# Patient Record
Sex: FEMALE | Race: WHITE | NOT HISPANIC OR LATINO | URBAN - METROPOLITAN AREA
[De-identification: names, ages, dates, MRNs, and addresses within clinical notes are randomized per-mention and may not be internally consistent; named-entity substitution may affect disease eponyms.]

---

## 2019-07-10 ENCOUNTER — EMERGENCY (EMERGENCY)
Facility: HOSPITAL | Age: 59
LOS: 1 days | Discharge: HOME | End: 2019-07-10
Attending: EMERGENCY MEDICINE
Payer: COMMERCIAL

## 2019-07-10 VITALS
TEMPERATURE: 98 F | RESPIRATION RATE: 16 BRPM | HEART RATE: 87 BPM | SYSTOLIC BLOOD PRESSURE: 91 MMHG | WEIGHT: 192.9 LBS | HEIGHT: 72 IN | DIASTOLIC BLOOD PRESSURE: 60 MMHG | OXYGEN SATURATION: 97 %

## 2019-07-10 PROBLEM — Z00.00 ENCOUNTER FOR PREVENTIVE HEALTH EXAMINATION: Status: ACTIVE | Noted: 2019-07-10

## 2019-07-10 PROCEDURE — 99284 EMERGENCY DEPT VISIT MOD MDM: CPT

## 2019-07-10 PROCEDURE — 73110 X-RAY EXAM OF WRIST: CPT | Mod: 26,RT

## 2019-07-10 PROCEDURE — 73090 X-RAY EXAM OF FOREARM: CPT | Mod: 26,RT

## 2019-07-10 RX ORDER — OXYCODONE HYDROCHLORIDE 5 MG/1
5 TABLET ORAL ONCE
Refills: 0 | Status: DISCONTINUED | OUTPATIENT
Start: 2019-07-10 | End: 2019-07-10

## 2019-07-10 RX ORDER — ACETAMINOPHEN 500 MG
975 TABLET ORAL ONCE
Refills: 0 | Status: COMPLETED | OUTPATIENT
Start: 2019-07-10 | End: 2019-07-10

## 2019-07-10 RX ADMIN — OXYCODONE HYDROCHLORIDE 5 MILLIGRAM(S): 5 TABLET ORAL at 21:39

## 2019-07-10 RX ADMIN — Medication 975 MILLIGRAM(S): at 23:21

## 2019-07-10 NOTE — ED ADULT NURSE NOTE - NSIMPLEMENTINTERV_GEN_ALL_ED
Implemented All Universal Safety Interventions:  [place] to call system. Call bell, personal items and telephone within reach. Instruct patient to call for assistance. Room bathroom lighting operational. Non-slip footwear when patient is off stretcher. Physically safe environment: no spills, clutter or unnecessary equipment. Stretcher in lowest position, wheels locked, appropriate side rails in place.

## 2019-07-10 NOTE — ED ADULT NURSE NOTE - CAS DISCH TRANSFER METHOD
Patient Instructions by Dung Castnao MD at 05/01/17 09:52 AM     Author:  Dung Castano MD Service:  (none) Author Type:  Physician     Filed:  05/01/17 09:52 AM Encounter Date:  5/1/2017 Status:  Signed     :  Dung Castano MD (Physician)            PATIENT INFORMATION    Sinus Infection (Sinusitis)    Definition   A sinus infection is a bacterial infection of one of the seven sinuses that normally drain into the nose.  Sinus congestion can occur without an infection if one of the sinus openings becomes blocked from a cold or hay fever.  As bacteria multiply within the sinuses, pain and pressure occur above the eyebrow, behind the eye, or over the cheekbone.  Other symptoms can include a profuse yellow nasal drainage, postnasal drip, a blocked nose, fever and bad breath.  Until recent years, we didn't recognize that a chronic cough can be caused by a sinus infection.  Swallowing sinus secretions is normal and harmless but may lead to some nausea.  Most sinus infections can be diagnosed without sinus x-ray studies.  The following treatment should reduce pain and fever within 48 hours or less.    Home Treatment    Antibiotics This medicine will kill bacteria that are causing the sinus infection.  Try not to forget any of the doses.  If your child goes to school or to a , arrange for someone to give the afternoon dose.  If the medicine is a liquid, use a measuring spoon so you can give the right amount.  Also, an antibiotic should not be saved from one illness to the next because it loses its strength.  Even though your child will feel better in a few days, give all the medicine to prevent the infection from flaring up.    Nasal Washes Use warm water or saline nose drops followed by suction or nose blowing to wash dried mucus or pus out of the nose.  Do nasal washes at least four times a day or whenever your child can't breath through the nose.  If the air in your home is dry, run a  humidifier.    Pain Relief Medication Acetaminophen or ibuprofen can be given for a few days for sinus pain or any fever over 102 F.    Oral Antihistamine If your child also has hay fever, give her allergy medicine.  Otherwise, avoid antihistamines because they can slow down the movement of secretions out of the sinuses.    Contagiousness Sinus infections are not contagious.  Your child can return to school or day care when she is feeling better and the fever is gone.      Call our office Immediately if  · Redness or swelling occurs on the cheek, eyelid, or forehead.  · Your child starts acting very sick.    Call our office within 24 hours if  · The fever or pain is not gone after your child has taken the antibiotic for 48 hours.  · You have other questions or concerns.    Taken from: MICHAEL Bermeo (1999).  Instructions for Pediatric Patients 2nd Edition.      Follow-Up  - If your symptoms worsen, please call.    Additional Educational Resources:  For additional resources regarding your symptoms, diagnosis, or further health information, please visit the Health Resources section on [email or web address] or the Online Health Resources section in [employer].        Revision History        User Key Date/Time User Provider Type Action    > [N/A] 05/01/17 09:52 AM Dung Castano MD Physician Sign             Private car

## 2019-07-10 NOTE — ED ADULT NURSE NOTE - OBJECTIVE STATEMENT
59 year old female A&OX4 presents s/p  trip and fall landing on an outstretched right hand. Patient went to urgent care and had xray performed which showed a right wrist fracture. Patient has sensation present. Limited strength.

## 2019-07-10 NOTE — ED ADULT NURSE REASSESSMENT NOTE - NS ED NURSE REASSESS COMMENT FT1
Report received from BRIDGET BHANDARI Pt resting with family at bedside, c/o pain to R arm. Awaiting radiology results. Safety maintained at all times, bed in lowest position, call bell in reach. Will continue to monitor closely.

## 2019-07-11 VITALS
SYSTOLIC BLOOD PRESSURE: 110 MMHG | TEMPERATURE: 98 F | OXYGEN SATURATION: 100 % | DIASTOLIC BLOOD PRESSURE: 62 MMHG | HEART RATE: 72 BPM | RESPIRATION RATE: 16 BRPM

## 2019-07-11 PROCEDURE — 73110 X-RAY EXAM OF WRIST: CPT

## 2019-07-11 PROCEDURE — 73090 X-RAY EXAM OF FOREARM: CPT

## 2019-07-11 PROCEDURE — 73110 X-RAY EXAM OF WRIST: CPT | Mod: 26,RT

## 2019-07-11 PROCEDURE — 99285 EMERGENCY DEPT VISIT HI MDM: CPT | Mod: 25

## 2019-07-11 PROCEDURE — 25605 CLTX DST RDL FX/EPHYS SEP W/: CPT | Mod: RT

## 2019-07-11 PROCEDURE — 99282 EMERGENCY DEPT VISIT SF MDM: CPT

## 2019-07-11 NOTE — ED PROVIDER NOTE - OBJECTIVE STATEMENT
59 y F h/o aortic stenosis and HTN c R wrist pain after mechanical trip and fall.  Was power washing her house, tripped, RUE FOOSH, no head/neck/chest/back trauma/pain.  Not on AC.  Able to stand afterwards.  Seen at urgent care center, found to have R distal radius fx, splinted there and told to f/u in ED.  Noted to have been hypotensive at urgent care center, states she took an additional tab of losartan earlier in the afternoon because she "felt her blood pressure was high." (stated symptom was facial warmth) but denied SOB/n/v/dizziness, CP, palps.  No paresthesias in RUE.  No pain Rx given at urgent care center, took aleve eaerlier in the day.  Denies ETOH, drug use, +smoking

## 2019-07-11 NOTE — ED PROVIDER NOTE - PHYSICAL EXAMINATION
GENERAL: AAOx4, GCS 15, NAD, no ETOH on breath, WDWN; HEENT: MMM, no jugular venous distension, supple neck, PERRLA, EOMI, nonicteric sclera; PULM: CTA B, no crackles/rubs/rales; CV: RRR, S1S2, no MRG; ABD: Flat abdomen, NTND, no R/G/R, no CVAT.  MSK: RUE -- obvious deformity noted at distal radius after splint taken down, normal skin, tenderness to palpation at site of deformity, distal extremity NVI.  VELIZ, +2 pulses x4;  NEURO: No obvious focal deficits; PSYCH: AAOx3, clear thought and normal sensorium.

## 2019-07-11 NOTE — ED PROVIDER NOTE - CLINICAL SUMMARY MEDICAL DECISION MAKING FREE TEXT BOX
R wrist pain/deformity after mechanical fall from standing height.  No n/v, no other trauma, sent by urgent care center for distal radius fx.  NVI, no breaks in the skin, no skin tenting or crepitation.  Remainder for primary/secondary surveys unremarkable.  Noted to have been hypotensive in urgent care center (80s/60s) but states had taken additional blood pressure medications this afternoon because she felt her bp was high.  110s/70s in ED, mentating and perfusing well.  No clinical intoxication, no AMS as per daughter.  NCAT, supple neck, nontender c/t/l throughout, lungs CTA, abd ntnd, b/l LE, LUE, WNL.   Will give pain Rx, XR, ortho for reduction/splint, reassess.  --BMM

## 2019-07-11 NOTE — CONSULT NOTE ADULT - SUBJECTIVE AND OBJECTIVE BOX
59yFemale presents to [facility] ED c/o severe R wrist pain s/p mechanical fall. Patient denies head hit or LOC. Localizing pain to distal radius. Denies radiation of pain. Pt denies numbness, tingling or burning. L Hand Dominant. Patient denies any other injuries.    PMH: aortic stenosis, HTN    PSH:  denies  AH: denies    Meds: See med rec    T(C): 36.7 (07-11-19 @ 01:32)  HR: 72 (07-11-19 @ 01:32)  BP: 110/62 (07-11-19 @ 01:32)  RR: 16 (07-11-19 @ 01:32)  SpO2: 100% (07-11-19 @ 01:32)  Wt(kg): --    PE R UE:  Skin intact, visible deformity of wrist, + soft tissue swelling, no ecchymosis; Decreased ROM of Wrist 2/2 pain. Normal Elbow/Shoulder ROM w/o pain. + TTP over DR/Ulna. + Rad Pulse 2+/4. SILT C5-T1, +AIN/PIN/Ulnar/Radial/Musc/Median, soft compartments;    L UE / B/L LE:  No bony TTP; Good ROM w/o pain. Able to SLR B/L. Exam Unremarkable.     Imaging:  XRay R Wrist  3 views of R Wrist demonstrates R distal radius fracture, intra-articular w/ posterior displacement of carpus/hand in relation to forearm. No other fx/dislocations noted.       Procedure Note:  After verbal consent obtained, ~ 10 cc of 1% Lidocaine injected into area around DR/Ulna as hematoma block. UE hung by fingers and reduction maneuver performed. Sugartong splint applied to Forearm/Wrist and mold held. LA XR obtained which show improved alignment of R DR Fracture. Pt NVI post procedure. Pt tolerated procedure well.    A/P: 59yFemale s/p Mech Fall w/ R Distal Radius Fracture  - Pain control  - Strict Ice/Elevation  - NWB R UE with splint and sling  - Keep splint clean/dry/intact;  - Encourage active finger motion to help with swelling  - Pt aware of possible need for surgical intervention of distal radius fracture. Will FU as outpatient  - Pt made aware of signs and symptoms of compartment syndrome. Aware of need to contact Doctor / Return to ED if symtoms arise.0  - All Pt's / Family Members questions answered, Pt/family understand plan.  - PATITO w/ Dr. George in 2-3 days.

## 2019-07-24 ENCOUNTER — APPOINTMENT (OUTPATIENT)
Dept: ORTHOPEDIC SURGERY | Facility: CLINIC | Age: 59
End: 2019-07-24
Payer: MEDICARE

## 2019-07-24 VITALS
HEART RATE: 61 BPM | SYSTOLIC BLOOD PRESSURE: 103 MMHG | HEIGHT: 71 IN | BODY MASS INDEX: 27.02 KG/M2 | WEIGHT: 193 LBS | DIASTOLIC BLOOD PRESSURE: 68 MMHG

## 2019-07-24 PROCEDURE — 73110 X-RAY EXAM OF WRIST: CPT | Mod: RT

## 2019-07-24 PROCEDURE — 99203 OFFICE O/P NEW LOW 30 MIN: CPT

## 2019-07-26 NOTE — DISCUSSION/SUMMARY
[de-identified] : AN extensive discussion is had w pt regarding conservative vs operative tx of this fracture.  Th loss of reduction is discussed.  She refuses surgery and wants to continue w the splint.  R/B/A are discussed of both tx modalities. We will see her back in 3 weeks

## 2019-07-26 NOTE — PHYSICAL EXAM
[de-identified] : RUE- IN sugar tong splint.  NVI [de-identified] : 3 views of the R wrist taken today and reviewed by me show a 3 part distal radius fx.  There is some loss of volar tilt from immediate post reduction films due to dorsal comminution.  There is currently neutral volar tilt and restoration of radial height

## 2019-08-21 ENCOUNTER — APPOINTMENT (OUTPATIENT)
Dept: ORTHOPEDIC SURGERY | Facility: CLINIC | Age: 59
End: 2019-08-21
Payer: MEDICARE

## 2019-08-21 DIAGNOSIS — S52.531D COLLES' FRACTURE OF RIGHT RADIUS, SUBSEQUENT ENCOUNTER FOR CLOSED FRACTURE WITH ROUTINE HEALING: ICD-10-CM

## 2019-08-21 PROCEDURE — 73110 X-RAY EXAM OF WRIST: CPT | Mod: RT

## 2019-08-21 PROCEDURE — 99213 OFFICE O/P EST LOW 20 MIN: CPT | Mod: 25

## 2019-08-21 PROCEDURE — 29075 APPL CST ELBW FNGR SHORT ARM: CPT | Mod: RT

## 2019-08-23 PROBLEM — S52.531D CLOSED COLLES' FRACTURE OF RIGHT RADIUS WITH ROUTINE HEALING, SUBSEQUENT ENCOUNTER: Status: ACTIVE | Noted: 2019-07-23

## 2019-09-11 ENCOUNTER — APPOINTMENT (OUTPATIENT)
Dept: ORTHOPEDIC SURGERY | Facility: CLINIC | Age: 59
End: 2019-09-11

## 2021-11-22 NOTE — ED ADULT NURSE NOTE - BREATH SOUNDS, MLM
Received surgical clearance from [facility] for patient to have a Colonoscopy scheduled on 12/20/21.     Upon chart review, clearance in JM last office visit note.   Letter drafted and faxed to [facility], received fax confirmation, scanned into [employer].    Clear

## 2022-05-26 ENCOUNTER — TRANSCRIPTION ENCOUNTER (OUTPATIENT)
Age: 62
End: 2022-05-26

## 2022-05-27 ENCOUNTER — INPATIENT (INPATIENT)
Facility: HOSPITAL | Age: 62
LOS: 3 days | Discharge: HOME | DRG: 158 | End: 2022-05-31
Attending: DENTIST | Admitting: DENTIST
Payer: MEDICARE

## 2022-05-27 ENCOUNTER — TRANSCRIPTION ENCOUNTER (OUTPATIENT)
Age: 62
End: 2022-05-27

## 2022-05-27 VITALS
HEART RATE: 65 BPM | HEIGHT: 72 IN | SYSTOLIC BLOOD PRESSURE: 130 MMHG | TEMPERATURE: 98 F | WEIGHT: 192.02 LBS | OXYGEN SATURATION: 95 % | RESPIRATION RATE: 20 BRPM | DIASTOLIC BLOOD PRESSURE: 62 MMHG

## 2022-05-27 DIAGNOSIS — K12.2 CELLULITIS AND ABSCESS OF MOUTH: ICD-10-CM

## 2022-05-27 LAB
ALBUMIN SERPL ELPH-MCNC: 3.9 G/DL — SIGNIFICANT CHANGE UP (ref 3.3–5)
ALP SERPL-CCNC: 77 U/L — SIGNIFICANT CHANGE UP (ref 40–120)
ALT FLD-CCNC: 12 U/L — SIGNIFICANT CHANGE UP (ref 10–45)
ANION GAP SERPL CALC-SCNC: 11 MMOL/L — SIGNIFICANT CHANGE UP (ref 5–17)
AST SERPL-CCNC: 10 U/L — SIGNIFICANT CHANGE UP (ref 10–40)
BASE EXCESS BLDV CALC-SCNC: 5.4 MMOL/L — HIGH (ref -2–2)
BASOPHILS # BLD AUTO: 0 K/UL — SIGNIFICANT CHANGE UP (ref 0–0.2)
BASOPHILS NFR BLD AUTO: 0 % — SIGNIFICANT CHANGE UP (ref 0–2)
BILIRUB SERPL-MCNC: 0.2 MG/DL — SIGNIFICANT CHANGE UP (ref 0.2–1.2)
BUN SERPL-MCNC: 20 MG/DL — SIGNIFICANT CHANGE UP (ref 7–23)
CA-I SERPL-SCNC: 1.34 MMOL/L — HIGH (ref 1.15–1.33)
CALCIUM SERPL-MCNC: 9.8 MG/DL — SIGNIFICANT CHANGE UP (ref 8.4–10.5)
CHLORIDE BLDV-SCNC: 99 MMOL/L — SIGNIFICANT CHANGE UP (ref 96–108)
CHLORIDE SERPL-SCNC: 97 MMOL/L — SIGNIFICANT CHANGE UP (ref 96–108)
CO2 BLDV-SCNC: 35 MMOL/L — HIGH (ref 22–26)
CO2 SERPL-SCNC: 29 MMOL/L — SIGNIFICANT CHANGE UP (ref 22–31)
CREAT SERPL-MCNC: 0.79 MG/DL — SIGNIFICANT CHANGE UP (ref 0.5–1.3)
EGFR: 85 ML/MIN/1.73M2 — SIGNIFICANT CHANGE UP
EOSINOPHIL # BLD AUTO: 0 K/UL — SIGNIFICANT CHANGE UP (ref 0–0.5)
EOSINOPHIL NFR BLD AUTO: 0 % — SIGNIFICANT CHANGE UP (ref 0–6)
FLUAV AG NPH QL: SIGNIFICANT CHANGE UP
FLUBV AG NPH QL: SIGNIFICANT CHANGE UP
GAS PNL BLDV: 133 MMOL/L — LOW (ref 136–145)
GAS PNL BLDV: SIGNIFICANT CHANGE UP
GAS PNL BLDV: SIGNIFICANT CHANGE UP
GLUCOSE BLDV-MCNC: 100 MG/DL — HIGH (ref 70–99)
GLUCOSE SERPL-MCNC: 101 MG/DL — HIGH (ref 70–99)
HCO3 BLDV-SCNC: 33 MMOL/L — HIGH (ref 22–29)
HCT VFR BLD CALC: 37.5 % — SIGNIFICANT CHANGE UP (ref 34.5–45)
HCT VFR BLDA CALC: 38 % — SIGNIFICANT CHANGE UP (ref 34.5–46.5)
HGB BLD CALC-MCNC: 12.8 G/DL — SIGNIFICANT CHANGE UP (ref 11.7–16.1)
HGB BLD-MCNC: 12.2 G/DL — SIGNIFICANT CHANGE UP (ref 11.5–15.5)
LACTATE BLDV-MCNC: 1.5 MMOL/L — SIGNIFICANT CHANGE UP (ref 0.7–2)
LYMPHOCYTES # BLD AUTO: 18.1 % — SIGNIFICANT CHANGE UP (ref 13–44)
LYMPHOCYTES # BLD AUTO: 2.41 K/UL — SIGNIFICANT CHANGE UP (ref 1–3.3)
MANUAL SMEAR VERIFICATION: SIGNIFICANT CHANGE UP
MCHC RBC-ENTMCNC: 30.9 PG — SIGNIFICANT CHANGE UP (ref 27–34)
MCHC RBC-ENTMCNC: 32.5 GM/DL — SIGNIFICANT CHANGE UP (ref 32–36)
MCV RBC AUTO: 94.9 FL — SIGNIFICANT CHANGE UP (ref 80–100)
MONOCYTES # BLD AUTO: 1.14 K/UL — HIGH (ref 0–0.9)
MONOCYTES NFR BLD AUTO: 8.6 % — SIGNIFICANT CHANGE UP (ref 2–14)
NEUTROPHILS # BLD AUTO: 9.62 K/UL — HIGH (ref 1.8–7.4)
NEUTROPHILS NFR BLD AUTO: 72.4 % — SIGNIFICANT CHANGE UP (ref 43–77)
PCO2 BLDV: 61 MMHG — HIGH (ref 39–42)
PH BLDV: 7.34 — SIGNIFICANT CHANGE UP (ref 7.32–7.43)
PLAT MORPH BLD: NORMAL — SIGNIFICANT CHANGE UP
PLATELET # BLD AUTO: 364 K/UL — SIGNIFICANT CHANGE UP (ref 150–400)
PO2 BLDV: 17 MMHG — LOW (ref 25–45)
POTASSIUM BLDV-SCNC: 4.4 MMOL/L — SIGNIFICANT CHANGE UP (ref 3.5–5.1)
POTASSIUM SERPL-MCNC: 4.1 MMOL/L — SIGNIFICANT CHANGE UP (ref 3.5–5.3)
POTASSIUM SERPL-SCNC: 4.1 MMOL/L — SIGNIFICANT CHANGE UP (ref 3.5–5.3)
PROT SERPL-MCNC: 7.8 G/DL — SIGNIFICANT CHANGE UP (ref 6–8.3)
RBC # BLD: 3.95 M/UL — SIGNIFICANT CHANGE UP (ref 3.8–5.2)
RBC # FLD: 14.2 % — SIGNIFICANT CHANGE UP (ref 10.3–14.5)
RBC BLD AUTO: SIGNIFICANT CHANGE UP
RSV RNA NPH QL NAA+NON-PROBE: SIGNIFICANT CHANGE UP
SAO2 % BLDV: 24.7 % — LOW (ref 67–88)
SARS-COV-2 RNA SPEC QL NAA+PROBE: SIGNIFICANT CHANGE UP
SODIUM SERPL-SCNC: 137 MMOL/L — SIGNIFICANT CHANGE UP (ref 135–145)
VARIANT LYMPHS # BLD: 0.9 % — SIGNIFICANT CHANGE UP (ref 0–6)
WBC # BLD: 13.29 K/UL — HIGH (ref 3.8–10.5)
WBC # FLD AUTO: 13.29 K/UL — HIGH (ref 3.8–10.5)

## 2022-05-27 PROCEDURE — 70491 CT SOFT TISSUE NECK W/DYE: CPT | Mod: 26,MA

## 2022-05-27 PROCEDURE — 99285 EMERGENCY DEPT VISIT HI MDM: CPT

## 2022-05-27 RX ORDER — LOSARTAN POTASSIUM 100 MG/1
100 TABLET, FILM COATED ORAL DAILY
Refills: 0 | Status: DISCONTINUED | OUTPATIENT
Start: 2022-05-28 | End: 2022-05-31

## 2022-05-27 RX ORDER — HYDROMORPHONE HYDROCHLORIDE 2 MG/ML
0.5 INJECTION INTRAMUSCULAR; INTRAVENOUS; SUBCUTANEOUS ONCE
Refills: 0 | Status: DISCONTINUED | OUTPATIENT
Start: 2022-05-27 | End: 2022-05-27

## 2022-05-27 RX ORDER — SODIUM CHLORIDE 9 MG/ML
1000 INJECTION INTRAMUSCULAR; INTRAVENOUS; SUBCUTANEOUS ONCE
Refills: 0 | Status: COMPLETED | OUTPATIENT
Start: 2022-05-27 | End: 2022-05-27

## 2022-05-27 RX ORDER — ONDANSETRON 8 MG/1
4 TABLET, FILM COATED ORAL ONCE
Refills: 0 | Status: DISCONTINUED | OUTPATIENT
Start: 2022-05-27 | End: 2022-05-27

## 2022-05-27 RX ORDER — ACETAMINOPHEN 500 MG
650 TABLET ORAL EVERY 6 HOURS
Refills: 0 | Status: DISCONTINUED | OUTPATIENT
Start: 2022-05-27 | End: 2022-05-27

## 2022-05-27 RX ORDER — SODIUM CHLORIDE 9 MG/ML
1000 INJECTION, SOLUTION INTRAVENOUS
Refills: 0 | Status: DISCONTINUED | OUTPATIENT
Start: 2022-05-27 | End: 2022-05-27

## 2022-05-27 RX ORDER — ENOXAPARIN SODIUM 100 MG/ML
40 INJECTION SUBCUTANEOUS EVERY 24 HOURS
Refills: 0 | Status: DISCONTINUED | OUTPATIENT
Start: 2022-05-28 | End: 2022-05-31

## 2022-05-27 RX ORDER — FENTANYL CITRATE 50 UG/ML
50 INJECTION INTRAVENOUS
Refills: 0 | Status: DISCONTINUED | OUTPATIENT
Start: 2022-05-27 | End: 2022-05-27

## 2022-05-27 RX ORDER — IBUPROFEN 200 MG
600 TABLET ORAL EVERY 6 HOURS
Refills: 0 | Status: DISCONTINUED | OUTPATIENT
Start: 2022-05-27 | End: 2022-05-27

## 2022-05-27 RX ORDER — SODIUM CHLORIDE 9 MG/ML
1000 INJECTION, SOLUTION INTRAVENOUS
Refills: 0 | Status: DISCONTINUED | OUTPATIENT
Start: 2022-05-27 | End: 2022-05-29

## 2022-05-27 RX ORDER — CLONAZEPAM 1 MG
2 TABLET ORAL
Refills: 0 | Status: DISCONTINUED | OUTPATIENT
Start: 2022-05-27 | End: 2022-05-28

## 2022-05-27 RX ORDER — ACETAMINOPHEN 500 MG
1000 TABLET ORAL ONCE
Refills: 0 | Status: COMPLETED | OUTPATIENT
Start: 2022-05-27 | End: 2022-05-27

## 2022-05-27 RX ORDER — AMPICILLIN SODIUM AND SULBACTAM SODIUM 250; 125 MG/ML; MG/ML
3 INJECTION, POWDER, FOR SUSPENSION INTRAMUSCULAR; INTRAVENOUS ONCE
Refills: 0 | Status: COMPLETED | OUTPATIENT
Start: 2022-05-27 | End: 2022-05-27

## 2022-05-27 RX ORDER — VANCOMYCIN HCL 1 G
1000 VIAL (EA) INTRAVENOUS ONCE
Refills: 0 | Status: COMPLETED | OUTPATIENT
Start: 2022-05-27 | End: 2022-05-27

## 2022-05-27 RX ORDER — OXYCODONE HYDROCHLORIDE 5 MG/1
20 TABLET ORAL
Refills: 0 | Status: DISCONTINUED | OUTPATIENT
Start: 2022-05-27 | End: 2022-05-31

## 2022-05-27 RX ORDER — ACETAMINOPHEN 500 MG
650 TABLET ORAL EVERY 6 HOURS
Refills: 0 | Status: DISCONTINUED | OUTPATIENT
Start: 2022-05-27 | End: 2022-05-29

## 2022-05-27 RX ORDER — MORPHINE SULFATE 50 MG/1
2 CAPSULE, EXTENDED RELEASE ORAL ONCE
Refills: 0 | Status: DISCONTINUED | OUTPATIENT
Start: 2022-05-27 | End: 2022-05-27

## 2022-05-27 RX ADMIN — SODIUM CHLORIDE 1000 MILLILITER(S): 9 INJECTION INTRAMUSCULAR; INTRAVENOUS; SUBCUTANEOUS at 12:33

## 2022-05-27 RX ADMIN — SODIUM CHLORIDE 75 MILLILITER(S): 9 INJECTION, SOLUTION INTRAVENOUS at 20:57

## 2022-05-27 RX ADMIN — Medication 1000 MILLIGRAM(S): at 14:30

## 2022-05-27 RX ADMIN — Medication 1000 MILLIGRAM(S): at 13:30

## 2022-05-27 RX ADMIN — AMPICILLIN SODIUM AND SULBACTAM SODIUM 3 GRAM(S): 250; 125 INJECTION, POWDER, FOR SUSPENSION INTRAMUSCULAR; INTRAVENOUS at 12:03

## 2022-05-27 RX ADMIN — SODIUM CHLORIDE 1000 MILLILITER(S): 9 INJECTION INTRAMUSCULAR; INTRAVENOUS; SUBCUTANEOUS at 14:17

## 2022-05-27 RX ADMIN — Medication 400 MILLIGRAM(S): at 14:15

## 2022-05-27 RX ADMIN — SODIUM CHLORIDE 1000 MILLILITER(S): 9 INJECTION INTRAMUSCULAR; INTRAVENOUS; SUBCUTANEOUS at 11:33

## 2022-05-27 RX ADMIN — AMPICILLIN SODIUM AND SULBACTAM SODIUM 200 GRAM(S): 250; 125 INJECTION, POWDER, FOR SUSPENSION INTRAMUSCULAR; INTRAVENOUS at 11:33

## 2022-05-27 RX ADMIN — HYDROMORPHONE HYDROCHLORIDE 0.5 MILLIGRAM(S): 2 INJECTION INTRAMUSCULAR; INTRAVENOUS; SUBCUTANEOUS at 21:08

## 2022-05-27 RX ADMIN — Medication 100 MILLIGRAM(S): at 16:06

## 2022-05-27 RX ADMIN — Medication 1000 MILLIGRAM(S): at 14:34

## 2022-05-27 RX ADMIN — Medication 250 MILLIGRAM(S): at 12:21

## 2022-05-27 RX ADMIN — HYDROMORPHONE HYDROCHLORIDE 0.5 MILLIGRAM(S): 2 INJECTION INTRAMUSCULAR; INTRAVENOUS; SUBCUTANEOUS at 21:30

## 2022-05-27 NOTE — ED ADULT NURSE NOTE - OBJECTIVE STATEMENT
1055 62 yr old WF c/o throat and neck swelling x 10 days since dental procedure, 2 dental implants 10 days ago. Has been on augmentin x 10 days. States allergic to PCN as a child caused  itching and swelling at that time. A&Ox4. No stridor. +Drooling. Lungs clear. denies SOB. States unable to eat all week and difficulty drinking fluids. Denies fever or chills.  evaluated pt. 1055 62 yr old WF c/o pain in teeth, throat and neck swelling x 10 days since dental procedure, 2 dental implants 10 days ago. Has been on augmentin x 10 days. States allergic to PCN as a child caused  itching and swelling at that time. A&Ox4. No stridor. +Drooling. Lungs clear. denies SOB. States unable to eat all week and difficulty drinking fluids. Denies fever or chills. Dr evaluated pt.

## 2022-05-27 NOTE — ED PROVIDER NOTE - NS ED ROS FT
CONST: no fevers, no chills  EYES: no pain, no vision changes  ENT: +submandibular edema, pain  CV: no chest pain, no leg swelling  RESP: +sob  ABD: no abdominal pain, no nausea, no vomiting, no diarrhea  : no dysuria, no flank pain, no hematuria  MSK: no back pain, no extremity pain  NEURO: no headache or additional neurologic complaints  SKIN:  no rash

## 2022-05-27 NOTE — ED ADULT NURSE REASSESSMENT NOTE - NS ED NURSE REASSESS COMMENT FT1
Pt received report @ 1430, pt lying in bed comfortably and denies any pain/discomfort. Pt made aware of plan of care for admission. Pt denies any difficulty breathing. VSS.

## 2022-05-27 NOTE — ED PROVIDER NOTE - ATTENDING CONTRIBUTION TO CARE
Pt s/p dental implants #29 and #31 2wks ago with almost immediate swelling of submandibular region, on antibiotics, currently Augmentin for 1 week, finished steroid pack, persistent pain and swelling.  NO stridor, +swelling submandibular, tender, swollen, clear lungs, RRR.  Spoke to pt's oral surgeon to obtain some of the history (oral surgeon does not come to hospital)

## 2022-05-27 NOTE — H&P ADULT - NSHPPHYSICALEXAM_GEN_ALL_CORE
Vital Signs Last 24 Hrs  T(C): 36.4 (27 May 2022 20:24), Max: 37.9 (27 May 2022 11:21)  T(F): 97.5 (27 May 2022 20:24), Max: 100.3 (27 May 2022 11:21)  HR: 69 (27 May 2022 20:29) (50 - 73)  BP: 140/109 (27 May 2022 20:29) (109/74 - 144/99)  BP(mean): 120 (27 May 2022 20:29) (93 - 120)  RR: 20 (27 May 2022 20:29) (18 - 20)  SpO2: 97% (27 May 2022 20:29) (94% - 99%)      AAOx3, NAD  Card: RRR  RESP: b/l chest rise, LCTAB,   HEENT: right submandibular/ submental edema, soft, warm, tender, (-) trismus  IOE: edentulous right melissa ridge with healing midcrestal incision, running vicryl suture present, mild right FOM edema, MP2, airway patent  Neuro: CN2-12 grossly intact

## 2022-05-27 NOTE — H&P ADULT - ASSESSMENT
Assessment/Plan: 62yF with right sublingual/ submental abscess,10 days s/p placement of implant 29,31 currently stable  -plan for I&D in OR with OMFS  -npo  -clinda  -covid test  -replete labs  -mIVF

## 2022-05-27 NOTE — H&P ADULT - HISTORY OF PRESENT ILLNESS
62F pt PMHx MVP, HTN, HLD presents to [facility] ED with CC of right lower jaw/chin swelling ~8 days. Pt had 2 dental implants placed at right mandible 10 days ago, developed significant swelling 2 days post-op, was placed on course of abx,and PO steroids, no improvement. Currently Reports right sided jaw pain and swelling, denies dyspnea, dysphagia, tolerating secretions, limited PO intake. In ED, WBC 13, HD stable, afebrile.

## 2022-05-27 NOTE — BRIEF OPERATIVE NOTE - OPERATION/FINDINGS
Incision and drainage of submental abscess via skin incision. Placement of 1 penrose drain. Extraction of tooth # 28

## 2022-05-27 NOTE — ED ADULT TRIAGE NOTE - CHIEF COMPLAINT QUOTE
pt states 2 dental implants placed in LR quad placed 5/17 swelling started right away pt was put on Augmentin and steroid pack with no relief of symptoms pt states difficulty swallowing

## 2022-05-27 NOTE — ED PROVIDER NOTE - CLINICAL SUMMARY MEDICAL DECISION MAKING FREE TEXT BOX
61yo female pt who presents to ED for submandibular edema, pain since dental procedure. Has been taking augmentin, medrol packs w no improvement. On exam found with submandibular induration and ttp. Will assess with imaging, labs. Will begin IV abx. Most likely tiara angina

## 2022-05-27 NOTE — ED ADULT NURSE NOTE - IN THE PAST 12 MONTHS HAVE YOU USED DRUGS OTHER THAN THOSE REQUIRED FOR MEDICAL REASON?
Problem: Patient Care Overview  Goal: Plan of Care Review  Outcome: Ongoing (interventions implemented as appropriate)   08/04/18 0256   Coping/Psychosocial   Plan of Care Reviewed With patient   OTHER   Outcome Summary Patient VSS, working on pain management, communicates by writing or mouthing words, up to bedside commode with assist x1   Plan of Care Review   Progress no change     Goal: Individualization and Mutuality  Outcome: Ongoing (interventions implemented as appropriate)    Goal: Discharge Needs Assessment  Outcome: Ongoing (interventions implemented as appropriate)      Problem: Fall Risk (Adult)  Goal: Identify Related Risk Factors and Signs and Symptoms  Outcome: Ongoing (interventions implemented as appropriate)    Goal: Absence of Fall  Outcome: Ongoing (interventions implemented as appropriate)      Problem: Skin Injury Risk (Adult)  Goal: Identify Related Risk Factors and Signs and Symptoms  Outcome: Ongoing (interventions implemented as appropriate)    Goal: Skin Health and Integrity  Outcome: Ongoing (interventions implemented as appropriate)      Problem: Pain, Acute (Adult)  Goal: Identify Related Risk Factors and Signs and Symptoms  Outcome: Ongoing (interventions implemented as appropriate)    Goal: Acceptable Pain Control/Comfort Level  Outcome: Ongoing (interventions implemented as appropriate)      Problem: Nutrition, Enteral (Adult)  Goal: Signs and Symptoms of Listed Potential Problems Will be Absent, Minimized or Managed (Nutrition, Enteral)  Outcome: Ongoing (interventions implemented as appropriate)         No

## 2022-05-27 NOTE — ED PROVIDER NOTE - PHYSICAL EXAMINATION
GENERAL: Awake, alert, NAD  HEENT: NC/AT, moist mucous membranes. no lymphadenopathy palpated. submandibular induration and ttp.  LUNGS: CTAB, no wheezes or crackles   CARDIAC: RRR, no m/r/g  ABDOMEN: Soft, non tender, non distended   BACK: No midline spinal tenderness, no CVA tenderness  EXT: No edema, no calf tenderness  NEURO: A&Ox3. Moving all extremities.

## 2022-05-27 NOTE — H&P ADULT - NSHPREVIEWOFSYSTEMS_GEN_ALL_CORE
*SOCIAL HISTORY: Denies ETOH use, Tobacco, drugs    * Review of Systems: (-) fever, chills,  (-) LOC,  (-) Nausea/vomiting/headache.   (-), SOB, cough.  (-) palpitations. (-) blurred vision/double vision.  (-) dysphagia, dyspnea.  (-) paresthesia.

## 2022-05-27 NOTE — ED PROVIDER NOTE - OBJECTIVE STATEMENT
63yo female pt PMHx MVP, HTN, HLD who presents to ED for worsening submandibular edema, pain since 2 weeks ago. Patient had dental procedure/dental implants removed 2 weeks ago. Has been taking augmenting, motrin and medrol for edema and pain with minimal improvement. Dentist referred patient to ED for Sonido angina suspicion. Denies f/c, n/v/d, abd pain. Does refer worsening pain with swallowing, eating. Experienced some SOB yesterday night.

## 2022-05-28 LAB
ANION GAP SERPL CALC-SCNC: 15 MMOL/L — SIGNIFICANT CHANGE UP (ref 5–17)
BUN SERPL-MCNC: 19 MG/DL — SIGNIFICANT CHANGE UP (ref 7–23)
CALCIUM SERPL-MCNC: 9 MG/DL — SIGNIFICANT CHANGE UP (ref 8.4–10.5)
CHLORIDE SERPL-SCNC: 98 MMOL/L — SIGNIFICANT CHANGE UP (ref 96–108)
CO2 SERPL-SCNC: 23 MMOL/L — SIGNIFICANT CHANGE UP (ref 22–31)
CREAT SERPL-MCNC: 0.83 MG/DL — SIGNIFICANT CHANGE UP (ref 0.5–1.3)
EGFR: 80 ML/MIN/1.73M2 — SIGNIFICANT CHANGE UP
GLUCOSE SERPL-MCNC: 129 MG/DL — HIGH (ref 70–99)
HCT VFR BLD CALC: 33.3 % — LOW (ref 34.5–45)
HCV AB S/CO SERPL IA: 0.16 S/CO — SIGNIFICANT CHANGE UP (ref 0–0.99)
HCV AB SERPL-IMP: SIGNIFICANT CHANGE UP
HGB BLD-MCNC: 10.7 G/DL — LOW (ref 11.5–15.5)
MCHC RBC-ENTMCNC: 30.4 PG — SIGNIFICANT CHANGE UP (ref 27–34)
MCHC RBC-ENTMCNC: 32.1 GM/DL — SIGNIFICANT CHANGE UP (ref 32–36)
MCV RBC AUTO: 94.6 FL — SIGNIFICANT CHANGE UP (ref 80–100)
NRBC # BLD: 0 /100 WBCS — SIGNIFICANT CHANGE UP (ref 0–0)
PLATELET # BLD AUTO: 316 K/UL — SIGNIFICANT CHANGE UP (ref 150–400)
POTASSIUM SERPL-MCNC: 4.3 MMOL/L — SIGNIFICANT CHANGE UP (ref 3.5–5.3)
POTASSIUM SERPL-SCNC: 4.3 MMOL/L — SIGNIFICANT CHANGE UP (ref 3.5–5.3)
RBC # BLD: 3.52 M/UL — LOW (ref 3.8–5.2)
RBC # FLD: 14.3 % — SIGNIFICANT CHANGE UP (ref 10.3–14.5)
SODIUM SERPL-SCNC: 136 MMOL/L — SIGNIFICANT CHANGE UP (ref 135–145)
WBC # BLD: 11.79 K/UL — HIGH (ref 3.8–10.5)
WBC # FLD AUTO: 11.79 K/UL — HIGH (ref 3.8–10.5)

## 2022-05-28 PROCEDURE — 99222 1ST HOSP IP/OBS MODERATE 55: CPT

## 2022-05-28 RX ORDER — AMPICILLIN SODIUM AND SULBACTAM SODIUM 250; 125 MG/ML; MG/ML
3 INJECTION, POWDER, FOR SUSPENSION INTRAMUSCULAR; INTRAVENOUS EVERY 6 HOURS
Refills: 0 | Status: DISCONTINUED | OUTPATIENT
Start: 2022-05-28 | End: 2022-05-31

## 2022-05-28 RX ORDER — AMPICILLIN SODIUM AND SULBACTAM SODIUM 250; 125 MG/ML; MG/ML
INJECTION, POWDER, FOR SUSPENSION INTRAMUSCULAR; INTRAVENOUS
Refills: 0 | Status: DISCONTINUED | OUTPATIENT
Start: 2022-05-28 | End: 2022-05-31

## 2022-05-28 RX ORDER — CLONAZEPAM 1 MG
2 TABLET ORAL THREE TIMES A DAY
Refills: 0 | Status: DISCONTINUED | OUTPATIENT
Start: 2022-05-28 | End: 2022-05-31

## 2022-05-28 RX ORDER — AMPICILLIN SODIUM AND SULBACTAM SODIUM 250; 125 MG/ML; MG/ML
3 INJECTION, POWDER, FOR SUSPENSION INTRAMUSCULAR; INTRAVENOUS ONCE
Refills: 0 | Status: COMPLETED | OUTPATIENT
Start: 2022-05-28 | End: 2022-05-28

## 2022-05-28 RX ADMIN — Medication 2 MILLIGRAM(S): at 15:02

## 2022-05-28 RX ADMIN — OXYCODONE HYDROCHLORIDE 20 MILLIGRAM(S): 5 TABLET ORAL at 23:16

## 2022-05-28 RX ADMIN — OXYCODONE HYDROCHLORIDE 20 MILLIGRAM(S): 5 TABLET ORAL at 12:13

## 2022-05-28 RX ADMIN — AMPICILLIN SODIUM AND SULBACTAM SODIUM 200 GRAM(S): 250; 125 INJECTION, POWDER, FOR SUSPENSION INTRAMUSCULAR; INTRAVENOUS at 12:35

## 2022-05-28 RX ADMIN — OXYCODONE HYDROCHLORIDE 20 MILLIGRAM(S): 5 TABLET ORAL at 18:03

## 2022-05-28 RX ADMIN — OXYCODONE HYDROCHLORIDE 20 MILLIGRAM(S): 5 TABLET ORAL at 02:04

## 2022-05-28 RX ADMIN — OXYCODONE HYDROCHLORIDE 20 MILLIGRAM(S): 5 TABLET ORAL at 17:33

## 2022-05-28 RX ADMIN — AMPICILLIN SODIUM AND SULBACTAM SODIUM 200 GRAM(S): 250; 125 INJECTION, POWDER, FOR SUSPENSION INTRAMUSCULAR; INTRAVENOUS at 17:32

## 2022-05-28 RX ADMIN — AMPICILLIN SODIUM AND SULBACTAM SODIUM 200 GRAM(S): 250; 125 INJECTION, POWDER, FOR SUSPENSION INTRAMUSCULAR; INTRAVENOUS at 23:41

## 2022-05-28 RX ADMIN — ENOXAPARIN SODIUM 40 MILLIGRAM(S): 100 INJECTION SUBCUTANEOUS at 05:48

## 2022-05-28 RX ADMIN — OXYCODONE HYDROCHLORIDE 20 MILLIGRAM(S): 5 TABLET ORAL at 01:34

## 2022-05-28 RX ADMIN — OXYCODONE HYDROCHLORIDE 20 MILLIGRAM(S): 5 TABLET ORAL at 07:56

## 2022-05-28 RX ADMIN — OXYCODONE HYDROCHLORIDE 20 MILLIGRAM(S): 5 TABLET ORAL at 11:43

## 2022-05-28 RX ADMIN — OXYCODONE HYDROCHLORIDE 20 MILLIGRAM(S): 5 TABLET ORAL at 07:26

## 2022-05-28 RX ADMIN — Medication 2 MILLIGRAM(S): at 23:15

## 2022-05-28 RX ADMIN — Medication 2 MILLIGRAM(S): at 08:10

## 2022-05-28 RX ADMIN — Medication 100 MILLIGRAM(S): at 03:44

## 2022-05-28 RX ADMIN — LOSARTAN POTASSIUM 100 MILLIGRAM(S): 100 TABLET, FILM COATED ORAL at 05:48

## 2022-05-28 NOTE — PROVIDER CONTACT NOTE (MEDICATION) - RECOMMENDATIONS
Contact provider, assess patient. Contact provider, assess patient; Patient educated to avoid self-medicating w/ home meds while in hospital; Please notify RN if in pain

## 2022-05-28 NOTE — PROVIDER CONTACT NOTE (OTHER) - SITUATION
Patient Bradycardic in the Hypoxemic, Tele, Pulse Ox, and medication order Patient Bradycardic and hypoxic O2 saturation 80's Pt Bradycardic and hypoxic O2 saturation in 80's

## 2022-05-28 NOTE — CONSULT NOTE ADULT - ATTENDING COMMENTS
62 f with HTN, HLD, MVP, s/p 2 dental implant placement on 5/17 and developed edema and pain 2 days later and was given augmentin and prednisone with no improvement   here T: 100.3, WBC: 13  CT showed large abscess in submandibular area  S/p I&D with penrose drain placement, tooth extraction #28  Abscess Cx grew GPC in pairs and GVRs    fever, leukocytosis, submandibular abscess due to dental infection s/o implant 5/17 but also had another tooth infection s/p I&D and tooth extraction    * f/u the abscess cx  * discontinue clinda and start unasyn  * MRI to r/o osteo    The above assessment and plan was discussed with the primary team    Elizabeth Diaz MD  contact on teams  After 5pm and on weekends call [phone]

## 2022-05-28 NOTE — PROVIDER CONTACT NOTE (OTHER) - ACTION/TREATMENT ORDERED:
Bradycardic in the mid 50's, Pulse ox drops to low 80's, requested order to be placed for Tele and continuous Pulse Ox. Pt denies pain or respiratory distress. Verified medication order with MD. MD Levin made aware. Remote tele/cpox ordered. No other interventions ordered at this time. Will continue to monitor.

## 2022-05-28 NOTE — PATIENT PROFILE ADULT - NSVAPING NUMBER OF YRS_GEN_A_CORE_NU
Anesthesia Consult and Med Hx


Date of service: 03/18/18





- Airway


Anesthetic Teeth Evaluation: Good


ROM Head & Neck: Adequate


Mental/Hyoid Distance: Adequate


Mallampati Class: Class III


Intubation Access Assessment: Possibly Difficult





- Pulmonary Exam


CTA: Yes





- Cardiac Exam


Cardiac Exam: RRR





- Pre-Operative Health Status


ASA Pre-Surgery Classification: ASA2


Proposed Anesthetic Plan: Epidural, Spinal





- Pulmonary


Hx Smoking: No


Hx Asthma: Yes (over 4 months ago)


COPD: No


Hx Pneumonia: No





- Cardiovascular System


Hx Hypertension: No





- Central Nervous System


Hx Seizures: No


Hx Psychiatric Problems: No





- Endocrine


Hx Renal Disease: No


Hx End Stage Renal Disease: No


Hx Hypothyroidism: No


Hx Hyperthyroidism: No





- Hematic


Hx Anemia: Yes


Hx Sickle Cell Disease: No





- Other Systems


Hx Alcohol Use: No
2

## 2022-05-28 NOTE — PROVIDER CONTACT NOTE (OTHER) - RECOMMENDATIONS
Maintain HOB 35-45 degrees. Continuous Pulse ox and Tele, EKG, Make provider aware. have pt on tele/cpox. EKG Make provider aware. Have pt on tele/cpox. EKG

## 2022-05-28 NOTE — CONSULT NOTE ADULT - ASSESSMENT
62F pt PMHx MVP, HTN, HLD presents to [facility] ED with CC of right lower jaw/chin swelling and pain    ID is consulted for submandibular abscess  Had 2 dental implant placed #29 and #31 on 5/17, immediately started having pain and worsening swelling  CT showed large abscess in submandibular area  S/p I&D with penrose drain placement, tooth extraction #28  Low grade fever on admission with leukocytosis  Abscess Cx grew GPC in pairs and GVRs    Source control is achieved with I&D and drain placement  However it is unsure if the implants are infected due to the infected tooth was in close proximity  Will need MRI to rule out underlying OM as it will change the duration of therapy and management      IMPRESSION:  Submandibular abscess  Tooth infection  Leukocytosis    RECOMMENDATIONS:  - D/C clindamycin, start Unasyn 3gm q6hrs for now  - MR maxillofacial w/wo immanuel to rule out OM  - Follow up abscess culture  - ENT follow up  - Trend WBC, fever curve, transaminases, creatinine daily  - Will continue to follow      Patient is seen and examined with attending and case is discussed with primary team      Mandy Lorenzo, DO  PGY4 ID fellow  Please contact me via page or text through [employer] Teams  If after 5PM or on weekends, please call [phone]

## 2022-05-28 NOTE — PROVIDER CONTACT NOTE (MEDICATION) - REASON
Patient took home medication: Advil 200 mg Patient took home medication: Advil 200 mg at 2020 5/28/21

## 2022-05-28 NOTE — PROVIDER CONTACT NOTE (OTHER) - ASSESSMENT
Pt AOx4, pt afebrile, /85. HOB elevated and O2 became 92%. Pt asymptomatic. No signs of respiratory distress. Pt denies chest pain. Pt AOx4, pt afebrile, /85. Elevated HOB O2 became 92%. Pt asymptomatic, no s/s of respiratory distress or chest pain.

## 2022-05-28 NOTE — PROVIDER CONTACT NOTE (MEDICATION) - BACKGROUND
Pt admitted for jaw swelling with pain and decrease oral intake. Pt admitted for jaw swelling with pain and decrease oral intake. S/p I&D submental abscess.

## 2022-05-28 NOTE — PATIENT PROFILE ADULT - FALL HARM RISK - HARM RISK INTERVENTIONS
Assistance with ambulation/Communicate Risk of Fall with Harm to all staff/Monitor gait and stability/Provide patient with walking aids - walker, cane, crutches/Reinforce activity limits and safety measures with patient and family/Review medications for side effects contributing to fall risk/Sit up slowly, dangle for a short time, stand at bedside before walking/Tailored Fall Risk Interventions/Toileting schedule using arm’s reach rule for commode and bathroom/Use of alarms - bed, chair and/or voice tab/Visual Cue: Yellow wristband and red socks/Bed in lowest position, wheels locked, appropriate side rails in place/Call bell, personal items and telephone in reach/Instruct patient to call for assistance before getting out of bed or chair/Non-slip footwear when patient is out of bed/[place] to call system/Physically safe environment - no spills, clutter or unnecessary equipment/Purposeful Proactive Rounding/Room/bathroom lighting operational, light cord in reach

## 2022-05-28 NOTE — CONSULT NOTE ADULT - SUBJECTIVE AND OBJECTIVE BOX
INFECTIOUS DISEASE CONSULT NOTE    Patient is a 62y old  Female who presents with a chief complaint of Submental infection (28 May 2022 08:28)    HPI:  62F pt PMHx MVP, HTN, HLD presents to [facility] ED with CC of right lower jaw/chin swelling ~8 days. Pt had 2 dental implants placed at right mandible 10 days ago, developed significant swelling 2 days post-op, was placed on course of abx,and PO steroids, no improvement. Currently Reports right sided jaw pain and swelling, denies dyspnea, dysphagia, tolerating secretions, limited PO intake. In ED, WBC 13, HD stable, afebrile. (27 May 2022 20:29)       REVIEW OF SYSTEMS:  CONSTITUTIONAL: + fever  HEENT: + jaw pain and swelling  RESPIRATORY: No cough, no shortness of breath  CARDIOVASCULAR: No chest pain or palpitations  GASTROINTESTINAL: No abdominal or epigastric pain  GENITOURINARY: No dysuria  NEUROLOGICAL: No headache/dizziness  MSK: No joint pain, erythema, or swelling; no back pain  SKIN: No itching, rashes  All other ROS negative except noted above    Prior hospital charts reviewed [Yes]  Primary team notes reviewed [Yes]  Other consultant notes reviewed [Yes]    PAST MEDICAL & SURGICAL HISTORY:  HTN (hypertension)      Mitral valve prolapse      HLD (hyperlipidemia)          SOCIAL HISTORY:  - Lives with   - No recent travel  - Denies tobacco use  - Denies alcohol use  - Denies illicit drug use    FAMILY HISTORY:  Father had DM  Mother had MVP    Allergies:  No Known Allergies      ANTIMICROBIALS:  ampicillin/sulbactam  IVPB    ampicillin/sulbactam  IVPB 3 every 6 hours      ANTIMICROBIALS (past 90 days):  MEDICATIONS  (STANDING):  ampicillin/sulbactam  IVPB   200 mL/Hr IV Intermittent (05-27-22 @ 11:33)    ampicillin/sulbactam  IVPB   200 mL/Hr IV Intermittent (05-28-22 @ 12:35)    ampicillin/sulbactam  IVPB   200 mL/Hr IV Intermittent (05-28-22 @ 17:32)    clindamycin IVPB   100 mL/Hr IV Intermittent (05-28-22 @ 03:44)    clindamycin IVPB   100 mL/Hr IV Intermittent (05-27-22 @ 16:06)    vancomycin  IVPB.   250 mL/Hr IV Intermittent (05-27-22 @ 12:21)        OTHER MEDS:   MEDICATIONS  (STANDING):  acetaminophen     Tablet .. 650 every 6 hours PRN  clonazePAM Oral Disintegrating Tablet 2 three times a day PRN  enoxaparin Injectable 40 every 24 hours  losartan 100 daily  oxyCODONE    IR 20 four times a day PRN      VITALS:  Vital Signs Last 24 Hrs  T(F): 97.7 (05-28-22 @ 16:50), Max: 100.3 (05-27-22 @ 11:21)    Vital Signs Last 24 Hrs  HR: 71 (05-28-22 @ 16:50) (50 - 73)  BP: 101/59 (05-28-22 @ 16:50) (90/56 - 151/84)  RR: 18 (05-28-22 @ 16:50)  SpO2: 94% (05-28-22 @ 16:50) (92% - 99%)  Wt(kg): --    EXAM:  GENERAL: NAD, lying in bed comfortably  HEAD: No head lesions  EYES: Conjunctiva pink and cornea white  ENT: Normal external ears and nose, no discharges; moist mucous membranes; swollen jaw and submandibular area. Dressing is clean and intact; tender to palpation  NECK: Supple, nontender to palpation; no JVD  CHEST/LUNG: Clear to auscultation bilaterally  HEART: S1 S2  ABDOMEN: Soft, nontender, nondistended; normoactive bowel sounds  EXTREMITIES: No clubbing, cyanosis, or petal edema  NERVOUS SYSTEM: Alert and oriented to person, time, place and situation, speech clear. No focal deficits   MSK: No joint erythema, swelling or pain  SKIN: No rashes or lesions, no superficial thrombophlebitis    Labs:                        10.7   11.79 )-----------( 316      ( 28 May 2022 07:11 )             33.3     05-28    136  |  98  |  19  ----------------------------<  129<H>  4.3   |  23  |  0.83    Ca    9.0      28 May 2022 07:10    TPro  7.8  /  Alb  3.9  /  TBili  0.2  /  DBili  x   /  AST  10  /  ALT  12  /  AlkPhos  77  05-27      WBC Trend:  WBC Count: 11.79 (05-28-22 @ 07:11)  WBC Count: 13.29 (05-27-22 @ 11:28)      Auto Neutrophil #: 9.62 K/uL (05-27-22 @ 11:28)      Creatine Trend:  Creatinine, Serum: 0.83 (05-28)  Creatinine, Serum: 0.79 (05-27)      Liver Biochemical Testing Trend:  Alanine Aminotransferase (ALT/SGPT): 12 (05-27)  Aspartate Aminotransferase (AST/SGOT): 10 (05-27-22 @ 11:28)  Bilirubin Total, Serum: 0.2 (05-27)      Trend LDH      Auto Eosinophil %: 0.0 % (05-27-22 @ 11:28)          MICROBIOLOGY:        Culture - Tissue with Gram Stain (collected 27 May 2022 22:24)  Source: .Tissue submental abscess    Blood Gas Venous - Lactate: 1.5 (05-27 @ 11:05)        RADIOLOGY:  imaging below personally reviewed    < from: CT Neck Soft Tissue w/ IV Cont (05.27.22 @ 12:09) >  IMPRESSION:  Floor of mouth abscess measuring up to 5.6 cm with surrounding   cellulitis. Correlate with history of recent dental work. Dental implants   noted within the bilateral mandible and periapical lucency involving the   right maxillary premolar tooth.    Reactive cervical lymphadenopathy.    Possible left maxillary sinusitis. Cannot rule out contribution from   maxillary periodontal disease.    < end of copied text >   INFECTIOUS DISEASE CONSULT NOTE    Patient is a 62y old  Female who presents with a chief complaint of Submental infection (28 May 2022 08:28)    HPI:  62F pt PMHx MVP, HTN, HLD presents to [facility] ED with CC of right lower jaw/chin swelling ~8 days. Pt had 2 dental implants placed at right mandible 10 days ago, developed significant swelling 2 days post-op, was placed on course of abx,and PO steroids, no improvement. Currently Reports right sided jaw pain and swelling, denies dyspnea, dysphagia, tolerating secretions, limited PO intake. In ED, WBC 13, HD stable, afebrile. (27 May 2022 20:29)       REVIEW OF SYSTEMS:  CONSTITUTIONAL: + fever  HEENT: + jaw pain and swelling  RESPIRATORY: No cough, no shortness of breath  CARDIOVASCULAR: No chest pain or palpitations  GASTROINTESTINAL: No abdominal or epigastric pain  GENITOURINARY: No dysuria  NEUROLOGICAL: No headache/dizziness  MSK: No joint pain, erythema, or swelling; no back pain  SKIN: No itching, rashes  All other ROS negative except noted above    Prior hospital charts reviewed [Yes]  Primary team notes reviewed [Yes]  Other consultant notes reviewed [Yes]    PAST MEDICAL & SURGICAL HISTORY:  HTN (hypertension)      Mitral valve prolapse      HLD (hyperlipidemia)          SOCIAL HISTORY:  - Lives with   - No recent travel  - Denies tobacco use  - Denies alcohol use  - Denies illicit drug use    FAMILY HISTORY:  Father had DM  Mother had MVP    Allergies:  No Known Allergies      ANTIMICROBIALS:  ampicillin/sulbactam  IVPB    ampicillin/sulbactam  IVPB 3 every 6 hours      ANTIMICROBIALS (past 90 days):  MEDICATIONS  (STANDING):  ampicillin/sulbactam  IVPB   200 mL/Hr IV Intermittent (05-27-22 @ 11:33)    ampicillin/sulbactam  IVPB   200 mL/Hr IV Intermittent (05-28-22 @ 12:35)    ampicillin/sulbactam  IVPB   200 mL/Hr IV Intermittent (05-28-22 @ 17:32)    clindamycin IVPB   100 mL/Hr IV Intermittent (05-28-22 @ 03:44)    clindamycin IVPB   100 mL/Hr IV Intermittent (05-27-22 @ 16:06)    vancomycin  IVPB.   250 mL/Hr IV Intermittent (05-27-22 @ 12:21)        OTHER MEDS:   MEDICATIONS  (STANDING):  acetaminophen     Tablet .. 650 every 6 hours PRN  clonazePAM Oral Disintegrating Tablet 2 three times a day PRN  enoxaparin Injectable 40 every 24 hours  losartan 100 daily  oxyCODONE    IR 20 four times a day PRN      VITALS:  Vital Signs Last 24 Hrs  T(F): 97.7 (05-28-22 @ 16:50), Max: 100.3 (05-27-22 @ 11:21)    Vital Signs Last 24 Hrs  HR: 71 (05-28-22 @ 16:50) (50 - 73)  BP: 101/59 (05-28-22 @ 16:50) (90/56 - 151/84)  RR: 18 (05-28-22 @ 16:50)  SpO2: 94% (05-28-22 @ 16:50) (92% - 99%)  Wt(kg): --    EXAM:  GENERAL: NAD, lying in bed comfortably  HEAD: No head lesions  EYES: Conjunctiva pink and cornea white  ENT: Normal external ears and nose, no discharges; moist mucous membranes; swollen jaw and submandibular area. Dressing is clean and intact; tender to palpation  NECK: Supple, nontender to palpation; no JVD  CHEST/LUNG: Clear to auscultation bilaterally  HEART: S1 S2  ABDOMEN: Soft, nontender, nondistended; normoactive bowel sounds  : no suprapubic or CVA tenderness  EXTREMITIES: No clubbing, cyanosis, or petal edema  NERVOUS SYSTEM: Alert and oriented to person, time, place and situation, speech clear. No focal deficits   MSK: No joint erythema, swelling or pain  SKIN: No rashes or lesions, no superficial thrombophlebitis  Psych: normal affect  Labs:                        10.7   11.79 )-----------( 316      ( 28 May 2022 07:11 )             33.3     05-28    136  |  98  |  19  ----------------------------<  129<H>  4.3   |  23  |  0.83    Ca    9.0      28 May 2022 07:10    TPro  7.8  /  Alb  3.9  /  TBili  0.2  /  DBili  x   /  AST  10  /  ALT  12  /  AlkPhos  77  05-27      WBC Trend:  WBC Count: 11.79 (05-28-22 @ 07:11)  WBC Count: 13.29 (05-27-22 @ 11:28)      Auto Neutrophil #: 9.62 K/uL (05-27-22 @ 11:28)      Creatine Trend:  Creatinine, Serum: 0.83 (05-28)  Creatinine, Serum: 0.79 (05-27)      Liver Biochemical Testing Trend:  Alanine Aminotransferase (ALT/SGPT): 12 (05-27)  Aspartate Aminotransferase (AST/SGOT): 10 (05-27-22 @ 11:28)  Bilirubin Total, Serum: 0.2 (05-27)      Trend LDH      Auto Eosinophil %: 0.0 % (05-27-22 @ 11:28)          MICROBIOLOGY:        Culture - Tissue with Gram Stain (collected 27 May 2022 22:24)  Source: .Tissue submental abscess    Blood Gas Venous - Lactate: 1.5 (05-27 @ 11:05)        RADIOLOGY:  imaging below personally reviewed    < from: CT Neck Soft Tissue w/ IV Cont (05.27.22 @ 12:09) >  IMPRESSION:  Floor of mouth abscess measuring up to 5.6 cm with surrounding   cellulitis. Correlate with history of recent dental work. Dental implants   noted within the bilateral mandible and periapical lucency involving the   right maxillary premolar tooth.    Reactive cervical lymphadenopathy.    Possible left maxillary sinusitis. Cannot rule out contribution from   maxillary periodontal disease.    < end of copied text >

## 2022-05-28 NOTE — PROVIDER CONTACT NOTE (OTHER) - REASON
Patient Bradycardic, Hypoxemic, Tele, Pulse Ox, and medication order Patient Bradycardic and hypoxic O2 saturation 80's Pt bradycardic and hypoxic O2 sat.'s in 80's No

## 2022-05-28 NOTE — PROVIDER CONTACT NOTE (OTHER) - BACKGROUND
Patient admitted for cellulitis and abscess in mouth. PMH HTN, HLD, and mitral valve prolapse. Pt admitted for cellulitis and abscess of mouth. PMH- HTN, HLD, and mitral valve prolapse.

## 2022-05-29 LAB
ANION GAP SERPL CALC-SCNC: 12 MMOL/L — SIGNIFICANT CHANGE UP (ref 5–17)
BUN SERPL-MCNC: 20 MG/DL — SIGNIFICANT CHANGE UP (ref 7–23)
CALCIUM SERPL-MCNC: 9.1 MG/DL — SIGNIFICANT CHANGE UP (ref 8.4–10.5)
CHLORIDE SERPL-SCNC: 100 MMOL/L — SIGNIFICANT CHANGE UP (ref 96–108)
CO2 SERPL-SCNC: 25 MMOL/L — SIGNIFICANT CHANGE UP (ref 22–31)
CREAT SERPL-MCNC: 0.89 MG/DL — SIGNIFICANT CHANGE UP (ref 0.5–1.3)
EGFR: 73 ML/MIN/1.73M2 — SIGNIFICANT CHANGE UP
GLUCOSE SERPL-MCNC: 83 MG/DL — SIGNIFICANT CHANGE UP (ref 70–99)
HCT VFR BLD CALC: 34 % — LOW (ref 34.5–45)
HGB BLD-MCNC: 11 G/DL — LOW (ref 11.5–15.5)
MCHC RBC-ENTMCNC: 31.1 PG — SIGNIFICANT CHANGE UP (ref 27–34)
MCHC RBC-ENTMCNC: 32.4 GM/DL — SIGNIFICANT CHANGE UP (ref 32–36)
MCV RBC AUTO: 96 FL — SIGNIFICANT CHANGE UP (ref 80–100)
NRBC # BLD: 0 /100 WBCS — SIGNIFICANT CHANGE UP (ref 0–0)
PLATELET # BLD AUTO: 321 K/UL — SIGNIFICANT CHANGE UP (ref 150–400)
POTASSIUM SERPL-MCNC: 3.8 MMOL/L — SIGNIFICANT CHANGE UP (ref 3.5–5.3)
POTASSIUM SERPL-SCNC: 3.8 MMOL/L — SIGNIFICANT CHANGE UP (ref 3.5–5.3)
RBC # BLD: 3.54 M/UL — LOW (ref 3.8–5.2)
RBC # FLD: 14.4 % — SIGNIFICANT CHANGE UP (ref 10.3–14.5)
SODIUM SERPL-SCNC: 137 MMOL/L — SIGNIFICANT CHANGE UP (ref 135–145)
WBC # BLD: 7.98 K/UL — SIGNIFICANT CHANGE UP (ref 3.8–10.5)
WBC # FLD AUTO: 7.98 K/UL — SIGNIFICANT CHANGE UP (ref 3.8–10.5)

## 2022-05-29 PROCEDURE — 99232 SBSQ HOSP IP/OBS MODERATE 35: CPT | Mod: GC

## 2022-05-29 PROCEDURE — 70542 MRI ORBIT/FACE/NECK W/DYE: CPT | Mod: 26

## 2022-05-29 RX ORDER — ACETAMINOPHEN 500 MG
650 TABLET ORAL EVERY 6 HOURS
Refills: 0 | Status: DISCONTINUED | OUTPATIENT
Start: 2022-05-29 | End: 2022-05-31

## 2022-05-29 RX ORDER — IBUPROFEN 200 MG
600 TABLET ORAL EVERY 6 HOURS
Refills: 0 | Status: DISCONTINUED | OUTPATIENT
Start: 2022-05-29 | End: 2022-05-31

## 2022-05-29 RX ADMIN — OXYCODONE HYDROCHLORIDE 20 MILLIGRAM(S): 5 TABLET ORAL at 23:45

## 2022-05-29 RX ADMIN — AMPICILLIN SODIUM AND SULBACTAM SODIUM 200 GRAM(S): 250; 125 INJECTION, POWDER, FOR SUSPENSION INTRAMUSCULAR; INTRAVENOUS at 11:25

## 2022-05-29 RX ADMIN — OXYCODONE HYDROCHLORIDE 20 MILLIGRAM(S): 5 TABLET ORAL at 05:04

## 2022-05-29 RX ADMIN — OXYCODONE HYDROCHLORIDE 20 MILLIGRAM(S): 5 TABLET ORAL at 11:25

## 2022-05-29 RX ADMIN — ENOXAPARIN SODIUM 40 MILLIGRAM(S): 100 INJECTION SUBCUTANEOUS at 05:04

## 2022-05-29 RX ADMIN — AMPICILLIN SODIUM AND SULBACTAM SODIUM 200 GRAM(S): 250; 125 INJECTION, POWDER, FOR SUSPENSION INTRAMUSCULAR; INTRAVENOUS at 23:45

## 2022-05-29 RX ADMIN — AMPICILLIN SODIUM AND SULBACTAM SODIUM 200 GRAM(S): 250; 125 INJECTION, POWDER, FOR SUSPENSION INTRAMUSCULAR; INTRAVENOUS at 05:04

## 2022-05-29 RX ADMIN — Medication 650 MILLIGRAM(S): at 23:46

## 2022-05-29 RX ADMIN — OXYCODONE HYDROCHLORIDE 20 MILLIGRAM(S): 5 TABLET ORAL at 00:16

## 2022-05-29 RX ADMIN — Medication 2 MILLIGRAM(S): at 07:39

## 2022-05-29 RX ADMIN — AMPICILLIN SODIUM AND SULBACTAM SODIUM 200 GRAM(S): 250; 125 INJECTION, POWDER, FOR SUSPENSION INTRAMUSCULAR; INTRAVENOUS at 17:22

## 2022-05-29 RX ADMIN — Medication 600 MILLIGRAM(S): at 21:44

## 2022-05-29 RX ADMIN — OXYCODONE HYDROCHLORIDE 20 MILLIGRAM(S): 5 TABLET ORAL at 11:55

## 2022-05-29 RX ADMIN — SODIUM CHLORIDE 75 MILLILITER(S): 9 INJECTION, SOLUTION INTRAVENOUS at 00:00

## 2022-05-29 RX ADMIN — OXYCODONE HYDROCHLORIDE 20 MILLIGRAM(S): 5 TABLET ORAL at 06:04

## 2022-05-29 RX ADMIN — OXYCODONE HYDROCHLORIDE 20 MILLIGRAM(S): 5 TABLET ORAL at 16:45

## 2022-05-29 RX ADMIN — Medication 2 MILLIGRAM(S): at 14:02

## 2022-05-29 RX ADMIN — OXYCODONE HYDROCHLORIDE 20 MILLIGRAM(S): 5 TABLET ORAL at 17:15

## 2022-05-29 NOTE — PROVIDER CONTACT NOTE (CRITICAL VALUE NOTIFICATION) - TEST AND RESULT REPORTED:
Tissue cx from submental abscess 5/27 with "Few Streptococcus anginosus "Susceptibilities not performed""

## 2022-05-30 LAB
ANION GAP SERPL CALC-SCNC: 12 MMOL/L — SIGNIFICANT CHANGE UP (ref 5–17)
BUN SERPL-MCNC: 18 MG/DL — SIGNIFICANT CHANGE UP (ref 7–23)
CALCIUM SERPL-MCNC: 9 MG/DL — SIGNIFICANT CHANGE UP (ref 8.4–10.5)
CHLORIDE SERPL-SCNC: 104 MMOL/L — SIGNIFICANT CHANGE UP (ref 96–108)
CO2 SERPL-SCNC: 24 MMOL/L — SIGNIFICANT CHANGE UP (ref 22–31)
CREAT SERPL-MCNC: 0.81 MG/DL — SIGNIFICANT CHANGE UP (ref 0.5–1.3)
CULTURE RESULTS: SIGNIFICANT CHANGE UP
EGFR: 82 ML/MIN/1.73M2 — SIGNIFICANT CHANGE UP
GLUCOSE SERPL-MCNC: 87 MG/DL — SIGNIFICANT CHANGE UP (ref 70–99)
HCT VFR BLD CALC: 33.1 % — LOW (ref 34.5–45)
HGB BLD-MCNC: 10.4 G/DL — LOW (ref 11.5–15.5)
MCHC RBC-ENTMCNC: 31.1 PG — SIGNIFICANT CHANGE UP (ref 27–34)
MCHC RBC-ENTMCNC: 31.4 GM/DL — LOW (ref 32–36)
MCV RBC AUTO: 99.1 FL — SIGNIFICANT CHANGE UP (ref 80–100)
NRBC # BLD: 0 /100 WBCS — SIGNIFICANT CHANGE UP (ref 0–0)
PLATELET # BLD AUTO: 345 K/UL — SIGNIFICANT CHANGE UP (ref 150–400)
POTASSIUM SERPL-MCNC: 4.1 MMOL/L — SIGNIFICANT CHANGE UP (ref 3.5–5.3)
POTASSIUM SERPL-SCNC: 4.1 MMOL/L — SIGNIFICANT CHANGE UP (ref 3.5–5.3)
RBC # BLD: 3.34 M/UL — LOW (ref 3.8–5.2)
RBC # FLD: 14.7 % — HIGH (ref 10.3–14.5)
SODIUM SERPL-SCNC: 140 MMOL/L — SIGNIFICANT CHANGE UP (ref 135–145)
SPECIMEN SOURCE: SIGNIFICANT CHANGE UP
WBC # BLD: 8.03 K/UL — SIGNIFICANT CHANGE UP (ref 3.8–10.5)
WBC # FLD AUTO: 8.03 K/UL — SIGNIFICANT CHANGE UP (ref 3.8–10.5)

## 2022-05-30 RX ADMIN — AMPICILLIN SODIUM AND SULBACTAM SODIUM 200 GRAM(S): 250; 125 INJECTION, POWDER, FOR SUSPENSION INTRAMUSCULAR; INTRAVENOUS at 16:49

## 2022-05-30 RX ADMIN — OXYCODONE HYDROCHLORIDE 20 MILLIGRAM(S): 5 TABLET ORAL at 11:44

## 2022-05-30 RX ADMIN — OXYCODONE HYDROCHLORIDE 20 MILLIGRAM(S): 5 TABLET ORAL at 05:57

## 2022-05-30 RX ADMIN — Medication 2 MILLIGRAM(S): at 08:28

## 2022-05-30 RX ADMIN — OXYCODONE HYDROCHLORIDE 20 MILLIGRAM(S): 5 TABLET ORAL at 06:45

## 2022-05-30 RX ADMIN — OXYCODONE HYDROCHLORIDE 20 MILLIGRAM(S): 5 TABLET ORAL at 23:19

## 2022-05-30 RX ADMIN — Medication 650 MILLIGRAM(S): at 23:18

## 2022-05-30 RX ADMIN — Medication 650 MILLIGRAM(S): at 12:14

## 2022-05-30 RX ADMIN — Medication 650 MILLIGRAM(S): at 11:45

## 2022-05-30 RX ADMIN — Medication 650 MILLIGRAM(S): at 05:58

## 2022-05-30 RX ADMIN — Medication 650 MILLIGRAM(S): at 00:30

## 2022-05-30 RX ADMIN — AMPICILLIN SODIUM AND SULBACTAM SODIUM 200 GRAM(S): 250; 125 INJECTION, POWDER, FOR SUSPENSION INTRAMUSCULAR; INTRAVENOUS at 05:59

## 2022-05-30 RX ADMIN — OXYCODONE HYDROCHLORIDE 20 MILLIGRAM(S): 5 TABLET ORAL at 17:18

## 2022-05-30 RX ADMIN — ENOXAPARIN SODIUM 40 MILLIGRAM(S): 100 INJECTION SUBCUTANEOUS at 05:58

## 2022-05-30 RX ADMIN — OXYCODONE HYDROCHLORIDE 20 MILLIGRAM(S): 5 TABLET ORAL at 12:14

## 2022-05-30 RX ADMIN — Medication 2 MILLIGRAM(S): at 23:22

## 2022-05-30 RX ADMIN — AMPICILLIN SODIUM AND SULBACTAM SODIUM 200 GRAM(S): 250; 125 INJECTION, POWDER, FOR SUSPENSION INTRAMUSCULAR; INTRAVENOUS at 11:44

## 2022-05-30 RX ADMIN — Medication 650 MILLIGRAM(S): at 06:45

## 2022-05-30 RX ADMIN — OXYCODONE HYDROCHLORIDE 20 MILLIGRAM(S): 5 TABLET ORAL at 16:48

## 2022-05-30 RX ADMIN — Medication 2 MILLIGRAM(S): at 12:59

## 2022-05-30 RX ADMIN — AMPICILLIN SODIUM AND SULBACTAM SODIUM 200 GRAM(S): 250; 125 INJECTION, POWDER, FOR SUSPENSION INTRAMUSCULAR; INTRAVENOUS at 23:20

## 2022-05-30 RX ADMIN — OXYCODONE HYDROCHLORIDE 20 MILLIGRAM(S): 5 TABLET ORAL at 00:30

## 2022-05-30 RX ADMIN — Medication 600 MILLIGRAM(S): at 20:09

## 2022-05-30 RX ADMIN — Medication 2 MILLIGRAM(S): at 00:05

## 2022-05-30 RX ADMIN — Medication 600 MILLIGRAM(S): at 08:36

## 2022-05-31 ENCOUNTER — TRANSCRIPTION ENCOUNTER (OUTPATIENT)
Age: 62
End: 2022-05-31

## 2022-05-31 VITALS
RESPIRATION RATE: 20 BRPM | SYSTOLIC BLOOD PRESSURE: 108 MMHG | TEMPERATURE: 98 F | HEART RATE: 58 BPM | DIASTOLIC BLOOD PRESSURE: 67 MMHG | OXYGEN SATURATION: 96 %

## 2022-05-31 PROCEDURE — 83605 ASSAY OF LACTIC ACID: CPT

## 2022-05-31 PROCEDURE — 86803 HEPATITIS C AB TEST: CPT

## 2022-05-31 PROCEDURE — 80048 BASIC METABOLIC PNL TOTAL CA: CPT

## 2022-05-31 PROCEDURE — 87070 CULTURE OTHR SPECIMN AEROBIC: CPT

## 2022-05-31 PROCEDURE — 70542 MRI ORBIT/FACE/NECK W/DYE: CPT

## 2022-05-31 PROCEDURE — 36415 COLL VENOUS BLD VENIPUNCTURE: CPT

## 2022-05-31 PROCEDURE — C9399: CPT

## 2022-05-31 PROCEDURE — 96365 THER/PROPH/DIAG IV INF INIT: CPT | Mod: XU

## 2022-05-31 PROCEDURE — 87102 FUNGUS ISOLATION CULTURE: CPT

## 2022-05-31 PROCEDURE — 82330 ASSAY OF CALCIUM: CPT

## 2022-05-31 PROCEDURE — 84132 ASSAY OF SERUM POTASSIUM: CPT

## 2022-05-31 PROCEDURE — 87637 SARSCOV2&INF A&B&RSV AMP PRB: CPT

## 2022-05-31 PROCEDURE — 87075 CULTR BACTERIA EXCEPT BLOOD: CPT

## 2022-05-31 PROCEDURE — 85027 COMPLETE CBC AUTOMATED: CPT

## 2022-05-31 PROCEDURE — 82435 ASSAY OF BLOOD CHLORIDE: CPT

## 2022-05-31 PROCEDURE — 82803 BLOOD GASES ANY COMBINATION: CPT

## 2022-05-31 PROCEDURE — 99285 EMERGENCY DEPT VISIT HI MDM: CPT | Mod: 25

## 2022-05-31 PROCEDURE — 96375 TX/PRO/DX INJ NEW DRUG ADDON: CPT

## 2022-05-31 PROCEDURE — 99232 SBSQ HOSP IP/OBS MODERATE 35: CPT

## 2022-05-31 PROCEDURE — 70491 CT SOFT TISSUE NECK W/DYE: CPT | Mod: MA

## 2022-05-31 PROCEDURE — 80053 COMPREHEN METABOLIC PANEL: CPT

## 2022-05-31 PROCEDURE — 82947 ASSAY GLUCOSE BLOOD QUANT: CPT

## 2022-05-31 PROCEDURE — 85018 HEMOGLOBIN: CPT

## 2022-05-31 PROCEDURE — 85014 HEMATOCRIT: CPT

## 2022-05-31 PROCEDURE — 85025 COMPLETE CBC W/AUTO DIFF WBC: CPT

## 2022-05-31 PROCEDURE — 87077 CULTURE AEROBIC IDENTIFY: CPT

## 2022-05-31 PROCEDURE — 96367 TX/PROPH/DG ADDL SEQ IV INF: CPT

## 2022-05-31 PROCEDURE — 84295 ASSAY OF SERUM SODIUM: CPT

## 2022-05-31 PROCEDURE — 96361 HYDRATE IV INFUSION ADD-ON: CPT

## 2022-05-31 RX ORDER — IBUPROFEN 200 MG
1 TABLET ORAL
Qty: 0 | Refills: 0 | DISCHARGE
Start: 2022-05-31

## 2022-05-31 RX ORDER — CLONAZEPAM 1 MG
1 TABLET ORAL
Qty: 0 | Refills: 0 | DISCHARGE
Start: 2022-05-31

## 2022-05-31 RX ORDER — LOSARTAN POTASSIUM 100 MG/1
1 TABLET, FILM COATED ORAL
Qty: 0 | Refills: 0 | DISCHARGE
Start: 2022-05-31

## 2022-05-31 RX ORDER — ACETAMINOPHEN 500 MG
2 TABLET ORAL
Qty: 0 | Refills: 0 | DISCHARGE
Start: 2022-05-31

## 2022-05-31 RX ORDER — OXYCODONE HYDROCHLORIDE 5 MG/1
1 TABLET ORAL
Qty: 0 | Refills: 0 | DISCHARGE
Start: 2022-05-31

## 2022-05-31 RX ORDER — CHLORHEXIDINE GLUCONATE 213 G/1000ML
15 SOLUTION TOPICAL
Qty: 473 | Refills: 0
Start: 2022-05-31 | End: 2022-06-04

## 2022-05-31 RX ADMIN — OXYCODONE HYDROCHLORIDE 20 MILLIGRAM(S): 5 TABLET ORAL at 12:18

## 2022-05-31 RX ADMIN — OXYCODONE HYDROCHLORIDE 20 MILLIGRAM(S): 5 TABLET ORAL at 13:00

## 2022-05-31 RX ADMIN — AMPICILLIN SODIUM AND SULBACTAM SODIUM 200 GRAM(S): 250; 125 INJECTION, POWDER, FOR SUSPENSION INTRAMUSCULAR; INTRAVENOUS at 06:02

## 2022-05-31 RX ADMIN — OXYCODONE HYDROCHLORIDE 20 MILLIGRAM(S): 5 TABLET ORAL at 06:01

## 2022-05-31 RX ADMIN — Medication 2 MILLIGRAM(S): at 08:04

## 2022-05-31 RX ADMIN — OXYCODONE HYDROCHLORIDE 20 MILLIGRAM(S): 5 TABLET ORAL at 06:45

## 2022-05-31 RX ADMIN — Medication 650 MILLIGRAM(S): at 06:45

## 2022-05-31 RX ADMIN — Medication 650 MILLIGRAM(S): at 11:22

## 2022-05-31 RX ADMIN — AMPICILLIN SODIUM AND SULBACTAM SODIUM 200 GRAM(S): 250; 125 INJECTION, POWDER, FOR SUSPENSION INTRAMUSCULAR; INTRAVENOUS at 11:22

## 2022-05-31 RX ADMIN — Medication 600 MILLIGRAM(S): at 09:30

## 2022-05-31 RX ADMIN — Medication 600 MILLIGRAM(S): at 14:33

## 2022-05-31 RX ADMIN — ENOXAPARIN SODIUM 40 MILLIGRAM(S): 100 INJECTION SUBCUTANEOUS at 06:03

## 2022-05-31 RX ADMIN — Medication 650 MILLIGRAM(S): at 00:10

## 2022-05-31 RX ADMIN — OXYCODONE HYDROCHLORIDE 20 MILLIGRAM(S): 5 TABLET ORAL at 00:10

## 2022-05-31 RX ADMIN — Medication 650 MILLIGRAM(S): at 06:02

## 2022-05-31 NOTE — PROGRESS NOTE ADULT - SUBJECTIVE AND OBJECTIVE BOX
Follow Up:  abscess, dental infection    Interval History: no more fever and WBC normalized, abscess cx showing alpha hem strep    ROS:      All other systems negative    Constitutional: no fever, no chills  ENT:  improved submandibular swelling  Cardiovascular:  no chest pain, no palpitation  Respiratory:  no SOB, no cough  GI:  no abd pain, no vomiting, no diarrhea  urinary: no dysuria, no hematuria, no flank pain  musculoskeletal:  no joint pain, no joint swelling  skin:  no rash  neurology:  no headache, no seizure      Allergies  No Known Allergies        ANTIMICROBIALS:  ampicillin/sulbactam  IVPB 3 every 6 hours  ampicillin/sulbactam  IVPB        OTHER MEDS:  acetaminophen     Tablet .. 650 milliGRAM(s) Oral every 6 hours PRN  clonazePAM Oral Disintegrating Tablet 2 milliGRAM(s) Oral three times a day PRN  enoxaparin Injectable 40 milliGRAM(s) SubCutaneous every 24 hours  lactated ringers. 1000 milliLiter(s) IV Continuous <Continuous>  losartan 100 milliGRAM(s) Oral daily  oxyCODONE    IR 20 milliGRAM(s) Oral four times a day PRN      Vital Signs Last 24 Hrs  T(C): 36.4 (29 May 2022 09:03), Max: 36.6 (28 May 2022 13:53)  T(F): 97.6 (29 May 2022 09:03), Max: 97.9 (28 May 2022 13:53)  HR: 54 (29 May 2022 09:03) (52 - 71)  BP: 98/55 (29 May 2022 09:03) (90/56 - 113/75)  BP(mean): --  RR: 16 (29 May 2022 09:03) (16 - 19)  SpO2: 97% (29 May 2022 09:03) (94% - 99%)    Physical Exam:  General:    NAD,  non toxic  ENT, abscess site with improved edema  Cardio:     regular S1, S2  Respiratory:    clear b/l,    no wheezing  abd:     soft,   BS +,   no tenderness  :   no CVAT,  no suprapubic tenderness,   no  joseph  Musculoskeletal:   no joint swelling  vascular: no phlebitis  Skin:    no rash             11.0   7.98  )-----------( 321      ( 29 May 2022 07:19 )             34.0       05-29    137  |  100  |  20  ----------------------------<  83  3.8   |  25  |  0.89    Ca    9.1      29 May 2022 07:19    TPro  7.8  /  Alb  3.9  /  TBili  0.2  /  DBili  x   /  AST  10  /  ALT  12  /  AlkPhos  77  05-27          MICROBIOLOGY:  v  .Surgical Swab submental abscess #2  05-27-22   Few Alpha hemolytic strep "Susceptibilities not performed"  --    Rare polymorphonuclear leukocytes seen per low power field  Few Gram positive cocci in pairs seen per oil power field  Few Gram Variable Rods seen per oil power field                RADIOLOGY:  Images independently visualized and reviewed personally, findings as below  < from: CT Neck Soft Tissue w/ IV Cont (05.27.22 @ 12:09) >  IMPRESSION:  Floor of mouth abscess measuring up to 5.6 cm with surrounding   cellulitis. Correlate with history of recent dental work. Dental implants   noted within the bilateral mandible and periapical lucency involving the   right maxillary premolar tooth.    < end of copied text >  
62F pt PMHx MVP, HTN, HLD presents to [facility] ED with CC of right lower jaw/chin swelling ~8 days. Pt had 2 dental implants placed at right mandible 10 days ago, developed significant swelling 2 days post-op, was placed on course of abx,and PO steroids, no improvement. Currently Reports right sided jaw pain and swelling, denies dyspnea, dysphagia, tolerating secretions, limited PO intake. In ED, WBC 13, HD stable, afebrile.      Interval hx:   5/27: Pt present to [facility] ED comaplining of LR facial swelling. Pt taken to OR for ext #28 and submental space I&D via extra-oral approach, 1x penrose drain placed  5/28: NAEON. Pt feeling subjectively and doing objectively better. WBC downtrending  5/29: NAEON. WBC downtrending. MRI taken.    5/30: BAMBI  5/31: Discharge to home    Subjective: Pt seen resting comfortably. Pain controlled. Tolerating diet, ambulating, voiding voiding w/o issue. Pt states she feels well today    Vital Signs Last 24 Hrs  T(C): 36.6 (31 May 2022 10:39), Max: 36.6 (30 May 2022 21:49)  T(F): 97.8 (31 May 2022 10:39), Max: 97.8 (30 May 2022 21:49)  HR: 58 (31 May 2022 10:39) (51 - 60)  BP: 112/62 (31 May 2022 10:39) (99/66 - 119/75)  BP(mean): --  RR: 18 (31 May 2022 10:39) (16 - 18)  SpO2: 96% (31 May 2022 10:39) (95% - 98%)      05-30-22 @ 07:01  -  05-31-22 @ 07:00  --------------------------------------------------------  IN: 1720 mL / OUT: 0 mL / NET: 1720 mL    05-31-22 @ 07:01  -  05-31-22 @ 11:52  --------------------------------------------------------  IN: 200 mL / OUT: 0 mL / NET: 200 mL          HEENT: Neck incision Hemostatic,  SS output, softer to touch  IOE: BHARGAV ~40mm, occlusion stable, extraction socket hemostatic w/ coagulum in place. FOM non elevated and non tender   LABS:                        10.4   8.03  )-----------( 345      ( 30 May 2022 07:23 )             33.1       05-30    140  |  104  |  18  ----------------------------<  87  4.1   |  24  |  0.81    Ca    9.0      30 May 2022 07:24        
PROGRESS NOTE:   Authored By: Lawrence Correa DMD (Pager [phone])  [place] [facility] [place]ager: [phone] 43I pt PMHx MVP, HTN, HLD presents to [facility] ED with CC of right lower jaw/chin swelling ~8 days. Pt had 2 dental implants placed at right mandible 10 days ago, developed significant swelling 2 days post-op, was placed on course of abx,and PO steroids, no improvement. Currently Reports right sided jaw pain and swelling, denies dyspnea, dysphagia, tolerating secretions, limited PO intake. In ED, WBC 13, HD stable, afebrile.    Interval hx:  - PT taken to OR for I&D submental infection via extra-oral approach, placement of penrose drain, and extraction of tooth #28      OVERNIGHT EVENTS:   - No acute events overnight  - Pt ambulating, voiding, tolerating PO  - Pt resting comfortably in bed, NAD  - Pt denies f/c/ns     Hospital course:   5/27: Pt prewsents to ED w/ facial swelling and is taken to OR for I&D submental infection via extra-oral approach, placement of penrose drain, and extraction of tooth #28. [place] [facility] [place]  5/28: NAEON. Neck swelling and mouth opening showing improvement.     SUBJECTIVE: Pt seen and examined at bedside this morning.     ADDITIONAL REVIEW OF SYSTEMS:  Vital Signs Last 24 Hrs  T(C): 36.7 (28 May 2022 05:09), Max: 37.9 (27 May 2022 11:21)  T(F): 98.1 (28 May 2022 05:09), Max: 100.3 (27 May 2022 11:21)  HR: 53 (28 May 2022 05:09) (50 - 73)  BP: 101/60 (28 May 2022 05:09) (101/60 - 151/84)  BP(mean): 97 (27 May 2022 22:15) (93 - 120)  RR: 18 (28 May 2022 05:09) (15 - 20)  SpO2: 94% (28 May 2022 05:09) (92% - 99%)    MEDICATIONS  (STANDING):  clindamycin IVPB 600 milliGRAM(s) IV Intermittent every 8 hours  enoxaparin Injectable 40 milliGRAM(s) SubCutaneous every 24 hours  lactated ringers. 1000 milliLiter(s) (75 mL/Hr) IV Continuous <Continuous>  losartan 100 milliGRAM(s) Oral daily    MEDICATIONS  (PRN):  acetaminophen     Tablet .. 650 milliGRAM(s) Oral every 6 hours PRN Mild Pain (1 - 3)  clonazePAM Oral Disintegrating Tablet 2 milliGRAM(s) Oral two times a day PRN Anxiety  oxyCODONE    IR 20 milliGRAM(s) Oral four times a day PRN Moderate Pain (4 - 6)        I&O's Summary    27 May 2022 07:01  -  28 May 2022 07:00  --------------------------------------------------------  IN: 955 mL / OUT: 850 mL / NET: 105 mL        PHYSICAL EXAM:  HEENT: Neck incision Hemostatic, penrose drain in place, SS output, softer to touch in comparison to pre-op.   IOE: BHARGAV ~35mm, occlusion stable, extraction socket hemostatic w/ coagulum in place. FOM elevated in tender (improvement from pre-op noted).   LABS:                        10.7   11.79 )-----------( 316      ( 28 May 2022 07:11 )             33.3     05-28    136  |  98  |  19  ----------------------------<  129<H>  4.3   |  23  |  0.83    Ca    9.0      28 May 2022 07:10    TPro  7.8  /  Alb  3.9  /  TBili  0.2  /  DBili  x   /  AST  10  /  ALT  12  /  AlkPhos  77  05-27              Culture - Tissue with Gram Stain (collected 27 May 2022 22:24)  Source: .Tissue submental abscess  Gram Stain (28 May 2022 04:53):    Rare polymorphonuclear leukocytes seen per low power field    Few Gram positive cocci in pairs seen per oil power field    Few Gram Variable Rods seen per oil power field        RADIOLOGY & ADDITIONAL TESTS:    Results Reviewed:   Imaging Personally Reviewed:  Electrocardiogram Personally Reviewed:      
  62F pt PMHx MVP, HTN, HLD presents to [facility] ED with CC of right lower jaw/chin swelling ~8 days. Pt had 2 dental implants placed at right mandible 10 days ago, developed significant swelling 2 days post-op, was placed on course of abx,and PO steroids, no improvement. Currently Reports right sided jaw pain and swelling, denies dyspnea, dysphagia, tolerating secretions, limited PO intake. In ED, WBC 13, HD stable, afebrile.    [id number]: s/p incision and drainage and extraction of tooth # 28.  Pt feels well today. States she feels better than yesterday. Pain controlled   - No acute events overnight  - Pt ambulating, voiding, tolerating PO  - Pt resting comfortably in bed, NAD  - Pt denies f/c/ns       OBJECTIVE:  Vital Signs Last 24 Hrs  T(C): 36.4 (29 May 2022 09:03), Max: 36.6 (28 May 2022 13:53)  T(F): 97.6 (29 May 2022 09:03), Max: 97.9 (28 May 2022 13:53)  HR: 54 (29 May 2022 09:03) (52 - 71)  BP: 98/55 (29 May 2022 09:03) (90/56 - 113/75)  BP(mean): --  RR: 16 (29 May 2022 09:03) (16 - 19)  SpO2: 97% (29 May 2022 09:03) (94% - 99%)      05-28-22 @ 07:01  -  05-29-22 @ 07:00  --------------------------------------------------------  IN: 3960 mL / OUT: 0 mL / NET: 3960 mL      PHYSICAL EXAM:  HEENT: Neck incision Hemostatic, penrose drain in place, SS output, softer to touch  IOE: BHARGAV ~40mm, occlusion stable, extraction socket hemostatic w/ coagulum in place. FOM non elevated and non tender       LABS:                        11.0   7.98  )-----------( 321      ( 29 May 2022 07:19 )             34.0       05-29    137  |  100  |  20  ----------------------------<  83  3.8   |  25  |  0.89    Ca    9.1      29 May 2022 07:19    TPro  7.8  /  Alb  3.9  /  TBili  0.2  /  DBili  x   /  AST  10  /  ALT  12  /  AlkPhos  77  05-27      
  62F pt PMHx MVP, HTN, HLD presents to [facility] ED with CC of right lower jaw/chin swelling ~8 days. Pt had 2 dental implants placed at right mandible 10 days ago, developed significant swelling 2 days post-op, was placed on course of abx,and PO steroids, no improvement. Currently Reports right sided jaw pain and swelling, denies dyspnea, dysphagia, tolerating secretions, limited PO intake. In ED, WBC 13, HD stable, afebrile.    -  s/p incision and drainage and extraction of tooth # 28 (5/27).  Pt feels well today. States she feels better than yesterday. Pain controlled     - MRI completed, waiting on read  - No acute events overnight  - Pt ambulating, voiding, tolerating PO  - Pt resting comfortably in bed, NAD  - Pt denies f/c/ns     Interval hx:   5/27: Pt present to [facility] ED comaplining of LR facial swelling. Pt taken to OR for ext #28 and submental space I&D via extra-oral approach, 1x penrose drain placed  5/28: NAEON. Pt feeling subjectively and doing objectively better. WBC downtrending  5/29: NAEON. WBC downtrending. MRI taken.      OBJECTIVE:  ICU Vital Signs Last 24 Hrs  T(C): 36.3 (30 May 2022 06:31), Max: 36.8 (29 May 2022 21:45)  T(F): 97.4 (30 May 2022 06:31), Max: 98.3 (29 May 2022 21:45)  HR: 50 (30 May 2022 06:31) (50 - 62)  BP: 115/68 (30 May 2022 06:31) (96/58 - 115/68)  BP(mean): --  ABP: --  ABP(mean): --  RR: 18 (30 May 2022 06:31) (17 - 18)  SpO2: 97% (30 May 2022 06:31) (97% - 99%)    I&O's Detail    29 May 2022 07:01  -  30 May 2022 07:00  --------------------------------------------------------  IN:    IV PiggyBack: 200 mL    Lactated Ringers: 1125 mL    Oral Fluid: 1500 mL  Total IN: 2825 mL    OUT:  Total OUT: 0 mL    Total NET: 2825 mL      PHYSICAL EXAM:  HEENT: Neck incision Hemostatic, penrose drain in place, SS output, softer to touch  IOE: BHARGAV ~40mm, occlusion stable, extraction socket hemostatic w/ coagulum in place. FOM non elevated and non tender       LABS:                        11.0   7.98  )-----------( 321      ( 29 May 2022 07:19 )             34.0                         10.4   8.03  )-----------( 345      ( 30 May 2022 07:23 )             33.1         05-29    137  |  100  |  20  ----------------------------<  83  3.8   |  25  |  0.89    Ca    9.1      29 May 2022 07:19    TPro  7.8  /  Alb  3.9  /  TBili  0.2  /  DBili  x   /  AST  10  /  ALT  12  /  AlkPhos  77  05-27 05-30    140  |  104  |  18  ----------------------------<  87  4.1   |  24  |  0.81    Ca    9.0      30 May 2022 07:24        Culture - Tissue with Gram Stain (collected 27 May 2022 22:24)  Source: .Tissue submental abscess  Gram Stain (28 May 2022 04:53):    Rare polymorphonuclear leukocytes seen per low power field    Few Gram positive cocci in pairs seen per oil power field    Few Gram Variable Rods seen per oil power field  Preliminary Report (29 May 2022 12:09):    Few Streptococcus anginosus "Susceptibilities not performed"    Culture - Surgical Swab (collected 27 May 2022 22:24)  Source: .Surgical Swab submental abscess #2  Preliminary Report (29 May 2022 22:57):    Few Alpha hemolytic strep "Susceptibilities not performed"    Numerous Prevotella denticola "Susceptibilities not performed"    Normal mouth amalia isolated        
  Follow Up:  abscess, dental infection    Interval History: no more fever and WBC normalized, MRI showed improved abscess but could not complete exclude osteo due to metallic hardware    ROS:      All other systems negative    Constitutional: no fever, no chills  ENT:  improved submandibular swelling and swallowing  Cardiovascular:  no chest pain, no palpitation  Respiratory:  no SOB, no cough  GI:  no abd pain, no vomiting, no diarrhea  urinary: no dysuria, no hematuria, no flank pain  musculoskeletal:  no joint pain, no joint swelling  skin:  no rash  neurology:  no headache, no seizure          Allergies  No Known Allergies        ANTIMICROBIALS:  ampicillin/sulbactam  IVPB    ampicillin/sulbactam  IVPB 3 every 6 hours      OTHER MEDS:  acetaminophen     Tablet .. 650 milliGRAM(s) Oral every 6 hours  clonazePAM Oral Disintegrating Tablet 2 milliGRAM(s) Oral three times a day PRN  enoxaparin Injectable 40 milliGRAM(s) SubCutaneous every 24 hours  ibuprofen  Tablet. 600 milliGRAM(s) Oral every 6 hours  losartan 100 milliGRAM(s) Oral daily  oxyCODONE    IR 20 milliGRAM(s) Oral four times a day PRN      Vital Signs Last 24 Hrs  T(C): 36.6 (31 May 2022 10:39), Max: 36.6 (30 May 2022 21:49)  T(F): 97.8 (31 May 2022 10:39), Max: 97.8 (30 May 2022 21:49)  HR: 58 (31 May 2022 10:39) (51 - 60)  BP: 112/62 (31 May 2022 10:39) (99/66 - 119/75)  BP(mean): --  RR: 18 (31 May 2022 10:39) (16 - 18)  SpO2: 96% (31 May 2022 10:39) (95% - 98%)    Physical Exam:  General:    NAD,  non toxic  ENT, abscess site with improved edema, no tenderness  Cardio:     regular S1, S2  Respiratory:    clear b/l,    no wheezing  abd:     soft,   BS +,   no tenderness  :   no CVAT,  no suprapubic tenderness,   no  joseph  Musculoskeletal:   no joint swelling  vascular: no phlebitis  Skin:    no rash                        10.4   8.03  )-----------( 345      ( 30 May 2022 07:23 )             33.1       05-30    140  |  104  |  18  ----------------------------<  87  4.1   |  24  |  0.81    Ca    9.0      30 May 2022 07:24            MICROBIOLOGY:  v  .Surgical Swab submental abscess #2  05-27-22   Few Alpha hemolytic strep "Susceptibilities not performed"  Numerous Prevotella denticola "Susceptibilities not performed"  Few Haemophilus parainfluenzae "Susceptibilities not performed"  Normal mouth amalia isolated  --    Rare polymorphonuclear leukocytes seen per low power field  Few Gram positive cocci in pairs seen per oil power field  Few Gram Variable Rods seen per oil power field                RADIOLOGY:  Images independently visualized and reviewed personally, findings as below  < from: MR Orbit, Face, and/or Neck w/Cont (05.29.22 @ 11:42) >  IMPRESSION:    1. Interval decrease in size of a previously seen floor of mouth abscess   when compared to the prior CT exam.    2. Metallic dental hardware confounds the evaluation for osteomyelitis   within the underlying mandible and maxilla secondary to dental   susceptibility artifact. Underlying osteomyelitis cannot be fully   excluded. Consider further evaluation with a nuclear medicine study bone   scan or reevaluation with CT modality.    3. Unchanged reactive bilateral cervical lymphadenopathy.    4. Unchanged imaging findings for which acute left-sided maxillary   sinusitis is raised.      < end of copied text >

## 2022-05-31 NOTE — DISCHARGE NOTE PROVIDER - CARE PROVIDER_API CALL
Fantasma Huang (DDS; MD)  OralMaxillofacial Surgery  [address] [place] [address]  Phone: [phone]  Fax: [phone]  Follow Up Time: 1 week

## 2022-05-31 NOTE — DISCHARGE NOTE NURSING/CASE MANAGEMENT/SOCIAL WORK - PATIENT PORTAL LINK FT
You can access the FollowMyHealth Patient Portal offered by [facility] by registering at the following website: [email or web address]. By joining [facility]’s FollowMyHealth portal, you will also be able to view your health information using other applications (apps) compatible with our system.

## 2022-05-31 NOTE — DISCHARGE NOTE PROVIDER - HOSPITAL COURSE
63 F presented to [facility] w/ facial swelling related to tooth # 28. She went to the OR for incision and drainage of abscess and extraction of tooth # 28.  She then had an MRI done to evaluate her mandibular bone as per ID. She had an uneventful hospital stay and was discharged on 2 weeks of Augmentin on 5/31/22.  She should follow up with FS clinic in 1 week or as needed    At time of discharge, pt was tolerating a regular diet, voiding/stooling spontaneously, ambulating, and pain was well-controlled. Patient and family felt ready for discharge.

## 2022-05-31 NOTE — DISCHARGE NOTE PROVIDER - NSDCMRMEDTOKEN_GEN_ALL_CORE_FT
acetaminophen 325 mg oral tablet: 2 tab(s) orally every 6 hours  amoxicillin-clavulanate 875 mg-125 mg oral tablet: 1 tab(s) orally 2 times a day   clonazePAM 2 mg oral tablet, disintegratin tab(s) orally 3 times a day, As needed, Anxiety  ibuprofen 600 mg oral tablet: 1 tab(s) orally every 6 hours  losartan 100 mg oral tablet: 1 tab(s) orally once a day  oxyCODONE 20 mg oral tablet: 1 tab(s) orally 4 times a day, As needed, Moderate Pain (4 - 6)  Peridex 0.12% mucous membrane liquid: 15 milliliter(s) orally 2 times a day

## 2022-05-31 NOTE — DISCHARGE NOTE PROVIDER - NSDCCPCAREPLAN_GEN_ALL_CORE_FT
PRINCIPAL DISCHARGE DIAGNOSIS  Diagnosis: Submandibular abscess  Assessment and Plan of Treatment: You had an infection related to a tooth. The infection was drained and the tooth was removed.

## 2022-05-31 NOTE — PROGRESS NOTE ADULT - ASSESSMENT
62 f with HTN, HLD, MVP, s/p 2 dental implant placement on 5/17 and developed edema and pain 2 days later and was given augmentin and prednisone with no improvement   here T: 100.3, WBC: 13  CT showed large abscess in submandibular area  S/p I&D with penrose drain placement, tooth extraction #28  Abscess Cx grew  strep anginosis, prevotella, bacteroides, hemophilus parainfluenza  fever, leukocytosis, submandibular abscess due to dental infection s/o implant 5/17 but also had another tooth infection s/p I&D and tooth extraction  MRI could not exclude osteo completely in view of metallic hardware but no apparent osteo  * switch to augmentin 875 bid for a 2 week course  * f/u with OMFS    The above assessment and plan was discussed with the primary team    Elizabeth Diaz MD  contact on teams  After 5pm and on weekends call [phone]    
A/P: 62F [id number]  I&D submental infection via extra-oral approach, placement of penrose drain, and extraction of tooth #28. Pt progressing well    -C/w clindamycin  -F/u ID recs. F/U cultures  - analgesics  - HOB elevated  - Peridex BID  - encourage good oral hygiene  - activity as tolerated   -Pending MRI as per ID
A/P: 62F [id number]  I&D submental infection via extra-oral approach, placement of penrose drain, and extraction of tooth #28. Pt progressing well    - MRI taken; waiting on MRI read  -C/w clindamycin  -F/u ID recs. F/U cultures  - analgesics  - HOB elevated  - Peridex BID  - encourage good oral hygiene  - activity as tolerated   
62F [id number]  I&D submental infection via extra-oral approach, placement of penrose drain, and extraction of tooth #28. Pt progressing well    - MRI taken: ID rec DC on PO abx  -Unasyn --> PO Augmentin on DC  -Penrose drain removed today  -F/u ID recs. F/U cultures  - analgesics  - HOB elevated  - Peridex BID  - encourage good oral hygiene  - activity as tolerated   -Pt cleared for discharge today  
62 f with HTN, HLD, MVP, s/p 2 dental implant placement on 5/17 and developed edema and pain 2 days later and was given augmentin and prednisone with no improvement   here T: 100.3, WBC: 13  CT showed large abscess in submandibular area  S/p I&D with penrose drain placement, tooth extraction #28  Abscess Cx grew GPC in pairs and GVRs    fever, leukocytosis, submandibular abscess due to dental infection s/o implant 5/17 but also had another tooth infection s/p I&D and tooth extraction    * f/u the final abscess cx, for now growing alpha hem strep  * c/w unasyn  * MRI to r/o osteo    The above assessment and plan was discussed with the primary team    Elizabeth Diaz MD  contact on teams  After 5pm and on weekends call [phone]
A/P: 62F s/p  I&D submental infection via extra-oral approach, placement of penrose drain, and extraction of tooth #28. Pt progressing well    - abx  - analgesics  - HOB elevated  - Peridex BID  - encourage good oral hygiene  - activity as tolerated

## 2022-06-25 LAB
CULTURE RESULTS: SIGNIFICANT CHANGE UP
SPECIMEN SOURCE: SIGNIFICANT CHANGE UP

## 2023-09-12 NOTE — ED PROVIDER NOTE - NSFOLLOWUPINSTRUCTIONS_ED_ALL_ED_FT
Follow up with Dr. George in 7-10 days  Motrin 600 mg every 8 hours as needed for pain  Tylenol 500 mg every 6 hours for pain  Take your medications as prescribed  Return to the ER for ANY worsening pain, swelling, numbness, or ANY other concerns
[facility]

## 2023-11-27 NOTE — PATIENT PROFILE ADULT - FUNCTIONAL ASSESSMENT - DAILY ACTIVITY 2.
Call Center TCM Note      Flowsheet Row Responses   [facility] patient discharged from? Non-   Does the patient have one of the following disease processes/diagnoses(primary or secondary)? Other   TCM attempt successful? Yes   Call start time 1001   Call end time 1005   Meds reviewed with patient/caregiver? Yes   Does the patient have all medications ordered at discharge? Yes   Does the patient have an appointment with their PCP within 7-14 days of discharge? No   Nursing Interventions Assisted patient with making appointment per protocol   Has home health visited the patient within 72 hours of discharge? N/A   Psychosocial issues? No   What is the patient's perception of their health status since discharge? Improving   Is the patient/caregiver able to teach back signs and symptoms related to disease process for when to call PCP? Yes   Is the patient/caregiver able to teach back signs and symptoms related to disease process for when to call 911? Yes   Is the patient/caregiver able to teach back the hierarchy of who to call/visit for symptoms/problems? PCP, Specialist, Home health nurse, Urgent Care, ED, 911 Yes   If the patient is a current smoker, are they able to teach back resources for cessation? Not a smoker   TCM call completed? Yes   Call end time 1005   Would this patient benefit from a Referral to Amb Social Work? No   Is the patient interested in additional calls from an ambulatory ? No            Estrellita Davis LPN    11/27/2023, 10:14 EST        
4 = No assist / stand by assistance

## 2024-04-13 ENCOUNTER — INPATIENT (INPATIENT)
Facility: HOSPITAL | Age: 64
LOS: 13 days | Discharge: HOME HEALTH | DRG: 293 | End: 2024-04-27
Attending: THORACIC SURGERY (CARDIOTHORACIC VASCULAR SURGERY) | Admitting: INTERNAL MEDICINE
Payer: MEDICARE

## 2024-04-13 ENCOUNTER — RESULT REVIEW (OUTPATIENT)
Age: 64
End: 2024-04-13

## 2024-04-13 VITALS — OXYGEN SATURATION: 77 % | RESPIRATION RATE: 26 BRPM | HEART RATE: 75 BPM

## 2024-04-13 DIAGNOSIS — R57.0 CARDIOGENIC SHOCK: ICD-10-CM

## 2024-04-13 PROBLEM — E78.5 HYPERLIPIDEMIA, UNSPECIFIED: Chronic | Status: ACTIVE | Noted: 2022-05-27

## 2024-04-13 PROBLEM — I34.1 NONRHEUMATIC MITRAL (VALVE) PROLAPSE: Chronic | Status: ACTIVE | Noted: 2022-05-27

## 2024-04-13 PROBLEM — I10 ESSENTIAL (PRIMARY) HYPERTENSION: Chronic | Status: ACTIVE | Noted: 2022-05-27

## 2024-04-13 LAB
ALBUMIN SERPL ELPH-MCNC: 3.5 G/DL — SIGNIFICANT CHANGE UP (ref 3.3–5)
ALBUMIN SERPL ELPH-MCNC: 3.9 G/DL — SIGNIFICANT CHANGE UP (ref 3.3–5)
ALP SERPL-CCNC: 142 U/L — HIGH (ref 40–120)
ALP SERPL-CCNC: 161 U/L — HIGH (ref 40–120)
ALT FLD-CCNC: 127 U/L — HIGH (ref 10–45)
ALT FLD-CCNC: 131 U/L — HIGH (ref 10–45)
ANION GAP SERPL CALC-SCNC: 14 MMOL/L — SIGNIFICANT CHANGE UP (ref 5–17)
ANION GAP SERPL CALC-SCNC: 20 MMOL/L — HIGH (ref 5–17)
APTT BLD: 29.7 SEC — SIGNIFICANT CHANGE UP (ref 24.5–35.6)
APTT BLD: 29.9 SEC — SIGNIFICANT CHANGE UP (ref 24.5–35.6)
AST SERPL-CCNC: 89 U/L — HIGH (ref 10–40)
AST SERPL-CCNC: 90 U/L — HIGH (ref 10–40)
BASE EXCESS BLDMV CALC-SCNC: -0.7 MMOL/L — SIGNIFICANT CHANGE UP (ref -3–3)
BASE EXCESS BLDMV CALC-SCNC: -2.4 MMOL/L — SIGNIFICANT CHANGE UP (ref -3–3)
BASE EXCESS BLDV CALC-SCNC: -1.4 MMOL/L — SIGNIFICANT CHANGE UP (ref -2–3)
BASE EXCESS BLDV CALC-SCNC: -17.8 MMOL/L — LOW (ref -2–3)
BASE EXCESS BLDV CALC-SCNC: -3.2 MMOL/L — LOW (ref -2–3)
BASE EXCESS BLDV CALC-SCNC: 0 MMOL/L — SIGNIFICANT CHANGE UP (ref -2–3)
BASOPHILS # BLD AUTO: 0.03 K/UL — SIGNIFICANT CHANGE UP (ref 0–0.2)
BASOPHILS # BLD AUTO: 0.03 K/UL — SIGNIFICANT CHANGE UP (ref 0–0.2)
BASOPHILS NFR BLD AUTO: 0.3 % — SIGNIFICANT CHANGE UP (ref 0–2)
BASOPHILS NFR BLD AUTO: 0.4 % — SIGNIFICANT CHANGE UP (ref 0–2)
BILIRUB SERPL-MCNC: 0.7 MG/DL — SIGNIFICANT CHANGE UP (ref 0.2–1.2)
BILIRUB SERPL-MCNC: 0.8 MG/DL — SIGNIFICANT CHANGE UP (ref 0.2–1.2)
BLD GP AB SCN SERPL QL: NEGATIVE — SIGNIFICANT CHANGE UP
BUN SERPL-MCNC: 41 MG/DL — HIGH (ref 7–23)
BUN SERPL-MCNC: 44 MG/DL — HIGH (ref 7–23)
CA-I SERPL-SCNC: 0.61 MMOL/L — CRITICAL LOW (ref 1.15–1.33)
CA-I SERPL-SCNC: 1.2 MMOL/L — SIGNIFICANT CHANGE UP (ref 1.15–1.33)
CA-I SERPL-SCNC: 1.22 MMOL/L — SIGNIFICANT CHANGE UP (ref 1.15–1.33)
CA-I SERPL-SCNC: 1.24 MMOL/L — SIGNIFICANT CHANGE UP (ref 1.15–1.33)
CALCIUM SERPL-MCNC: 8.9 MG/DL — SIGNIFICANT CHANGE UP (ref 8.4–10.5)
CALCIUM SERPL-MCNC: 9.4 MG/DL — SIGNIFICANT CHANGE UP (ref 8.4–10.5)
CHLORIDE BLDV-SCNC: 100 MMOL/L — SIGNIFICANT CHANGE UP (ref 96–108)
CHLORIDE BLDV-SCNC: 102 MMOL/L — SIGNIFICANT CHANGE UP (ref 96–108)
CHLORIDE BLDV-SCNC: 103 MMOL/L — SIGNIFICANT CHANGE UP (ref 96–108)
CHLORIDE BLDV-SCNC: 123 MMOL/L — HIGH (ref 96–108)
CHLORIDE SERPL-SCNC: 100 MMOL/L — SIGNIFICANT CHANGE UP (ref 96–108)
CHLORIDE SERPL-SCNC: 100 MMOL/L — SIGNIFICANT CHANGE UP (ref 96–108)
CK MB CFR SERPL CALC: 3.7 NG/ML — SIGNIFICANT CHANGE UP (ref 0–3.8)
CK SERPL-CCNC: 99 U/L — SIGNIFICANT CHANGE UP (ref 25–170)
CO2 BLDMV-SCNC: 25 MMOL/L — SIGNIFICANT CHANGE UP (ref 21–29)
CO2 BLDMV-SCNC: 27 MMOL/L — SIGNIFICANT CHANGE UP (ref 21–29)
CO2 BLDV-SCNC: 25 MMOL/L — SIGNIFICANT CHANGE UP (ref 22–26)
CO2 BLDV-SCNC: 26 MMOL/L — SIGNIFICANT CHANGE UP (ref 22–26)
CO2 BLDV-SCNC: 28 MMOL/L — HIGH (ref 22–26)
CO2 BLDV-SCNC: 8 MMOL/L — LOW (ref 22–26)
CO2 SERPL-SCNC: 17 MMOL/L — LOW (ref 22–31)
CO2 SERPL-SCNC: 19 MMOL/L — LOW (ref 22–31)
CREAT SERPL-MCNC: 1.39 MG/DL — HIGH (ref 0.5–1.3)
CREAT SERPL-MCNC: 1.66 MG/DL — HIGH (ref 0.5–1.3)
EGFR: 34 ML/MIN/1.73M2 — LOW
EGFR: 42 ML/MIN/1.73M2 — LOW
EOSINOPHIL # BLD AUTO: 0.07 K/UL — SIGNIFICANT CHANGE UP (ref 0–0.5)
EOSINOPHIL # BLD AUTO: 0.09 K/UL — SIGNIFICANT CHANGE UP (ref 0–0.5)
EOSINOPHIL NFR BLD AUTO: 0.8 % — SIGNIFICANT CHANGE UP (ref 0–6)
EOSINOPHIL NFR BLD AUTO: 1 % — SIGNIFICANT CHANGE UP (ref 0–6)
GAS PNL BLDA: SIGNIFICANT CHANGE UP
GAS PNL BLDMV: SIGNIFICANT CHANGE UP
GAS PNL BLDMV: SIGNIFICANT CHANGE UP
GAS PNL BLDV: 133 MMOL/L — LOW (ref 136–145)
GAS PNL BLDV: 133 MMOL/L — LOW (ref 136–145)
GAS PNL BLDV: 134 MMOL/L — LOW (ref 136–145)
GAS PNL BLDV: 135 MMOL/L — LOW (ref 136–145)
GAS PNL BLDV: SIGNIFICANT CHANGE UP
GLUCOSE BLDV-MCNC: 105 MG/DL — HIGH (ref 70–99)
GLUCOSE BLDV-MCNC: 106 MG/DL — HIGH (ref 70–99)
GLUCOSE BLDV-MCNC: 145 MG/DL — HIGH (ref 70–99)
GLUCOSE BLDV-MCNC: 181 MG/DL — HIGH (ref 70–99)
GLUCOSE SERPL-MCNC: 102 MG/DL — HIGH (ref 70–99)
GLUCOSE SERPL-MCNC: 111 MG/DL — HIGH (ref 70–99)
HCO3 BLDMV-SCNC: 24 MMOL/L — SIGNIFICANT CHANGE UP (ref 20–28)
HCO3 BLDMV-SCNC: 26 MMOL/L — SIGNIFICANT CHANGE UP (ref 20–28)
HCO3 BLDV-SCNC: 23 MMOL/L — SIGNIFICANT CHANGE UP (ref 22–29)
HCO3 BLDV-SCNC: 24 MMOL/L — SIGNIFICANT CHANGE UP (ref 22–29)
HCO3 BLDV-SCNC: 26 MMOL/L — SIGNIFICANT CHANGE UP (ref 22–29)
HCO3 BLDV-SCNC: 8 MMOL/L — CRITICAL LOW (ref 22–29)
HCT VFR BLD CALC: 31.7 % — LOW (ref 34.5–45)
HCT VFR BLD CALC: 33.3 % — LOW (ref 34.5–45)
HCT VFR BLDA CALC: 13 % — CRITICAL LOW (ref 34.5–46.5)
HCT VFR BLDA CALC: 31 % — LOW (ref 34.5–46.5)
HCT VFR BLDA CALC: 31 % — LOW (ref 34.5–46.5)
HCT VFR BLDA CALC: 35 % — SIGNIFICANT CHANGE UP (ref 34.5–46.5)
HGB BLD CALC-MCNC: 10.3 G/DL — LOW (ref 11.7–16.1)
HGB BLD CALC-MCNC: 10.4 G/DL — LOW (ref 11.7–16.1)
HGB BLD CALC-MCNC: 11.8 G/DL — SIGNIFICANT CHANGE UP (ref 11.7–16.1)
HGB BLD CALC-MCNC: 4.4 G/DL — CRITICAL LOW (ref 11.7–16.1)
HGB BLD-MCNC: 10.2 G/DL — LOW (ref 11.5–15.5)
HGB BLD-MCNC: 10.5 G/DL — LOW (ref 11.5–15.5)
HOROWITZ INDEX BLDMV+IHG-RTO: 36 — SIGNIFICANT CHANGE UP
HOROWITZ INDEX BLDMV+IHG-RTO: 36 — SIGNIFICANT CHANGE UP
HOROWITZ INDEX BLDV+IHG-RTO: 36 — SIGNIFICANT CHANGE UP
HOROWITZ INDEX BLDV+IHG-RTO: 36 — SIGNIFICANT CHANGE UP
IMM GRANULOCYTES NFR BLD AUTO: 0.4 % — SIGNIFICANT CHANGE UP (ref 0–0.9)
IMM GRANULOCYTES NFR BLD AUTO: 0.5 % — SIGNIFICANT CHANGE UP (ref 0–0.9)
INR BLD: 1.23 RATIO — HIGH (ref 0.85–1.18)
INR BLD: 1.29 RATIO — HIGH (ref 0.85–1.18)
LACTATE BLDV-MCNC: 0.7 MMOL/L — SIGNIFICANT CHANGE UP (ref 0.5–2)
LACTATE BLDV-MCNC: 0.7 MMOL/L — SIGNIFICANT CHANGE UP (ref 0.5–2)
LACTATE BLDV-MCNC: 1.4 MMOL/L — SIGNIFICANT CHANGE UP (ref 0.5–2)
LACTATE BLDV-MCNC: 2.6 MMOL/L — HIGH (ref 0.5–2)
LYMPHOCYTES # BLD AUTO: 1.81 K/UL — SIGNIFICANT CHANGE UP (ref 1–3.3)
LYMPHOCYTES # BLD AUTO: 2.16 K/UL — SIGNIFICANT CHANGE UP (ref 1–3.3)
LYMPHOCYTES # BLD AUTO: 21.2 % — SIGNIFICANT CHANGE UP (ref 13–44)
LYMPHOCYTES # BLD AUTO: 24.2 % — SIGNIFICANT CHANGE UP (ref 13–44)
MAGNESIUM SERPL-MCNC: 2.4 MG/DL — SIGNIFICANT CHANGE UP (ref 1.6–2.6)
MCHC RBC-ENTMCNC: 30.5 PG — SIGNIFICANT CHANGE UP (ref 27–34)
MCHC RBC-ENTMCNC: 30.7 PG — SIGNIFICANT CHANGE UP (ref 27–34)
MCHC RBC-ENTMCNC: 31.5 GM/DL — LOW (ref 32–36)
MCHC RBC-ENTMCNC: 32.2 GM/DL — SIGNIFICANT CHANGE UP (ref 32–36)
MCV RBC AUTO: 95.5 FL — SIGNIFICANT CHANGE UP (ref 80–100)
MCV RBC AUTO: 96.8 FL — SIGNIFICANT CHANGE UP (ref 80–100)
MONOCYTES # BLD AUTO: 0.64 K/UL — SIGNIFICANT CHANGE UP (ref 0–0.9)
MONOCYTES # BLD AUTO: 0.67 K/UL — SIGNIFICANT CHANGE UP (ref 0–0.9)
MONOCYTES NFR BLD AUTO: 7.5 % — SIGNIFICANT CHANGE UP (ref 2–14)
MONOCYTES NFR BLD AUTO: 7.5 % — SIGNIFICANT CHANGE UP (ref 2–14)
NEUTROPHILS # BLD AUTO: 5.93 K/UL — SIGNIFICANT CHANGE UP (ref 1.8–7.4)
NEUTROPHILS # BLD AUTO: 5.95 K/UL — SIGNIFICANT CHANGE UP (ref 1.8–7.4)
NEUTROPHILS NFR BLD AUTO: 66.6 % — SIGNIFICANT CHANGE UP (ref 43–77)
NEUTROPHILS NFR BLD AUTO: 69.6 % — SIGNIFICANT CHANGE UP (ref 43–77)
NRBC # BLD: 0 /100 WBCS — SIGNIFICANT CHANGE UP (ref 0–0)
NRBC # BLD: 0 /100 WBCS — SIGNIFICANT CHANGE UP (ref 0–0)
O2 CT VFR BLD CALC: 32 MMHG — SIGNIFICANT CHANGE UP (ref 30–65)
O2 CT VFR BLD CALC: 35 MMHG — SIGNIFICANT CHANGE UP (ref 30–65)
PCO2 BLDMV: 45 MMHG — SIGNIFICANT CHANGE UP (ref 30–65)
PCO2 BLDMV: 49 MMHG — SIGNIFICANT CHANGE UP (ref 30–65)
PCO2 BLDV: 44 MMHG — HIGH (ref 39–42)
PCO2 BLDV: 46 MMHG — HIGH (ref 39–42)
PCO2 BLDV: 50 MMHG — HIGH (ref 39–42)
PCO2 BLDV: <19 MMHG — LOW (ref 39–42)
PH BLDMV: 7.33 — SIGNIFICANT CHANGE UP (ref 7.32–7.45)
PH BLDMV: 7.33 — SIGNIFICANT CHANGE UP (ref 7.32–7.45)
PH BLDV: 7.28 — LOW (ref 7.32–7.43)
PH BLDV: 7.31 — LOW (ref 7.32–7.43)
PH BLDV: 7.33 — SIGNIFICANT CHANGE UP (ref 7.32–7.43)
PH BLDV: 7.35 — SIGNIFICANT CHANGE UP (ref 7.32–7.43)
PHOSPHATE SERPL-MCNC: 4.7 MG/DL — HIGH (ref 2.5–4.5)
PLATELET # BLD AUTO: 232 K/UL — SIGNIFICANT CHANGE UP (ref 150–400)
PLATELET # BLD AUTO: 274 K/UL — SIGNIFICANT CHANGE UP (ref 150–400)
PO2 BLDV: 15 MMHG — LOW (ref 25–45)
PO2 BLDV: 30 MMHG — SIGNIFICANT CHANGE UP (ref 25–45)
PO2 BLDV: 34 MMHG — SIGNIFICANT CHANGE UP (ref 25–45)
PO2 BLDV: <10 MMHG — LOW (ref 25–45)
POTASSIUM BLDV-SCNC: 1.3 MMOL/L — CRITICAL LOW (ref 3.5–5.1)
POTASSIUM BLDV-SCNC: 3.7 MMOL/L — SIGNIFICANT CHANGE UP (ref 3.5–5.1)
POTASSIUM BLDV-SCNC: 4 MMOL/L — SIGNIFICANT CHANGE UP (ref 3.5–5.1)
POTASSIUM BLDV-SCNC: 4.1 MMOL/L — SIGNIFICANT CHANGE UP (ref 3.5–5.1)
POTASSIUM SERPL-MCNC: 3.8 MMOL/L — SIGNIFICANT CHANGE UP (ref 3.5–5.3)
POTASSIUM SERPL-MCNC: 4.5 MMOL/L — SIGNIFICANT CHANGE UP (ref 3.5–5.3)
POTASSIUM SERPL-SCNC: 3.8 MMOL/L — SIGNIFICANT CHANGE UP (ref 3.5–5.3)
POTASSIUM SERPL-SCNC: 4.5 MMOL/L — SIGNIFICANT CHANGE UP (ref 3.5–5.3)
PROT SERPL-MCNC: 6.8 G/DL — SIGNIFICANT CHANGE UP (ref 6–8.3)
PROT SERPL-MCNC: 7.6 G/DL — SIGNIFICANT CHANGE UP (ref 6–8.3)
PROTHROM AB SERPL-ACNC: 13.4 SEC — HIGH (ref 9.5–13)
PROTHROM AB SERPL-ACNC: 14.1 SEC — HIGH (ref 9.5–13)
RBC # BLD: 3.32 M/UL — LOW (ref 3.8–5.2)
RBC # BLD: 3.44 M/UL — LOW (ref 3.8–5.2)
RBC # FLD: 15.2 % — HIGH (ref 10.3–14.5)
RBC # FLD: 15.4 % — HIGH (ref 10.3–14.5)
RH IG SCN BLD-IMP: NEGATIVE — SIGNIFICANT CHANGE UP
SAO2 % BLDMV: 46.7 — LOW (ref 60–90)
SAO2 % BLDMV: 50.2 — LOW (ref 60–90)
SAO2 % BLDV: 23.6 % — LOW (ref 67–88)
SAO2 % BLDV: 44.9 % — LOW (ref 67–88)
SAO2 % BLDV: 52 % — LOW (ref 67–88)
SAO2 % BLDV: 67.4 % — SIGNIFICANT CHANGE UP (ref 67–88)
SODIUM SERPL-SCNC: 133 MMOL/L — LOW (ref 135–145)
SODIUM SERPL-SCNC: 137 MMOL/L — SIGNIFICANT CHANGE UP (ref 135–145)
TROPONIN T, HIGH SENSITIVITY RESULT: 17 NG/L — SIGNIFICANT CHANGE UP (ref 0–51)
WBC # BLD: 8.52 K/UL — SIGNIFICANT CHANGE UP (ref 3.8–10.5)
WBC # BLD: 8.94 K/UL — SIGNIFICANT CHANGE UP (ref 3.8–10.5)
WBC # FLD AUTO: 8.52 K/UL — SIGNIFICANT CHANGE UP (ref 3.8–10.5)
WBC # FLD AUTO: 8.94 K/UL — SIGNIFICANT CHANGE UP (ref 3.8–10.5)

## 2024-04-13 PROCEDURE — 99291 CRITICAL CARE FIRST HOUR: CPT | Mod: 25

## 2024-04-13 PROCEDURE — 76937 US GUIDE VASCULAR ACCESS: CPT | Mod: 26

## 2024-04-13 PROCEDURE — 93460 R&L HRT ART/VENTRICLE ANGIO: CPT | Mod: 26

## 2024-04-13 PROCEDURE — 93306 TTE W/DOPPLER COMPLETE: CPT | Mod: 26

## 2024-04-13 PROCEDURE — 99291 CRITICAL CARE FIRST HOUR: CPT

## 2024-04-13 PROCEDURE — 33967 INSERT I-AORT PERCUT DEVICE: CPT

## 2024-04-13 PROCEDURE — 99222 1ST HOSP IP/OBS MODERATE 55: CPT

## 2024-04-13 PROCEDURE — 93010 ELECTROCARDIOGRAM REPORT: CPT

## 2024-04-13 PROCEDURE — 71045 X-RAY EXAM CHEST 1 VIEW: CPT | Mod: 26,76

## 2024-04-13 PROCEDURE — 36620 INSERTION CATHETER ARTERY: CPT

## 2024-04-13 PROCEDURE — 93308 TTE F-UP OR LMTD: CPT | Mod: 26

## 2024-04-13 RX ORDER — MILRINONE LACTATE 1 MG/ML
0.12 INJECTION, SOLUTION INTRAVENOUS
Qty: 20 | Refills: 0 | Status: DISCONTINUED | OUTPATIENT
Start: 2024-04-13 | End: 2024-04-15

## 2024-04-13 RX ORDER — BUMETANIDE 0.25 MG/ML
2 INJECTION INTRAMUSCULAR; INTRAVENOUS
Qty: 20 | Refills: 0 | Status: DISCONTINUED | OUTPATIENT
Start: 2024-04-13 | End: 2024-04-15

## 2024-04-13 RX ORDER — NOREPINEPHRINE BITARTRATE/D5W 8 MG/250ML
0.05 PLASTIC BAG, INJECTION (ML) INTRAVENOUS
Qty: 8 | Refills: 0 | Status: DISCONTINUED | OUTPATIENT
Start: 2024-04-13 | End: 2024-04-13

## 2024-04-13 RX ORDER — FUROSEMIDE 40 MG
20 TABLET ORAL ONCE
Refills: 0 | Status: COMPLETED | OUTPATIENT
Start: 2024-04-13 | End: 2024-04-13

## 2024-04-13 RX ORDER — POTASSIUM CHLORIDE 20 MEQ
20 PACKET (EA) ORAL ONCE
Refills: 0 | Status: COMPLETED | OUTPATIENT
Start: 2024-04-13 | End: 2024-04-13

## 2024-04-13 RX ORDER — NICOTINE POLACRILEX 2 MG
1 GUM BUCCAL DAILY
Refills: 0 | Status: DISCONTINUED | OUTPATIENT
Start: 2024-04-13 | End: 2024-04-14

## 2024-04-13 RX ORDER — BUMETANIDE 0.25 MG/ML
2 INJECTION INTRAMUSCULAR; INTRAVENOUS ONCE
Refills: 0 | Status: COMPLETED | OUTPATIENT
Start: 2024-04-13 | End: 2024-04-13

## 2024-04-13 RX ORDER — HEPARIN SODIUM 5000 [USP'U]/ML
1400 INJECTION INTRAVENOUS; SUBCUTANEOUS
Qty: 25000 | Refills: 0 | Status: DISCONTINUED | OUTPATIENT
Start: 2024-04-13 | End: 2024-04-16

## 2024-04-13 RX ORDER — OXYCODONE HYDROCHLORIDE 5 MG/1
20 TABLET ORAL ONCE
Refills: 0 | Status: DISCONTINUED | OUTPATIENT
Start: 2024-04-13 | End: 2024-04-13

## 2024-04-13 RX ORDER — SODIUM CHLORIDE 9 MG/ML
250 INJECTION INTRAMUSCULAR; INTRAVENOUS; SUBCUTANEOUS ONCE
Refills: 0 | Status: COMPLETED | OUTPATIENT
Start: 2024-04-13 | End: 2024-04-13

## 2024-04-13 RX ORDER — CEFEPIME 1 G/1
1000 INJECTION, POWDER, FOR SOLUTION INTRAMUSCULAR; INTRAVENOUS ONCE
Refills: 0 | Status: COMPLETED | OUTPATIENT
Start: 2024-04-13 | End: 2024-04-13

## 2024-04-13 RX ADMIN — HEPARIN SODIUM 14 UNIT(S)/HR: 5000 INJECTION INTRAVENOUS; SUBCUTANEOUS at 22:34

## 2024-04-13 RX ADMIN — Medication 1 PATCH: at 21:37

## 2024-04-13 RX ADMIN — CEFEPIME 100 MILLIGRAM(S): 1 INJECTION, POWDER, FOR SOLUTION INTRAMUSCULAR; INTRAVENOUS at 16:20

## 2024-04-13 RX ADMIN — OXYCODONE HYDROCHLORIDE 20 MILLIGRAM(S): 5 TABLET ORAL at 23:00

## 2024-04-13 RX ADMIN — BUMETANIDE 10 MG/HR: 0.25 INJECTION INTRAMUSCULAR; INTRAVENOUS at 23:48

## 2024-04-13 RX ADMIN — Medication 20 MILLIEQUIVALENT(S): at 21:37

## 2024-04-13 RX ADMIN — Medication 2 MILLIGRAM(S): at 21:37

## 2024-04-13 RX ADMIN — Medication 7.69 MICROGRAM(S)/KG/MIN: at 20:10

## 2024-04-13 RX ADMIN — OXYCODONE HYDROCHLORIDE 20 MILLIGRAM(S): 5 TABLET ORAL at 22:45

## 2024-04-13 RX ADMIN — SODIUM CHLORIDE 250 MILLILITER(S): 9 INJECTION INTRAMUSCULAR; INTRAVENOUS; SUBCUTANEOUS at 15:59

## 2024-04-13 RX ADMIN — Medication 20 MILLIGRAM(S): at 21:36

## 2024-04-13 RX ADMIN — Medication 7.69 MICROGRAM(S)/KG/MIN: at 16:09

## 2024-04-13 RX ADMIN — BUMETANIDE 2 MILLIGRAM(S): 0.25 INJECTION INTRAMUSCULAR; INTRAVENOUS at 23:31

## 2024-04-13 NOTE — ED PROVIDER NOTE - PROGRESS NOTE DETAILS
Attending La Holloway: d/w cards will go to the cath lab for IABP Attending La Holloway: upon arrival pt with cold extremities, hypotensive. pocus with dilated LA, small pericardial effuision and cnocern for severe MR. cncern for cardiogenic shock. cards called and pt started on blood pressure support

## 2024-04-13 NOTE — H&P ADULT - NSHPLABSRESULTS_GEN_ALL_CORE
10.2   8.94  )-----------( 232      ( 13 Apr 2024 20:34 )             31.7     04-13    133<L>  |  100  |  41<H>  ----------------------------<  102<H>  3.8   |  19<L>  |  1.39<H>    Ca    8.9      13 Apr 2024 20:34  Phos  4.7     04-13  Mg     2.4     04-13    TPro  6.8  /  Alb  3.5  /  TBili  0.7  /  DBili  x   /  AST  89<H>  /  ALT  127<H>  /  AlkPhos  142<H>  04-13    < from: TTE W or WO Ultrasound Enhancing Agent (04.13.24 @ 16:30) >    CONCLUSIONS:      1. The left ventricular cavity is normal in size. Left ventricular wall thickness is normal. Left ventricular systolic function is normal with an ejection fraction visually estimated at 60 to 65%. There are no regional wall motion abnormalities seen. The left ventricular diastolic function is indeterminate. Analysis of left ventricular diastolic function and filling pressure is made challenging by the presence of severe mitral regurgitation.   2. Mildly enlarged right ventricular cavity size and reduced systolic function.   3. There is mitral valve thickening of the anterior and posterior leaflets. Chordal thickening noted. Doming and thickening of the tips of the mitral valve consistent with rheumatic mitral valve disease. The transmitral peak gradientis 60.2 mmHg and mean gradient is 22.80 mmHg. There is severe mitral regurgitation. Elevated transmitral gradients are related to the both the presence of significant mitral regurgitation in addition to mitral stenosis.   4. The left atrium is severely dilated with an indexed volume of 62 ml/m².   5. Structurally normal tricuspid valve with normal leaflet excursion. There is moderate tricuspid regurgitation. Estimated pulmonary artery systolic pressure is 76 mmHg, consistent with severe pulmonary hypertension.   6. The inferior vena cava is dilated (dilated >2.1cm) with abnormal inspiratory collapse (abnormal <50%) consistent with elevated right atrial pressure (~15, range 10-20mmHg).   7. No prior echocardiogram is available for comparison.    < end of copied text >

## 2024-04-13 NOTE — ED PROVIDER NOTE - CLINICAL SUMMARY MEDICAL DECISION MAKING FREE TEXT BOX
Attending La Holloway: 64-year-old female presenting with concern for shortness of breath.  Upon arrival to the emergency department patient ill-appearing with cold extremities.  Patient also found to be tachycardic and hypotensive.  Manual blood pressure with significant hypotension in the 70s.  Point-of-care ultrasound performed upon arrival showing dilated left atrium, plethoric IVC, bilateral anterior B-lines, bilateral pleural effusions and significant mitral regurgitation.  Concern based on appearance and point-of-care ultrasound findings for cardiogenic shock.  Concern for severe mitral regurgitation in the setting of a plethoric IVC versus possible papillary muscle rupture.  Patient started on Levophed as concerned with plethoric IVC and bilateral B-lines for fluid overload.  Patient denies any chest pain.  Less likely pulmonary embolism.  Cardiology consulted upon arrival as concern for cardiogenic shock and need for mechanical support.  2 large-bore IVs in place.  Patient is afebrile rectally.  Does not appear to be infected however with significantly abnormal vital signs upon arrival and ill appearance of the patient will cover with antibiotics.  No fevers or rash on exam to suggest endocarditis.  Patient started on Levophed with improvement in the blood pressure however difficulty obtaining good blood pressures reliably.  Discussed with CCU attending Dr. Terrell and decision made to place a right A-line for closer hemodynamic monitoring.  Stat comprehensive echo performed to further evaluate for mitral valve disease as well as cardiac function.  Decision made to take patient to the Cath Lab for balloon pump as concern for cardiogenic shock.  Patient will need to go to the CCU.  Patient is also found to be in new onset atrial fibrillation.  Discussed with Dr. Terrell will hold off on anticoagulation at this time pending further evaluation by 60

## 2024-04-13 NOTE — H&P ADULT - NSHPPHYSICALEXAM_GEN_ALL_CORE
Physical Exam:   Constitutional: NAD, well-groomed, well-developed  HEENT: PERRLA, EOMI, no drainage or redness  Neck: supple  Respiratory: Breath Sounds equal & clear bilaterally to auscultation, no rales/rhonchi/wheezing, no accessory muscle use noted  Cardiovascular: Regular rate, irregular rhythm, normal S1, S2  Gastrointestinal: Soft, non-tender, non distended, + bowel sounds  Extremities: VELIZ x 4, no peripheral edema, no cyanosis, no clubbing. +peripheral pulses.   Neurological: A+O x 3; speech clear and intact; no sensory, motor  deficits, normal reflexes. Nonfocal.   Skin: warm, dry, well perfused  Lines: R fem IABP and swan c/d/i Physical Exam:   Constitutional: in no acute distress  HEENT: PERRLA, EOMI, no drainage or redness  Neck: supple  Respiratory: Breath Sounds equal & clear bilaterally to auscultation, no rales/rhonchi/wheezing, no accessory muscle use noted  Cardiovascular: Regular rate, irregular rhythm, normal S1, S2  Gastrointestinal: Soft, non-tender, non distended, + bowel sounds  Extremities: VELIZ x 4, no peripheral edema, no cyanosis, no clubbing. + peripheral pulses.   Neurological: A+O x 3; speech clear and intact; no sensory, motor  deficits, normal reflexes. Nonfocal.   Skin: cool, dry  Lines: R fem IABP and swan c/d/i
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
## 2024-04-13 NOTE — ED PROVIDER NOTE - OBJECTIVE STATEMENT
Attending La Holloway: 64-year-old female current smoker with past medical history of mitral valve prolapse, hypertension, hyperlipidemia presenting with concern for shortness of breath.  Per daughter who is a nurse she has not seen her mom for the last couple of days.  When she came to see her today she noticed that she was having difficulty breathing and appeared pale and brought to the emergency department.  Upon arrival patient found to be hypotensive and ill-appearing.  Patient reports feeling short of breath.  No fevers or chills.  Does see a cardiologist.  Denies any back pain.  Denies any bl patient denies any chest pain.  denies an black or bloody stools

## 2024-04-13 NOTE — ED PROVIDER NOTE - PHYSICAL EXAMINATION
Attending La Holloway:  Gen: ill appearingheent: atrauamtic, eomi, perrla, mmm, op pink, uvula midline, neck; nttp, no nuchal rigidity, chest: nttp, no crepitus, cv: irregular +murmur, lungs: ctab, abd: soft, nontender, nondistended, no peritoneal signs, no guarding, ext: cool extremities skin: no rash, neuro: awake and alert, following commands, speech clear, sensation and strength intact, no focal deficits

## 2024-04-13 NOTE — H&P ADULT - HISTORY OF PRESENT ILLNESS
Ms Denise Bautista is a 63 y/o female who is a current smoker with a PMHx of HLD, HTN, AFib (diagnosed recently, not on AC), benzodiazepine/opioid and etoh abuse, and mitral valve prolapse presenting to the ER with shortness of breath. Pt reports developing shortness of breath, fatigue, and a sense of feeling unwell for the last week, with milder symptoms present longer. Her daughter at bedside reports pt is lethargic, not herself and was having difficulty breathing at home.      Upon arrival to the ED she was pale and ill appearing. Was found to be hypoxic to 77% on room air requiring 3L nasal cannula as well as hypotensive to the 70s requiring levophed. On pocus she was found to have a small pericardial effusion, severe MR w/ plethoric IVC and bilateral B lines. Cardiology was consulted for concern of cardiogenic shock. She is now s/p RHC with IABP and swan purnima catheter placement.    Ms Denise Bautista is a 63 y/o female who is a current smoker with a PMHx of HLD, HTN, AFib (diagnosed recently, not on AC), benzodiazepine/opioid and etoh abuse, and mitral valve prolapse presenting to the ER with shortness of breath. Pt reports developing shortness of breath, fatigue, and a sense of feeling unwell for the last week, with milder symptoms present longer. Her daughter at bedside reports pt is lethargic, not herself and was having difficulty breathing at home.      Upon arrival to the ED she was pale and ill appearing. Was found to be hypoxic to 77% on room air requiring 3L nasal cannula as well as hypotensive to the 70s requiring levophed. On pocus she was found to have a small pericardial effusion, severe MR w/ plethoric IVC and bilateral B lines. Cardiology was consulted for concern of cardiogenic shock. She is now s/p RHC with IABP and swan purnima catheter placement.     On admission to CICU she is A&O x4, saturating well on 4L nasal cannula, IABP 1:1 augmenting 100 on .1mcg/kg/min of norepinephrine.

## 2024-04-13 NOTE — ED ADULT NURSE NOTE - OBJECTIVE STATEMENT
65 y/o female with PMH of HLD, HTN, mitral valve prolapse arrives to the ER complaining of SOB. Pt reports developing shortness of breath for the last week. Daughter at bedside reports pt is lethargic, not herself.  On assessment pt is lethargic, pale, able to follow commands, A&Ox4, speaking coherently, airway is patent, breathing spontaneously, tachypneic, SPO2 77% RA, placed on NC 3 L improving to 96 Skin is dry, warm. Pt placed on continuous cardiac monitor, , non febrile.  Patient undressed and placed into gown, side rails up with bed locked  and in lowest position for safety. [place] provided. Comfort and safety provided. 63 y/o female with PMH of HLD, HTN, mitral valve prolapse arrives to the ER complaining of SOB. Pt reports developing shortness of breath for the last week. Daughter at bedside reports pt is lethargic, not herself having difficulty making full sentences due to the SOB.  On assessment pt is lethargic, pale, able to follow commands, A&Ox4, speaking coherently, airway is patent, breathing spontaneously, tachypneic, SPO2 77% RA, placed on NC 3 L improving to 96 Skin is dry, warm. Pt placed on continuous cardiac monitor, , hypotensive 88/58, non febrile.  Patient undressed and placed into gown, side rails up with bed locked  and in lowest position for safety. [place] provided. Comfort and safety provided.

## 2024-04-13 NOTE — ED PROVIDER NOTE - ATTENDING CONTRIBUTION TO CARE
Attending MD La Holloway:  I personally have seen and examined this patient.  Resident note reviewed and agree on plan of care and except where noted.  See HPI, PE, and MDM for details.

## 2024-04-13 NOTE — H&P ADULT - ASSESSMENT
====================ASSESSMENT ==============   63 y/o female with PMHx of HLD, HTN, AFib (diagnosed recently, not on AC), benzodiazepine/opioid & ETOH abuse, and mitral valve prolapse found to have severe mitral and tricuspid regurgitation w/ signs of cardiogenic shock, now s/p RHC with IABP placement.     ====================== NEUROLOGY=====================   #Substance Use Disorder   #chronic back pain  - A&O x4 on exam  - takes oxycodon 20mg TID for chronic back pain s/t work injury  - on home clonazepam 1mg TID  - istop confirmed, printed in chart  - ? alcohol use disorder as per daughter, continue ciwa assessment  - continue to monitor mental status per protocol    ==================== RESPIRATORY======================   #Acute Hypoxic Respiratory Failure   - requiring 4L nasal cannula s/t pulmonary edema i/s/o severe MR  - wean nasal cannula w/ diuresis  - continue to monitor sp02    ====================CARDIOVASCULAR==================   #Cardiogenic Shock   - i/s/o severe MR and pHTN requiring IABP   - TTE 4/13: severe MR and TR with normal LV size, thickness, and EF of 60-65%.  LA severely dilated.  Moderate tricuspid regurgitation.  Mildly enlarged RV with reduced systolic function, severe pHTN  - s/p RHC w/ PAC and IABP placement  - CVP 10 on admission, 40 mg lasix ordered  - continue IABP 1:1 w/ heparin gtt  - f/u mv02, lactate and perfusion indices  - wean levo as tolerated for assisted means > 65  - continue strict I&O, daily weights, frequent mv02 trending w/ lactate monitoring    #Atrial Fibrillation   - diagnosed as outpatient, not on AC at home  - rates > 100 on admission, continue to monitor w/ weaning levo  - Heparin gtt   - continue to monitor tele, electrolytes    ==================== GASTROINTESTINAL===================   #transaminitis  - likely i/s/o low flow state  - avoid hepatoxins, continue to trend daily    - DASH diet   - continue bowel regimen      ===================== RENAL =========================   #ISABELA  - sCR 1.66 on admission, unknown baseline  - continue to trend, avoid nephrotoxins  - replete lytes PRN, keep K>4 and Mg>2      ===================HEMATOLOGIC/ONC ===================   - H/H and platelets stable  - continue to trend daily  #DVT: heparin gtt for IABP & a.fib    =======================    ENDOCRINE  =====================   - f.u a1c, lipid panel, tsh  - glucose controlled on cmp, continue to trend    ========================INFECTIOUS DISEASE================   - afebrile, no leukocytosis    - f/u blood cultures x2,   - continue to monitor WBC and fever curve      #full code  Lines: R fem IABP (4/13 -  R fem PAC (4/13 -     ======================= DISPOSITION  =====================  [X] Critical   [ ] Guarded    [ ] Stable    [X] Maintain in CICU  [ ] Downgrade to Telemtry  [ ] Discharge Home    Patient requires continuous monitoring with bedside rhythm monitoring, pulse ox monitoring, and intermittent blood gas analysis. Care plan discussed with ICU care team. Patient remained critical and at risk for life threatening decompensation.  Patient seen, examined and plan discussed with CCU team during rounds.     I have personally provided 75 minutes of critical care time excluding time spent on separate procedures, in addition to initial critical care time provided by the CICU Attending, Dr. Anita Cadet, [facility]

## 2024-04-13 NOTE — PATIENT PROFILE ADULT - FALL HARM RISK - HARM RISK INTERVENTIONS
Assistance with ambulation/Assistance OOB with selected safe patient handling equipment/Communicate Risk of Fall with Harm to all staff/Discuss with provider need for PT consult/Provide patient with walking aids - walker, cane, crutches/Reinforce activity limits and safety measures with patient and family/Tailored Fall Risk Interventions/Visual Cue: Yellow wristband and red socks/Bed in lowest position, wheels locked, appropriate side rails in place/Call bell, personal items and telephone in reach/[place] to call system/Physically safe environment - no spills, clutter or unnecessary equipment/Purposeful Proactive Rounding/Room/bathroom lighting operational, light cord in reach

## 2024-04-14 DIAGNOSIS — R57.0 CARDIOGENIC SHOCK: ICD-10-CM

## 2024-04-14 DIAGNOSIS — I48.91 UNSPECIFIED ATRIAL FIBRILLATION: ICD-10-CM

## 2024-04-14 DIAGNOSIS — I34.0 NONRHEUMATIC MITRAL (VALVE) INSUFFICIENCY: ICD-10-CM

## 2024-04-14 DIAGNOSIS — I07.1 RHEUMATIC TRICUSPID INSUFFICIENCY: ICD-10-CM

## 2024-04-14 DIAGNOSIS — N17.9 ACUTE KIDNEY FAILURE, UNSPECIFIED: ICD-10-CM

## 2024-04-14 LAB
A1C WITH ESTIMATED AVERAGE GLUCOSE RESULT: 5.6 % — SIGNIFICANT CHANGE UP (ref 4–5.6)
ALBUMIN SERPL ELPH-MCNC: 3.5 G/DL — SIGNIFICANT CHANGE UP (ref 3.3–5)
ALBUMIN SERPL ELPH-MCNC: 3.8 G/DL — SIGNIFICANT CHANGE UP (ref 3.3–5)
ALP SERPL-CCNC: 141 U/L — HIGH (ref 40–120)
ALP SERPL-CCNC: 145 U/L — HIGH (ref 40–120)
ALT FLD-CCNC: 112 U/L — HIGH (ref 10–45)
ALT FLD-CCNC: 120 U/L — HIGH (ref 10–45)
ANION GAP SERPL CALC-SCNC: 12 MMOL/L — SIGNIFICANT CHANGE UP (ref 5–17)
ANION GAP SERPL CALC-SCNC: 13 MMOL/L — SIGNIFICANT CHANGE UP (ref 5–17)
APTT BLD: 45.6 SEC — HIGH (ref 24.5–35.6)
APTT BLD: 70 SEC — HIGH (ref 24.5–35.6)
APTT BLD: 74.6 SEC — HIGH (ref 24.5–35.6)
AST SERPL-CCNC: 59 U/L — HIGH (ref 10–40)
AST SERPL-CCNC: 84 U/L — HIGH (ref 10–40)
BASE EXCESS BLDMV CALC-SCNC: -0.4 MMOL/L — SIGNIFICANT CHANGE UP (ref -3–3)
BASE EXCESS BLDMV CALC-SCNC: 1 MMOL/L — SIGNIFICANT CHANGE UP (ref -3–3)
BASE EXCESS BLDMV CALC-SCNC: 5.3 MMOL/L — HIGH (ref -3–3)
BASE EXCESS BLDV CALC-SCNC: -0.1 MMOL/L — SIGNIFICANT CHANGE UP (ref -2–3)
BASE EXCESS BLDV CALC-SCNC: 1 MMOL/L — SIGNIFICANT CHANGE UP (ref -2–3)
BASE EXCESS BLDV CALC-SCNC: 5.4 MMOL/L — HIGH (ref -2–3)
BASOPHILS # BLD AUTO: 0.04 K/UL — SIGNIFICANT CHANGE UP (ref 0–0.2)
BASOPHILS NFR BLD AUTO: 0.5 % — SIGNIFICANT CHANGE UP (ref 0–2)
BILIRUB SERPL-MCNC: 0.4 MG/DL — SIGNIFICANT CHANGE UP (ref 0.2–1.2)
BILIRUB SERPL-MCNC: 0.5 MG/DL — SIGNIFICANT CHANGE UP (ref 0.2–1.2)
BUN SERPL-MCNC: 36 MG/DL — HIGH (ref 7–23)
BUN SERPL-MCNC: 39 MG/DL — HIGH (ref 7–23)
CA-I SERPL-SCNC: 1.21 MMOL/L — SIGNIFICANT CHANGE UP (ref 1.15–1.33)
CA-I SERPL-SCNC: 1.22 MMOL/L — SIGNIFICANT CHANGE UP (ref 1.15–1.33)
CA-I SERPL-SCNC: 1.23 MMOL/L — SIGNIFICANT CHANGE UP (ref 1.15–1.33)
CALCIUM SERPL-MCNC: 8.8 MG/DL — SIGNIFICANT CHANGE UP (ref 8.4–10.5)
CALCIUM SERPL-MCNC: 9.2 MG/DL — SIGNIFICANT CHANGE UP (ref 8.4–10.5)
CHLORIDE BLDV-SCNC: 101 MMOL/L — SIGNIFICANT CHANGE UP (ref 96–108)
CHLORIDE BLDV-SCNC: 103 MMOL/L — SIGNIFICANT CHANGE UP (ref 96–108)
CHLORIDE BLDV-SCNC: 99 MMOL/L — SIGNIFICANT CHANGE UP (ref 96–108)
CHLORIDE SERPL-SCNC: 101 MMOL/L — SIGNIFICANT CHANGE UP (ref 96–108)
CHLORIDE SERPL-SCNC: 98 MMOL/L — SIGNIFICANT CHANGE UP (ref 96–108)
CHOLEST SERPL-MCNC: 74 MG/DL — SIGNIFICANT CHANGE UP
CO2 BLDMV-SCNC: 27 MMOL/L — SIGNIFICANT CHANGE UP (ref 21–29)
CO2 BLDMV-SCNC: 28 MMOL/L — SIGNIFICANT CHANGE UP (ref 21–29)
CO2 BLDMV-SCNC: 32 MMOL/L — HIGH (ref 21–29)
CO2 BLDV-SCNC: 27 MMOL/L — HIGH (ref 22–26)
CO2 BLDV-SCNC: 29 MMOL/L — HIGH (ref 22–26)
CO2 BLDV-SCNC: 32 MMOL/L — HIGH (ref 22–26)
CO2 SERPL-SCNC: 22 MMOL/L — SIGNIFICANT CHANGE UP (ref 22–31)
CO2 SERPL-SCNC: 26 MMOL/L — SIGNIFICANT CHANGE UP (ref 22–31)
CREAT SERPL-MCNC: 1.1 MG/DL — SIGNIFICANT CHANGE UP (ref 0.5–1.3)
CREAT SERPL-MCNC: 1.36 MG/DL — HIGH (ref 0.5–1.3)
EGFR: 44 ML/MIN/1.73M2 — LOW
EGFR: 56 ML/MIN/1.73M2 — LOW
EOSINOPHIL # BLD AUTO: 0.17 K/UL — SIGNIFICANT CHANGE UP (ref 0–0.5)
EOSINOPHIL NFR BLD AUTO: 2.1 % — SIGNIFICANT CHANGE UP (ref 0–6)
ESTIMATED AVERAGE GLUCOSE: 114 MG/DL — SIGNIFICANT CHANGE UP (ref 68–114)
GAS PNL BLDMV: SIGNIFICANT CHANGE UP
GAS PNL BLDV: 134 MMOL/L — LOW (ref 136–145)
GAS PNL BLDV: 135 MMOL/L — LOW (ref 136–145)
GAS PNL BLDV: 135 MMOL/L — LOW (ref 136–145)
GAS PNL BLDV: SIGNIFICANT CHANGE UP
GLUCOSE BLDV-MCNC: 103 MG/DL — HIGH (ref 70–99)
GLUCOSE BLDV-MCNC: 143 MG/DL — HIGH (ref 70–99)
GLUCOSE BLDV-MCNC: 146 MG/DL — HIGH (ref 70–99)
GLUCOSE SERPL-MCNC: 113 MG/DL — HIGH (ref 70–99)
GLUCOSE SERPL-MCNC: 149 MG/DL — HIGH (ref 70–99)
HCO3 BLDMV-SCNC: 26 MMOL/L — SIGNIFICANT CHANGE UP (ref 20–28)
HCO3 BLDMV-SCNC: 27 MMOL/L — SIGNIFICANT CHANGE UP (ref 20–28)
HCO3 BLDMV-SCNC: 30 MMOL/L — HIGH (ref 20–28)
HCO3 BLDV-SCNC: 25 MMOL/L — SIGNIFICANT CHANGE UP (ref 22–29)
HCO3 BLDV-SCNC: 27 MMOL/L — SIGNIFICANT CHANGE UP (ref 22–29)
HCO3 BLDV-SCNC: 30 MMOL/L — HIGH (ref 22–29)
HCT VFR BLD CALC: 30.9 % — LOW (ref 34.5–45)
HCT VFR BLDA CALC: 31 % — LOW (ref 34.5–46.5)
HCT VFR BLDA CALC: 31 % — LOW (ref 34.5–46.5)
HCT VFR BLDA CALC: 32 % — LOW (ref 34.5–46.5)
HDLC SERPL-MCNC: 27 MG/DL — LOW
HGB BLD CALC-MCNC: 10.2 G/DL — LOW (ref 11.7–16.1)
HGB BLD CALC-MCNC: 10.3 G/DL — LOW (ref 11.7–16.1)
HGB BLD CALC-MCNC: 10.8 G/DL — LOW (ref 11.7–16.1)
HGB BLD-MCNC: 10 G/DL — LOW (ref 11.5–15.5)
HOROWITZ INDEX BLDMV+IHG-RTO: 28 — SIGNIFICANT CHANGE UP
HOROWITZ INDEX BLDMV+IHG-RTO: 36 — SIGNIFICANT CHANGE UP
HOROWITZ INDEX BLDMV+IHG-RTO: 36 — SIGNIFICANT CHANGE UP
HOROWITZ INDEX BLDV+IHG-RTO: 28 — SIGNIFICANT CHANGE UP
HOROWITZ INDEX BLDV+IHG-RTO: 36 — SIGNIFICANT CHANGE UP
HOROWITZ INDEX BLDV+IHG-RTO: 36 — SIGNIFICANT CHANGE UP
IMM GRANULOCYTES NFR BLD AUTO: 0.5 % — SIGNIFICANT CHANGE UP (ref 0–0.9)
INR BLD: 1.23 RATIO — HIGH (ref 0.85–1.18)
INR BLD: 1.27 RATIO — HIGH (ref 0.85–1.18)
INR BLD: 1.29 RATIO — HIGH (ref 0.85–1.18)
LACTATE BLDV-MCNC: 0.7 MMOL/L — SIGNIFICANT CHANGE UP (ref 0.5–2)
LACTATE BLDV-MCNC: 0.7 MMOL/L — SIGNIFICANT CHANGE UP (ref 0.5–2)
LACTATE BLDV-MCNC: 1 MMOL/L — SIGNIFICANT CHANGE UP (ref 0.5–2)
LIPID PNL WITH DIRECT LDL SERPL: 30 MG/DL — SIGNIFICANT CHANGE UP
LYMPHOCYTES # BLD AUTO: 1.91 K/UL — SIGNIFICANT CHANGE UP (ref 1–3.3)
LYMPHOCYTES # BLD AUTO: 23.3 % — SIGNIFICANT CHANGE UP (ref 13–44)
MAGNESIUM SERPL-MCNC: 1.7 MG/DL — SIGNIFICANT CHANGE UP (ref 1.6–2.6)
MAGNESIUM SERPL-MCNC: 2.2 MG/DL — SIGNIFICANT CHANGE UP (ref 1.6–2.6)
MCHC RBC-ENTMCNC: 30.9 PG — SIGNIFICANT CHANGE UP (ref 27–34)
MCHC RBC-ENTMCNC: 32.4 GM/DL — SIGNIFICANT CHANGE UP (ref 32–36)
MCV RBC AUTO: 95.4 FL — SIGNIFICANT CHANGE UP (ref 80–100)
MONOCYTES # BLD AUTO: 0.72 K/UL — SIGNIFICANT CHANGE UP (ref 0–0.9)
MONOCYTES NFR BLD AUTO: 8.8 % — SIGNIFICANT CHANGE UP (ref 2–14)
NEUTROPHILS # BLD AUTO: 5.32 K/UL — SIGNIFICANT CHANGE UP (ref 1.8–7.4)
NEUTROPHILS NFR BLD AUTO: 64.8 % — SIGNIFICANT CHANGE UP (ref 43–77)
NON HDL CHOLESTEROL: 47 MG/DL — SIGNIFICANT CHANGE UP
NRBC # BLD: 0 /100 WBCS — SIGNIFICANT CHANGE UP (ref 0–0)
O2 CT VFR BLD CALC: 36 MMHG — SIGNIFICANT CHANGE UP (ref 30–65)
O2 CT VFR BLD CALC: 40 MMHG — SIGNIFICANT CHANGE UP (ref 30–65)
O2 CT VFR BLD CALC: 40 MMHG — SIGNIFICANT CHANGE UP (ref 30–65)
PCO2 BLDMV: 45 MMHG — SIGNIFICANT CHANGE UP (ref 30–65)
PCO2 BLDMV: 46 MMHG — SIGNIFICANT CHANGE UP (ref 30–65)
PCO2 BLDMV: 48 MMHG — SIGNIFICANT CHANGE UP (ref 30–65)
PCO2 BLDV: 44 MMHG — HIGH (ref 39–42)
PCO2 BLDV: 46 MMHG — HIGH (ref 39–42)
PCO2 BLDV: 49 MMHG — HIGH (ref 39–42)
PH BLDMV: 7.34 — SIGNIFICANT CHANGE UP (ref 7.32–7.45)
PH BLDMV: 7.38 — SIGNIFICANT CHANGE UP (ref 7.32–7.45)
PH BLDMV: 7.43 — SIGNIFICANT CHANGE UP (ref 7.32–7.45)
PH BLDV: 7.35 — SIGNIFICANT CHANGE UP (ref 7.32–7.43)
PH BLDV: 7.37 — SIGNIFICANT CHANGE UP (ref 7.32–7.43)
PH BLDV: 7.43 — SIGNIFICANT CHANGE UP (ref 7.32–7.43)
PHOSPHATE SERPL-MCNC: 3.3 MG/DL — SIGNIFICANT CHANGE UP (ref 2.5–4.5)
PHOSPHATE SERPL-MCNC: 4 MG/DL — SIGNIFICANT CHANGE UP (ref 2.5–4.5)
PLATELET # BLD AUTO: 225 K/UL — SIGNIFICANT CHANGE UP (ref 150–400)
PO2 BLDV: 35 MMHG — SIGNIFICANT CHANGE UP (ref 25–45)
PO2 BLDV: 37 MMHG — SIGNIFICANT CHANGE UP (ref 25–45)
PO2 BLDV: 40 MMHG — SIGNIFICANT CHANGE UP (ref 25–45)
POTASSIUM BLDV-SCNC: 3.3 MMOL/L — LOW (ref 3.5–5.1)
POTASSIUM BLDV-SCNC: 3.7 MMOL/L — SIGNIFICANT CHANGE UP (ref 3.5–5.1)
POTASSIUM BLDV-SCNC: 4.4 MMOL/L — SIGNIFICANT CHANGE UP (ref 3.5–5.1)
POTASSIUM SERPL-MCNC: 3.4 MMOL/L — LOW (ref 3.5–5.3)
POTASSIUM SERPL-MCNC: 3.8 MMOL/L — SIGNIFICANT CHANGE UP (ref 3.5–5.3)
POTASSIUM SERPL-SCNC: 3.4 MMOL/L — LOW (ref 3.5–5.3)
POTASSIUM SERPL-SCNC: 3.8 MMOL/L — SIGNIFICANT CHANGE UP (ref 3.5–5.3)
PROT SERPL-MCNC: 6.8 G/DL — SIGNIFICANT CHANGE UP (ref 6–8.3)
PROT SERPL-MCNC: 6.9 G/DL — SIGNIFICANT CHANGE UP (ref 6–8.3)
PROTHROM AB SERPL-ACNC: 12.8 SEC — SIGNIFICANT CHANGE UP (ref 9.5–13)
PROTHROM AB SERPL-ACNC: 13.2 SEC — HIGH (ref 9.5–13)
PROTHROM AB SERPL-ACNC: 14.1 SEC — HIGH (ref 9.5–13)
RBC # BLD: 3.24 M/UL — LOW (ref 3.8–5.2)
RBC # FLD: 15.2 % — HIGH (ref 10.3–14.5)
SAO2 % BLDMV: 58.6 — LOW (ref 60–90)
SAO2 % BLDMV: 59.1 — LOW (ref 60–90)
SAO2 % BLDMV: 66.4 — SIGNIFICANT CHANGE UP (ref 60–90)
SAO2 % BLDV: 51.8 % — LOW (ref 67–88)
SAO2 % BLDV: 61.8 % — LOW (ref 67–88)
SAO2 % BLDV: 69.2 % — SIGNIFICANT CHANGE UP (ref 67–88)
SODIUM SERPL-SCNC: 135 MMOL/L — SIGNIFICANT CHANGE UP (ref 135–145)
SODIUM SERPL-SCNC: 137 MMOL/L — SIGNIFICANT CHANGE UP (ref 135–145)
TRIGL SERPL-MCNC: 80 MG/DL — SIGNIFICANT CHANGE UP
TSH SERPL-MCNC: 2.29 UIU/ML — SIGNIFICANT CHANGE UP (ref 0.27–4.2)
WBC # BLD: 8.2 K/UL — SIGNIFICANT CHANGE UP (ref 3.8–10.5)
WBC # FLD AUTO: 8.2 K/UL — SIGNIFICANT CHANGE UP (ref 3.8–10.5)

## 2024-04-14 PROCEDURE — 71045 X-RAY EXAM CHEST 1 VIEW: CPT | Mod: 26

## 2024-04-14 PROCEDURE — 99292 CRITICAL CARE ADDL 30 MIN: CPT

## 2024-04-14 PROCEDURE — 99291 CRITICAL CARE FIRST HOUR: CPT | Mod: GC,25

## 2024-04-14 PROCEDURE — 99291 CRITICAL CARE FIRST HOUR: CPT

## 2024-04-14 PROCEDURE — 93010 ELECTROCARDIOGRAM REPORT: CPT

## 2024-04-14 PROCEDURE — 99221 1ST HOSP IP/OBS SF/LOW 40: CPT

## 2024-04-14 RX ORDER — OXYCODONE HYDROCHLORIDE 5 MG/1
20 TABLET ORAL EVERY 6 HOURS
Refills: 0 | Status: DISCONTINUED | OUTPATIENT
Start: 2024-04-14 | End: 2024-04-16

## 2024-04-14 RX ORDER — NOREPINEPHRINE BITARTRATE/D5W 8 MG/250ML
0.05 PLASTIC BAG, INJECTION (ML) INTRAVENOUS
Qty: 8 | Refills: 0 | Status: DISCONTINUED | OUTPATIENT
Start: 2024-04-14 | End: 2024-04-14

## 2024-04-14 RX ORDER — NICOTINE POLACRILEX 2 MG
1 GUM BUCCAL DAILY
Refills: 0 | Status: DISCONTINUED | OUTPATIENT
Start: 2024-04-14 | End: 2024-04-16

## 2024-04-14 RX ORDER — DIGOXIN 250 MCG
250 TABLET ORAL EVERY 6 HOURS
Refills: 0 | Status: COMPLETED | OUTPATIENT
Start: 2024-04-14 | End: 2024-04-14

## 2024-04-14 RX ORDER — POTASSIUM CHLORIDE 20 MEQ
20 PACKET (EA) ORAL
Refills: 0 | Status: COMPLETED | OUTPATIENT
Start: 2024-04-14 | End: 2024-04-14

## 2024-04-14 RX ORDER — LANOLIN ALCOHOL/MO/W.PET/CERES
3 CREAM (GRAM) TOPICAL ONCE
Refills: 0 | Status: COMPLETED | OUTPATIENT
Start: 2024-04-14 | End: 2024-04-14

## 2024-04-14 RX ORDER — CHLORHEXIDINE GLUCONATE 213 G/1000ML
1 SOLUTION TOPICAL DAILY
Refills: 0 | Status: DISCONTINUED | OUTPATIENT
Start: 2024-04-14 | End: 2024-04-16

## 2024-04-14 RX ORDER — OXYCODONE HYDROCHLORIDE 5 MG/1
20 TABLET ORAL ONCE
Refills: 0 | Status: DISCONTINUED | OUTPATIENT
Start: 2024-04-14 | End: 2024-04-14

## 2024-04-14 RX ORDER — MAGNESIUM SULFATE 500 MG/ML
2 VIAL (ML) INJECTION ONCE
Refills: 0 | Status: COMPLETED | OUTPATIENT
Start: 2024-04-14 | End: 2024-04-14

## 2024-04-14 RX ORDER — DIGOXIN 250 MCG
500 TABLET ORAL ONCE
Refills: 0 | Status: COMPLETED | OUTPATIENT
Start: 2024-04-14 | End: 2024-04-14

## 2024-04-14 RX ORDER — POTASSIUM CHLORIDE 20 MEQ
40 PACKET (EA) ORAL ONCE
Refills: 0 | Status: COMPLETED | OUTPATIENT
Start: 2024-04-14 | End: 2024-04-14

## 2024-04-14 RX ORDER — POTASSIUM CHLORIDE 20 MEQ
20 PACKET (EA) ORAL ONCE
Refills: 0 | Status: COMPLETED | OUTPATIENT
Start: 2024-04-14 | End: 2024-04-14

## 2024-04-14 RX ADMIN — Medication 8.32 MICROGRAM(S)/KG/MIN: at 10:30

## 2024-04-14 RX ADMIN — OXYCODONE HYDROCHLORIDE 20 MILLIGRAM(S): 5 TABLET ORAL at 04:00

## 2024-04-14 RX ADMIN — HEPARIN SODIUM 16 UNIT(S)/HR: 5000 INJECTION INTRAVENOUS; SUBCUTANEOUS at 19:14

## 2024-04-14 RX ADMIN — Medication 100 MILLIEQUIVALENT(S): at 04:50

## 2024-04-14 RX ADMIN — Medication 3 MILLIGRAM(S): at 02:21

## 2024-04-14 RX ADMIN — Medication 25 GRAM(S): at 18:03

## 2024-04-14 RX ADMIN — Medication 100 MILLIEQUIVALENT(S): at 02:21

## 2024-04-14 RX ADMIN — BUMETANIDE 10 MG/HR: 0.25 INJECTION INTRAMUSCULAR; INTRAVENOUS at 19:13

## 2024-04-14 RX ADMIN — OXYCODONE HYDROCHLORIDE 20 MILLIGRAM(S): 5 TABLET ORAL at 09:27

## 2024-04-14 RX ADMIN — Medication 250 MICROGRAM(S): at 23:56

## 2024-04-14 RX ADMIN — Medication 1 PATCH: at 12:32

## 2024-04-14 RX ADMIN — Medication 100 MILLIEQUIVALENT(S): at 03:41

## 2024-04-14 RX ADMIN — HEPARIN SODIUM 16 UNIT(S)/HR: 5000 INJECTION INTRAVENOUS; SUBCUTANEOUS at 07:56

## 2024-04-14 RX ADMIN — BUMETANIDE 10 MG/HR: 0.25 INJECTION INTRAMUSCULAR; INTRAVENOUS at 14:00

## 2024-04-14 RX ADMIN — Medication 1 PATCH: at 19:15

## 2024-04-14 RX ADMIN — Medication 2 MILLIGRAM(S): at 22:17

## 2024-04-14 RX ADMIN — Medication 250 MICROGRAM(S): at 18:04

## 2024-04-14 RX ADMIN — Medication 1 PATCH: at 12:30

## 2024-04-14 RX ADMIN — Medication 2 MILLIGRAM(S): at 06:20

## 2024-04-14 RX ADMIN — HEPARIN SODIUM 16 UNIT(S)/HR: 5000 INJECTION INTRAVENOUS; SUBCUTANEOUS at 14:01

## 2024-04-14 RX ADMIN — BUMETANIDE 10 MG/HR: 0.25 INJECTION INTRAMUSCULAR; INTRAVENOUS at 07:54

## 2024-04-14 RX ADMIN — OXYCODONE HYDROCHLORIDE 20 MILLIGRAM(S): 5 TABLET ORAL at 16:27

## 2024-04-14 RX ADMIN — Medication 1 PATCH: at 08:00

## 2024-04-14 RX ADMIN — MILRINONE LACTATE 3.33 MICROGRAM(S)/KG/MIN: 1 INJECTION, SOLUTION INTRAVENOUS at 00:10

## 2024-04-14 RX ADMIN — OXYCODONE HYDROCHLORIDE 20 MILLIGRAM(S): 5 TABLET ORAL at 03:40

## 2024-04-14 RX ADMIN — MILRINONE LACTATE 3.33 MICROGRAM(S)/KG/MIN: 1 INJECTION, SOLUTION INTRAVENOUS at 19:13

## 2024-04-14 RX ADMIN — OXYCODONE HYDROCHLORIDE 20 MILLIGRAM(S): 5 TABLET ORAL at 10:00

## 2024-04-14 RX ADMIN — Medication 2 MILLIGRAM(S): at 14:00

## 2024-04-14 RX ADMIN — OXYCODONE HYDROCHLORIDE 20 MILLIGRAM(S): 5 TABLET ORAL at 23:00

## 2024-04-14 RX ADMIN — CHLORHEXIDINE GLUCONATE 1 APPLICATION(S): 213 SOLUTION TOPICAL at 23:57

## 2024-04-14 RX ADMIN — Medication 40 MILLIEQUIVALENT(S): at 18:03

## 2024-04-14 RX ADMIN — OXYCODONE HYDROCHLORIDE 20 MILLIGRAM(S): 5 TABLET ORAL at 22:17

## 2024-04-14 RX ADMIN — OXYCODONE HYDROCHLORIDE 20 MILLIGRAM(S): 5 TABLET ORAL at 15:57

## 2024-04-14 RX ADMIN — Medication 500 MICROGRAM(S): at 12:31

## 2024-04-14 RX ADMIN — MILRINONE LACTATE 3.33 MICROGRAM(S)/KG/MIN: 1 INJECTION, SOLUTION INTRAVENOUS at 07:54

## 2024-04-14 NOTE — CONSULT NOTE ADULT - PROBLEM SELECTOR RECOMMENDATION 2
Severe with concomitant mitral valve stenosis, likely rheumatic   Management of preload and afterload as per above  Appreciate CTS

## 2024-04-14 NOTE — CONSULT NOTE ADULT - PROBLEM SELECTOR RECOMMENDATION 5
Unclear baseline, possible has CKD  Initial Cr 1.6, now improving to 1.3  Will monitor as treat shock as per above

## 2024-04-14 NOTE — CONSULT NOTE ADULT - NS ATTEND AMEND GEN_ALL_CORE FT
Pt seen and examined.  Risks/benefits MVR, poss TVR MAZE d/w Pt.  Agree to proceed  STS risk 2-4%.  Agree to proceed

## 2024-04-14 NOTE — PROGRESS NOTE ADULT - ASSESSMENT
====================ASSESSMENT ==============   63 y/o female with PMHx of HLD, HTN, AFib (diagnosed recently, not on AC), benzodiazepine/opioid & ETOH abuse, and mitral valve prolapse found to have severe mitral and tricuspid regurgitation w/ signs of cardiogenic shock, now s/p RHC with IABP placement.     ====================== NEUROLOGY=====================   #Substance Use Disorder   #chronic back pain  - A&O x4 on exam  - takes oxycodon 20mg TID for chronic back pain s/t work injury  - on home clonazepam 1mg TID  - istop confirmed, printed in chart  - ? alcohol use disorder as per daughter, continue ciwa assessment  - continue to monitor mental status per protocol    ==================== RESPIRATORY======================   #Acute Hypoxic Respiratory Failure   - requiring 4L nasal cannula s/t pulmonary edema i/s/o severe MR  - wean nasal cannula w/ diuresis  - continue to monitor sp02    ====================CARDIOVASCULAR==================   #Cardiogenic Shock   - i/s/o severe MR and pHTN requiring IABP   - TTE 4/13: severe MR and TR with normal LV size, thickness, and EF of 60-65%.  LA severely dilated.  Moderate tricuspid regurgitation.  Mildly enlarged RV with reduced systolic function, severe pHTN  - s/p RHC w/ PAC and IABP placement  - CVP 10 on admission, 40 mg lasix ordered  - continue IABP 1:1 w/ heparin gtt  - f/u mv02, lactate and perfusion indices  - wean levo as tolerated for assisted means > 65  - continue strict I&O, daily weights, frequent mv02 trending w/ lactate monitoring    #Atrial Fibrillation   - diagnosed as outpatient, not on AC at home  - rates > 100 on admission, continue to monitor w/ weaning levo  - Heparin gtt   - continue to monitor tele, electrolytes    ==================== GASTROINTESTINAL===================   #transaminitis  - likely i/s/o low flow state  - avoid hepatoxins, continue to trend daily    - DASH diet   - continue bowel regimen      ===================== RENAL =========================   #ISABELA  - sCR 1.66 on admission, unknown baseline  - continue to trend, avoid nephrotoxins  - replete lytes PRN, keep K>4 and Mg>2      ===================HEMATOLOGIC/ONC ===================   - H/H and platelets stable  - continue to trend daily  #DVT: heparin gtt for IABP & a.fib    =======================    ENDOCRINE  =====================   - f.u a1c, lipid panel, tsh  - glucose controlled on cmp, continue to trend    ========================INFECTIOUS DISEASE================   - afebrile, no leukocytosis    - f/u blood cultures x2,   - continue to monitor WBC and fever curve      #full code  Lines: R fem IABP (4/13 -  R fem PAC (4/13 -     ======================= DISPOSITION  =====================  [X] Critical   [ ] Guarded    [ ] Stable    [X] Maintain in CICU  [ ] Downgrade to Telemtry  [ ] Discharge Home    Patient requires continuous monitoring with bedside rhythm monitoring, pulse ox monitoring, and intermittent blood gas analysis. Care plan discussed with ICU care team. Patient remained critical and at risk for life threatening decompensation.  Patient seen, examined and plan discussed with CCU team during rounds.     I have personally provided 75 minutes of critical care time excluding time spent on separate procedures, in addition to initial critical care time provided by the CICU Attending, Dr. Anita Cadet, [facility]   ====================ASSESSMENT ==============   65 y/o female with PMHx of HLD, HTN, AFib (diagnosed recently, not on AC), benzodiazepine/opioid & ETOH abuse, and mitral valve prolapse found to have severe mitral and tricuspid regurgitation w/ signs of cardiogenic shock, now s/p RHC with IABP placement.     ====================== NEUROLOGY=====================   #Substance Use Disorder   #chronic back pain  - A&O x4 on exam  - takes oxycodon 20mg TID for chronic back pain s/t work injury  - on home clonazepam 1mg TID  - istop confirmed, printed in chart  - ? alcohol use disorder as per daughter, continue ciwa assessment  - continue to monitor mental status per protocol    ==================== RESPIRATORY======================   #Acute Hypoxic Respiratory Failure   - requiring 4L nasal cannula s/t pulmonary edema i/s/o severe MR  - wean nasal cannula w/ diuresis  - continue to monitor sp02    ====================CARDIOVASCULAR==================   #Cardiogenic Shock   - i/s/o severe MR and pHTN requiring IABP   - TTE 4/13: severe MR and TR with normal LV size, thickness, and EF of 60-65%.  LA severely dilated.  Moderate tricuspid regurgitation.  Mildly enlarged RV with reduced systolic function, severe pHTN  - s/p RHC w/ PAC and IABP placement  - CVP 10 on admission, 40 mg lasix ordered  - continue IABP 1:1 w/ heparin gtt  - f/u mv02, lactate and perfusion indices  - wean levo as tolerated for assisted means > 65  - continue strict I&O, daily weights, frequent mv02 trending w/ lactate monitoring    #Atrial Fibrillation   - diagnosed as outpatient, not on AC at home  - rates > 100 on admission, continue to monitor w/ weaning levo  - Heparin gtt   - continue to monitor tele, electrolytes    ==================== GASTROINTESTINAL===================   #transaminitis  - likely i/s/o low flow state  - avoid hepatoxins, continue to trend daily    - DASH diet   - continue bowel regimen      ===================== RENAL =========================   #ISABELA  - sCR 1.66 on admission, unknown baseline  - continue to trend, avoid nephrotoxins  - replete lytes PRN, keep K>4 and Mg>2      ===================HEMATOLOGIC/ONC ===================   - H/H and platelets stable  - continue to trend daily  #DVT: heparin gtt for IABP & a.fib    =======================    ENDOCRINE  =====================   - f.u a1c, lipid panel, tsh  - glucose controlled on cmp, continue to trend    ========================INFECTIOUS DISEASE================   - afebrile, no leukocytosis    - f/u blood cultures x2,   - continue to monitor WBC and fever curve      #full code  Lines: R fem IABP (4/13 -  R fem PAC (4/13 -     ======================= DISPOSITION  =====================  [X] Critical   [ ] Guarded    [ ] Stable    [X] Maintain in CICU  [ ] Downgrade to Telemtry  [ ] Discharge Home     ====================ASSESSMENT ==============   65 y/o female with PMHx of HLD, HTN, AFib (diagnosed recently, not on AC), benzodiazepine/opioid & ETOH abuse, and mitral valve prolapse found to have severe mitral and tricuspid regurgitation w/ signs of cardiogenic shock, now s/p RHC with IABP placement.     ====================== NEUROLOGY=====================   #Substance Use Disorder   #chronic back pain  - A&O x4 on exam  - takes oxycodon 20mg TID for chronic back pain s/t work injury, order as needed  - on home clonazepam 1mg TID  - istop confirmed, printed in chart  - alcohol use disorder as per daughter, continue ciwa assessment  - continue to monitor mental status per protocol    ==================== RESPIRATORY======================   #Acute Hypoxic Respiratory Failure   - requiring 4L nasal cannula s/t pulmonary edema i/s/o severe MR  - wean nasal cannula w/ diuresis  - continue to monitor sp02    ====================CARDIOVASCULAR==================   #Cardiogenic Shock   - i/s/o severe MR and pHTN requiring IABP   - TTE 4/13: severe MR and TR with normal LV size, thickness, and EF of 60-65%.  LA severely dilated.  Moderate tricuspid regurgitation.  Mildly enlarged RV with reduced systolic function, severe pHTN  - s/p RHC w/ PAC and IABP placement  - CVP 10 on admission, 40 mg lasix ordered  - continue IABP 1:1 w/ heparin gtt  - f/u mv02, lactate and perfusion indices  - wean levo as tolerated for assisted means > 65  - continue strict I&O, daily weights, frequent mv02 trending w/ lactate monitoring  - goal -200cc /hr   - BRUNA   - CT surgery for possible mitral valve repair     #Atrial Fibrillation   - diagnosed as outpatient, not on AC at home  - rates > 100 on admission, continue to monitor w/ weaning levo  - Heparin gtt   - continue to monitor tele, electrolytes  - add digoxin for rhythm control     ==================== GASTROINTESTINAL===================   #transaminitis  - likely i/s/o low flow state  - avoid hepatoxins, continue to trend daily    - DASH diet   - continue bowel regimen      ===================== RENAL =========================   #ISABELA  - sCR 1.66 on admission, unknown baseline  - continue to trend, avoid nephrotoxins  - replete lytes PRN, keep K>4 and Mg>2      ===================HEMATOLOGIC/ONC ===================   - H/H and platelets stable  - continue to trend daily  #DVT: heparin gtt for IABP & a.fib    =======================    ENDOCRINE  =====================   - f.u a1c, lipid panel, tsh  - glucose controlled on cmp, continue to trend    ========================INFECTIOUS DISEASE================   - afebrile, no leukocytosis    - f/u blood cultures x2,   - continue to monitor WBC and fever curve      #full code  Lines: R fem IABP (4/13 -  R fem PAC (4/13 -     ======================= DISPOSITION  =====================  [X] Critical   [ ] Guarded    [ ] Stable    [X] Maintain in CICU  [ ] Downgrade to Telemtry  [ ] Discharge Home
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
## 2024-04-14 NOTE — PROGRESS NOTE ADULT - SUBJECTIVE AND OBJECTIVE BOX
Jamil Cage, PGY-1  Internal Medicine   [phone]    HERBER IRAHETA  MRN-[id number]  Patient is a 64y old  Female who presents with a chief complaint of cardiogenic shock (2024 20:11)      INTERVAL HISTORY/OVERNIGHT EVENTS:      REVIEW OF SYSTEMS:    CONSTITUTIONAL: No weakness, fevers or chills  EYES/ENT: No visual changes;  No vertigo or throat pain   NECK: No pain or stiffness  RESPIRATORY: No cough, wheezing, hemoptysis; No shortness of breath  CARDIOVASCULAR: No chest pain or palpitations  GASTROINTESTINAL: No abdominal or epigastric pain. No nausea, vomiting, or hematemesis; No diarrhea or constipation. No melena or hematochezia.  GENITOURINARY: No dysuria, frequency or hematuria  NEUROLOGICAL: No numbness or weakness  SKIN: No itching, rashes      MEDICATIONS:  MEDICATIONS  (STANDING):  buMETAnide Infusion 2 mG/Hr (10 mL/Hr) IV Continuous <Continuous>  heparin  Infusion 1400 Unit(s)/Hr (16 mL/Hr) IV Continuous <Continuous>  LORazepam     Tablet 2 milliGRAM(s) Oral three times a day  milrinone Infusion 0.125 MICROgram(s)/kG/Min (3.33 mL/Hr) IV Continuous <Continuous>  nicotine - 21 mG/24Hr(s) Patch 1 Patch Transdermal daily    MEDICATIONS  (PRN):      ALLERGIES:  Allergies    No Known Allergies    Intolerances        OBJECTIVE:  ICU Vital Signs Last 24 Hrs  T(C): 37.1 (2024 03:00), Max: 37.5 (2024 23:30)  T(F): 98.8 (2024 03:00), Max: 99.5 (2024 23:30)  HR: 122 (2024 06:00) (75 - 126)  BP: 89/50 (2024 01:00) (85/63 - 181/66)  BP(mean): 66 (2024 01:00) (63 - 106)  ABP: --  ABP(mean): --  RR: 22 (2024 06:00) (15 - 39)  SpO2: 90% (2024 06:00) (77% - 100%)    O2 Parameters below as of 2024 06:00  Patient On (Oxygen Delivery Method): nasal cannula  O2 Flow (L/min): 2          Adult Advanced Hemodynamics Last 24 Hrs  CVP(mm Hg): 19 (2024 02:00) (6 - 26)  CVP(cm H2O): --  CO: --  CI: --  PA: 51/36 (2024 06:00) (40/26 - 65/46)  PA(mean): 43 (2024 06:00) (32 - 55)  PCWP: --  SVR: --  SVRI: --  PVR: --  PVRI: --  CAPILLARY BLOOD GLUCOSE        Daily Height in cm: 180.34 (2024 19:45)    Daily Weight in k.2 (2024 06:00)    PHYSICAL EXAM:  GENERAL: No acute distress, well-developed  HEAD:  Atraumatic, Normocephalic  EYES: EOMI, PERRLA, conjunctiva and sclera clear  NECK: Supple, no lymphadenopathy, no JVD  CHEST/LUNG: CTAB; No wheezes, rales, or rhonchi  HEART: Regular rate and rhythm. Normal S1/S2. No murmurs, rubs, or gallops  ABDOMEN: Soft, non-tender, non-distended; normal bowel sounds, no organomegaly  EXTREMITIES:  2+ peripheral pulses b/l, No clubbing, cyanosis, or edema  NEUROLOGY: A&O x 3, no focal deficits  SKIN: No rashes or lesions      ============================I/O===========================   I&O's Detail    2024 07:01  -  2024 07:00  --------------------------------------------------------  IN:    Bumetanide: 70 mL    Heparin: 112 mL    IV PiggyBack: 300 mL    Milrinone: 19.8 mL    Norepinephrine: 45.6 mL    Oral Fluid: 840 mL  Total IN: 1387.4 mL    OUT:    Voided (mL): 2250 mL  Total OUT: 2250 mL    Total NET: -862.6 mL        ============================ LABS =========================                        10.0   8.20  )-----------( 225      ( 2024 02:24 )             30.9         135  |  101  |  39<H>  ----------------------------<  149<H>  3.4<L>   |  22  |  1.36<H>    Ca    9.2      2024 02:24  Phos  4.0       Mg     2.2         TPro  6.8  /  Alb  3.5  /  TBili  0.5  /  DBili  x   /  AST  84<H>  /  ALT  120<H>  /  AlkPhos  141<H>      LIVER FUNCTIONS - ( 2024 02:24 )  Alb: 3.5 g/dL / Pro: 6.8 g/dL / ALK PHOS: 141 U/L / ALT: 120 U/L / AST: 84 U/L / GGT: x           Troponin T, High Sensitivity Result: 17 ng/L (24 @ 16:14)    CKMB Units: 3.7 ng/mL (24 @ 16:14)    Creatine Kinase, Serum: 99 U/L (24 @ 16:14)          Blood Gas Venous - Lactate: 1.0 mmol/L (24 @ 06:12)  Blood Gas Venous - Lactate: 0.7 mmol/L (24 @ 01:40)  Blood Gas Venous - Lactate: 1.4 mmol/L (24 @ 22:55)  Blood Gas Venous - Lactate: 0.7 mmol/L (24 @ 20:33)  Blood Gas Arterial, Lactate: 1.0 mmol/L (24 @ 17:42)  Blood Gas Venous - Lactate: 0.7 mmol/L (24 @ 17:16)  Blood Gas Venous - Lactate: 2.6 mmol/L (24 @ 16:01)    Urinalysis Basic - ( 2024 02:24 )    Color: x / Appearance: x / SG: x / pH: x  Gluc: 149 mg/dL / Ketone: x  / Bili: x / Urobili: x   Blood: x / Protein: x / Nitrite: x   Leuk Esterase: x / RBC: x / WBC x   Sq Epi: x / Non Sq Epi: x / Bacteria: x      ABG - ( 2024 17:42 )  pH, Arterial: 7.36  pH, Blood: x     /  pCO2: 36    /  pO2: 87    / HCO3: 20    / Base Excess: -4.6  /  SaO2: 97.8              PT/INR - ( 2024 05:05 )   PT: 14.1 sec;   INR: 1.29 ratio         PTT - ( 2024 05:05 )  PTT:45.6 sec    ======================Micro/Rad/Cardio=================  Telemetry: Reviewed   EKG: Reviewed  CXR: Reviewed  Culture: Reviewed   Echo:   Cath:   ======================================================           Jamil Cage, PGY-2  Internal Medicine   TEAMS/[phone]    HERBER IRAHETA  MRN-[id number]  Patient is a 64y old  Female who presents with a chief complaint of cardiogenic shock (2024 20:11)      INTERVAL HISTORY/OVERNIGHT EVENTS:      REVIEW OF SYSTEMS:    CONSTITUTIONAL: No weakness, fevers or chills  EYES/ENT: No visual changes;  No vertigo or throat pain   NECK: No pain or stiffness  RESPIRATORY: No cough, wheezing, hemoptysis; No shortness of breath  CARDIOVASCULAR: No chest pain or palpitations  GASTROINTESTINAL: No abdominal or epigastric pain. No nausea, vomiting, or hematemesis; No diarrhea or constipation. No melena or hematochezia.  GENITOURINARY: No dysuria, frequency or hematuria  NEUROLOGICAL: No numbness or weakness  SKIN: No itching, rashes      MEDICATIONS:  MEDICATIONS  (STANDING):  buMETAnide Infusion 2 mG/Hr (10 mL/Hr) IV Continuous <Continuous>  heparin  Infusion 1400 Unit(s)/Hr (16 mL/Hr) IV Continuous <Continuous>  LORazepam     Tablet 2 milliGRAM(s) Oral three times a day  milrinone Infusion 0.125 MICROgram(s)/kG/Min (3.33 mL/Hr) IV Continuous <Continuous>  nicotine - 21 mG/24Hr(s) Patch 1 Patch Transdermal daily    MEDICATIONS  (PRN):      ALLERGIES:  Allergies    No Known Allergies    Intolerances        OBJECTIVE:  ICU Vital Signs Last 24 Hrs  T(C): 37.1 (2024 03:00), Max: 37.5 (2024 23:30)  T(F): 98.8 (2024 03:00), Max: 99.5 (2024 23:30)  HR: 122 (2024 06:00) (75 - 126)  BP: 89/50 (2024 01:00) (85/63 - 181/66)  BP(mean): 66 (2024 01:00) (63 - 106)  ABP: --  ABP(mean): --  RR: 22 (2024 06:00) (15 - 39)  SpO2: 90% (2024 06:00) (77% - 100%)    O2 Parameters below as of 2024 06:00  Patient On (Oxygen Delivery Method): nasal cannula  O2 Flow (L/min): 2          Adult Advanced Hemodynamics Last 24 Hrs  CVP(mm Hg): 19 (2024 02:00) (6 - 26)  CVP(cm H2O): --  CO: --  CI: --  PA: 51/36 (2024 06:00) (40/26 - 65/46)  PA(mean): 43 (2024 06:00) (32 - 55)  PCWP: --  SVR: --  SVRI: --  PVR: --  PVRI: --  CAPILLARY BLOOD GLUCOSE        Daily Height in cm: 180.34 (2024 19:45)    Daily Weight in k.2 (2024 06:00)    PHYSICAL EXAM:  GENERAL: No acute distress, well-developed  HEAD:  Atraumatic, Normocephalic  EYES: EOMI, PERRLA, conjunctiva and sclera clear  NECK: Supple, no lymphadenopathy, no JVD  CHEST/LUNG: CTAB; No wheezes, rales, or rhonchi  HEART: Regular rate and rhythm. Normal S1/S2. No murmurs, rubs, or gallops  ABDOMEN: Soft, non-tender, non-distended; normal bowel sounds, no organomegaly  EXTREMITIES:  2+ peripheral pulses b/l, No clubbing, cyanosis, or edema  NEUROLOGY: A&O x 3, no focal deficits  SKIN: No rashes or lesions      ============================I/O===========================   I&O's Detail    2024 07:01  -  2024 07:00  --------------------------------------------------------  IN:    Bumetanide: 70 mL    Heparin: 112 mL    IV PiggyBack: 300 mL    Milrinone: 19.8 mL    Norepinephrine: 45.6 mL    Oral Fluid: 840 mL  Total IN: 1387.4 mL    OUT:    Voided (mL): 2250 mL  Total OUT: 2250 mL    Total NET: -862.6 mL        ============================ LABS =========================                        10.0   8.20  )-----------( 225      ( 2024 02:24 )             30.9         135  |  101  |  39<H>  ----------------------------<  149<H>  3.4<L>   |  22  |  1.36<H>    Ca    9.2      2024 02:24  Phos  4.0       Mg     2.2         TPro  6.8  /  Alb  3.5  /  TBili  0.5  /  DBili  x   /  AST  84<H>  /  ALT  120<H>  /  AlkPhos  141<H>      LIVER FUNCTIONS - ( 2024 02:24 )  Alb: 3.5 g/dL / Pro: 6.8 g/dL / ALK PHOS: 141 U/L / ALT: 120 U/L / AST: 84 U/L / GGT: x           Troponin T, High Sensitivity Result: 17 ng/L (24 @ 16:14)    CKMB Units: 3.7 ng/mL (24 @ 16:14)    Creatine Kinase, Serum: 99 U/L (24 @ 16:14)          Blood Gas Venous - Lactate: 1.0 mmol/L (24 @ 06:12)  Blood Gas Venous - Lactate: 0.7 mmol/L (24 @ 01:40)  Blood Gas Venous - Lactate: 1.4 mmol/L (24 @ 22:55)  Blood Gas Venous - Lactate: 0.7 mmol/L (24 @ 20:33)  Blood Gas Arterial, Lactate: 1.0 mmol/L (24 @ 17:42)  Blood Gas Venous - Lactate: 0.7 mmol/L (24 @ 17:16)  Blood Gas Venous - Lactate: 2.6 mmol/L (24 @ 16:01)    Urinalysis Basic - ( 2024 02:24 )    Color: x / Appearance: x / SG: x / pH: x  Gluc: 149 mg/dL / Ketone: x  / Bili: x / Urobili: x   Blood: x / Protein: x / Nitrite: x   Leuk Esterase: x / RBC: x / WBC x   Sq Epi: x / Non Sq Epi: x / Bacteria: x      ABG - ( 2024 17:42 )  pH, Arterial: 7.36  pH, Blood: x     /  pCO2: 36    /  pO2: 87    / HCO3: 20    / Base Excess: -4.6  /  SaO2: 97.8              PT/INR - ( 2024 05:05 )   PT: 14.1 sec;   INR: 1.29 ratio         PTT - ( 2024 05:05 )  PTT:45.6 sec    ======================Micro/Rad/Cardio=================  Telemetry: Reviewed   EKG: Reviewed  CXR: Reviewed  Culture: Reviewed   Echo:   Cath:   ======================================================           Jamil Cage, PGY-2  Internal Medicine   TEAMS/[phone]    HERBER IRAHETA  MRN-[id number]  Patient is a 64y old  Female who presents with a chief complaint of cardiogenic shock (2024 20:11)      INTERVAL HISTORY/OVERNIGHT EVENTS:  ON, she was complaining about back pain, and numbness on the legs has been improving. denied any cp sob.     REVIEW OF SYSTEMS:    CONSTITUTIONAL: No weakness, fevers or chills  EYES/ENT: No visual changes;  No vertigo or throat pain   NECK: No pain or stiffness  RESPIRATORY: No cough, wheezing, hemoptysis; No shortness of breath  CARDIOVASCULAR: No chest pain or palpitations  GASTROINTESTINAL: No abdominal or epigastric pain. No nausea, vomiting, or hematemesis; No diarrhea or constipation. No melena or hematochezia.  GENITOURINARY: No dysuria, frequency or hematuria  NEUROLOGICAL: No numbness or weakness  SKIN: No itching, rashes      MEDICATIONS:  MEDICATIONS  (STANDING):  buMETAnide Infusion 2 mG/Hr (10 mL/Hr) IV Continuous <Continuous>  heparin  Infusion 1400 Unit(s)/Hr (16 mL/Hr) IV Continuous <Continuous>  LORazepam     Tablet 2 milliGRAM(s) Oral three times a day  milrinone Infusion 0.125 MICROgram(s)/kG/Min (3.33 mL/Hr) IV Continuous <Continuous>  nicotine - 21 mG/24Hr(s) Patch 1 Patch Transdermal daily    MEDICATIONS  (PRN):      ALLERGIES:  Allergies    No Known Allergies    Intolerances        OBJECTIVE:  ICU Vital Signs Last 24 Hrs  T(C): 37.1 (2024 03:00), Max: 37.5 (2024 23:30)  T(F): 98.8 (2024 03:00), Max: 99.5 (2024 23:30)  HR: 122 (2024 06:00) (75 - 126)  BP: 89/50 (2024 01:00) (85/63 - 181/66)  BP(mean): 66 (2024 01:00) (63 - 106)  ABP: --  ABP(mean): --  RR: 22 (2024 06:00) (15 - 39)  SpO2: 90% (2024 06:00) (77% - 100%)    O2 Parameters below as of 2024 06:00  Patient On (Oxygen Delivery Method): nasal cannula  O2 Flow (L/min): 2          Adult Advanced Hemodynamics Last 24 Hrs  CVP(mm Hg): 19 (2024 02:00) (6 - 26)  CVP(cm H2O): --  CO: --  CI: --  PA: 51/36 (2024 06:00) (40/26 - 65/46)  PA(mean): 43 (2024 06:00) (32 - 55)  PCWP: --  SVR: --  SVRI: --  PVR: --  PVRI: --  CAPILLARY BLOOD GLUCOSE        Daily Height in cm: 180.34 (2024 19:45)    Daily Weight in k.2 (2024 06:00)    PHYSICAL EXAM:  GENERAL: No acute distress, well-developed  HEAD:  Atraumatic, Normocephalic  EYES: EOMI, PERRLA, conjunctiva and sclera clear  NECK: Supple, no lymphadenopathy, no JVD  CHEST/LUNG: CTAB; No wheezes, rales, or rhonchi  HEART: Regular rate and rhythm. Normal S1/S2. systolic murmur noted  ABDOMEN: Soft, non-tender, non-distended; normal bowel sounds, no organomegaly  EXTREMITIES:  2+ peripheral pulses b/l, No clubbing, cyanosis, or edema  NEUROLOGY: A&O x 3, no focal deficits  SKIN: No rashes or lesions      ============================I/O===========================   I&O's Detail    2024 07:01  -  2024 07:00  --------------------------------------------------------  IN:    Bumetanide: 70 mL    Heparin: 112 mL    IV PiggyBack: 300 mL    Milrinone: 19.8 mL    Norepinephrine: 45.6 mL    Oral Fluid: 840 mL  Total IN: 1387.4 mL    OUT:    Voided (mL): 2250 mL  Total OUT: 2250 mL    Total NET: -862.6 mL        ============================ LABS =========================                        10.0   8.20  )-----------( 225      ( 2024 02:24 )             30.9         135  |  101  |  39<H>  ----------------------------<  149<H>  3.4<L>   |  22  |  1.36<H>    Ca    9.2      2024 02:24  Phos  4.0       Mg     2.2         TPro  6.8  /  Alb  3.5  /  TBili  0.5  /  DBili  x   /  AST  84<H>  /  ALT  120<H>  /  AlkPhos  141<H>      LIVER FUNCTIONS - ( 2024 02:24 )  Alb: 3.5 g/dL / Pro: 6.8 g/dL / ALK PHOS: 141 U/L / ALT: 120 U/L / AST: 84 U/L / GGT: x           Troponin T, High Sensitivity Result: 17 ng/L (24 @ 16:14)    CKMB Units: 3.7 ng/mL (24 @ 16:14)    Creatine Kinase, Serum: 99 U/L (24 @ 16:14)          Blood Gas Venous - Lactate: 1.0 mmol/L (24 @ 06:12)  Blood Gas Venous - Lactate: 0.7 mmol/L (24 @ 01:40)  Blood Gas Venous - Lactate: 1.4 mmol/L (24 @ 22:55)  Blood Gas Venous - Lactate: 0.7 mmol/L (24 @ 20:33)  Blood Gas Arterial, Lactate: 1.0 mmol/L (24 @ 17:42)  Blood Gas Venous - Lactate: 0.7 mmol/L (04-13-24 @ 17:16)  Blood Gas Venous - Lactate: 2.6 mmol/L (24 @ 16:01)    Urinalysis Basic - ( 2024 02:24 )    Color: x / Appearance: x / SG: x / pH: x  Gluc: 149 mg/dL / Ketone: x  / Bili: x / Urobili: x   Blood: x / Protein: x / Nitrite: x   Leuk Esterase: x / RBC: x / WBC x   Sq Epi: x / Non Sq Epi: x / Bacteria: x      ABG - ( 2024 17:42 )  pH, Arterial: 7.36  pH, Blood: x     /  pCO2: 36    /  pO2: 87    / HCO3: 20    / Base Excess: -4.6  /  SaO2: 97.8              PT/INR - ( 2024 05:05 )   PT: 14.1 sec;   INR: 1.29 ratio         PTT - ( 2024 05:05 )  PTT:45.6 sec    ======================Micro/Rad/Cardio=================  Telemetry: Reviewed   EKG: Reviewed  CXR: Reviewed  Culture: Reviewed   Echo:   Cath:   ======================================================

## 2024-04-14 NOTE — CONSULT NOTE ADULT - ATTENDING COMMENTS
65 y/o F current smoker with h/o HTN, mitral valve prolapse, recent diagnosis of afib not on A/C admitted with worsening SOB, fatigue, and lethargic per daughter. She was found to be hypoxia on arrival to ED and promptly became hypotensive to the 70s for which she was started on norepinephrine gtt. On POCUS exam, severe MR and plethoric IVC were noted. She was taken urgently to the cath lab where right and left heart cath showed:     Angiogram: Normal coronary arteries   RHC: BP 90/71/77  RA 16/21, PA 60/35/48, PAWP 41/58 mmHg, LVEF 20 mmHg, CO/CI 3.24/1.61 SVR 1506  PVR 2.1  An IABP was placed and patient transferred to CCU. She was later started on milrinone for drop in Mvo2 to 40s.     Today, she reports feeling better. Her pulmonary pressures have decreased to 40s. ECG shows afib with RVR to 120s. POCUS shows severe MR. Mvo2 this morning was 66.4%.      Continue aggressive diuresis with Bumex gtt   Continue milrinone gtt  If Blood pressure allows, consider adding low dose nitroprusside  Strict I/Os   Monitor renal and liver function, lactate/Mvo2   Get iron profile   CT surgery consult for evaluation for MVR  Discussed with CCU team and patient at bedside

## 2024-04-14 NOTE — CONSULT NOTE ADULT - PROBLEM SELECTOR RECOMMENDATION 9
SCAI Stage C-D, 2/2 severe mitral valve regurgitation   Continue IABP 1:1 and Milrinone for inotropic and afterload support   If needed, can add Nipride for further afterload support if SVR rises   Bumex gtt to keep goal CVP 6-8  Please weigh patient each day and replete K 4-5, Mg >2.2  CT surgery recommendation for mitral valve intervention

## 2024-04-14 NOTE — PROGRESS NOTE ADULT - SUBJECTIVE AND OBJECTIVE BOX
DENISE IRAHETA  MRN-[id number]  Patient is a 64y old  Female who presents with a chief complaint of cardiogenic shock (14 Apr 2024 16:38)    HPI:  Ms Denise Iraheta is a 63 y/o female who is a current smoker with a PMHx of HLD, HTN, AFib (diagnosed recently, not on AC), benzodiazepine/opioid and etoh abuse, and mitral valve prolapse presenting to the ER with shortness of breath. Pt reports developing shortness of breath, fatigue, and a sense of feeling unwell for the last week, with milder symptoms present longer. Her daughter at bedside reports pt is lethargic, not herself and was having difficulty breathing at home.      Upon arrival to the ED she was pale and ill appearing. Was found to be hypoxic to 77% on room air requiring 3L nasal cannula as well as hypotensive to the 70s requiring levophed. On pocus she was found to have a small pericardial effusion, severe MR w/ plethoric IVC and bilateral B lines. Cardiology was consulted for concern of cardiogenic shock. She is now s/p RHC with IABP and swan purnima catheter placement.     On admission to CICU she is A&O x4, saturating well on 4L nasal cannula, IABP 1:1 augmenting 100 on .1mcg/kg/min of norepinephrine.    (13 Apr 2024 20:11)      24 HOUR EVENTS:    REVIEW OF SYSTEMS:    CONSTITUTIONAL: No weakness, fevers or chills  EYES/ENT: No visual changes;  No vertigo or throat pain   NECK: No pain or stiffness  RESPIRATORY: No cough, wheezing, hemoptysis; No shortness of breath  CARDIOVASCULAR: No chest pain or palpitations  GASTROINTESTINAL: No abdominal or epigastric pain. No nausea, vomiting, or hematemesis; No diarrhea or constipation. No melena or hematochezia.  GENITOURINARY: No dysuria, frequency or hematuria  NEUROLOGICAL: No numbness or weakness  SKIN: No itching, rashes      ICU Vital Signs Last 24 Hrs  T(C): 37.3 (14 Apr 2024 19:00), Max: 37.5 (13 Apr 2024 23:30)  T(F): 99.1 (14 Apr 2024 19:00), Max: 99.5 (13 Apr 2024 23:30)  HR: 102 (14 Apr 2024 19:00) (102 - 127)  BP: 102/60 (14 Apr 2024 19:00) (86/51 - 181/66)  BP(mean): 79 (14 Apr 2024 19:00) (63 - 106)  ABP: --  ABP(mean): --  RR: 17 (14 Apr 2024 19:00) (14 - 39)  SpO2: 94% (14 Apr 2024 19:00) (83% - 100%)    O2 Parameters below as of 14 Apr 2024 17:00  Patient On (Oxygen Delivery Method): nasal cannula  O2 Flow (L/min): 2          CVP(mm Hg): 12 (04-14-24 @ 17:00) (6 - 26)  CO: --  CI: --  PA: 35/20 (04-14-24 @ 19:00) (28/15 - 65/46)  PA(mean): 27 (04-14-24 @ 19:00) (21 - 55)  PA(direct): --  PCWP: --  LA: --  RA: --  SVR: --  SVRI: --  PVR: --  PVRI: --  I&O's Summary    13 Apr 2024 07:01  -  14 Apr 2024 07:00  --------------------------------------------------------  IN: 1416.7 mL / OUT: 2500 mL / NET: -1083.3 mL    14 Apr 2024 07:01  -  14 Apr 2024 19:15  --------------------------------------------------------  IN: 898.1 mL / OUT: 1400 mL / NET: -501.9 mL        CAPILLARY BLOOD GLUCOSE    CAPILLARY BLOOD GLUCOSE          PHYSICAL EXAM:  GENERAL: No acute distress, well-developed  HEAD:  Atraumatic, Normocephalic  EYES: EOMI, PERRLA, conjunctiva and sclera clear  NECK: Supple, no lymphadenopathy, no JVD  CHEST/LUNG: CTAB; No wheezes, rales, or rhonchi  HEART: Regular rate and rhythm. Normal S1/S2. No murmurs, rubs, or gallops  ABDOMEN: Soft, non-tender, non-distended; normal bowel sounds, no organomegaly  EXTREMITIES:  2+ peripheral pulses b/l, No clubbing, cyanosis, or edema  NEUROLOGY: A&O x 3, no focal deficits  SKIN: No rashes or lesions    ============================I/O===========================   I&O's Detail    13 Apr 2024 07:01  -  14 Apr 2024 07:00  --------------------------------------------------------  IN:    Bumetanide: 80 mL    Heparin: 128 mL    IV PiggyBack: 300 mL    Milrinone: 23.1 mL    Norepinephrine: 45.6 mL    Oral Fluid: 840 mL  Total IN: 1416.7 mL    OUT:    Voided (mL): 2500 mL  Total OUT: 2500 mL    Total NET: -1083.3 mL      14 Apr 2024 07:01  -  14 Apr 2024 19:15  --------------------------------------------------------  IN:    Bumetanide: 100 mL    Heparin: 160 mL    Milrinone: 33 mL    Norepinephrine: 5.1 mL    Oral Fluid: 600 mL  Total IN: 898.1 mL    OUT:    Voided (mL): 1400 mL  Total OUT: 1400 mL    Total NET: -501.9 mL        ============================ LABS =========================                        10.0   8.20  )-----------( 225      ( 14 Apr 2024 02:24 )             30.9     04-14    137  |  98  |  36<H>  ----------------------------<  113<H>  3.8   |  26  |  1.10    Ca    8.8      14 Apr 2024 16:57  Phos  3.3     04-14  Mg     1.7     04-14    TPro  6.9  /  Alb  3.8  /  TBili  0.4  /  DBili  x   /  AST  59<H>  /  ALT  112<H>  /  AlkPhos  145<H>  04-14    Troponin T, High Sensitivity Result: 17 ng/L (04-13-24 @ 16:14)    CKMB Units: 3.7 ng/mL (04-13-24 @ 16:14)    Creatine Kinase, Serum: 99 U/L (04-13-24 @ 16:14)          LIVER FUNCTIONS - ( 14 Apr 2024 16:57 )  Alb: 3.8 g/dL / Pro: 6.9 g/dL / ALK PHOS: 145 U/L / ALT: 112 U/L / AST: 59 U/L / GGT: x           PT/INR - ( 14 Apr 2024 13:11 )   PT: 13.2 sec;   INR: 1.27 ratio         PTT - ( 14 Apr 2024 13:11 )  PTT:70.0 sec  ABG - ( 13 Apr 2024 17:42 )  pH, Arterial: 7.36  pH, Blood: x     /  pCO2: 36    /  pO2: 87    / HCO3: 20    / Base Excess: -4.6  /  SaO2: 97.8              Blood Gas Venous - Lactate: 0.7 mmol/L (04-14-24 @ 16:53)  Blood Gas Venous - Lactate: 1.0 mmol/L (04-14-24 @ 06:12)  Blood Gas Venous - Lactate: 0.7 mmol/L (04-14-24 @ 01:40)  Blood Gas Venous - Lactate: 1.4 mmol/L (04-13-24 @ 22:55)  Blood Gas Venous - Lactate: 0.7 mmol/L (04-13-24 @ 20:33)  Blood Gas Arterial, Lactate: 1.0 mmol/L (04-13-24 @ 17:42)  Blood Gas Venous - Lactate: 0.7 mmol/L (04-13-24 @ 17:16)  Blood Gas Venous - Lactate: 2.6 mmol/L (04-13-24 @ 16:01)    Urinalysis Basic - ( 14 Apr 2024 16:57 )    Color: x / Appearance: x / SG: x / pH: x  Gluc: 113 mg/dL / Ketone: x  / Bili: x / Urobili: x   Blood: x / Protein: x / Nitrite: x   Leuk Esterase: x / RBC: x / WBC x   Sq Epi: x / Non Sq Epi: x / Bacteria: x      ======================Micro/Rad/Cardio=================  Telemetry: Reviewed   EKG: Reviewed  CXR: Reviewed  Culture: Reviewed   Echo:   Cath:   ======================================================  PAST MEDICAL & SURGICAL HISTORY:  HTN (hypertension)      Mitral valve prolapse      HLD (hyperlipidemia)              ======================= LINES/TUBES  =====================  [place]: (Date placed)  Central Line: (Date placed)  HD Catheter: (Date placed)  Arterial Line: (Date placed)  Endotracheal Tube: (Date placed)  Jimenez: (Date placed)  ======================= DISPOSITION  =====================  [X] Critical   [ ] Guarded    [ ] Stable    [X] Maintain in CICU  [ ] Downgrade to Telemtry  [ ] Discharge Home    Patient requires continuous monitoring with bedside rhythm monitoring, pulse ox monitoring, and intermittent blood gas analysis. Care plan discussed with ICU care team. Patient remained critical and at risk for life threatening decompensation.  Patient seen, examined and plan discussed with CCU team during rounds.     I have personally provided 30 minutes of critical care time excluding time spent on separate procedures, in addition to initial critical care time provided by the CICU Attending, Dr. Terrell/ Alphonse/ Leon/ Chelsea/ Jose/ Norah .       Mona Valdez [facility] [phone]      DENISE IRAHETA  MRN-[id number]  Patient is a 64y old  Female who presents with a chief complaint of cardiogenic shock (14 Apr 2024 16:38)    HPI: Ms Denise Iraheta is a 63 y/o female who is a current smoker with a PMHx of HLD, HTN, AFib (diagnosed recently, not on AC), benzodiazepine/opioid and etoh abuse, and mitral valve prolapse presenting to the ER with shortness of breath. Pt reports developing shortness of breath, fatigue, and a sense of feeling unwell for the last week, with milder symptoms present longer. Her daughter at bedside reports pt is lethargic, not herself and was having difficulty breathing at home.      Upon arrival to the ED she was pale and ill appearing. Was found to be hypoxic to 77% on room air requiring 3L nasal cannula as well as hypotensive to the 70s requiring levophed. On pocus she was found to have a small pericardial effusion, severe MR w/ plethoric IVC and bilateral B lines. Cardiology was consulted for concern of cardiogenic shock. She is now s/p RHC with IABP and swan purnima catheter placement.     On admission to CICU she is A&O x4, saturating well on 4L nasal cannula, IABP 1:1 augmenting 100 on .1mcg/kg/min of norepinephrine (13 Apr 2024 20:11)      REVIEW OF SYSTEMS:  CONSTITUTIONAL: No weakness, fevers or chills  EYES/ENT: No visual changes;  No vertigo or throat pain   NECK: No pain or stiffness  RESPIRATORY: No cough, wheezing, hemoptysis; No shortness of breath  CARDIOVASCULAR: No chest pain or palpitations  GASTROINTESTINAL: No abdominal or epigastric pain  No nausea, vomiting, or hematemesis; No diarrhea or constipation. No melena or hematochezia.  GENITOURINARY: No dysuria, frequency or hematuria  NEUROLOGICAL: No numbness or weakness  SKIN: No itching, rashes      ICU Vital Signs Last 24 Hrs  T(C): 37.3 (14 Apr 2024 19:00), Max: 37.5 (13 Apr 2024 23:30)  T(F): 99.1 (14 Apr 2024 19:00), Max: 99.5 (13 Apr 2024 23:30)  HR: 102 (14 Apr 2024 19:00) (102 - 127)  BP: 102/60 (14 Apr 2024 19:00) (86/51 - 181/66)  BP(mean): 79 (14 Apr 2024 19:00) (63 - 106)  RR: 17 (14 Apr 2024 19:00) (14 - 39)  SpO2: 94% (14 Apr 2024 19:00) (83% - 100%)    O2 Parameters below as of 14 Apr 2024 17:00  Patient On (Oxygen Delivery Method): nasal cannula  O2 Flow (L/min): 2      CVP(mm Hg): 12 (04-14-24 @ 17:00) (6 - 26)  PA: 35/20 (04-14-24 @ 19:00) (28/15 - 65/46)  PA(mean): 27 (04-14-24 @ 19:00) (21 - 55)      I&O's Summary    13 Apr 2024 07:01  -  14 Apr 2024 07:00  --------------------------------------------------------  IN: 1416.7 mL / OUT: 2500 mL / NET: -1083.3 mL    14 Apr 2024 07:01  -  14 Apr 2024 19:15  --------------------------------------------------------  IN: 898.1 mL / OUT: 1400 mL / NET: -501.9 mL  ============================I/O===========================   I&O's Detail    13 Apr 2024 07:01  -  14 Apr 2024 07:00  --------------------------------------------------------  IN:    Bumetanide: 80 mL    Heparin: 128 mL    IV PiggyBack: 300 mL    Milrinone: 23.1 mL    Norepinephrine: 45.6 mL    Oral Fluid: 840 mL  Total IN: 1416.7 mL    OUT:    Voided (mL): 2500 mL  Total OUT: 2500 mL    Total NET: -1083.3 mL      14 Apr 2024 07:01  -  14 Apr 2024 19:15  --------------------------------------------------------  IN:    Bumetanide: 100 mL    Heparin: 160 mL    Milrinone: 33 mL    Norepinephrine: 5.1 mL    Oral Fluid: 600 mL  Total IN: 898.1 mL    OUT:    Voided (mL): 1400 mL  Total OUT: 1400 mL    Total NET: -501.9 mL  ============================ LABS ========================                    10.0   8.20  )-----------( 225      ( 14 Apr 2024 02:24 )             30.9     04-14    137  |  98  |  36<H>  ----------------------------<  113<H>  3.8   |  26  |  1.10    Ca    8.8      14 Apr 2024 16:57  Phos  3.3     04-14  Mg     1.7     04-14    TPro  6.9  /  Alb  3.8  /  TBili  0.4  /  DBili  x   /  AST  59<H>  /  ALT  112<H>  /  AlkPhos  145<H>  04-14    Troponin T, High Sensitivity Result: 17 ng/L (04-13-24 @ 16:14)    CKMB Units: 3.7 ng/mL (04-13-24 @ 16:14)    Creatine Kinase, Serum: 99 U/L (04-13-24 @ 16:14)    LIVER FUNCTIONS - ( 14 Apr 2024 16:57 )  Alb: 3.8 g/dL / Pro: 6.9 g/dL / ALK PHOS: 145 U/L / ALT: 112 U/L / AST: 59 U/L / GGT: x           PT/INR - ( 14 Apr 2024 13:11 )   PT: 13.2 sec;   INR: 1.27 ratio         PTT - ( 14 Apr 2024 13:11 )  PTT:70.0 sec  ABG - ( 13 Apr 2024 17:42 )  pH, Arterial: 7.36  pH, Blood: x     /  pCO2: 36    /  pO2: 87    / HCO3: 20    / Base Excess: -4.6  /  SaO2: 97.8      Blood Gas Venous - Lactate: 0.7 mmol/L (04-14-24 @ 16:53)  Blood Gas Venous - Lactate: 1.0 mmol/L (04-14-24 @ 06:12)  Blood Gas Venous - Lactate: 0.7 mmol/L (04-14-24 @ 01:40)  Blood Gas Venous - Lactate: 1.4 mmol/L (04-13-24 @ 22:55)  Blood Gas Venous - Lactate: 0.7 mmol/L (04-13-24 @ 20:33)  Blood Gas Arterial, Lactate: 1.0 mmol/L (04-13-24 @ 17:42)  Blood Gas Venous - Lactate: 0.7 mmol/L (04-13-24 @ 17:16)  Blood Gas Venous - Lactate: 2.6 mmol/L (04-13-24 @ 16:01)    Urinalysis Basic - ( 14 Apr 2024 16:57 )    Color: x / Appearance: x / SG: x / pH: x  Gluc: 113 mg/dL / Ketone: x  / Bili: x / Urobili: x   Blood: x / Protein: x / Nitrite: x   Leuk Esterase: x / RBC: x / WBC x   Sq Epi: x / Non Sq Epi: x / Bacteria: x  ======================Micro/Rad/Cardio=================  Telemetry: Reviewed   EKG: Reviewed  CXR: Reviewed  Culture: Reviewed   Echo: Reviewed   Cath: Reviewed   ======================================================  PAST MEDICAL & SURGICAL HISTORY:  HTN (hypertension)    Mitral valve prolapse    HLD (hyperlipidemia)    A/P: 63 y/o female with PMHx of HLD, HTN, AFib (diagnosed recently, not on AC), benzodiazepine/opioid & ETOH abuse, and mitral valve prolapse found to have severe mitral and tricuspid regurgitation w/ signs of cardiogenic shock, now s/p RHC with IABP placement.     ====================== NEUROLOGY=====================   #Substance Use Disorder   #chronic back pain  - A&O x4 on exam  - takes oxycodon 20mg TID for chronic back pain s/t work injury, order as needed  - on home clonazepam 1mg TID  - istop confirmed, printed in chart  - alcohol use disorder as per daughter, continue ciwa assessment  - continue to monitor mental status per protocol    ==================== RESPIRATORY======================   #Acute Hypoxic Respiratory Failure   - requiring 4L nasal cannula s/t pulmonary edema i/s/o severe MR  - wean nasal cannula w/ diuresis  - continue to monitor sp02    ====================CARDIOVASCULAR==================   #Cardiogenic Shock   - i/s/o severe MR and pHTN requiring IABP   - TTE 4/13: severe MR and TR with normal LV size, thickness, and EF of 60-65%.  LA severely dilated.  Moderate tricuspid regurgitation.  Mildly enlarged RV with reduced systolic function, severe pHTN  - s/p RHC w/ PAC and IABP placement  - CVP 10 on admission, 40 mg lasix ordered  - continue IABP 1:1 w/ heparin gtt  - f/u mv02, lactate and perfusion indices  - wean levo as tolerated for assisted means > 65  - continue strict I&O, daily weights, frequent mv02 trending w/ lactate monitoring  - goal -200cc /hr   - BRUNA   - CT surgery for possible mitral valve repair     #Atrial Fibrillation   - diagnosed as outpatient, not on AC at home  - rates > 100 on admission, continue to monitor w/ weaning levo  - Heparin gtt   - continue to monitor tele, electrolytes  - add digoxin for rhythm control     ==================== GASTROINTESTINAL===================   #transaminitis  - likely i/s/o low flow state  - avoid hepatoxins, continue to trend daily    - DASH diet   - continue bowel regimen      ===================== RENAL =========================   #ISABELA  - sCR 1.66 on admission, unknown baseline  - continue to trend, avoid nephrotoxins  - replete lytes PRN, keep K>4 and Mg>2      ===================HEMATOLOGIC/ONC ===================   - H/H and platelets stable  - continue to trend daily  #DVT: heparin gtt for IABP & a.fib    =======================    ENDOCRINE  =====================   - f.u a1c, lipid panel, tsh  - glucose controlled on cmp, continue to trend    ========================INFECTIOUS DISEASE================   - afebrile, no leukocytosis    - f/u blood cultures x2,   - continue to monitor WBC and fever curve      #full code  Lines: R fem IABP (4/13 -  R fem PAC (4/13 -   ======================= DISPOSITION  =====================  [X] Critical   [ ] Guarded    [ ] Stable    [X] Maintain in CICU  [ ] Downgrade to Telemetry  [ ] Discharge Home    Patient requires continuous monitoring with bedside rhythm monitoring, pulse ox monitoring, and intermittent blood gas analysis. Care plan discussed with ICU care team. Patient remained critical and at risk for life threatening decompensation.  Patient seen, examined and plan discussed with CCU team during rounds.     I have personally provided 30 minutes of critical care time excluding time spent on separate procedures, in addition to initial critical care time provided by the CICU Attending, Dr. Xander Valdez [facility]  CICU [phone]

## 2024-04-14 NOTE — PROGRESS NOTE ADULT - ATTENDING COMMENTS
Admitted with mixed shock requiring IABP and pressors in the setting of severe TR and torrential MR  A+Ox3  Consumes alcohol and takes benzos and opioids (latter two prescribed) daily  Continue standing Ativan, CIWA protocol, and Oxycodone   Mixed shock requiring IABP and Milrinone for the cardiogenic component and Levophed for the vasodilatory component  Wean Levophed as able  TTE with preserved LVEF, severe TR and torrential MR - consult CT Surgery  O2 sats mid 90s on nasal cannula - wean to room air  Acute hypoxic respiratory failure requiring mechanical ventilation / Bipap  DASH/diabetic diet  Normal renal function/Non-oliguric ISABELA  H/H normal/low but acceptable  HSQ for DVT prophylaxis / on Heparin drip for afib / PE / IABP  Afebrile, no antibiotics  Sugars controlled  No central access  RIJIMENA Dang/Ned Admitted with mixed shock requiring IABP and pressors in the setting of severe TR and torrential MR  A+Ox3  Consumes alcohol and takes benzos and opioids (latter two prescribed) daily  Continue standing Ativan, CIWA protocol, and Oxycodone   Mixed shock requiring IABP and Milrinone for the cardiogenic component and Levophed for the vasodilatory component  Wean Levophed as able  TTE with preserved LVEF, severe TR and torrential MR - consult CT Surgery, check BRUNA  Afib with RVR; atria are enlarged on TTE - load Digoxin  O2 sats mid 90s on nasal cannula - wean to room air  DASH diet  Non-oliguric ISABELA, elevated filling pressures on exam - diurese with IV Bumex for at least 2L overall negative  H/H low but acceptable on Heparin drip for afib and IABP  Afebrile, no antibiotics  Sugars controlled  Femoral Cordis/Renfrew and IABP 4/13

## 2024-04-14 NOTE — CONSULT NOTE ADULT - SUBJECTIVE AND OBJECTIVE BOX
Patient seen and evaluated at bedside      HPI:  63 y/o female who is a current smoker with a PMHx of HLD, HTN, AFib (diagnosed recently, not on AC), benzodiazepine/opioid and etoh abuse, and mitral valve prolapse presenting to the ER with shortness of breath. Pt reports developing shortness of breath, fatigue, and a sense of feeling unwell for the last week, with milder symptoms present longer. Her daughter at bedside reports pt is lethargic, not herself and was having difficulty breathing at home.    Upon arrival to the ED she was pale and ill appearing. Was found to be hypoxic to 77% on room air requiring 3L nasal cannula as well as hypotensive to the 70s requiring levophed. On pocus she was found to have a small pericardial effusion, severe MR w/ plethoric IVC and bilateral B lines. Cardiology was consulted for concern of cardiogenic shock. She is now s/p RHC with IABP and swan purnima catheter placement.   On admission to CICU she is A&O x4, saturating well on 4L nasal cannula, IABP 1:1 augmenting 100 on .1mcg/kg/min of norepinephrine.          PMHx:   HTN (hypertension)    Mitral valve prolapse    HLD (hyperlipidemia)        PSHx:   No known surgical history       Allergies:  No Known Allergies      Home Meds: As per admission medication reconcilliation.     Current Medications:   buMETAnide Infusion 2 mG/Hr IV Continuous <Continuous>  chlorhexidine 2% Cloths 1 Application(s) Topical daily  digoxin  Injectable 250 MICROGram(s) IV Push every 6 hours  heparin  Infusion 1400 Unit(s)/Hr IV Continuous <Continuous>  LORazepam     Tablet 2 milliGRAM(s) Oral three times a day  milrinone Infusion 0.125 MICROgram(s)/kG/Min IV Continuous <Continuous>  nicotine - 21 mG/24Hr(s) Patch 1 Patch Transdermal daily  oxyCODONE    IR 20 milliGRAM(s) Oral every 6 hours PRN      FAMILY HISTORY: NC      Social History: Current tobacco use. Lives with . Daughter is RN.     REVIEW OF SYSTEMS:  Constitutional:     [x ] negative [ ] fevers [ ] chills [ ] weight loss [ ] weight gain  HEENT:                  [x ] negative [ ] dry eyes [ ] eye irritation [ ] postnasal drip [ ] nasal congestion  CV:                         [ x] negative  [ ] chest pain [ ] orthopnea [ ] palpitations [ ] murmur  Resp:                     [ ] negative [ ] cough [x ] shortness of breath [ ] dyspnea [ ] wheezing [ ] sputum [ ]hemoptysis  GI:                          [ x] negative [ ] nausea [ ] vomiting [ ] diarrhea [ ] constipation [ ] abd pain [ ] dysphagia   :                        [ x] negative [ ] dysuria [ ] nocturia [ ] hematuria [ ] increased urinary frequency  Musculoskeletal: [x ] negative [ ] back pain [ ] myalgias [ ] arthralgias [ ] fracture  Skin:                       [ x] negative [ ] rash [ ] itch  Neurological:        [ x] negative [ ] headache [ ] dizziness [ ] syncope [ ] weakness [ ] numbness  Psychiatric:           [ x] negative [ ] anxiety [ ] depression  Endocrine:            [ x] negative [ ] diabetes [ ] thyroid problem  Heme/Lymph:      [ x] negative [ ] anemia [ ] bleeding problem  Allergic/Immune: [ x] negative [ ] itchy eyes [ ] nasal discharge [ ] hives [ ] angioedema    [ x] All other systems negative  [ ] Unable to assess ROS due to      Physical Exam:  T(F): 98.8 (), Max: 99.5 ()  HR: 114 () (109 - 127)  BP: 97/61 () (86/51 - 181/66)  RR: 18 ()  SpO2: 93% ()  General: Alert, no acute distress  HEENT: No scleral icterus, EOMI, no facial dysmorphia, no external ear lesions   Cardiac: JAGDEEP, IABP in place  Pulmonary: Clear breath sounds throughout, no wheezing, no stridor, no crackles   Abdomen: Nondistended, nontender, appears soft   Skin: no obvious rash or lesions   Extremities: no LE edema  Neurological: Moving all 4 extremities, no overt focal deficits noted   Psych: normal mood and affect       Labs: Personally reviewed                        10.0   8.20  )-----------( 225      ( 2024 02:24 )             30.9     -14    135  |  101  |  39<H>  ----------------------------<  149<H>  3.4<L>   |  22  |  1.36<H>    Ca    9.2      2024 02:24  Phos  4.0     04-14  Mg     2.2         TPro  6.8  /  Alb  3.5  /  TBili  0.5  /  DBili  x   /  AST  84<H>  /  ALT  120<H>  /  AlkPhos  141<H>      PT/INR - ( 2024 13:11 )   PT: 13.2 sec;   INR: 1.27 ratio         PTT - ( 2024 13:11 )  PTT:70.0 sec    Total Cholesterol: 74  LDL: --  HDL: 27  T      Thyroid Stimulating Hormone, Serum: 2.29 uIU/mL ( @ 20:34)

## 2024-04-15 ENCOUNTER — TRANSCRIPTION ENCOUNTER (OUTPATIENT)
Age: 64
End: 2024-04-15

## 2024-04-15 LAB
ALBUMIN SERPL ELPH-MCNC: 3.9 G/DL — SIGNIFICANT CHANGE UP (ref 3.3–5)
ALP SERPL-CCNC: 157 U/L — HIGH (ref 40–120)
ALT FLD-CCNC: 117 U/L — HIGH (ref 10–45)
ANION GAP SERPL CALC-SCNC: 14 MMOL/L — SIGNIFICANT CHANGE UP (ref 5–17)
APTT BLD: 67.1 SEC — HIGH (ref 24.5–35.6)
AST SERPL-CCNC: 69 U/L — HIGH (ref 10–40)
BASE EXCESS BLDMV CALC-SCNC: 10.3 MMOL/L — HIGH (ref -3–3)
BASE EXCESS BLDMV CALC-SCNC: 7.7 MMOL/L — HIGH (ref -3–3)
BASE EXCESS BLDV CALC-SCNC: 7.8 MMOL/L — HIGH (ref -2–3)
BASOPHILS # BLD AUTO: 0.03 K/UL — SIGNIFICANT CHANGE UP (ref 0–0.2)
BASOPHILS NFR BLD AUTO: 0.3 % — SIGNIFICANT CHANGE UP (ref 0–2)
BILIRUB SERPL-MCNC: 0.5 MG/DL — SIGNIFICANT CHANGE UP (ref 0.2–1.2)
BUN SERPL-MCNC: 32 MG/DL — HIGH (ref 7–23)
CA-I SERPL-SCNC: 1.2 MMOL/L — SIGNIFICANT CHANGE UP (ref 1.15–1.33)
CALCIUM SERPL-MCNC: 9.8 MG/DL — SIGNIFICANT CHANGE UP (ref 8.4–10.5)
CHLORIDE BLDV-SCNC: 97 MMOL/L — SIGNIFICANT CHANGE UP (ref 96–108)
CHLORIDE SERPL-SCNC: 96 MMOL/L — SIGNIFICANT CHANGE UP (ref 96–108)
CO2 BLDMV-SCNC: 34 MMOL/L — HIGH (ref 21–29)
CO2 BLDMV-SCNC: 36 MMOL/L — HIGH (ref 21–29)
CO2 BLDV-SCNC: 34 MMOL/L — HIGH (ref 22–26)
CO2 SERPL-SCNC: 26 MMOL/L — SIGNIFICANT CHANGE UP (ref 22–31)
CREAT SERPL-MCNC: 1.01 MG/DL — SIGNIFICANT CHANGE UP (ref 0.5–1.3)
EGFR: 62 ML/MIN/1.73M2 — SIGNIFICANT CHANGE UP
EOSINOPHIL # BLD AUTO: 0.29 K/UL — SIGNIFICANT CHANGE UP (ref 0–0.5)
EOSINOPHIL NFR BLD AUTO: 2.8 % — SIGNIFICANT CHANGE UP (ref 0–6)
GAS PNL BLDMV: SIGNIFICANT CHANGE UP
GAS PNL BLDMV: SIGNIFICANT CHANGE UP
GAS PNL BLDV: 134 MMOL/L — LOW (ref 136–145)
GAS PNL BLDV: SIGNIFICANT CHANGE UP
GAS PNL BLDV: SIGNIFICANT CHANGE UP
GLUCOSE BLDV-MCNC: 115 MG/DL — HIGH (ref 70–99)
GLUCOSE SERPL-MCNC: 126 MG/DL — HIGH (ref 70–99)
HCO3 BLDMV-SCNC: 33 MMOL/L — HIGH (ref 20–28)
HCO3 BLDMV-SCNC: 35 MMOL/L — HIGH (ref 20–28)
HCO3 BLDV-SCNC: 33 MMOL/L — HIGH (ref 22–29)
HCT VFR BLD CALC: 34.7 % — SIGNIFICANT CHANGE UP (ref 34.5–45)
HCT VFR BLDA CALC: 35 % — SIGNIFICANT CHANGE UP (ref 34.5–46.5)
HGB BLD CALC-MCNC: 11.7 G/DL — SIGNIFICANT CHANGE UP (ref 11.7–16.1)
HGB BLD-MCNC: 11.4 G/DL — LOW (ref 11.5–15.5)
HOROWITZ INDEX BLDMV+IHG-RTO: 21 — SIGNIFICANT CHANGE UP
HOROWITZ INDEX BLDMV+IHG-RTO: 28 — SIGNIFICANT CHANGE UP
HOROWITZ INDEX BLDV+IHG-RTO: 28 — SIGNIFICANT CHANGE UP
IMM GRANULOCYTES NFR BLD AUTO: 0.6 % — SIGNIFICANT CHANGE UP (ref 0–0.9)
INR BLD: 1.18 RATIO — SIGNIFICANT CHANGE UP (ref 0.85–1.18)
LACTATE BLDV-MCNC: 1 MMOL/L — SIGNIFICANT CHANGE UP (ref 0.5–2)
LACTATE BLDV-MCNC: 1 MMOL/L — SIGNIFICANT CHANGE UP (ref 0.5–2)
LYMPHOCYTES # BLD AUTO: 1.88 K/UL — SIGNIFICANT CHANGE UP (ref 1–3.3)
LYMPHOCYTES # BLD AUTO: 18.4 % — SIGNIFICANT CHANGE UP (ref 13–44)
MAGNESIUM SERPL-MCNC: 2.1 MG/DL — SIGNIFICANT CHANGE UP (ref 1.6–2.6)
MCHC RBC-ENTMCNC: 30.5 PG — SIGNIFICANT CHANGE UP (ref 27–34)
MCHC RBC-ENTMCNC: 32.9 GM/DL — SIGNIFICANT CHANGE UP (ref 32–36)
MCV RBC AUTO: 92.8 FL — SIGNIFICANT CHANGE UP (ref 80–100)
MONOCYTES # BLD AUTO: 1.01 K/UL — HIGH (ref 0–0.9)
MONOCYTES NFR BLD AUTO: 9.9 % — SIGNIFICANT CHANGE UP (ref 2–14)
NEUTROPHILS # BLD AUTO: 6.94 K/UL — SIGNIFICANT CHANGE UP (ref 1.8–7.4)
NEUTROPHILS NFR BLD AUTO: 68 % — SIGNIFICANT CHANGE UP (ref 43–77)
NRBC # BLD: 0 /100 WBCS — SIGNIFICANT CHANGE UP (ref 0–0)
O2 CT VFR BLD CALC: 35 MMHG — SIGNIFICANT CHANGE UP (ref 30–65)
O2 CT VFR BLD CALC: 41 MMHG — SIGNIFICANT CHANGE UP (ref 30–65)
PCO2 BLDMV: 46 MMHG — SIGNIFICANT CHANGE UP (ref 30–65)
PCO2 BLDMV: 46 MMHG — SIGNIFICANT CHANGE UP (ref 30–65)
PCO2 BLDV: 46 MMHG — HIGH (ref 39–42)
PH BLDMV: 7.46 — HIGH (ref 7.32–7.45)
PH BLDMV: 7.49 — HIGH (ref 7.32–7.45)
PH BLDV: 7.46 — HIGH (ref 7.32–7.43)
PHOSPHATE SERPL-MCNC: 2.8 MG/DL — SIGNIFICANT CHANGE UP (ref 2.5–4.5)
PLATELET # BLD AUTO: 242 K/UL — SIGNIFICANT CHANGE UP (ref 150–400)
PO2 BLDV: 43 MMHG — SIGNIFICANT CHANGE UP (ref 25–45)
POTASSIUM BLDV-SCNC: 3.5 MMOL/L — SIGNIFICANT CHANGE UP (ref 3.5–5.1)
POTASSIUM SERPL-MCNC: 3.8 MMOL/L — SIGNIFICANT CHANGE UP (ref 3.5–5.3)
POTASSIUM SERPL-SCNC: 3.8 MMOL/L — SIGNIFICANT CHANGE UP (ref 3.5–5.3)
PROT SERPL-MCNC: 7.6 G/DL — SIGNIFICANT CHANGE UP (ref 6–8.3)
PROTHROM AB SERPL-ACNC: 12.3 SEC — SIGNIFICANT CHANGE UP (ref 9.5–13)
RBC # BLD: 3.74 M/UL — LOW (ref 3.8–5.2)
RBC # FLD: 15.2 % — HIGH (ref 10.3–14.5)
SAO2 % BLDMV: 55 — LOW (ref 60–90)
SAO2 % BLDMV: 71.1 — SIGNIFICANT CHANGE UP (ref 60–90)
SAO2 % BLDV: 74.7 % — SIGNIFICANT CHANGE UP (ref 67–88)
SODIUM SERPL-SCNC: 136 MMOL/L — SIGNIFICANT CHANGE UP (ref 135–145)
WBC # BLD: 10.21 K/UL — SIGNIFICANT CHANGE UP (ref 3.8–10.5)
WBC # FLD AUTO: 10.21 K/UL — SIGNIFICANT CHANGE UP (ref 3.8–10.5)

## 2024-04-15 PROCEDURE — 99292 CRITICAL CARE ADDL 30 MIN: CPT

## 2024-04-15 PROCEDURE — 71045 X-RAY EXAM CHEST 1 VIEW: CPT | Mod: 26,76

## 2024-04-15 PROCEDURE — 93010 ELECTROCARDIOGRAM REPORT: CPT

## 2024-04-15 PROCEDURE — 76700 US EXAM ABDOM COMPLETE: CPT | Mod: 26

## 2024-04-15 PROCEDURE — 99291 CRITICAL CARE FIRST HOUR: CPT

## 2024-04-15 RX ORDER — SENNA PLUS 8.6 MG/1
2 TABLET ORAL AT BEDTIME
Refills: 0 | Status: DISCONTINUED | OUTPATIENT
Start: 2024-04-15 | End: 2024-04-16

## 2024-04-15 RX ORDER — DIGOXIN 250 MCG
125 TABLET ORAL DAILY
Refills: 0 | Status: DISCONTINUED | OUTPATIENT
Start: 2024-04-16 | End: 2024-04-16

## 2024-04-15 RX ORDER — POTASSIUM CHLORIDE 20 MEQ
20 PACKET (EA) ORAL ONCE
Refills: 0 | Status: COMPLETED | OUTPATIENT
Start: 2024-04-15 | End: 2024-04-15

## 2024-04-15 RX ORDER — ACETAMINOPHEN 500 MG
1000 TABLET ORAL ONCE
Refills: 0 | Status: COMPLETED | OUTPATIENT
Start: 2024-04-15 | End: 2024-04-16

## 2024-04-15 RX ORDER — CHLORHEXIDINE GLUCONATE 213 G/1000ML
1 SOLUTION TOPICAL ONCE
Refills: 0 | Status: COMPLETED | OUTPATIENT
Start: 2024-04-15 | End: 2024-04-15

## 2024-04-15 RX ORDER — MUPIROCIN 20 MG/G
1 OINTMENT TOPICAL
Refills: 0 | Status: DISCONTINUED | OUTPATIENT
Start: 2024-04-15 | End: 2024-04-15

## 2024-04-15 RX ORDER — POLYETHYLENE GLYCOL 3350 17 G/17G
17 POWDER, FOR SOLUTION ORAL DAILY
Refills: 0 | Status: DISCONTINUED | OUTPATIENT
Start: 2024-04-15 | End: 2024-04-16

## 2024-04-15 RX ORDER — ASCORBIC ACID 60 MG
2000 TABLET,CHEWABLE ORAL ONCE
Refills: 0 | Status: COMPLETED | OUTPATIENT
Start: 2024-04-15 | End: 2024-04-16

## 2024-04-15 RX ORDER — MUPIROCIN 20 MG/G
1 OINTMENT TOPICAL
Refills: 0 | Status: DISCONTINUED | OUTPATIENT
Start: 2024-04-15 | End: 2024-04-16

## 2024-04-15 RX ORDER — CHLORHEXIDINE GLUCONATE 213 G/1000ML
30 SOLUTION TOPICAL ONCE
Refills: 0 | Status: COMPLETED | OUTPATIENT
Start: 2024-04-15 | End: 2024-04-16

## 2024-04-15 RX ORDER — GABAPENTIN 400 MG/1
300 CAPSULE ORAL ONCE
Refills: 0 | Status: COMPLETED | OUTPATIENT
Start: 2024-04-15 | End: 2024-04-16

## 2024-04-15 RX ORDER — ACETAMINOPHEN 500 MG
1000 TABLET ORAL ONCE
Refills: 0 | Status: COMPLETED | OUTPATIENT
Start: 2024-04-15 | End: 2024-04-15

## 2024-04-15 RX ORDER — CEFUROXIME AXETIL 250 MG
1500 TABLET ORAL ONCE
Refills: 0 | Status: DISCONTINUED | OUTPATIENT
Start: 2024-04-15 | End: 2024-04-16

## 2024-04-15 RX ADMIN — Medication 2 MILLIGRAM(S): at 21:29

## 2024-04-15 RX ADMIN — OXYCODONE HYDROCHLORIDE 20 MILLIGRAM(S): 5 TABLET ORAL at 11:57

## 2024-04-15 RX ADMIN — OXYCODONE HYDROCHLORIDE 20 MILLIGRAM(S): 5 TABLET ORAL at 19:26

## 2024-04-15 RX ADMIN — Medication 1 PATCH: at 21:02

## 2024-04-15 RX ADMIN — OXYCODONE HYDROCHLORIDE 20 MILLIGRAM(S): 5 TABLET ORAL at 05:08

## 2024-04-15 RX ADMIN — OXYCODONE HYDROCHLORIDE 20 MILLIGRAM(S): 5 TABLET ORAL at 20:26

## 2024-04-15 RX ADMIN — CHLORHEXIDINE GLUCONATE 1 APPLICATION(S): 213 SOLUTION TOPICAL at 21:29

## 2024-04-15 RX ADMIN — HEPARIN SODIUM 16 UNIT(S)/HR: 5000 INJECTION INTRAVENOUS; SUBCUTANEOUS at 08:27

## 2024-04-15 RX ADMIN — Medication 100 MILLIEQUIVALENT(S): at 03:36

## 2024-04-15 RX ADMIN — OXYCODONE HYDROCHLORIDE 20 MILLIGRAM(S): 5 TABLET ORAL at 12:27

## 2024-04-15 RX ADMIN — Medication 2 MILLIGRAM(S): at 14:12

## 2024-04-15 RX ADMIN — Medication 1 PATCH: at 11:55

## 2024-04-15 RX ADMIN — SENNA PLUS 2 TABLET(S): 8.6 TABLET ORAL at 21:29

## 2024-04-15 RX ADMIN — POLYETHYLENE GLYCOL 3350 17 GRAM(S): 17 POWDER, FOR SOLUTION ORAL at 21:30

## 2024-04-15 RX ADMIN — CHLORHEXIDINE GLUCONATE 1 APPLICATION(S): 213 SOLUTION TOPICAL at 21:30

## 2024-04-15 RX ADMIN — Medication 2 MILLIGRAM(S): at 05:09

## 2024-04-15 RX ADMIN — MILRINONE LACTATE 3.33 MICROGRAM(S)/KG/MIN: 1 INJECTION, SOLUTION INTRAVENOUS at 08:28

## 2024-04-15 RX ADMIN — Medication 1 PATCH: at 11:56

## 2024-04-15 RX ADMIN — MUPIROCIN 1 APPLICATION(S): 20 OINTMENT TOPICAL at 21:29

## 2024-04-15 RX ADMIN — Medication 1 PATCH: at 07:00

## 2024-04-15 RX ADMIN — OXYCODONE HYDROCHLORIDE 20 MILLIGRAM(S): 5 TABLET ORAL at 06:00

## 2024-04-15 RX ADMIN — Medication 400 MILLIGRAM(S): at 03:07

## 2024-04-15 RX ADMIN — Medication 1000 MILLIGRAM(S): at 04:00

## 2024-04-15 NOTE — PROGRESS NOTE ADULT - ASSESSMENT
Assessment	  63 y/o female who is a current smoker with a PMHx of HLD, HTN, AFib (diagnosed recently, not on AC), benzodiazepine/opioid and etoh abuse, and mitral valve prolapse presenting to the ER with shortness of breath, found to have severe TR/MR.  She is now s/p RHC with IABP and swan purnima catheter placement.     Plan:    ====================== NEUROLOGY=====================   #Substance Use Disorder   #chronic back pain  - A&O x4 on exam  - takes oxycodone 20mg TID for chronic back pain s/t work injury, continue PRN  - continue lorazepam 2mg TID (on clonazepam at home)  - istop confirmed, printed in chart  - alcohol use disorder as per daughter, continue ciwa assessment  - continue to monitor mental status per protocol    ==================== RESPIRATORY======================   #Acute Hypoxic Respiratory Failure   - requiring 4L nasal cannula s/t pulmonary edema i/s/o severe MR, improving  - saturating well on room air  - continue to monitor sp02    ====================CARDIOVASCULAR==================   #Cardiogenic Shock   - i/s/o severe MR and pHTN requiring IABP   - TTE 4/13: severe MR and TR with normal LV size, thickness, and EF of 60-65%.  LA severely dilated.  Moderate tricuspid regurgitation.  Mildly enlarged RV with reduced systolic function, severe pHTN  - s/p RHC w/ PAC and IABP placement  - appears euvolemic, off inotropic support  - continue IABP 1:1 w/ heparin gtt  - continue strict I&O, daily weights, frequent mv02 trending w/ lactate monitoring  - pending CT surgery for MVR tomorrow    #Atrial Fibrillation/Flutter  - diagnosed as outpatient, not on AC at home  - cotinue digoxin  - Heparin gtt   - continue to monitor tele, electrolytes    ==================== GASTROINTESTINAL===================   #Elevated LFTs  - likely i/s/o low flow state  - avoid hepatotoxins, continue to trend daily    - DASH diet   - npo after midnight  - continue bowel regimen      ===================== RENAL =========================   #ISABELA  - sCR 1.66 on admission, downtrended  - continue to trend, avoid nephrotoxins  - replete lytes PRN, keep K>4 and Mg>2      ===================HEMATOLOGIC/ONC ===================   - H/H and platelets stable  - continue to trend daily  #DVT: heparin gtt for IABP & a.fib    =======================    ENDOCRINE  =====================   - a1c, lipid panel, tsh within normal limits  - glucose controlled on cmp, continue to trend daily    ========================INFECTIOUS DISEASE================   - afebrile, no leukocytosis    - continue to monitor WBC and fever curve      #full code  Lines: R fem IABP (4/13 -  R fem PAC (4/13 - 4/15)    ======================= DISPOSITION  =====================  [X] Critical   [ ] Guarded    [ ] Stable    [X] Maintain in CICU  [ ] Downgrade to Telemtry  [ ] Discharge Home    Patient requires continuous monitoring with bedside rhythm monitoring, pulse ox monitoring, and intermittent blood gas analysis. Care plan discussed with ICU care team. Patient remained critical and at risk for life threatening decompensation.  Patient seen, examined and plan discussed with CCU team during rounds.     I have personally provided 30 minutes of critical care time excluding time spent on separate procedures, in addition to initial critical care time provided by the CICU Attending, Dr. Cunha.     DESMOND NassarSHANNA-BC     Assessment	  65 y/o female who is a current smoker with a PMHx of HLD, HTN, AFib (diagnosed recently, not on AC), benzodiazepine/opioid and etoh abuse, and mitral valve prolapse presenting to the ER with shortness of breath, found to have severe TR/MR.  She is now s/p RHC with IABP and swan purnima catheter placement.     Plan:    ====================== NEUROLOGY=====================   #Substance Use Disorder   #chronic back pain  - A&O x4 on exam  - takes oxycodone 20mg TID for chronic back pain s/t work injury, continue PRN  - continue lorazepam 2mg TID (on clonazepam at home)  - istop confirmed, printed in chart  - alcohol use disorder as per daughter, continue ciwa assessment  - continue to monitor mental status per protocol    ==================== RESPIRATORY======================   #Acute Hypoxic Respiratory Failure   - requiring 4L nasal cannula s/t pulmonary edema i/s/o severe MR, improving  - saturating well on room air  - continue to monitor sp02    ====================CARDIOVASCULAR==================   #Cardiogenic Shock   - i/s/o severe MR and pHTN requiring IABP   - TTE 4/13: severe MR and TR with normal LV size, thickness, and EF of 60-65%.  LA severely dilated.  Moderate tricuspid regurgitation.  Mildly enlarged RV with reduced systolic function, severe pHTN  - s/p RHC w/ PAC and IABP placement  - appears euvolemic, off inotropic support  - continue IABP 1:1 w/ heparin gtt  - continue strict I&O, daily weights, frequent mv02 trending w/ lactate monitoring  - pending CT surgery for MVR tomorrow    #Atrial Fibrillation/Flutter  - diagnosed as outpatient, not on AC at home  - continue digoxin  - Heparin gtt   - continue to monitor tele, electrolytes    ==================== GASTROINTESTINAL===================   #Elevated LFTs  - likely i/s/o low flow state  - avoid hepatotoxins, continue to trend daily    - DASH diet   - npo after midnight  - continue bowel regimen      ===================== RENAL =========================   #ISABELA  - sCR 1.66 on admission, downtrended  - continue to trend, avoid nephrotoxins  - replete lytes PRN, keep K>4 and Mg>2      ===================HEMATOLOGIC/ONC ===================   - H/H and platelets stable  - continue to trend daily  #DVT: heparin gtt for IABP & a.fib    =======================    ENDOCRINE  =====================   - a1c, lipid panel, tsh within normal limits  - glucose controlled on cmp, continue to trend daily    ========================INFECTIOUS DISEASE================   - afebrile, no leukocytosis    - continue to monitor WBC and fever curve      #full code  Lines: R fem IABP (4/13 -  R fem PAC (4/13 - 4/15)    ======================= DISPOSITION  =====================  [X] Critical   [ ] Guarded    [ ] Stable    [X] Maintain in CICU  [ ] Downgrade to Telemetry  [ ] Discharge Home    Patient requires continuous monitoring with bedside rhythm monitoring, pulse ox monitoring, and intermittent blood gas analysis. Care plan discussed with ICU care team. Patient remained critical and at risk for life threatening decompensation.  Patient seen, examined and plan discussed with CCU team during rounds.     I have personally provided 30 minutes of critical care time excluding time spent on separate procedures, in addition to initial critical care time provided by the CICU Attending, Dr. Cunha.     Anita Cadet, [facility]-BC

## 2024-04-15 NOTE — PROGRESS NOTE ADULT - SUBJECTIVE AND OBJECTIVE BOX
PATIENT:  HERBER IRAHETA  [id number]    CHIEF COMPLAINT:  Patient is a 64y old  Female who presents with a chief complaint of cardiogenic shock (15 Apr 2024 07:44)      INTERVAL HISTORYOVERNIGHT EVENTS:      REVIEW OF SYSTEMS:    Constitutional:     [ ] negative [ ] fevers [ ] chills [ ] weight loss [ ] weight gain  HEENT:                  [ ] negative [ ] dry eyes [ ] eye irritation [ ] postnasal drip [ ] nasal congestion  CV:                         [ ] negative  [ ] chest pain [ ] orthopnea [ ] palpitations [ ] murmur  Resp:                     [ ] negative [ ] cough [ ] shortness of breath [ ] dyspnea [ ] wheezing [ ] sputum [ ] hemoptysis  GI:                          [ ] negative [ ] nausea [ ] vomiting [ ] diarrhea [ ] constipation [ ] abd pain [ ] dysphagia   :                        [ ] negative [ ] dysuria [ ] nocturia [ ] hematuria [ ] increased urinary frequency  Musculoskeletal: [ ] negative [ ] back pain [ ] myalgias [ ] arthralgias [ ] fracture  Skin:                       [ ] negative [ ] rash [ ] itch  Neurological:        [ ] negative [ ] headache [ ] dizziness [ ] syncope [ ] weakness [ ] numbness  Psychiatric:           [ ] negative [ ] anxiety [ ] depression  Endocrine:            [ ] negative [ ] diabetes [ ] thyroid problem  Heme/Lymph:      [ ] negative [ ] anemia [ ] bleeding problem  Allergic/Immune: [ ] negative [ ] itchy eyes [ ] nasal discharge [ ] hives [ ] angioedema    [ ] All other systems negative  [ ] Unable to assess ROS because ________.    MEDICATIONS:  MEDICATIONS  (STANDING):  acetaminophen     Tablet .. 1000 milliGRAM(s) Oral once  ascorbic acid 2000 milliGRAM(s) Oral once  cefuroxime  IVPB 1500 milliGRAM(s) IV Intermittent once  chlorhexidine 0.12% Liquid 30 milliLiter(s) Swish and Spit once  chlorhexidine 2% Cloths 1 Application(s) Topical daily  chlorhexidine 4% Liquid 1 Application(s) Topical once  gabapentin 300 milliGRAM(s) Oral once  heparin  Infusion 1400 Unit(s)/Hr (16 mL/Hr) IV Continuous <Continuous>  LORazepam     Tablet 2 milliGRAM(s) Oral three times a day  mupirocin 2% Nasal 1 Application(s) Both Nostrils two times a day  nicotine - 21 mG/24Hr(s) Patch 1 Patch Transdermal daily  polyethylene glycol 3350 17 Gram(s) Oral daily  senna 2 Tablet(s) Oral at bedtime    MEDICATIONS  (PRN):  oxyCODONE    IR 20 milliGRAM(s) Oral every 6 hours PRN Severe Pain (7 - 10)      ALLERGIES:  Allergies    No Known Allergies    Intolerances        OBJECTIVE:  ICU Vital Signs Last 24 Hrs  T(C): 36.9 (15 Apr 2024 19:00), Max: 37.5 (2024 23:00)  T(F): 98.4 (15 Apr 2024 19:00), Max: 99.5 (2024 23:00)  HR: 79 (15 Apr 2024 21:00) (69 - 106)  BP: 85/61 (2024 23:00) (85/61 - 105/61)  BP(mean): 69 (2024 23:00) (69 - 79)  ABP: --  ABP(mean): --  RR: 30 (15 Apr 2024 21:) (12 - 30)  SpO2: 96% (15 Apr 2024 21:00) (91% - 98%)    O2 Parameters below as of 15 Apr 2024 21:00  Patient On (Oxygen Delivery Method): room air            Adult Advanced Hemodynamics Last 24 Hrs  CVP(mm Hg): 1 (15 Apr 2024 16:00) (-2 - 5)  CVP(cm H2O): --  CO: --  CI: --  PA: 16/4 (15 Apr 2024 16:00) (16/4 - 34/18)  PA(mean): 11 (15 Apr 2024 16:00) (9 - 26)  PCWP: --  SVR: --  SVRI: --  PVR: --  PVRI: --  CAPILLARY BLOOD GLUCOSE        CAPILLARY BLOOD GLUCOSE        I&O's Summary    2024 07:01  -  15 Apr 2024 07:00  --------------------------------------------------------  IN: 1868.3 mL / OUT: 3100 mL / NET: -1231.7 mL    15 Apr 2024 07:01  -  15 Apr 2024 21:17  --------------------------------------------------------  IN: 413.2 mL / OUT: 1150 mL / NET: -736.8 mL      Daily     Daily Weight in k.8 (15 Apr 2024 05:00)    PHYSICAL EXAMINATION:  General: WN/WD NAD  HEENT: PERRLA, EOMI, moist mucous membranes  Neurology: A&Ox3, nonfocal, VELIZ x 4  Respiratory: CTA B/L, normal respiratory effort, no wheezes, crackles, rales  CV: RRR, S1S2, no murmurs, rubs or gallops  Abdominal: Soft, NT, ND +BS, Last BM  Extremities: No edema, + peripheral pulses  Incisions:   Tubes:    LABS:                          11.4   10.21 )-----------( 242      ( 15 Apr 2024 01:06 )             34.7     04-15    136  |  96  |  32<H>  ----------------------------<  126<H>  3.8   |  26  |  1.01    Ca    9.8      15 Apr 2024 01:06  Phos  2.8     04-15  Mg     2.1     -15    TPro  7.6  /  Alb  3.9  /  TBili  0.5  /  DBili  x   /  AST  69<H>  /  ALT  117<H>  /  AlkPhos  157<H>  -15    LIVER FUNCTIONS - ( 15 Apr 2024 01:06 )  Alb: 3.9 g/dL / Pro: 7.6 g/dL / ALK PHOS: 157 U/L / ALT: 117 U/L / AST: 69 U/L / GGT: x           PT/INR - ( 15 Apr 2024 01:06 )   PT: 12.3 sec;   INR: 1.18 ratio         PTT - ( 15 Apr 2024 01:06 )  PTT:67.1 sec        Urinalysis Basic - ( 15 Apr 2024 01:06 )    Color: x / Appearance: x / SG: x / pH: x  Gluc: 126 mg/dL / Ketone: x  / Bili: x / Urobili: x   Blood: x / Protein: x / Nitrite: x   Leuk Esterase: x / RBC: x / WBC x   Sq Epi: x / Non Sq Epi: x / Bacteria: x        TELEMETRY:     EKG:     IMAGING:       PATIENT:  HERBER IRAHETA  [id number]    CHIEF COMPLAINT:  Patient is a 64y old  Female who presents with a chief complaint of cardiogenic shock (15 Apr 2024 07:44)      INTERVAL HISTORY: no acute events    REVIEW OF SYSTEMS:  Constitutional:     [ x] negative [ ] fevers [ ] chills [ ] weight loss [ ] weight gain  HEENT:                  [x ] negative [ ] dry eyes [ ] eye irritation [ ] postnasal drip [ ] nasal congestion  CV:                         [x ] negative  [ ] chest pain [ ] orthopnea [ ] palpitations [ ] murmur  Resp:                     [x ] negative [ ] cough [ ] shortness of breath [ ] dyspnea [ ] wheezing [ ] sputum [ ] hemoptysis  GI:                          [ x] negative [ ] nausea [ ] vomiting [ ] diarrhea [ ] constipation [ ] abd pain [ ] dysphagia   :                        [x ] negative [ ] dysuria [ ] nocturia [ ] hematuria [ ] increased urinary frequency  Musculoskeletal: [x ] negative [ ] back pain [ ] myalgias [ ] arthralgias [ ] fracture  Skin:                       [ x] negative [ ] rash [ ] itch  Neurological:        [x ] negative [ ] headache [ ] dizziness [ ] syncope [ ] weakness [ ] numbness      MEDICATIONS:  MEDICATIONS  (STANDING):  acetaminophen     Tablet .. 1000 milliGRAM(s) Oral once  ascorbic acid 2000 milliGRAM(s) Oral once  cefuroxime  IVPB 1500 milliGRAM(s) IV Intermittent once  chlorhexidine 0.12% Liquid 30 milliLiter(s) Swish and Spit once  chlorhexidine 2% Cloths 1 Application(s) Topical daily  chlorhexidine 4% Liquid 1 Application(s) Topical once  gabapentin 300 milliGRAM(s) Oral once  heparin  Infusion 1400 Unit(s)/Hr (16 mL/Hr) IV Continuous <Continuous>  LORazepam     Tablet 2 milliGRAM(s) Oral three times a day  mupirocin 2% Nasal 1 Application(s) Both Nostrils two times a day  nicotine - 21 mG/24Hr(s) Patch 1 Patch Transdermal daily  polyethylene glycol 3350 17 Gram(s) Oral daily  senna 2 Tablet(s) Oral at bedtime    MEDICATIONS  (PRN):  oxyCODONE    IR 20 milliGRAM(s) Oral every 6 hours PRN Severe Pain (7 - 10)      ALLERGIES:  Allergies    No Known Allergies    Intolerances        OBJECTIVE:  ICU Vital Signs Last 24 Hrs  T(C): 36.9 (15 Apr 2024 19:00), Max: 37.5 (2024 23:00)  T(F): 98.4 (15 Apr 2024 19:00), Max: 99.5 (2024 23:00)  HR: 79 (15 Apr 2024 21:) (69 - 106)  BP: 85/61 (2024 23:00) (85/61 - 105/61)  BP(mean): 69 (:) (69 - 79)  ABP: --  ABP(mean): --  RR: 30 (15 Apr 2024 21:) (12 - 30)  SpO2: 96% (15 Apr 2024 21:) (91% - 98%)    O2 Parameters below as of 15 Apr 2024 21:00  Patient On (Oxygen Delivery Method): room air      Adult Advanced Hemodynamics Last 24 Hrs  CVP(mm Hg): 1 (15 Apr 2024 16:00) (-2 - 5)  CVP(cm H2O): --  CO: --  CI: --  PA: 16/4 (15 Apr 2024 16:00) (16/4 - 34/18)  PA(mean): 11 (15 Apr 2024 16:00) (9 - 26)  PCWP: --  SVR: --  SVRI: --  PVR: --  PVRI: --  CAPILLARY BLOOD GLUCOSE      I&O's Summary    2024 07:01  -  15 Apr 2024 07:00  --------------------------------------------------------  IN: 1868.3 mL / OUT: 3100 mL / NET: -1231.7 mL    15 Apr 2024 07:01  -  15 Apr 2024 21:17  --------------------------------------------------------  IN: 413.2 mL / OUT: 1150 mL / NET: -736.8 mL      Daily     Daily Weight in k.8 (15 Apr 2024 05:00)    PHYSICAL EXAMINATION:  General: WN/WD NAD  HEENT: PERRLA, EOMI, moist mucous membranes  Neurology: A&Ox3, nonfocal, VELIZ x 4  Respiratory: CTA B/L, normal respiratory effort, no wheezes, crackles, rales  CV: Regular rate, irregular rhythm, S1S2,  Abdominal: Soft, NT, ND +BS  Extremities: No edema, + peripheral pulses  Skin: warm, dry  Lines: R fem iabp c/d/i    LABS:                 11.4   10.21 )-----------( 242      ( 15 Apr 2024 01:06 )             34.7     04-15    136  |  96  |  32<H>  ----------------------------<  126<H>  3.8   |  26  |  1.01    Ca    9.8      15 Apr 2024 01:06  Phos  2.8     04-15  Mg     2.1     -15    TPro  7.6  /  Alb  3.9  /  TBili  0.5  /  DBili  x   /  AST  69<H>  /  ALT  117<H>  /  AlkPhos  157<H>  04-15    LIVER FUNCTIONS - ( 15 Apr 2024 01:06 )  Alb: 3.9 g/dL / Pro: 7.6 g/dL / ALK PHOS: 157 U/L / ALT: 117 U/L / AST: 69 U/L / GGT: x           PT/INR - ( 15 Apr 2024 01:06 )   PT: 12.3 sec;   INR: 1.18 ratio         PTT - ( 15 Apr 2024 01:06 )  PTT:67.1 sec    Urinalysis Basic - ( 15 Apr 2024 01:06 )    Color: x / Appearance: x / SG: x / pH: x  Gluc: 126 mg/dL / Ketone: x  / Bili: x / Urobili: x   Blood: x / Protein: x / Nitrite: x   Leuk Esterase: x / RBC: x / WBC x   Sq Epi: x / Non Sq Epi: x / Bacteria: x    TELEMETRY: reviewed    EKG: reviewed    IMAGING: reviewed

## 2024-04-15 NOTE — PRE PROCEDURE NOTE - PRE PROCEDURE EVALUATION
Cardiac Surgery Pre-op Note:    CC: Patient is a 64y old  Female who presents with a chief complaint of cardiogenic shock (15 Apr 2024 07:44)                                                                                                             Surgeon:    Procedure:     Allergies    No Known Allergies    Intolerances        HPI:  Ms Denise Bautista is a 63 y/o female who is a current smoker with a PMHx of HLD, HTN, AFib (diagnosed recently, not on AC), benzodiazepine/opioid and etoh abuse, and mitral valve prolapse presenting to the ER with shortness of breath. Pt reports developing shortness of breath, fatigue, and a sense of feeling unwell for the last week, with milder symptoms present longer. Her daughter at bedside reports pt is lethargic, not herself and was having difficulty breathing at home.      Upon arrival to the ED she was pale and ill appearing. Was found to be hypoxic to 77% on room air requiring 3L nasal cannula as well as hypotensive to the 70s requiring levophed. On pocus she was found to have a small pericardial effusion, severe MR w/ plethoric IVC and bilateral B lines. Cardiology was consulted for concern of cardiogenic shock. She is now s/p RHC with IABP and swan triston catheter placement.     On admission to CICU she is A&O x4, saturating well on 4L nasal cannula, IABP 1:1 augmenting 100 on .1mcg/kg/min of norepinephrine.    (13 Apr 2024 20:11)      PAST MEDICAL & SURGICAL HISTORY:  HTN (hypertension)      Mitral valve prolapse      HLD (hyperlipidemia)          MEDICATIONS  (STANDING):  acetaminophen     Tablet .. 1000 milliGRAM(s) Oral once  ascorbic acid 2000 milliGRAM(s) Oral once  cefuroxime  IVPB 1500 milliGRAM(s) IV Intermittent once  chlorhexidine 0.12% Liquid 30 milliLiter(s) Swish and Spit once  chlorhexidine 2% Cloths 1 Application(s) Topical daily  chlorhexidine 4% Liquid 1 Application(s) Topical once  gabapentin 300 milliGRAM(s) Oral once  heparin  Infusion 1400 Unit(s)/Hr (16 mL/Hr) IV Continuous <Continuous>  LORazepam     Tablet 2 milliGRAM(s) Oral three times a day  mupirocin 2% Ointment 1 Application(s) Both Nostrils two times a day  nicotine - 21 mG/24Hr(s) Patch 1 Patch Transdermal daily    MEDICATIONS  (PRN):  oxyCODONE    IR 20 milliGRAM(s) Oral every 6 hours PRN Severe Pain (7 - 10)        Labs:                        11.4   10.21 )-----------( 242      ( 15 Apr 2024 01:06 )             34.7     04-15    136  |  96  |  32<H>  ----------------------------<  126<H>  3.8   |  26  |  1.01    Ca    9.8      15 Apr 2024 01:06  Phos  2.8     04-15  Mg     2.1     04-15    TPro  7.6  /  Alb  3.9  /  TBili  0.5  /  DBili  x   /  AST  69<H>  /  ALT  117<H>  /  AlkPhos  157<H>  04-15    PT/INR - ( 15 Apr 2024 01:06 )   PT: 12.3 sec;   INR: 1.18 ratio         PTT - ( 15 Apr 2024 01:06 )  PTT:67.1 sec    Blood Type: ABO Interpretation: O (04-13 @ 21:21)    HGB A1C: A1C with Estimated Average Glucose (04.13.24 @ 20:35)    A1C with Estimated Average Glucose Result: 5.6: Method: Immunoassay      Pro-BNP: Pro-Brain Natriuretic Peptide (04.13.24 @ 16:14)    Pro-Brain Natriuretic Peptide: 20689 pg/mL      Thyroid Panel: 04-13 @ 20:34/2.29  --/--/--    MRSA:  / MSSA:   Urinalysis Basic - ( 15 Apr 2024 01:06 )    Color: x / Appearance: x / SG: x / pH: x  Gluc: 126 mg/dL / Ketone: x  / Bili: x / Urobili: x   Blood: x / Protein: x / Nitrite: x   Leuk Esterase: x / RBC: x / WBC x   Sq Epi: x / Non Sq Epi: x / Bacteria: x        CXR: < from: Xray Chest 1 View- PORTABLE-Urgent (Xray Chest 1 View- PORTABLE-Urgent .) (04.15.24 @ 12:08) >  Chest x-ray 4/15/2024 3:50 AM:  Mediastinal drain. Inferior approach Sherrill-Triston catheter with tip   projecting over left pulmonary artery. IABP is 4.5 cm from aortic arch..  The heart is enlarged.  No focal consolidation.  There is no pneumothorax or pleural effusion.  No acute bony abnormality.    Chest x-ray 4/15/2024 11:52 AM:  IABP is  1.3 cm from aortic arch. No other significant interval change.    IMPRESSION:  Cardiomegaly.    < end of copied text >      EKG:    Carotid Duplex:      PFT's:    Echocardiogram:    Cardiac catheterization:    Vein Mapping:    Gen: WN/WD NAD  Neuro: AAOx3, nonfocal  Pulm: CTA B/L  CV: RRR, S1S2  Abd: Soft, NT, ND +BS  Ext: No edema, + peripheral pulses  IABP R groin      Pt has AICD/PPM [ ] Yes  [x ] No             Brand Name:  Pre-op Beta Blocker ordered within 24 hrs of surgery?  [ ] Yes  [x ] No  If not, Why? low BP  Type & Cross  [ x] Yes  [ ] No  NPO after Midnight [ x] Yes  [ ] No  Pre-op ABX ordered, to be taped on chart:  [ x] Yes  [ ] No     Hibiclens/Peridex ordered [ x] Yes  [ ] No  Intraop on Hold:  BRUNA [x ]   Consent obtained  [ ] Yes  [ ] No

## 2024-04-15 NOTE — PROGRESS NOTE ADULT - ATTENDING COMMENTS
Admitted with mixed shock requiring IABP and pressors in the setting of severe TR and torrential MR  A+Ox3  Consumes alcohol and takes benzos and opioids (latter two prescribed) daily  Continue standing Ativan, CIWA protocol, and Oxycodone   Mixed shock requiring IABP and Milrinone for the cardiogenic component and Levophed for the vasodilatory component.  Now resolved.  Milrinone off, off pressors   TTE with preserved LVEF, severe TR and torrential MR - for CTS tmr as d/w Dr. Maki   Afib/Aflutter with RVR; cont Digoxin  O2 sats mid 90s on room air  DASH diet, npo after MN   Non-oliguric IASBELA, now resolved   H/H low but acceptable on Heparin drip for afib and IABP  Afebrile, no antibiotics  Sugars controlled  IABP 4/13. benja felton

## 2024-04-15 NOTE — PROGRESS NOTE ADULT - ASSESSMENT
Ms Denise Bautista is a 63 y/o female who is a current smoker with a PMHx of HLD, HTN, AFib (diagnosed recently, not on AC), benzodiazepine/opioid and etoh abuse, and mitral valve prolapse presenting to the ER with shortness of breath, found to have severe TR/MR.  She is now s/p RHC with IABP and swan purnima catheter placement.     ====================== NEUROLOGY=====================   #Substance Use Disorder   #chronic back pain  - A&O x4 on exam  - takes oxycodone 20mg TID for chronic back pain s/t work injury, order as needed  - on home clonazepam 1mg TID  - istop confirmed, printed in chart  - alcohol use disorder as per daughter, continue ciwa assessment  - continue to monitor mental status per protocol    ==================== RESPIRATORY======================   #Acute Hypoxic Respiratory Failure   - requiring 4L nasal cannula s/t pulmonary edema i/s/o severe MR  - wean nasal cannula w/ diuresis  - continue to monitor sp02    ====================CARDIOVASCULAR==================   #Cardiogenic Shock   - i/s/o severe MR and pHTN requiring IABP   - TTE 4/13: severe MR and TR with normal LV size, thickness, and EF of 60-65%.  LA severely dilated.  Moderate tricuspid regurgitation.  Mildly enlarged RV with reduced systolic function, severe pHTN  - s/p RHC w/ PAC and IABP placement  - CVP 10 on admission  - Continue diuresis  - HF consulted, appreciate recs  - Consider nitroprusside  - Continue milrinone  - continue IABP 1:1 w/ heparin gtt  - f/u mv02, lactate and perfusion indices  - wean levo as tolerated for assisted means > 65  - continue strict I&O, daily weights, frequent mv02 trending w/ lactate monitoring  - goal -200cc /hr   - BRUNA   - CT surgery for possible mitral valve repair     #Atrial Fibrillation   - diagnosed as outpatient, not on AC at home  - rates > 100 on admission, continue to monitor w/ weaning levo  - Heparin gtt   - continue to monitor tele, electrolytes  - add digoxin for rhythm control     ==================== GASTROINTESTINAL===================   #Elevated LFTs  - likely i/s/o low flow state  - avoid hepatoxins, continue to trend daily    - DASH diet   - continue bowel regimen      ===================== RENAL =========================   #ISABELA  - sCR 1.66 on admission, unknown baseline  - continue to trend, avoid nephrotoxins  - replete lytes PRN, keep K>4 and Mg>2      ===================HEMATOLOGIC/ONC ===================   - H/H and platelets stable  - continue to trend daily  #DVT: heparin gtt for IABP & a.fib    =======================    ENDOCRINE  =====================   - f.u a1c, lipid panel, tsh  - glucose controlled on cmp, continue to trend    ========================INFECTIOUS DISEASE================   - afebrile, no leukocytosis    - f/u blood cultures x2,   - continue to monitor WBC and fever curve      #full code  Lines: R fem IABP (4/13 -  R fem PAC (4/13 -     ======================= DISPOSITION  =====================  [X] Critical   [ ] Guarded    [ ] Stable    [X] Maintain in CICU  [ ] Downgrade to Telemtry  [ ] Discharge Home Ms Denise Bautista is a 63 y/o female who is a current smoker with a PMHx of HLD, HTN, AFib (diagnosed recently, not on AC), benzodiazepine/opioid and etoh abuse, and mitral valve prolapse presenting to the ER with shortness of breath, found to have severe TR/MR.  She is now s/p RHC with IABP and swan purnima catheter placement.     ====================== NEUROLOGY=====================   #Substance Use Disorder   #chronic back pain  - A&O x4 on exam  - takes oxycodone 20mg TID for chronic back pain s/t work injury, order as needed  - on lorazepam 2mg TID  - istop confirmed, printed in chart  - alcohol use disorder as per daughter, continue ciwa assessment  - continue to monitor mental status per protocol    ==================== RESPIRATORY======================   #Acute Hypoxic Respiratory Failure   - requiring 4L nasal cannula s/t pulmonary edema i/s/o severe MR  - wean nasal cannula w/ diuresis  - continue to monitor sp02    ====================CARDIOVASCULAR==================   #Cardiogenic Shock   - i/s/o severe MR and pHTN requiring IABP   - TTE 4/13: severe MR and TR with normal LV size, thickness, and EF of 60-65%.  LA severely dilated.  Moderate tricuspid regurgitation.  Mildly enlarged RV with reduced systolic function, severe pHTN  - s/p RHC w/ PAC and IABP placement  - CVP 10 on admission  - Continue diuresis  - HF consulted, appreciate recs  - Consider nitroprusside  - Continue milrinone  - continue IABP 1:1 w/ heparin gtt  - f/u mv02, lactate and perfusion indices  - wean levo as tolerated for assisted means > 65  - continue strict I&O, daily weights, frequent mv02 trending w/ lactate monitoring  - goal -200cc /hr   - BRUNA   - CT surgery for possible mitral valve repair     #Atrial Fibrillation   - diagnosed as outpatient, not on AC at home  - rates > 100 on admission, continue to monitor w/ weaning levo  - Heparin gtt   - continue to monitor tele, electrolytes  - add digoxin for rhythm control     ==================== GASTROINTESTINAL===================   #Elevated LFTs  - likely i/s/o low flow state  - avoid hepatoxins, continue to trend daily    - DASH diet   - continue bowel regimen      ===================== RENAL =========================   #ISABELA  - sCR 1.66 on admission, unknown baseline  - continue to trend, avoid nephrotoxins  - replete lytes PRN, keep K>4 and Mg>2      ===================HEMATOLOGIC/ONC ===================   - H/H and platelets stable  - continue to trend daily  #DVT: heparin gtt for IABP & a.fib    =======================    ENDOCRINE  =====================   - f.u a1c, lipid panel, tsh  - glucose controlled on cmp, continue to trend    ========================INFECTIOUS DISEASE================   - afebrile, no leukocytosis    - f/u blood cultures x2,   - continue to monitor WBC and fever curve      #full code  Lines: R fem IABP (4/13 -  R fem PAC (4/13 -     ======================= DISPOSITION  =====================  [X] Critical   [ ] Guarded    [ ] Stable    [X] Maintain in CICU  [ ] Downgrade to Telemtry  [ ] Discharge Home Ms Denise Bautista is a 63 y/o female who is a current smoker with a PMHx of HLD, HTN, AFib (diagnosed recently, not on AC), benzodiazepine/opioid and etoh abuse, and mitral valve prolapse presenting to the ER with shortness of breath, found to have severe TR/MR.  She is now s/p RHC with IABP and swan purnima catheter placement.     ====================== NEUROLOGY=====================   #Substance Use Disorder   #chronic back pain  - A&O x4 on exam  - takes oxycodone 20mg TID for chronic back pain s/t work injury, order as needed  - on lorazepam 2mg TID  - istop confirmed, printed in chart  - alcohol use disorder as per daughter, continue ciwa assessment  - continue to monitor mental status per protocol    ==================== RESPIRATORY======================   #Acute Hypoxic Respiratory Failure   - requiring 4L nasal cannula s/t pulmonary edema i/s/o severe MR  - wean nasal cannula w/ diuresis  - continue to monitor sp02    ====================CARDIOVASCULAR==================   #Cardiogenic Shock   - i/s/o severe MR and pHTN requiring IABP   - TTE 4/13: severe MR and TR with normal LV size, thickness, and EF of 60-65%.  LA severely dilated.  Moderate tricuspid regurgitation.  Mildly enlarged RV with reduced systolic function, severe pHTN  - s/p RHC w/ PAC and IABP placement  - CVP 10 on admission  - Hold diuresis  - HF consulted, appreciate recs  - Discontinue milrinone  - continue IABP 1:1 w/ heparin gtt  - f/u mv02, lactate and perfusion indices  - off norepinephrine  - continue strict I&O, daily weights, frequent mv02 trending w/ lactate monitoring  - Consider intraoperative BRUNA instead of pre-operative BRUNA vs cardioversion  - CT surgery for possible mitral valve repair     #Atrial Fibrillation/Flutter  - diagnosed as outpatient, not on AC at home  - rates > 100 on admission, continue to monitor w/ weaning levo  - Heparin gtt   - continue to monitor tele, electrolytes  - Discussed cardioversion with EP, will need to decide whether patient needs CT surgery intervention prior to determining whether she can be cardioverted    ==================== GASTROINTESTINAL===================   #Elevated LFTs  - likely i/s/o low flow state  - avoid hepatoxins, continue to trend daily    - DASH diet   - continue bowel regimen      ===================== RENAL =========================   #ISABELA  - sCR 1.66 on admission, unknown baseline  - continue to trend, avoid nephrotoxins  - replete lytes PRN, keep K>4 and Mg>2      ===================HEMATOLOGIC/ONC ===================   - H/H and platelets stable  - continue to trend daily  #DVT: heparin gtt for IABP & a.fib    =======================    ENDOCRINE  =====================   - f.u a1c, lipid panel, tsh  - glucose controlled on cmp, continue to trend    ========================INFECTIOUS DISEASE================   - afebrile, no leukocytosis    - f/u blood cultures x2,   - continue to monitor WBC and fever curve      #full code  Lines: R fem IABP (4/13 -  R fem PAC (4/13 -     ======================= DISPOSITION  =====================  [X] Critical   [ ] Guarded    [ ] Stable    [X] Maintain in CICU  [ ] Downgrade to Telemtry  [ ] Discharge Home Ms Denise Bautista is a 63 y/o female who is a current smoker with a PMHx of HLD, HTN, AFib (diagnosed recently, not on AC), benzodiazepine/opioid and etoh abuse, and mitral valve prolapse presenting to the ER with shortness of breath, found to have severe TR/MR.  She is now s/p RHC with IABP and swan purnima catheter placement.     ====================== NEUROLOGY=====================   #Substance Use Disorder   #chronic back pain  - A&O x4 on exam  - takes oxycodone 20mg TID for chronic back pain s/t work injury, order as needed  - on lorazepam 2mg TID  - istop confirmed, printed in chart  - alcohol use disorder as per daughter, continue ciwa assessment  - continue to monitor mental status per protocol    ==================== RESPIRATORY======================   #Acute Hypoxic Respiratory Failure   - requiring 4L nasal cannula s/t pulmonary edema i/s/o severe MR  - wean nasal cannula w/ diuresis  - continue to monitor sp02    ====================CARDIOVASCULAR==================   #Cardiogenic Shock   - i/s/o severe MR and pHTN requiring IABP   - TTE 4/13: severe MR and TR with normal LV size, thickness, and EF of 60-65%.  LA severely dilated.  Moderate tricuspid regurgitation.  Mildly enlarged RV with reduced systolic function, severe pHTN  - s/p RHC w/ PAC and IABP placement  - CVP 10 on admission  - Hold diuresis  - HF consulted, appreciate recs  - Discontinue milrinone  - continue IABP 1:1 w/ heparin gtt  - f/u mv02, lactate and perfusion indices  - off norepinephrine  - continue strict I&O, daily weights, frequent mv02 trending w/ lactate monitoring  - Will hold on pre-operative TTE  - CT surgery for possible mitral valve repair     #Atrial Fibrillation/Flutter  - diagnosed as outpatient, not on AC at home  - rates > 100 on admission, continue to monitor w/ weaning levo  - Heparin gtt   - continue to monitor tele, electrolytes  - Discussed cardioversion with EP, will need to decide whether patient needs CT surgery intervention prior to determining whether she can be cardioverted    ==================== GASTROINTESTINAL===================   #Elevated LFTs  - likely i/s/o low flow state  - avoid hepatoxins, continue to trend daily    - DASH diet   - continue bowel regimen      ===================== RENAL =========================   #ISABELA  - sCR 1.66 on admission, unknown baseline  - continue to trend, avoid nephrotoxins  - replete lytes PRN, keep K>4 and Mg>2      ===================HEMATOLOGIC/ONC ===================   - H/H and platelets stable  - continue to trend daily  #DVT: heparin gtt for IABP & a.fib    =======================    ENDOCRINE  =====================   - f.u a1c, lipid panel, tsh  - glucose controlled on cmp, continue to trend    ========================INFECTIOUS DISEASE================   - afebrile, no leukocytosis    - f/u blood cultures x2,   - continue to monitor WBC and fever curve      #full code  Lines: R fem IABP (4/13 -  R fem PAC (4/13 -     ======================= DISPOSITION  =====================  [X] Critical   [ ] Guarded    [ ] Stable    [X] Maintain in CICU  [ ] Downgrade to Telemtry  [ ] Discharge Home Ms Denise Bautista is a 63 y/o female who is a current smoker with a PMHx of HLD, HTN, AFib (diagnosed recently, not on AC), benzodiazepine/opioid and etoh abuse, and mitral valve prolapse presenting to the ER with shortness of breath, found to have severe TR/MR.  She is now s/p RHC with IABP and swan purnima catheter placement.     ====================== NEUROLOGY=====================   #Substance Use Disorder   #chronic back pain  - A&O x4 on exam  - takes oxycodone 20mg TID for chronic back pain s/t work injury, order as needed  - on lorazepam 2mg TID (on clonazepam at home)  - istop confirmed, printed in chart  - alcohol use disorder as per daughter, continue ciwa assessment  - continue to monitor mental status per protocol    ==================== RESPIRATORY======================   #Acute Hypoxic Respiratory Failure   - requiring 4L nasal cannula s/t pulmonary edema i/s/o severe MR  - wean nasal cannula w/ diuresis  - continue to monitor sp02    ====================CARDIOVASCULAR==================   #Cardiogenic Shock   - i/s/o severe MR and pHTN requiring IABP   - TTE 4/13: severe MR and TR with normal LV size, thickness, and EF of 60-65%.  LA severely dilated.  Moderate tricuspid regurgitation.  Mildly enlarged RV with reduced systolic function, severe pHTN  - s/p RHC w/ PAC and IABP placement  - CVP 10 on admission  - Hold diuresis  - HF consulted, appreciate recs  - Discontinue milrinone  - continue IABP 1:1 w/ heparin gtt  - f/u mv02, lactate and perfusion indices  - off norepinephrine  - continue strict I&O, daily weights, frequent mv02 trending w/ lactate monitoring  - Will hold on pre-operative TTE  - CT surgery for possible mitral valve repair     #Atrial Fibrillation/Flutter  - diagnosed as outpatient, not on AC at home  - rates > 100 on admission, continue to monitor w/ weaning levo  - Heparin gtt   - continue to monitor tele, electrolytes  - Discussed cardioversion with EP, will need to decide whether patient needs CT surgery intervention prior to determining whether she can be cardioverted    ==================== GASTROINTESTINAL===================   #Elevated LFTs  - likely i/s/o low flow state  - avoid hepatotoxins, continue to trend daily    - DASH diet   - continue bowel regimen      ===================== RENAL =========================   #ISABELA  - sCR 1.66 on admission, unknown baseline  - continue to trend, avoid nephrotoxins  - replete lytes PRN, keep K>4 and Mg>2      ===================HEMATOLOGIC/ONC ===================   - H/H and platelets stable  - continue to trend daily  #DVT: heparin gtt for IABP & a.fib    =======================    ENDOCRINE  =====================   - f.u a1c, lipid panel, tsh  - glucose controlled on cmp, continue to trend    ========================INFECTIOUS DISEASE================   - afebrile, no leukocytosis    - f/u blood cultures x2,   - continue to monitor WBC and fever curve      #full code  Lines: R fem IABP (4/13 -  R fem PAC (4/13 -     ======================= DISPOSITION  =====================  [X] Critical   [ ] Guarded    [ ] Stable    [X] Maintain in CICU  [ ] Downgrade to Telemtry  [ ] Discharge Home

## 2024-04-15 NOTE — PROGRESS NOTE ADULT - SUBJECTIVE AND OBJECTIVE BOX
Chidi Tyson, PGY-1  Internal Medicine   [employer]    HERBER IRAHETA  MRN-[id number]  Patient is a 64y old  Female who presents with a chief complaint of Cardiogenic shock (2024 19:15)      INTERVAL HISTORY/OVERNIGHT EVENTS:      REVIEW OF SYSTEMS:    CONSTITUTIONAL: No weakness, fevers or chills  EYES/ENT: No visual changes;  No vertigo or throat pain   NECK: No pain or stiffness  RESPIRATORY: No cough, wheezing, hemoptysis; No shortness of breath  CARDIOVASCULAR: No chest pain or palpitations  GASTROINTESTINAL: No abdominal or epigastric pain. No nausea, vomiting, or hematemesis; No diarrhea or constipation. No melena or hematochezia.  GENITOURINARY: No dysuria, frequency or hematuria  NEUROLOGICAL: No numbness or weakness  SKIN: No itching, rashes      MEDICATIONS:  MEDICATIONS  (STANDING):  chlorhexidine 2% Cloths 1 Application(s) Topical daily  heparin  Infusion 1400 Unit(s)/Hr (16 mL/Hr) IV Continuous <Continuous>  LORazepam     Tablet 2 milliGRAM(s) Oral three times a day  milrinone Infusion 0.125 MICROgram(s)/kG/Min (3.33 mL/Hr) IV Continuous <Continuous>  nicotine - 21 mG/24Hr(s) Patch 1 Patch Transdermal daily    MEDICATIONS  (PRN):  oxyCODONE    IR 20 milliGRAM(s) Oral every 6 hours PRN Severe Pain (7 - 10)      ALLERGIES:  Allergies    No Known Allergies    Intolerances        OBJECTIVE:  ICU Vital Signs Last 24 Hrs  T(C): 37.4 (15 Apr 2024 03:00), Max: 37.5 (2024 23:00)  T(F): 99.3 (15 Apr 2024 03:00), Max: 99.5 (2024 23:00)  HR: 93 (15 Apr 2024 06:00) (88 - 127)  BP: 85/61 (2024 23:00) (85/61 - 120/91)  BP(mean): 69 (2024 23:00) (68 - 102)  ABP: --  ABP(mean): --  RR: 17 (15 Apr 2024 06:00) (12 - 26)  SpO2: 93% (15 Apr 2024 06:00) (91% - 100%)    O2 Parameters below as of 15 Apr 2024 06:00  Patient On (Oxygen Delivery Method): nasal cannula  O2 Flow (L/min): 2          Adult Advanced Hemodynamics Last 24 Hrs  CVP(mm Hg): 0 (15 Apr 2024 06:00) (-2 - 13)  CVP(cm H2O): --  CO: --  CI: --  PA: 23/7 (15 Apr 2024 06:00) (21/6 - 50/36)  PA(mean): 15 (15 Apr 2024 06:00) (13 - 42)  PCWP: --  SVR: --  SVRI: --  PVR: --  PVRI: --  CAPILLARY BLOOD GLUCOSE        Daily     Daily Weight in k.8 (15 Apr 2024 05:00)    PHYSICAL EXAM:  Constitutional: in no acute distress  HEENT: PERRLA, EOMI, no drainage or redness  Neck: supple  Respiratory: Breath Sounds equal & clear bilaterally to auscultation, no rales/rhonchi/wheezing, no accessory muscle use noted  Cardiovascular: Regular rate, irregular rhythm, normal S1, S2  Gastrointestinal: Soft, non-tender, non distended, + bowel sounds  Extremities: VELIZ x 4, no peripheral edema, no cyanosis, no clubbing. + peripheral pulses.   Neurological: A+O x 3; speech clear and intact; no sensory, motor  deficits, normal reflexes. Nonfocal.   Skin: cool, dry  Lines: R fem IABP and swan c/d/i    ============================I/O===========================   I&O's Detail    2024 07:01  -  15 Apr 2024 07:00  --------------------------------------------------------  IN:    Bumetanide: 190 mL    Heparin: 384 mL    IV PiggyBack: 250 mL    Milrinone: 79.2 mL    Norepinephrine: 5.1 mL    Oral Fluid: 960 mL  Total IN: 1868.3 mL    OUT:    Voided (mL): 3100 mL  Total OUT: 3100 mL    Total NET: -1231.7 mL        ============================ LABS =========================                        11.4   10.21 )-----------( 242      ( 15 Apr 2024 01:06 )             34.7     04-15    136  |  96  |  32<H>  ----------------------------<  126<H>  3.8   |  26  |  1.01    Ca    9.8      15 Apr 2024 01:06  Phos  2.8     04-15  Mg     2.1     04-15    TPro  7.6  /  Alb  3.9  /  TBili  0.5  /  DBili  x   /  AST  69<H>  /  ALT  117<H>  /  AlkPhos  157<H>  04-15    LIVER FUNCTIONS - ( 15 Apr 2024 01:06 )  Alb: 3.9 g/dL / Pro: 7.6 g/dL / ALK PHOS: 157 U/L / ALT: 117 U/L / AST: 69 U/L / GGT: x           Troponin T, High Sensitivity Result: 17 ng/L (24 @ 16:14)    CKMB Units: 3.7 ng/mL (24 @ 16:14)    Creatine Kinase, Serum: 99 U/L (24 @ 16:14)          Blood Gas Venous - Lactate: 1.0 mmol/L (04-15-24 @ 01:04)  Blood Gas Venous - Lactate: 0.7 mmol/L (24 @ 16:53)  Blood Gas Venous - Lactate: 1.0 mmol/L (24 @ 06:12)  Blood Gas Venous - Lactate: 0.7 mmol/L (24 @ 01:40)  Blood Gas Venous - Lactate: 1.4 mmol/L (24 @ 22:55)  Blood Gas Venous - Lactate: 0.7 mmol/L (24 @ 20:33)  Blood Gas Arterial, Lactate: 1.0 mmol/L (24 @ 17:42)  Blood Gas Venous - Lactate: 0.7 mmol/L (24 @ 17:16)  Blood Gas Venous - Lactate: 2.6 mmol/L (24 @ 16:01)    Urinalysis Basic - ( 15 Apr 2024 01:06 )    Color: x / Appearance: x / SG: x / pH: x  Gluc: 126 mg/dL / Ketone: x  / Bili: x / Urobili: x   Blood: x / Protein: x / Nitrite: x   Leuk Esterase: x / RBC: x / WBC x   Sq Epi: x / Non Sq Epi: x / Bacteria: x      ABG - ( 2024 17:42 )  pH, Arterial: 7.36  pH, Blood: x     /  pCO2: 36    /  pO2: 87    / HCO3: 20    / Base Excess: -4.6  /  SaO2: 97.8              PT/INR - ( 15 Apr 2024 01:06 )   PT: 12.3 sec;   INR: 1.18 ratio         PTT - ( 15 Apr 2024 01:06 )  PTT:67.1 sec    ======================Micro/Rad/Cardio=================  Telemetry: Reviewed   EKG: Reviewed  CXR: Reviewed  Culture: Reviewed   Echo:   Cath:   ======================================================           Chidi Tyson, PGY-1  Internal Medicine   [employer]    HERBER IRAHETA  MRN-[id number]  Patient is a 64y old  Female who presents with a chief complaint of Cardiogenic shock (2024 19:15)      INTERVAL HISTORY/OVERNIGHT EVENTS:  On milrinone 0.125.  Feels well, mildly SOB ORA (saturating 90-92%).  Denies CP, palpitations, n/v/c/d.  Tolerating diet.      REVIEW OF SYSTEMS:  As above    MEDICATIONS:  MEDICATIONS  (STANDING):  chlorhexidine 2% Cloths 1 Application(s) Topical daily  heparin  Infusion 1400 Unit(s)/Hr (16 mL/Hr) IV Continuous <Continuous>  LORazepam     Tablet 2 milliGRAM(s) Oral three times a day  milrinone Infusion 0.125 MICROgram(s)/kG/Min (3.33 mL/Hr) IV Continuous <Continuous>  nicotine - 21 mG/24Hr(s) Patch 1 Patch Transdermal daily    MEDICATIONS  (PRN):  oxyCODONE    IR 20 milliGRAM(s) Oral every 6 hours PRN Severe Pain (7 - 10)      ALLERGIES:  Allergies    No Known Allergies    Intolerances        OBJECTIVE:  ICU Vital Signs Last 24 Hrs  T(C): 37.4 (15 Apr 2024 03:00), Max: 37.5 (2024 23:00)  T(F): 99.3 (15 Apr 2024 03:00), Max: 99.5 (2024 23:00)  HR: 93 (15 Apr 2024 06:00) (88 - 127)  BP: 85/61 (2024 23:00) (85/61 - 120/91)  BP(mean): 69 (2024 23:00) (68 - 102)  ABP: --  ABP(mean): --  RR: 17 (15 Apr 2024 06:00) (12 - 26)  SpO2: 93% (15 Apr 2024 06:00) (91% - 100%)    O2 Parameters below as of 15 Apr 2024 06:00  Patient On (Oxygen Delivery Method): nasal cannula  O2 Flow (L/min): 2          Adult Advanced Hemodynamics Last 24 Hrs  CVP(mm Hg): 0 (15 Apr 2024 06:00) (-2 - 13)  CVP(cm H2O): --  CO: --  CI: --  PA: 23/7 (15 Apr 2024 06:00) (21/6 - 50/36)  PA(mean): 15 (15 Apr 2024 06:00) (13 - 42)  PCWP: --  SVR: --  SVRI: --  PVR: --  PVRI: --  CAPILLARY BLOOD GLUCOSE        Daily     Daily Weight in k.8 (15 Apr 2024 05:00)    PHYSICAL EXAM:  Constitutional: in no acute distress  HEENT: PERRLA, EOMI, no drainage or redness  Neck: supple  Respiratory: Breath Sounds equal & clear bilaterally to auscultation, no rales/rhonchi/wheezing, no accessory muscle use noted  Cardiovascular: Irregular rate and rhythm, normal S1, S2  Gastrointestinal: Soft, non-tender, non distended, + bowel sounds  Extremities: VELIZ x 4, no peripheral edema, no cyanosis, no clubbing. + peripheral pulses.   Neurological: A+O x 3; speech clear and intact; no motor deficits Nonfocal.   Skin: cool, dry  Lines: R fem IABP and swan c/d/i    ============================I/O===========================   I&O's Detail    2024 07:01  -  15 Apr 2024 07:00  --------------------------------------------------------  IN:    Bumetanide: 190 mL    Heparin: 384 mL    IV PiggyBack: 250 mL    Milrinone: 79.2 mL    Norepinephrine: 5.1 mL    Oral Fluid: 960 mL  Total IN: 1868.3 mL    OUT:    Voided (mL): 3100 mL  Total OUT: 3100 mL    Total NET: -1231.7 mL        ============================ LABS =========================                        11.4   10.21 )-----------( 242      ( 15 Apr 2024 01:06 )             34.7     04-15    136  |  96  |  32<H>  ----------------------------<  126<H>  3.8   |  26  |  1.01    Ca    9.8      15 Apr 2024 01:06  Phos  2.8     04-15  Mg     2.1     04-15    TPro  7.6  /  Alb  3.9  /  TBili  0.5  /  DBili  x   /  AST  69<H>  /  ALT  117<H>  /  AlkPhos  157<H>  04-15    LIVER FUNCTIONS - ( 15 Apr 2024 01:06 )  Alb: 3.9 g/dL / Pro: 7.6 g/dL / ALK PHOS: 157 U/L / ALT: 117 U/L / AST: 69 U/L / GGT: x           Troponin T, High Sensitivity Result: 17 ng/L (24 @ 16:14)    CKMB Units: 3.7 ng/mL (24 @ 16:14)    Creatine Kinase, Serum: 99 U/L (24 @ 16:14)          Blood Gas Venous - Lactate: 1.0 mmol/L (04-15-24 @ 01:04)  Blood Gas Venous - Lactate: 0.7 mmol/L (24 @ 16:53)  Blood Gas Venous - Lactate: 1.0 mmol/L (24 @ 06:12)  Blood Gas Venous - Lactate: 0.7 mmol/L (24 @ 01:40)  Blood Gas Venous - Lactate: 1.4 mmol/L (24 @ 22:55)  Blood Gas Venous - Lactate: 0.7 mmol/L (24 @ 20:33)  Blood Gas Arterial, Lactate: 1.0 mmol/L (24 @ 17:42)  Blood Gas Venous - Lactate: 0.7 mmol/L (24 @ 17:16)  Blood Gas Venous - Lactate: 2.6 mmol/L (24 @ 16:01)    Urinalysis Basic - ( 15 Apr 2024 01:06 )    Color: x / Appearance: x / SG: x / pH: x  Gluc: 126 mg/dL / Ketone: x  / Bili: x / Urobili: x   Blood: x / Protein: x / Nitrite: x   Leuk Esterase: x / RBC: x / WBC x   Sq Epi: x / Non Sq Epi: x / Bacteria: x      ABG - ( 2024 17:42 )  pH, Arterial: 7.36  pH, Blood: x     /  pCO2: 36    /  pO2: 87    / HCO3: 20    / Base Excess: -4.6  /  SaO2: 97.8              PT/INR - ( 15 Apr 2024 01:06 )   PT: 12.3 sec;   INR: 1.18 ratio         PTT - ( 15 Apr 2024 01:06 )  PTT:67.1 sec    ======================Micro/Rad/Cardio=================  Telemetry: Reviewed   EKG: Reviewed  CXR: Reviewed  Culture: Reviewed   Echo:   Cath:   ======================================================           Chidi Tyson, PGY-1  Internal Medicine   [employer]    DENISE IRAHETA  MRN-[id number]  Patient is a 64y old  Female who presents with a chief complaint of Cardiogenic shock (2024 19:15)    HPI: Ms Denise Iraheta is a 65 y/o female who is a current smoker with a PMHx of HLD, HTN, AFib (diagnosed recently, not on AC), benzodiazepine/opioid and etoh abuse, and mitral valve prolapse presenting to the ER with shortness of breath. Pt reports developing shortness of breath, fatigue, and a sense of feeling unwell for the last week, with milder symptoms present longer. Her daughter at bedside reports pt is lethargic, not herself and was having difficulty breathing at home.      Upon arrival to the ED she was pale and ill appearing. Was found to be hypoxic to 77% on room air requiring 3L nasal cannula as well as hypotensive to the 70s requiring levophed. On pocus she was found to have a small pericardial effusion, severe MR w/ plethoric IVC and bilateral B lines. Cardiology was consulted for concern of cardiogenic shock. She is now s/p RHC with IABP and swan purnima catheter placement.     On admission to CICU she is A&O x4, saturating well on 4L nasal cannula, IABP 1:1 augmenting 100 on .1mcg/kg/min of norepinephrine     INTERVAL HISTORY/OVERNIGHT EVENTS:  On milrinone 0.125.  Developed leg cramps overnight so Bumex gtt was discontinued.  Otherwise feels well, mildly SOB ORA (saturating 90-92%).  Denies CP, palpitations, n/v/c/d.  Tolerating diet.      REVIEW OF SYSTEMS:  As above    MEDICATIONS:  MEDICATIONS  (STANDING):  chlorhexidine 2% Cloths 1 Application(s) Topical daily  heparin  Infusion 1400 Unit(s)/Hr (16 mL/Hr) IV Continuous <Continuous>  LORazepam     Tablet 2 milliGRAM(s) Oral three times a day  milrinone Infusion 0.125 MICROgram(s)/kG/Min (3.33 mL/Hr) IV Continuous <Continuous>  nicotine - 21 mG/24Hr(s) Patch 1 Patch Transdermal daily    MEDICATIONS  (PRN):  oxyCODONE    IR 20 milliGRAM(s) Oral every 6 hours PRN Severe Pain (7 - 10)      ALLERGIES:  Allergies    No Known Allergies    Intolerances        OBJECTIVE:  ICU Vital Signs Last 24 Hrs  T(C): 37.4 (15 Apr 2024 03:00), Max: 37.5 (2024 23:00)  T(F): 99.3 (15 Apr 2024 03:00), Max: 99.5 (2024 23:00)  HR: 93 (15 Apr 2024 06:00) (88 - 127)  BP: 85/61 (2024 23:00) (85/61 - 120/91)  BP(mean): 69 (2024 23:) (68 - 102)  ABP: --  ABP(mean): --  RR: 17 (15 Apr 2024 06:00) (12 - 26)  SpO2: 93% (15 Apr 2024 06:) (91% - 100%)    O2 Parameters below as of 15 Apr 2024 06:00  Patient On (Oxygen Delivery Method): nasal cannula  O2 Flow (L/min): 2          Adult Advanced Hemodynamics Last 24 Hrs  CVP(mm Hg): 0 (15 Apr 2024 06:00) (-2 - 13)  CVP(cm H2O): --  CO: --  CI: --  PA: 23/7 (15 Apr 2024 06:00) (21/6 - 50/36)  PA(mean): 15 (15 Apr 2024 06:00) (13 - 42)  PCWP: --  SVR: --  SVRI: --  PVR: --  PVRI: --  CAPILLARY BLOOD GLUCOSE        Daily     Daily Weight in k.8 (15 Apr 2024 05:00)    PHYSICAL EXAM:  Constitutional: in no acute distress  HEENT: PERRLA, EOMI, no drainage or redness  Neck: supple  Respiratory: Breath Sounds equal & clear bilaterally to auscultation, no rales/rhonchi/wheezing, no accessory muscle use noted  Cardiovascular: Irregular rate and rhythm, normal S1, S2  Gastrointestinal: Soft, non-tender, non distended, + bowel sounds  Extremities: VELIZ x 4, no peripheral edema, no cyanosis, no clubbing. + peripheral pulses.   Neurological: A+O x 3; speech clear and intact; no motor deficits Nonfocal.   Skin: cool, dry  Lines: R fem IABP and swan c/d/i    ============================I/O===========================   I&O's Detail    2024 07:01  -  15 Apr 2024 07:00  --------------------------------------------------------  IN:    Bumetanide: 190 mL    Heparin: 384 mL    IV PiggyBack: 250 mL    Milrinone: 79.2 mL    Norepinephrine: 5.1 mL    Oral Fluid: 960 mL  Total IN: 1868.3 mL    OUT:    Voided (mL): 3100 mL  Total OUT: 3100 mL    Total NET: -1231.7 mL        ============================ LABS =========================                        11.4   10.21 )-----------( 242      ( 15 Apr 2024 01:06 )             34.7     04-15    136  |  96  |  32<H>  ----------------------------<  126<H>  3.8   |  26  |  1.01    Ca    9.8      15 Apr 2024 01:06  Phos  2.8     04-15  Mg     2.1     04-15    TPro  7.6  /  Alb  3.9  /  TBili  0.5  /  DBili  x   /  AST  69<H>  /  ALT  117<H>  /  AlkPhos  157<H>  04-15    LIVER FUNCTIONS - ( 15 Apr 2024 01:06 )  Alb: 3.9 g/dL / Pro: 7.6 g/dL / ALK PHOS: 157 U/L / ALT: 117 U/L / AST: 69 U/L / GGT: x           Troponin T, High Sensitivity Result: 17 ng/L (24 @ 16:14)    CKMB Units: 3.7 ng/mL (24 @ 16:14)    Creatine Kinase, Serum: 99 U/L (24 @ 16:14)          Blood Gas Venous - Lactate: 1.0 mmol/L (04-15-24 @ 01:04)  Blood Gas Venous - Lactate: 0.7 mmol/L (24 @ 16:53)  Blood Gas Venous - Lactate: 1.0 mmol/L (24 @ 06:12)  Blood Gas Venous - Lactate: 0.7 mmol/L (24 @ 01:40)  Blood Gas Venous - Lactate: 1.4 mmol/L (24 @ 22:55)  Blood Gas Venous - Lactate: 0.7 mmol/L (24 @ 20:33)  Blood Gas Arterial, Lactate: 1.0 mmol/L (24 @ 17:42)  Blood Gas Venous - Lactate: 0.7 mmol/L (24 @ 17:16)  Blood Gas Venous - Lactate: 2.6 mmol/L (24 @ 16:01)    Urinalysis Basic - ( 15 Apr 2024 01:06 )    Color: x / Appearance: x / SG: x / pH: x  Gluc: 126 mg/dL / Ketone: x  / Bili: x / Urobili: x   Blood: x / Protein: x / Nitrite: x   Leuk Esterase: x / RBC: x / WBC x   Sq Epi: x / Non Sq Epi: x / Bacteria: x      ABG - ( 2024 17:42 )  pH, Arterial: 7.36  pH, Blood: x     /  pCO2: 36    /  pO2: 87    / HCO3: 20    / Base Excess: -4.6  /  SaO2: 97.8              PT/INR - ( 15 Apr 2024 01:06 )   PT: 12.3 sec;   INR: 1.18 ratio         PTT - ( 15 Apr 2024 01:06 )  PTT:67.1 sec    ======================Micro/Rad/Cardio=================  Telemetry: Reviewed   EKG: Reviewed  CXR: Reviewed  Culture: Reviewed   Echo:   Cath:   ======================================================

## 2024-04-16 ENCOUNTER — APPOINTMENT (OUTPATIENT)
Dept: CARDIOTHORACIC SURGERY | Facility: HOSPITAL | Age: 64
End: 2024-04-16

## 2024-04-16 ENCOUNTER — RESULT REVIEW (OUTPATIENT)
Age: 64
End: 2024-04-16

## 2024-04-16 LAB
ALBUMIN SERPL ELPH-MCNC: 3 G/DL — LOW (ref 3.3–5)
ALBUMIN SERPL ELPH-MCNC: 3.8 G/DL — SIGNIFICANT CHANGE UP (ref 3.3–5)
ALBUMIN SERPL ELPH-MCNC: 3.9 G/DL — SIGNIFICANT CHANGE UP (ref 3.3–5)
ALP SERPL-CCNC: 148 U/L — HIGH (ref 40–120)
ALP SERPL-CCNC: 149 U/L — HIGH (ref 40–120)
ALP SERPL-CCNC: 99 U/L — SIGNIFICANT CHANGE UP (ref 40–120)
ALT FLD-CCNC: 50 U/L — HIGH (ref 10–45)
ALT FLD-CCNC: 87 U/L — HIGH (ref 10–45)
ALT FLD-CCNC: 89 U/L — HIGH (ref 10–45)
ANION GAP SERPL CALC-SCNC: 11 MMOL/L — SIGNIFICANT CHANGE UP (ref 5–17)
ANION GAP SERPL CALC-SCNC: 14 MMOL/L — SIGNIFICANT CHANGE UP (ref 5–17)
ANION GAP SERPL CALC-SCNC: 17 MMOL/L — SIGNIFICANT CHANGE UP (ref 5–17)
APTT BLD: 112.2 SEC — HIGH (ref 24.5–35.6)
APTT BLD: 62.1 SEC — HIGH (ref 24.5–35.6)
AST SERPL-CCNC: 103 U/L — HIGH (ref 10–40)
AST SERPL-CCNC: 40 U/L — SIGNIFICANT CHANGE UP (ref 10–40)
AST SERPL-CCNC: 41 U/L — HIGH (ref 10–40)
BASE EXCESS BLDMV CALC-SCNC: -0.9 MMOL/L — SIGNIFICANT CHANGE UP (ref -3–3)
BASE EXCESS BLDMV CALC-SCNC: -1.2 MMOL/L — SIGNIFICANT CHANGE UP (ref -3–3)
BASE EXCESS BLDV CALC-SCNC: -2.6 MMOL/L — LOW (ref -2–3)
BASE EXCESS BLDV CALC-SCNC: 0.2 MMOL/L — SIGNIFICANT CHANGE UP (ref -2–3)
BASE EXCESS BLDV CALC-SCNC: 2.5 MMOL/L — SIGNIFICANT CHANGE UP (ref -2–3)
BASE EXCESS BLDV CALC-SCNC: 4 MMOL/L — HIGH (ref -2–3)
BASOPHILS # BLD AUTO: 0.02 K/UL — SIGNIFICANT CHANGE UP (ref 0–0.2)
BASOPHILS # BLD AUTO: 0.04 K/UL — SIGNIFICANT CHANGE UP (ref 0–0.2)
BASOPHILS NFR BLD AUTO: 0.2 % — SIGNIFICANT CHANGE UP (ref 0–2)
BASOPHILS NFR BLD AUTO: 0.2 % — SIGNIFICANT CHANGE UP (ref 0–2)
BILIRUB SERPL-MCNC: 0.5 MG/DL — SIGNIFICANT CHANGE UP (ref 0.2–1.2)
BILIRUB SERPL-MCNC: 0.6 MG/DL — SIGNIFICANT CHANGE UP (ref 0.2–1.2)
BILIRUB SERPL-MCNC: 0.9 MG/DL — SIGNIFICANT CHANGE UP (ref 0.2–1.2)
BLD GP AB SCN SERPL QL: NEGATIVE — SIGNIFICANT CHANGE UP
BUN SERPL-MCNC: 20 MG/DL — SIGNIFICANT CHANGE UP (ref 7–23)
BUN SERPL-MCNC: 26 MG/DL — HIGH (ref 7–23)
BUN SERPL-MCNC: 27 MG/DL — HIGH (ref 7–23)
CA-I SERPL-SCNC: 0.86 MMOL/L — LOW (ref 1.15–1.33)
CA-I SERPL-SCNC: 1.02 MMOL/L — LOW (ref 1.15–1.33)
CA-I SERPL-SCNC: 1.07 MMOL/L — LOW (ref 1.15–1.33)
CA-I SERPL-SCNC: 1.18 MMOL/L — SIGNIFICANT CHANGE UP (ref 1.15–1.33)
CALCIUM SERPL-MCNC: 9 MG/DL — SIGNIFICANT CHANGE UP (ref 8.4–10.5)
CALCIUM SERPL-MCNC: 9.3 MG/DL — SIGNIFICANT CHANGE UP (ref 8.4–10.5)
CALCIUM SERPL-MCNC: 9.5 MG/DL — SIGNIFICANT CHANGE UP (ref 8.4–10.5)
CHLORIDE BLDV-SCNC: 100 MMOL/L — SIGNIFICANT CHANGE UP (ref 96–108)
CHLORIDE BLDV-SCNC: 100 MMOL/L — SIGNIFICANT CHANGE UP (ref 96–108)
CHLORIDE BLDV-SCNC: 104 MMOL/L — SIGNIFICANT CHANGE UP (ref 96–108)
CHLORIDE BLDV-SCNC: 99 MMOL/L — SIGNIFICANT CHANGE UP (ref 96–108)
CHLORIDE SERPL-SCNC: 103 MMOL/L — SIGNIFICANT CHANGE UP (ref 96–108)
CHLORIDE SERPL-SCNC: 91 MMOL/L — LOW (ref 96–108)
CHLORIDE SERPL-SCNC: 95 MMOL/L — LOW (ref 96–108)
CK MB BLD-MCNC: 14.6 % — HIGH (ref 0–3.5)
CK MB CFR SERPL CALC: 123.7 NG/ML — HIGH (ref 0–3.8)
CK SERPL-CCNC: 850 U/L — HIGH (ref 25–170)
CO2 BLDMV-SCNC: 27 MMOL/L — SIGNIFICANT CHANGE UP (ref 21–29)
CO2 BLDMV-SCNC: 27 MMOL/L — SIGNIFICANT CHANGE UP (ref 21–29)
CO2 BLDV-SCNC: 22 MMOL/L — SIGNIFICANT CHANGE UP (ref 22–26)
CO2 BLDV-SCNC: 27 MMOL/L — HIGH (ref 22–26)
CO2 BLDV-SCNC: 28 MMOL/L — HIGH (ref 22–26)
CO2 BLDV-SCNC: 28 MMOL/L — HIGH (ref 22–26)
CO2 SERPL-SCNC: 23 MMOL/L — SIGNIFICANT CHANGE UP (ref 22–31)
CO2 SERPL-SCNC: 26 MMOL/L — SIGNIFICANT CHANGE UP (ref 22–31)
CO2 SERPL-SCNC: 28 MMOL/L — SIGNIFICANT CHANGE UP (ref 22–31)
CREAT SERPL-MCNC: 0.71 MG/DL — SIGNIFICANT CHANGE UP (ref 0.5–1.3)
CREAT SERPL-MCNC: 0.83 MG/DL — SIGNIFICANT CHANGE UP (ref 0.5–1.3)
CREAT SERPL-MCNC: 0.85 MG/DL — SIGNIFICANT CHANGE UP (ref 0.5–1.3)
EGFR: 76 ML/MIN/1.73M2 — SIGNIFICANT CHANGE UP
EGFR: 79 ML/MIN/1.73M2 — SIGNIFICANT CHANGE UP
EGFR: 95 ML/MIN/1.73M2 — SIGNIFICANT CHANGE UP
EOSINOPHIL # BLD AUTO: 0.11 K/UL — SIGNIFICANT CHANGE UP (ref 0–0.5)
EOSINOPHIL # BLD AUTO: 0.16 K/UL — SIGNIFICANT CHANGE UP (ref 0–0.5)
EOSINOPHIL NFR BLD AUTO: 0.9 % — SIGNIFICANT CHANGE UP (ref 0–6)
EOSINOPHIL NFR BLD AUTO: 1.3 % — SIGNIFICANT CHANGE UP (ref 0–6)
GAS PNL BLDA: SIGNIFICANT CHANGE UP
GAS PNL BLDMV: SIGNIFICANT CHANGE UP
GAS PNL BLDMV: SIGNIFICANT CHANGE UP
GAS PNL BLDV: 131 MMOL/L — LOW (ref 136–145)
GAS PNL BLDV: 132 MMOL/L — LOW (ref 136–145)
GAS PNL BLDV: 133 MMOL/L — LOW (ref 136–145)
GAS PNL BLDV: 133 MMOL/L — LOW (ref 136–145)
GAS PNL BLDV: SIGNIFICANT CHANGE UP
GLUCOSE BLDV-MCNC: 114 MG/DL — HIGH (ref 70–99)
GLUCOSE BLDV-MCNC: 120 MG/DL — HIGH (ref 70–99)
GLUCOSE BLDV-MCNC: 125 MG/DL — HIGH (ref 70–99)
GLUCOSE BLDV-MCNC: 99 MG/DL — SIGNIFICANT CHANGE UP (ref 70–99)
GLUCOSE SERPL-MCNC: 137 MG/DL — HIGH (ref 70–99)
GLUCOSE SERPL-MCNC: 159 MG/DL — HIGH (ref 70–99)
GLUCOSE SERPL-MCNC: 292 MG/DL — HIGH (ref 70–99)
HCO3 BLDMV-SCNC: 26 MMOL/L — SIGNIFICANT CHANGE UP (ref 20–28)
HCO3 BLDMV-SCNC: 26 MMOL/L — SIGNIFICANT CHANGE UP (ref 20–28)
HCO3 BLDV-SCNC: 21 MMOL/L — LOW (ref 22–29)
HCO3 BLDV-SCNC: 26 MMOL/L — SIGNIFICANT CHANGE UP (ref 22–29)
HCO3 BLDV-SCNC: 26 MMOL/L — SIGNIFICANT CHANGE UP (ref 22–29)
HCO3 BLDV-SCNC: 27 MMOL/L — SIGNIFICANT CHANGE UP (ref 22–29)
HCT VFR BLD CALC: 34 % — LOW (ref 34.5–45)
HCT VFR BLD CALC: 36.4 % — SIGNIFICANT CHANGE UP (ref 34.5–45)
HCT VFR BLDA CALC: 26 % — LOW (ref 34.5–46.5)
HCT VFR BLDA CALC: 26 % — LOW (ref 34.5–46.5)
HCT VFR BLDA CALC: 28 % — LOW (ref 34.5–46.5)
HCT VFR BLDA CALC: 30 % — LOW (ref 34.5–46.5)
HEPARINASE TEG R TIME: 5.2 MIN — SIGNIFICANT CHANGE UP (ref 4.3–8.3)
HEPARINASE TEG R TIME: 5.8 MIN — SIGNIFICANT CHANGE UP (ref 4.3–8.3)
HGB BLD CALC-MCNC: 10.1 G/DL — LOW (ref 11.7–16.1)
HGB BLD CALC-MCNC: 8.5 G/DL — LOW (ref 11.7–16.1)
HGB BLD CALC-MCNC: 8.5 G/DL — LOW (ref 11.7–16.1)
HGB BLD CALC-MCNC: 9.2 G/DL — LOW (ref 11.7–16.1)
HGB BLD-MCNC: 11 G/DL — LOW (ref 11.5–15.5)
HGB BLD-MCNC: 12.1 G/DL — SIGNIFICANT CHANGE UP (ref 11.5–15.5)
HOROWITZ INDEX BLDMV+IHG-RTO: 100 — SIGNIFICANT CHANGE UP
HOROWITZ INDEX BLDMV+IHG-RTO: 50 — SIGNIFICANT CHANGE UP
IMM GRANULOCYTES NFR BLD AUTO: 0.2 % — SIGNIFICANT CHANGE UP (ref 0–0.9)
IMM GRANULOCYTES NFR BLD AUTO: 1.2 % — HIGH (ref 0–0.9)
INR BLD: 1.07 RATIO — SIGNIFICANT CHANGE UP (ref 0.85–1.18)
INR BLD: 1.08 RATIO — SIGNIFICANT CHANGE UP (ref 0.85–1.18)
LACTATE BLDV-MCNC: 0.7 MMOL/L — SIGNIFICANT CHANGE UP (ref 0.5–2)
LACTATE BLDV-MCNC: 1 MMOL/L — SIGNIFICANT CHANGE UP (ref 0.5–2)
LACTATE BLDV-MCNC: 1.2 MMOL/L — SIGNIFICANT CHANGE UP (ref 0.5–2)
LACTATE BLDV-MCNC: 1.7 MMOL/L — SIGNIFICANT CHANGE UP (ref 0.5–2)
LYMPHOCYTES # BLD AUTO: 1.19 K/UL — SIGNIFICANT CHANGE UP (ref 1–3.3)
LYMPHOCYTES # BLD AUTO: 1.55 K/UL — SIGNIFICANT CHANGE UP (ref 1–3.3)
LYMPHOCYTES # BLD AUTO: 14.2 % — SIGNIFICANT CHANGE UP (ref 13–44)
LYMPHOCYTES # BLD AUTO: 8.8 % — LOW (ref 13–44)
MAGNESIUM SERPL-MCNC: 1.8 MG/DL — SIGNIFICANT CHANGE UP (ref 1.6–2.6)
MAGNESIUM SERPL-MCNC: 1.8 MG/DL — SIGNIFICANT CHANGE UP (ref 1.6–2.6)
MCHC RBC-ENTMCNC: 30.9 PG — SIGNIFICANT CHANGE UP (ref 27–34)
MCHC RBC-ENTMCNC: 31 PG — SIGNIFICANT CHANGE UP (ref 27–34)
MCHC RBC-ENTMCNC: 32.4 GM/DL — SIGNIFICANT CHANGE UP (ref 32–36)
MCHC RBC-ENTMCNC: 33.2 GM/DL — SIGNIFICANT CHANGE UP (ref 32–36)
MCV RBC AUTO: 92.9 FL — SIGNIFICANT CHANGE UP (ref 80–100)
MCV RBC AUTO: 95.8 FL — SIGNIFICANT CHANGE UP (ref 80–100)
MONOCYTES # BLD AUTO: 0.88 K/UL — SIGNIFICANT CHANGE UP (ref 0–0.9)
MONOCYTES # BLD AUTO: 1.17 K/UL — HIGH (ref 0–0.9)
MONOCYTES NFR BLD AUTO: 10.5 % — SIGNIFICANT CHANGE UP (ref 2–14)
MONOCYTES NFR BLD AUTO: 6.6 % — SIGNIFICANT CHANGE UP (ref 2–14)
NEUTROPHILS # BLD AUTO: 14.54 K/UL — HIGH (ref 1.8–7.4)
NEUTROPHILS # BLD AUTO: 6.14 K/UL — SIGNIFICANT CHANGE UP (ref 1.8–7.4)
NEUTROPHILS NFR BLD AUTO: 73.6 % — SIGNIFICANT CHANGE UP (ref 43–77)
NEUTROPHILS NFR BLD AUTO: 82.3 % — HIGH (ref 43–77)
NRBC # BLD: 0 /100 WBCS — SIGNIFICANT CHANGE UP (ref 0–0)
NRBC # BLD: 0 /100 WBCS — SIGNIFICANT CHANGE UP (ref 0–0)
O2 CT VFR BLD CALC: 41 MMHG — SIGNIFICANT CHANGE UP (ref 30–65)
O2 CT VFR BLD CALC: 46 MMHG — SIGNIFICANT CHANGE UP (ref 30–65)
OTHER CELLS CSF MANUAL: 11.6 ML/DL — LOW (ref 18–22)
PCO2 BLDMV: 50 MMHG — SIGNIFICANT CHANGE UP (ref 30–65)
PCO2 BLDMV: 51 MMHG — SIGNIFICANT CHANGE UP (ref 30–65)
PCO2 BLDV: 32 MMHG — LOW (ref 39–42)
PCO2 BLDV: 33 MMHG — LOW (ref 39–42)
PCO2 BLDV: 35 MMHG — LOW (ref 39–42)
PCO2 BLDV: 49 MMHG — HIGH (ref 39–42)
PH BLDMV: 7.31 — LOW (ref 7.32–7.45)
PH BLDMV: 7.32 — SIGNIFICANT CHANGE UP (ref 7.32–7.45)
PH BLDV: 7.34 — SIGNIFICANT CHANGE UP (ref 7.32–7.43)
PH BLDV: 7.43 — SIGNIFICANT CHANGE UP (ref 7.32–7.43)
PH BLDV: 7.5 — HIGH (ref 7.32–7.43)
PH BLDV: 7.5 — HIGH (ref 7.32–7.43)
PHOSPHATE SERPL-MCNC: 2.8 MG/DL — SIGNIFICANT CHANGE UP (ref 2.5–4.5)
PHOSPHATE SERPL-MCNC: 3 MG/DL — SIGNIFICANT CHANGE UP (ref 2.5–4.5)
PLATELET # BLD AUTO: 134 K/UL — LOW (ref 150–400)
PLATELET # BLD AUTO: 229 K/UL — SIGNIFICANT CHANGE UP (ref 150–400)
PO2 BLDV: 100 MMHG — HIGH (ref 25–45)
PO2 BLDV: 161 MMHG — HIGH (ref 25–45)
PO2 BLDV: 50 MMHG — HIGH (ref 25–45)
PO2 BLDV: 71 MMHG — HIGH (ref 25–45)
POTASSIUM BLDV-SCNC: 4.3 MMOL/L — SIGNIFICANT CHANGE UP (ref 3.5–5.1)
POTASSIUM BLDV-SCNC: 4.6 MMOL/L — SIGNIFICANT CHANGE UP (ref 3.5–5.1)
POTASSIUM BLDV-SCNC: 4.7 MMOL/L — SIGNIFICANT CHANGE UP (ref 3.5–5.1)
POTASSIUM BLDV-SCNC: 6.1 MMOL/L — HIGH (ref 3.5–5.1)
POTASSIUM SERPL-MCNC: 3.2 MMOL/L — LOW (ref 3.5–5.3)
POTASSIUM SERPL-MCNC: 3.7 MMOL/L — SIGNIFICANT CHANGE UP (ref 3.5–5.3)
POTASSIUM SERPL-MCNC: 4.4 MMOL/L — SIGNIFICANT CHANGE UP (ref 3.5–5.3)
POTASSIUM SERPL-SCNC: 3.2 MMOL/L — LOW (ref 3.5–5.3)
POTASSIUM SERPL-SCNC: 3.7 MMOL/L — SIGNIFICANT CHANGE UP (ref 3.5–5.3)
POTASSIUM SERPL-SCNC: 4.4 MMOL/L — SIGNIFICANT CHANGE UP (ref 3.5–5.3)
PROT SERPL-MCNC: 5.4 G/DL — LOW (ref 6–8.3)
PROT SERPL-MCNC: 7.4 G/DL — SIGNIFICANT CHANGE UP (ref 6–8.3)
PROT SERPL-MCNC: 7.5 G/DL — SIGNIFICANT CHANGE UP (ref 6–8.3)
PROTHROM AB SERPL-ACNC: 11.7 SEC — SIGNIFICANT CHANGE UP (ref 9.5–13)
PROTHROM AB SERPL-ACNC: 11.9 SEC — SIGNIFICANT CHANGE UP (ref 9.5–13)
RAPIDTEG MAXIMUM AMPLITUDE: 61 MM — SIGNIFICANT CHANGE UP (ref 52–70)
RAPIDTEG MAXIMUM AMPLITUDE: 66.3 MM — SIGNIFICANT CHANGE UP (ref 52–70)
RBC # BLD: 3.55 M/UL — LOW (ref 3.8–5.2)
RBC # BLD: 3.92 M/UL — SIGNIFICANT CHANGE UP (ref 3.8–5.2)
RBC # FLD: 14.9 % — HIGH (ref 10.3–14.5)
RBC # FLD: 15.1 % — HIGH (ref 10.3–14.5)
RH IG SCN BLD-IMP: NEGATIVE — SIGNIFICANT CHANGE UP
SAO2 % BLDMV: 66.5 — SIGNIFICANT CHANGE UP (ref 60–90)
SAO2 % BLDMV: 74.5 — SIGNIFICANT CHANGE UP (ref 60–90)
SAO2 % BLDV: 79.5 % — SIGNIFICANT CHANGE UP (ref 67–88)
SAO2 % BLDV: 97.4 % — HIGH (ref 67–88)
SAO2 % BLDV: 98.1 % — HIGH (ref 67–88)
SAO2 % BLDV: 99.7 % — HIGH (ref 67–88)
SODIUM SERPL-SCNC: 134 MMOL/L — LOW (ref 135–145)
SODIUM SERPL-SCNC: 137 MMOL/L — SIGNIFICANT CHANGE UP (ref 135–145)
SODIUM SERPL-SCNC: 137 MMOL/L — SIGNIFICANT CHANGE UP (ref 135–145)
TEG FUNCTIONAL FIBRINOGEN: 21.9 MM — SIGNIFICANT CHANGE UP (ref 15–32)
TEG FUNCTIONAL FIBRINOGEN: 29 MM — SIGNIFICANT CHANGE UP (ref 15–32)
TEG MAXIMUM AMPLITUDE: 61.4 MM — SIGNIFICANT CHANGE UP (ref 52–69)
TEG MAXIMUM AMPLITUDE: 65.1 MM — SIGNIFICANT CHANGE UP (ref 52–69)
TEG REACTION TIME: 5.3 MIN — SIGNIFICANT CHANGE UP (ref 4.6–9.1)
TEG REACTION TIME: 6.2 MIN — SIGNIFICANT CHANGE UP (ref 4.6–9.1)
TROPONIN T, HIGH SENSITIVITY RESULT: 3754 NG/L — HIGH (ref 0–51)
WBC # BLD: 17.67 K/UL — HIGH (ref 3.8–10.5)
WBC # BLD: 8.36 K/UL — SIGNIFICANT CHANGE UP (ref 3.8–10.5)
WBC # FLD AUTO: 17.67 K/UL — HIGH (ref 3.8–10.5)
WBC # FLD AUTO: 8.36 K/UL — SIGNIFICANT CHANGE UP (ref 3.8–10.5)

## 2024-04-16 PROCEDURE — 33259 ABLATE ATRIA W/BYPASS ADD-ON: CPT

## 2024-04-16 PROCEDURE — 99291 CRITICAL CARE FIRST HOUR: CPT

## 2024-04-16 PROCEDURE — 94010 BREATHING CAPACITY TEST: CPT | Mod: 26

## 2024-04-16 PROCEDURE — 71045 X-RAY EXAM CHEST 1 VIEW: CPT | Mod: 26

## 2024-04-16 PROCEDURE — 93010 ELECTROCARDIOGRAM REPORT: CPT

## 2024-04-16 PROCEDURE — 88311 DECALCIFY TISSUE: CPT | Mod: 26

## 2024-04-16 PROCEDURE — 88305 TISSUE EXAM BY PATHOLOGIST: CPT | Mod: 26

## 2024-04-16 PROCEDURE — 33430 REPLACEMENT OF MITRAL VALVE: CPT

## 2024-04-16 DEVICE — LIGATING CLIPS WECK HORIZON SMALL-WIDE (RED) 24: Type: IMPLANTABLE DEVICE | Status: FUNCTIONAL

## 2024-04-16 DEVICE — IMPLANTABLE DEVICE: Type: IMPLANTABLE DEVICE | Status: FUNCTIONAL

## 2024-04-16 DEVICE — COR-KNOT MINI DEVICE COMBO KIT: Type: IMPLANTABLE DEVICE | Status: FUNCTIONAL

## 2024-04-16 DEVICE — CANNULA VENOUS 1 STAGE STRAIGHT 32FR X 3/8": Type: IMPLANTABLE DEVICE | Status: FUNCTIONAL

## 2024-04-16 DEVICE — CANNULA AORTIC PERFUSION EZ GLIDE STRAIGHT 21FR X 35CM VENTED: Type: IMPLANTABLE DEVICE | Status: FUNCTIONAL

## 2024-04-16 DEVICE — CHEST DRAIN PLEUR-EVAC 28FR STRAIGHT: Type: IMPLANTABLE DEVICE | Status: FUNCTIONAL

## 2024-04-16 DEVICE — LIGATING CLIPS WECK HORIZON MEDIUM (BLUE) 24: Type: IMPLANTABLE DEVICE | Status: FUNCTIONAL

## 2024-04-16 DEVICE — CANNULA VENOUS 1 STAGE RIGHT ANGLE 28FR X 3/8": Type: IMPLANTABLE DEVICE | Status: FUNCTIONAL

## 2024-04-16 DEVICE — MEDIASTINAL CATH DRAIN 9MM: Type: IMPLANTABLE DEVICE | Status: FUNCTIONAL

## 2024-04-16 DEVICE — ATRICLIP LAA EXCLUSION DEVICE 50MM FLEX-V: Type: IMPLANTABLE DEVICE | Status: FUNCTIONAL

## 2024-04-16 DEVICE — PACING WIRE WHITE M-24 BAREWIRE 37MM X 89MM: Type: IMPLANTABLE DEVICE | Status: FUNCTIONAL

## 2024-04-16 DEVICE — PROBE MALLEABLE CRYOABLATION 10CM: Type: IMPLANTABLE DEVICE | Status: FUNCTIONAL

## 2024-04-16 DEVICE — CANNULA ANTEGRADE W/VENT 12GA: Type: IMPLANTABLE DEVICE | Status: FUNCTIONAL

## 2024-04-16 DEVICE — CATH VENT VENTRICULAR PVC 18FR X 4.25" TIP PERFORATION: Type: IMPLANTABLE DEVICE | Status: FUNCTIONAL

## 2024-04-16 DEVICE — CANNULA RCC MANUALLY INFLATING W/GWIRE 15FR: Type: IMPLANTABLE DEVICE | Status: FUNCTIONAL

## 2024-04-16 DEVICE — KIT A-LINE 1LUM 20G X 12CM SAFE KIT: Type: IMPLANTABLE DEVICE | Status: FUNCTIONAL

## 2024-04-16 DEVICE — KIT CVC 2LUM MAC 9FR CHG: Type: IMPLANTABLE DEVICE | Status: FUNCTIONAL

## 2024-04-16 DEVICE — COR-KNOT QUICK LOAD SINGLES: Type: IMPLANTABLE DEVICE | Status: FUNCTIONAL

## 2024-04-16 RX ORDER — POTASSIUM CHLORIDE 20 MEQ
40 PACKET (EA) ORAL ONCE
Refills: 0 | Status: COMPLETED | OUTPATIENT
Start: 2024-04-16 | End: 2024-04-16

## 2024-04-16 RX ORDER — CHLORHEXIDINE GLUCONATE 213 G/1000ML
1 SOLUTION TOPICAL DAILY
Refills: 0 | Status: COMPLETED | OUTPATIENT
Start: 2024-04-16 | End: 2024-04-21

## 2024-04-16 RX ORDER — CHLORHEXIDINE GLUCONATE 213 G/1000ML
15 SOLUTION TOPICAL EVERY 12 HOURS
Refills: 0 | Status: DISCONTINUED | OUTPATIENT
Start: 2024-04-16 | End: 2024-04-18

## 2024-04-16 RX ORDER — SENNA PLUS 8.6 MG/1
2 TABLET ORAL AT BEDTIME
Refills: 0 | Status: DISCONTINUED | OUTPATIENT
Start: 2024-04-17 | End: 2024-04-27

## 2024-04-16 RX ORDER — AMIODARONE HYDROCHLORIDE 400 MG/1
400 TABLET ORAL
Refills: 0 | Status: DISCONTINUED | OUTPATIENT
Start: 2024-04-16 | End: 2024-04-19

## 2024-04-16 RX ORDER — OXYCODONE HYDROCHLORIDE 5 MG/1
10 TABLET ORAL EVERY 4 HOURS
Refills: 0 | Status: DISCONTINUED | OUTPATIENT
Start: 2024-04-16 | End: 2024-04-17

## 2024-04-16 RX ORDER — POTASSIUM CHLORIDE 20 MEQ
40 PACKET (EA) ORAL EVERY 4 HOURS
Refills: 0 | Status: DISCONTINUED | OUTPATIENT
Start: 2024-04-16 | End: 2024-04-16

## 2024-04-16 RX ORDER — HYDROMORPHONE HYDROCHLORIDE 2 MG/ML
0.5 INJECTION INTRAMUSCULAR; INTRAVENOUS; SUBCUTANEOUS ONCE
Refills: 0 | Status: DISCONTINUED | OUTPATIENT
Start: 2024-04-16 | End: 2024-04-16

## 2024-04-16 RX ORDER — DEXTROSE 50 % IN WATER 50 %
50 SYRINGE (ML) INTRAVENOUS
Refills: 0 | Status: DISCONTINUED | OUTPATIENT
Start: 2024-04-16 | End: 2024-04-26

## 2024-04-16 RX ORDER — POTASSIUM CHLORIDE 20 MEQ
10 PACKET (EA) ORAL
Refills: 0 | Status: DISCONTINUED | OUTPATIENT
Start: 2024-04-16 | End: 2024-04-19

## 2024-04-16 RX ORDER — SODIUM CHLORIDE 9 MG/ML
1000 INJECTION INTRAMUSCULAR; INTRAVENOUS; SUBCUTANEOUS
Refills: 0 | Status: DISCONTINUED | OUTPATIENT
Start: 2024-04-16 | End: 2024-04-26

## 2024-04-16 RX ORDER — FAMOTIDINE 10 MG/ML
20 INJECTION INTRAVENOUS EVERY 12 HOURS
Refills: 0 | Status: DISCONTINUED | OUTPATIENT
Start: 2024-04-16 | End: 2024-04-17

## 2024-04-16 RX ORDER — INSULIN HUMAN 100 [IU]/ML
3 INJECTION, SOLUTION SUBCUTANEOUS
Qty: 100 | Refills: 0 | Status: DISCONTINUED | OUTPATIENT
Start: 2024-04-16 | End: 2024-04-19

## 2024-04-16 RX ORDER — ALBUMIN HUMAN 25 %
250 VIAL (ML) INTRAVENOUS ONCE
Refills: 0 | Status: COMPLETED | OUTPATIENT
Start: 2024-04-16 | End: 2024-04-16

## 2024-04-16 RX ORDER — OXYCODONE HYDROCHLORIDE 5 MG/1
5 TABLET ORAL EVERY 4 HOURS
Refills: 0 | Status: DISCONTINUED | OUTPATIENT
Start: 2024-04-16 | End: 2024-04-17

## 2024-04-16 RX ORDER — ACETAMINOPHEN 500 MG
650 TABLET ORAL EVERY 6 HOURS
Refills: 0 | Status: COMPLETED | OUTPATIENT
Start: 2024-04-19 | End: 2025-03-18

## 2024-04-16 RX ORDER — ASPIRIN/CALCIUM CARB/MAGNESIUM 324 MG
300 TABLET ORAL ONCE
Refills: 0 | Status: DISCONTINUED | OUTPATIENT
Start: 2024-04-16 | End: 2024-04-19

## 2024-04-16 RX ORDER — ASPIRIN/CALCIUM CARB/MAGNESIUM 324 MG
81 TABLET ORAL DAILY
Refills: 0 | Status: DISCONTINUED | OUTPATIENT
Start: 2024-04-16 | End: 2024-04-27

## 2024-04-16 RX ORDER — DEXTROSE 50 % IN WATER 50 %
25 SYRINGE (ML) INTRAVENOUS
Refills: 0 | Status: DISCONTINUED | OUTPATIENT
Start: 2024-04-16 | End: 2024-04-19

## 2024-04-16 RX ORDER — DOBUTAMINE HCL 250MG/20ML
2.5 VIAL (ML) INTRAVENOUS
Qty: 500 | Refills: 0 | Status: DISCONTINUED | OUTPATIENT
Start: 2024-04-16 | End: 2024-04-21

## 2024-04-16 RX ORDER — MAGNESIUM SULFATE 500 MG/ML
2 VIAL (ML) INJECTION ONCE
Refills: 0 | Status: COMPLETED | OUTPATIENT
Start: 2024-04-16 | End: 2024-04-16

## 2024-04-16 RX ORDER — DEXMEDETOMIDINE HYDROCHLORIDE IN 0.9% SODIUM CHLORIDE 4 UG/ML
0.5 INJECTION INTRAVENOUS
Qty: 200 | Refills: 0 | Status: DISCONTINUED | OUTPATIENT
Start: 2024-04-16 | End: 2024-04-20

## 2024-04-16 RX ORDER — ACETAMINOPHEN 500 MG
650 TABLET ORAL EVERY 6 HOURS
Refills: 0 | Status: COMPLETED | OUTPATIENT
Start: 2024-04-16 | End: 2024-04-19

## 2024-04-16 RX ORDER — ASCORBIC ACID 60 MG
500 TABLET,CHEWABLE ORAL
Refills: 0 | Status: COMPLETED | OUTPATIENT
Start: 2024-04-16 | End: 2024-04-21

## 2024-04-16 RX ORDER — GABAPENTIN 400 MG/1
100 CAPSULE ORAL EVERY 8 HOURS
Refills: 0 | Status: DISCONTINUED | OUTPATIENT
Start: 2024-04-16 | End: 2024-04-20

## 2024-04-16 RX ORDER — HYDROMORPHONE HYDROCHLORIDE 2 MG/ML
0.5 INJECTION INTRAMUSCULAR; INTRAVENOUS; SUBCUTANEOUS EVERY 6 HOURS
Refills: 0 | Status: DISCONTINUED | OUTPATIENT
Start: 2024-04-16 | End: 2024-04-18

## 2024-04-16 RX ORDER — CEFUROXIME AXETIL 250 MG
1500 TABLET ORAL EVERY 8 HOURS
Refills: 0 | Status: COMPLETED | OUTPATIENT
Start: 2024-04-16 | End: 2024-04-18

## 2024-04-16 RX ORDER — POLYETHYLENE GLYCOL 3350 17 G/17G
17 POWDER, FOR SOLUTION ORAL DAILY
Refills: 0 | Status: DISCONTINUED | OUTPATIENT
Start: 2024-04-17 | End: 2024-04-27

## 2024-04-16 RX ORDER — MEPERIDINE HYDROCHLORIDE 50 MG/ML
25 INJECTION INTRAMUSCULAR; INTRAVENOUS; SUBCUTANEOUS ONCE
Refills: 0 | Status: DISCONTINUED | OUTPATIENT
Start: 2024-04-16 | End: 2024-04-19

## 2024-04-16 RX ADMIN — Medication 1 PATCH: at 12:33

## 2024-04-16 RX ADMIN — POLYETHYLENE GLYCOL 3350 17 GRAM(S): 17 POWDER, FOR SOLUTION ORAL at 12:33

## 2024-04-16 RX ADMIN — Medication 150 GRAM(S): at 04:38

## 2024-04-16 RX ADMIN — MUPIROCIN 1 APPLICATION(S): 20 OINTMENT TOPICAL at 05:44

## 2024-04-16 RX ADMIN — Medication 125 MILLILITER(S): at 21:55

## 2024-04-16 RX ADMIN — Medication 2000 MILLIGRAM(S): at 12:34

## 2024-04-16 RX ADMIN — CHLORHEXIDINE GLUCONATE 30 MILLILITER(S): 213 SOLUTION TOPICAL at 12:35

## 2024-04-16 RX ADMIN — CHLORHEXIDINE GLUCONATE 1 APPLICATION(S): 213 SOLUTION TOPICAL at 12:37

## 2024-04-16 RX ADMIN — Medication 40 MILLIEQUIVALENT(S): at 05:44

## 2024-04-16 RX ADMIN — OXYCODONE HYDROCHLORIDE 20 MILLIGRAM(S): 5 TABLET ORAL at 10:25

## 2024-04-16 RX ADMIN — GABAPENTIN 300 MILLIGRAM(S): 400 CAPSULE ORAL at 12:36

## 2024-04-16 RX ADMIN — HEPARIN SODIUM 16 UNIT(S)/HR: 5000 INJECTION INTRAVENOUS; SUBCUTANEOUS at 05:43

## 2024-04-16 RX ADMIN — Medication 1000 MILLIGRAM(S): at 12:34

## 2024-04-16 RX ADMIN — OXYCODONE HYDROCHLORIDE 20 MILLIGRAM(S): 5 TABLET ORAL at 04:29

## 2024-04-16 RX ADMIN — Medication 1 PATCH: at 08:10

## 2024-04-16 RX ADMIN — OXYCODONE HYDROCHLORIDE 20 MILLIGRAM(S): 5 TABLET ORAL at 09:31

## 2024-04-16 RX ADMIN — Medication 2 MILLIGRAM(S): at 05:44

## 2024-04-16 RX ADMIN — OXYCODONE HYDROCHLORIDE 20 MILLIGRAM(S): 5 TABLET ORAL at 03:29

## 2024-04-16 RX ADMIN — HYDROMORPHONE HYDROCHLORIDE 0.5 MILLIGRAM(S): 2 INJECTION INTRAMUSCULAR; INTRAVENOUS; SUBCUTANEOUS at 23:09

## 2024-04-16 RX ADMIN — HYDROMORPHONE HYDROCHLORIDE 0.5 MILLIGRAM(S): 2 INJECTION INTRAMUSCULAR; INTRAVENOUS; SUBCUTANEOUS at 23:30

## 2024-04-16 RX ADMIN — Medication 125 MILLILITER(S): at 20:54

## 2024-04-16 RX ADMIN — Medication 1 PATCH: at 12:26

## 2024-04-16 RX ADMIN — HYDROMORPHONE HYDROCHLORIDE 0.5 MILLIGRAM(S): 2 INJECTION INTRAMUSCULAR; INTRAVENOUS; SUBCUTANEOUS at 22:54

## 2024-04-16 NOTE — BRIEF OPERATIVE NOTE - COMMENTS
No unexpected foreign bodies were apparent on preliminary review of the post-op chest x-ray.  Reviewed by attending surgeon MI Maki MD.    Estimated blood loss not accurate due to the use of cardiotomy and cell saver suction.

## 2024-04-16 NOTE — PROGRESS NOTE ADULT - ATTENDING COMMENTS
Admitted with mixed shock requiring IABP and pressors in the setting of severe TR and torrential MR  A+Ox3  Consumes alcohol and takes benzos and opioids (latter two prescribed) daily  Continue standing Ativan, CIWA protocol, and Oxycodone   Mixed shock requiring IABP and Milrinone for the cardiogenic component and Levophed for the vasodilatory component.  Now resolved.  Milrinone off, off pressors   TTE with preserved LVEF, severe TR and torrential MR - for CTS today as d/w Dr. Maki   Afib/Aflutter with RVR; cont Digoxin  O2 sats mid 90s on room air  npo for surgery   Non-oliguric ISABELA, now resolved   H/H low but acceptable on Heparin drip for afib and IABP  Afebrile, no antibiotics  Sugars labile  IABP 4/13.

## 2024-04-16 NOTE — BRIEF OPERATIVE NOTE - NSICDXBRIEFPROCEDURE_GEN_ALL_CORE_FT
PROCEDURES:  Repair or replacement, mitral valve, with BRUNA 16-Apr-2024 21:15:55  Fantasma Hilton

## 2024-04-16 NOTE — PRE-ANESTHESIA EVALUATION ADULT - NSANTHPMHFT_GEN_ALL_CORE
64F PMH HTN, HLD, lumbar herniated disc on chronic benzos/opioids, current smoker, former ETOH abuse (3 years ago), MVP with MR/MS, mod TR, severe pHTN, dec RV function, normal LVSF.  S/P IABP.

## 2024-04-16 NOTE — PROGRESS NOTE ADULT - SUBJECTIVE AND OBJECTIVE BOX
Chidi Tyson, PGY-1  Internal Medicine   [employer]    DENISE IRAHETA  MRN-[id number]  Patient is a 64y old  Female who presents with a chief complaint of cardiogenic shock (15 Apr 2024 21:17)    HPI: Ms Denise Iraheta is a 65 y/o female who is a current smoker with a PMHx of HLD, HTN, AFib (diagnosed recently, not on AC), benzodiazepine/opioid and etoh abuse, and mitral valve prolapse presenting to the ER with shortness of breath. Pt reports developing shortness of breath, fatigue, and a sense of feeling unwell for the last week, with milder symptoms present longer. Her daughter at bedside reports pt is lethargic, not herself and was having difficulty breathing at home.      Upon arrival to the ED she was pale and ill appearing. Was found to be hypoxic to 77% on room air requiring 3L nasal cannula as well as hypotensive to the 70s requiring levophed. On pocus she was found to have a small pericardial effusion, severe MR w/ plethoric IVC and bilateral B lines. Cardiology was consulted for concern of cardiogenic shock. She is now s/p RHC with IABP and swan purnima catheter placement.     On admission to CICU she is A&O x4, saturating well on 4L nasal cannula, IABP 1:1 augmenting 100 on .1mcg/kg/min of norepinephrine       INTERVAL HISTORY/OVERNIGHT EVENTS:      REVIEW OF SYSTEMS:  As above      MEDICATIONS:  MEDICATIONS  (STANDING):  acetaminophen     Tablet .. 1000 milliGRAM(s) Oral once  ascorbic acid 2000 milliGRAM(s) Oral once  cefuroxime  IVPB 1500 milliGRAM(s) IV Intermittent once  chlorhexidine 0.12% Liquid 30 milliLiter(s) Swish and Spit once  chlorhexidine 2% Cloths 1 Application(s) Topical daily  digoxin     Tablet 125 MICROGram(s) Oral daily  gabapentin 300 milliGRAM(s) Oral once  LORazepam     Tablet 2 milliGRAM(s) Oral three times a day  mupirocin 2% Nasal 1 Application(s) Both Nostrils two times a day  nicotine - 21 mG/24Hr(s) Patch 1 Patch Transdermal daily  polyethylene glycol 3350 17 Gram(s) Oral daily  senna 2 Tablet(s) Oral at bedtime    MEDICATIONS  (PRN):  oxyCODONE    IR 20 milliGRAM(s) Oral every 6 hours PRN Severe Pain (7 - 10)      ALLERGIES:  Allergies    No Known Allergies    Intolerances        OBJECTIVE:  ICU Vital Signs Last 24 Hrs  T(C): 37.1 (2024 08:00), Max: 37.1 (15 Apr 2024 16:00)  T(F): 98.7 (2024 08:00), Max: 98.8 (15 Apr 2024 16:00)  HR: 77 (2024 08:00) (68 - 88)  BP: --  BP(mean): --  ABP: --  ABP(mean): --  RR: 22 (2024 08:00) (13 - 30)  SpO2: 95% (2024 08:00) (91% - 99%)    O2 Parameters below as of 2024 08:00  Patient On (Oxygen Delivery Method): room air            Adult Advanced Hemodynamics Last 24 Hrs  CVP(mm Hg): 1 (15 Apr 2024 16:00) (1 - 5)  CVP(cm H2O): --  CO: --  CI: --  PA: 16/4 (15 Apr 2024 16:00) (16/4 - 34/18)  PA(mean): 11 (15 Apr 2024 16:00) (9 - 26)  PCWP: --  SVR: --  SVRI: --  PVR: --  PVRI: --  CAPILLARY BLOOD GLUCOSE        Daily     Daily Weight in k.9 (2024 05:00)    PHYSICAL EXAM:  Constitutional: in no acute distress  HEENT: PERRLA, EOMI, no drainage or redness  Neck: supple  Respiratory: Breath Sounds equal & clear bilaterally to auscultation, no rales/rhonchi/wheezing, no accessory muscle use noted  Cardiovascular: Irregular rate and rhythm, normal S1, S2  Gastrointestinal: Soft, non-tender, non distended, + bowel sounds  Extremities: VELIZ x 4, no peripheral edema, no cyanosis, no clubbing. + peripheral pulses.   Neurological: A+O x 3; speech clear and intact; no motor deficits Nonfocal.   Skin: cool, dry  Lines: R fem IABP and swan c/d/i      ============================I/O===========================   I&O's Detail    15 Apr 2024 07:01  -  2024 07:00  --------------------------------------------------------  IN:    Heparin: 288 mL    IV PiggyBack: 50 mL    Milrinone: 13.2 mL    Oral Fluid: 240 mL  Total IN: 591.2 mL    OUT:    Voided (mL): 1550 mL  Total OUT: 1550 mL    Total NET: -958.8 mL        ============================ LABS =========================                        12.1   8.36  )-----------( 229      ( 2024 00:34 )             36.4         137  |  95<L>  |  26<H>  ----------------------------<  159<H>  3.7   |  28  |  0.83    Ca    9.5      2024 03:29  Phos  3.0       Mg     1.8         TPro  7.5  /  Alb  3.9  /  TBili  0.6  /  DBili  x   /  AST  41<H>  /  ALT  87<H>  /  AlkPhos  149<H>      LIVER FUNCTIONS - ( 2024 03:29 )  Alb: 3.9 g/dL / Pro: 7.5 g/dL / ALK PHOS: 149 U/L / ALT: 87 U/L / AST: 41 U/L / GGT: x           Troponin T, High Sensitivity Result: 17 ng/L (24 @ 16:14)    CKMB Units: 3.7 ng/mL (24 @ 16:14)    Creatine Kinase, Serum: 99 U/L (24 @ 16:14)          Blood Gas Venous - Lactate: 1.0 mmol/L (04-15-24 @ 13:40)  Blood Gas Venous - Lactate: 1.0 mmol/L (04-15-24 @ 01:04)  Blood Gas Venous - Lactate: 0.7 mmol/L (24 @ 16:53)    Urinalysis Basic - ( 2024 03:29 )    Color: x / Appearance: x / SG: x / pH: x  Gluc: 159 mg/dL / Ketone: x  / Bili: x / Urobili: x   Blood: x / Protein: x / Nitrite: x   Leuk Esterase: x / RBC: x / WBC x   Sq Epi: x / Non Sq Epi: x / Bacteria: x        PT/INR - ( 2024 04:33 )   PT: 11.7 sec;   INR: 1.07 ratio         PTT - ( 2024 04:33 )  PTT:62.1 sec    ======================Micro/Rad/Cardio=================  Telemetry: Reviewed   EKG: Reviewed  CXR: Reviewed  Culture: Reviewed   Echo:   Cath:   ======================================================           Chidi Tyson, PGY-1  Internal Medicine   [employer]    DENISE IRAHETA  MRN-[id number]  Patient is a 64y old  Female who presents with a chief complaint of cardiogenic shock (15 Apr 2024 21:17)    HPI: Ms Denise Iraheta is a 65 y/o female who is a current smoker with a PMHx of HLD, HTN, AFib (diagnosed recently, not on AC), benzodiazepine/opioid and etoh abuse, and mitral valve prolapse presenting to the ER with shortness of breath. Pt reports developing shortness of breath, fatigue, and a sense of feeling unwell for the last week, with milder symptoms present longer. Her daughter at bedside reports pt is lethargic, not herself and was having difficulty breathing at home.      Upon arrival to the ED she was pale and ill appearing. Was found to be hypoxic to 77% on room air requiring 3L nasal cannula as well as hypotensive to the 70s requiring levophed. On pocus she was found to have a small pericardial effusion, severe MR w/ plethoric IVC and bilateral B lines. Cardiology was consulted for concern of cardiogenic shock. She is now s/p RHC with IABP and swan purnima catheter placement.     On admission to CICU she is A&O x4, saturating well on 4L nasal cannula, IABP 1:1 augmenting 100 on .1mcg/kg/min of norepinephrine       INTERVAL HISTORY/OVERNIGHT EVENTS:  Put on digoxin overnight due to tachycardia from afib/flutter.  Feels wel today although slightly anxious about procedure      REVIEW OF SYSTEMS:  As above      MEDICATIONS:  MEDICATIONS  (STANDING):  acetaminophen     Tablet .. 1000 milliGRAM(s) Oral once  ascorbic acid 2000 milliGRAM(s) Oral once  cefuroxime  IVPB 1500 milliGRAM(s) IV Intermittent once  chlorhexidine 0.12% Liquid 30 milliLiter(s) Swish and Spit once  chlorhexidine 2% Cloths 1 Application(s) Topical daily  digoxin     Tablet 125 MICROGram(s) Oral daily  gabapentin 300 milliGRAM(s) Oral once  LORazepam     Tablet 2 milliGRAM(s) Oral three times a day  mupirocin 2% Nasal 1 Application(s) Both Nostrils two times a day  nicotine - 21 mG/24Hr(s) Patch 1 Patch Transdermal daily  polyethylene glycol 3350 17 Gram(s) Oral daily  senna 2 Tablet(s) Oral at bedtime    MEDICATIONS  (PRN):  oxyCODONE    IR 20 milliGRAM(s) Oral every 6 hours PRN Severe Pain (7 - 10)      ALLERGIES:  Allergies    No Known Allergies    Intolerances        OBJECTIVE:  ICU Vital Signs Last 24 Hrs  T(C): 37.1 (2024 08:00), Max: 37.1 (15 Apr 2024 16:00)  T(F): 98.7 (2024 08:00), Max: 98.8 (15 Apr 2024 16:00)  HR: 77 (2024 08:00) (68 - 88)  BP: --  BP(mean): --  ABP: --  ABP(mean): --  RR: 22 (2024 08:00) (13 - 30)  SpO2: 95% (2024 08:00) (91% - 99%)    O2 Parameters below as of 2024 08:00  Patient On (Oxygen Delivery Method): room air            Adult Advanced Hemodynamics Last 24 Hrs  CVP(mm Hg): 1 (15 Apr 2024 16:00) (1 - 5)  CVP(cm H2O): --  CO: --  CI: --  PA: 16/4 (15 Apr 2024 16:00) (16/4 - 34/18)  PA(mean): 11 (15 Apr 2024 16:00) (9 - 26)  PCWP: --  SVR: --  SVRI: --  PVR: --  PVRI: --  CAPILLARY BLOOD GLUCOSE        Daily     Daily Weight in k.9 (2024 05:00)    PHYSICAL EXAM:  Constitutional: in no acute distress  HEENT: PERRLA, EOMI, no drainage or redness  Neck: supple  Respiratory: Breath Sounds equal & clear bilaterally to auscultation, no rales/rhonchi/wheezing, no accessory muscle use noted  Cardiovascular: Irregular rate and rhythm, normal S1, S2, difficult to assess   Gastrointestinal: Soft, non-tender, non distended, + bowel sounds  Extremities: VELIZ x 4, no peripheral edema, no cyanosis, no clubbing. + peripheral pulses.   Neurological: A+O x 3; speech clear and intact; no motor deficits Nonfocal.   Skin: cool, dry  Lines: R fem IABP and swan c/d/i      ============================I/O===========================   I&O's Detail    15 Apr 2024 07:01  -  2024 07:00  --------------------------------------------------------  IN:    Heparin: 288 mL    IV PiggyBack: 50 mL    Milrinone: 13.2 mL    Oral Fluid: 240 mL  Total IN: 591.2 mL    OUT:    Voided (mL): 1550 mL  Total OUT: 1550 mL    Total NET: -958.8 mL        ============================ LABS =========================                        12.1   8.36  )-----------( 229      ( 2024 00:34 )             36.4         137  |  95<L>  |  26<H>  ----------------------------<  159<H>  3.7   |  28  |  0.83    Ca    9.5      2024 03:29  Phos  3.0       Mg     1.8         TPro  7.5  /  Alb  3.9  /  TBili  0.6  /  DBili  x   /  AST  41<H>  /  ALT  87<H>  /  AlkPhos  149<H>      LIVER FUNCTIONS - ( 2024 03:29 )  Alb: 3.9 g/dL / Pro: 7.5 g/dL / ALK PHOS: 149 U/L / ALT: 87 U/L / AST: 41 U/L / GGT: x           Troponin T, High Sensitivity Result: 17 ng/L (24 @ 16:14)    CKMB Units: 3.7 ng/mL (24 @ 16:14)    Creatine Kinase, Serum: 99 U/L (24 @ 16:14)          Blood Gas Venous - Lactate: 1.0 mmol/L (04-15-24 @ 13:40)  Blood Gas Venous - Lactate: 1.0 mmol/L (04-15-24 @ 01:04)  Blood Gas Venous - Lactate: 0.7 mmol/L (24 @ 16:53)    Urinalysis Basic - ( 2024 03:29 )    Color: x / Appearance: x / SG: x / pH: x  Gluc: 159 mg/dL / Ketone: x  / Bili: x / Urobili: x   Blood: x / Protein: x / Nitrite: x   Leuk Esterase: x / RBC: x / WBC x   Sq Epi: x / Non Sq Epi: x / Bacteria: x        PT/INR - ( 2024 04:33 )   PT: 11.7 sec;   INR: 1.07 ratio         PTT - ( 2024 04:33 )  PTT:62.1 sec    ======================Micro/Rad/Cardio=================  Telemetry: Reviewed   EKG: Reviewed  CXR: Reviewed  Culture: Reviewed   Echo:   Cath:   ======================================================           Chidi Tyson, PGY-1  Internal Medicine   [employer]    DENISE IRAHETA  MRN-[id number]  Patient is a 64y old  Female who presents with a chief complaint of cardiogenic shock (15 Apr 2024 21:17)    HPI: Ms Denise Iraheta is a 65 y/o female who is a current smoker with a PMHx of HLD, HTN, AFib (diagnosed recently, not on AC), benzodiazepine/opioid and etoh abuse, and mitral valve prolapse presenting to the ER with shortness of breath. Pt reports developing shortness of breath, fatigue, and a sense of feeling unwell for the last week, with milder symptoms present longer. Her daughter at bedside reports pt is lethargic, not herself and was having difficulty breathing at home.      Upon arrival to the ED she was pale and ill appearing. Was found to be hypoxic to 77% on room air requiring 3L nasal cannula as well as hypotensive to the 70s requiring levophed. On pocus she was found to have a small pericardial effusion, severe MR w/ plethoric IVC and bilateral B lines. Cardiology was consulted for concern of cardiogenic shock. She is now s/p RHC with IABP and swan purnima catheter placement.     On admission to CICU she is A&O x4, saturating well on 4L nasal cannula, IABP 1:1 augmenting 100 on .1mcg/kg/min of norepinephrine       INTERVAL HISTORY/OVERNIGHT EVENTS:  Put on digoxin overnight due to tachycardia from afib/flutter.  Feels well today although slightly anxious about procedure      REVIEW OF SYSTEMS:  As above      MEDICATIONS:  MEDICATIONS  (STANDING):  acetaminophen     Tablet .. 1000 milliGRAM(s) Oral once  ascorbic acid 2000 milliGRAM(s) Oral once  cefuroxime  IVPB 1500 milliGRAM(s) IV Intermittent once  chlorhexidine 0.12% Liquid 30 milliLiter(s) Swish and Spit once  chlorhexidine 2% Cloths 1 Application(s) Topical daily  digoxin     Tablet 125 MICROGram(s) Oral daily  gabapentin 300 milliGRAM(s) Oral once  LORazepam     Tablet 2 milliGRAM(s) Oral three times a day  mupirocin 2% Nasal 1 Application(s) Both Nostrils two times a day  nicotine - 21 mG/24Hr(s) Patch 1 Patch Transdermal daily  polyethylene glycol 3350 17 Gram(s) Oral daily  senna 2 Tablet(s) Oral at bedtime    MEDICATIONS  (PRN):  oxyCODONE    IR 20 milliGRAM(s) Oral every 6 hours PRN Severe Pain (7 - 10)      ALLERGIES:  Allergies    No Known Allergies    Intolerances        OBJECTIVE:  ICU Vital Signs Last 24 Hrs  T(C): 37.1 (2024 08:00), Max: 37.1 (15 Apr 2024 16:00)  T(F): 98.7 (2024 08:00), Max: 98.8 (15 Apr 2024 16:00)  HR: 77 (2024 08:00) (68 - 88)  BP: --  BP(mean): --  ABP: --  ABP(mean): --  RR: 22 (2024 08:00) (13 - 30)  SpO2: 95% (2024 08:00) (91% - 99%)    O2 Parameters below as of 2024 08:00  Patient On (Oxygen Delivery Method): room air            Adult Advanced Hemodynamics Last 24 Hrs  CVP(mm Hg): 1 (15 Apr 2024 16:00) (1 - 5)  CVP(cm H2O): --  CO: --  CI: --  PA: 16/4 (15 Apr 2024 16:00) (16/4 - 34/18)  PA(mean): 11 (15 Apr 2024 16:00) (9 - 26)  PCWP: --  SVR: --  SVRI: --  PVR: --  PVRI: --  CAPILLARY BLOOD GLUCOSE        Daily     Daily Weight in k.9 (2024 05:00)    PHYSICAL EXAM:  Constitutional: in no acute distress  HEENT: PERRLA, EOMI, no drainage or redness  Neck: supple  Respiratory: Breath Sounds equal & clear bilaterally to auscultation, no rales/rhonchi/wheezing, no accessory muscle use noted  Cardiovascular: Irregular rate and rhythm, normal S1, S2, difficult to assess   Gastrointestinal: Soft, non-tender, non distended, + bowel sounds  Extremities: VELIZ x 4, no peripheral edema, no cyanosis, no clubbing. + peripheral pulses.   Neurological: A+O x 3; speech clear and intact; no motor deficits, Nonfocal.   Skin: cool, dry  Lines: R fem IABP and swan c/d/i      ============================I/O===========================   I&O's Detail    15 Apr 2024 07:01  -  2024 07:00  --------------------------------------------------------  IN:    Heparin: 288 mL    IV PiggyBack: 50 mL    Milrinone: 13.2 mL    Oral Fluid: 240 mL  Total IN: 591.2 mL    OUT:    Voided (mL): 1550 mL  Total OUT: 1550 mL    Total NET: -958.8 mL        ============================ LABS =========================                        12.1   8.36  )-----------( 229      ( 2024 00:34 )             36.4         137  |  95<L>  |  26<H>  ----------------------------<  159<H>  3.7   |  28  |  0.83    Ca    9.5      2024 03:29  Phos  3.0       Mg     1.8         TPro  7.5  /  Alb  3.9  /  TBili  0.6  /  DBili  x   /  AST  41<H>  /  ALT  87<H>  /  AlkPhos  149<H>      LIVER FUNCTIONS - ( 2024 03:29 )  Alb: 3.9 g/dL / Pro: 7.5 g/dL / ALK PHOS: 149 U/L / ALT: 87 U/L / AST: 41 U/L / GGT: x           Troponin T, High Sensitivity Result: 17 ng/L (24 @ 16:14)    CKMB Units: 3.7 ng/mL (24 @ 16:14)    Creatine Kinase, Serum: 99 U/L (24 @ 16:14)          Blood Gas Venous - Lactate: 1.0 mmol/L (04-15-24 @ 13:40)  Blood Gas Venous - Lactate: 1.0 mmol/L (04-15-24 @ 01:04)  Blood Gas Venous - Lactate: 0.7 mmol/L (24 @ 16:53)    Urinalysis Basic - ( 2024 03:29 )    Color: x / Appearance: x / SG: x / pH: x  Gluc: 159 mg/dL / Ketone: x  / Bili: x / Urobili: x   Blood: x / Protein: x / Nitrite: x   Leuk Esterase: x / RBC: x / WBC x   Sq Epi: x / Non Sq Epi: x / Bacteria: x        PT/INR - ( 2024 04:33 )   PT: 11.7 sec;   INR: 1.07 ratio         PTT - ( 2024 04:33 )  PTT:62.1 sec    ======================Micro/Rad/Cardio=================  Telemetry: Reviewed   EKG: Reviewed  CXR: Reviewed  Culture: Reviewed   Echo:   Cath:   ======================================================           Chidi Tyson, PGY-1  Internal Medicine   [employer]    DENISE IRAHETA  MRN-[id number]  Patient is a 64y old  Female who presents with a chief complaint of cardiogenic shock (15 Apr 2024 21:17)    HPI: Ms Denise Iraheta is a 65 y/o female who is a current smoker with a PMHx of HLD, HTN, AFib (diagnosed recently, not on AC), benzodiazepine/opioid and etoh abuse, and mitral valve prolapse presenting to the ER with shortness of breath. Pt reports developing shortness of breath, fatigue, and a sense of feeling unwell for the last week, with milder symptoms present longer. Her daughter at bedside reports pt is lethargic, not herself and was having difficulty breathing at home.      Upon arrival to the ED she was pale and ill appearing. Was found to be hypoxic to 77% on room air requiring 3L nasal cannula as well as hypotensive to the 70s requiring levophed. On pocus she was found to have a small pericardial effusion, severe MR w/ plethoric IVC and bilateral B lines. Cardiology was consulted for concern of cardiogenic shock. She is now s/p RHC with IABP and swan purnima catheter placement.     On admission to CICU she is A&O x4, saturating well on 4L nasal cannula, IABP 1:1 augmenting 100 on .1mcg/kg/min of norepinephrine       INTERVAL HISTORY/OVERNIGHT EVENTS:  Put on digoxin overnight due to tachycardia from afib/flutter.  Feels well today although slightly anxious about procedure      REVIEW OF SYSTEMS:  As above      MEDICATIONS:  MEDICATIONS  (STANDING):  acetaminophen     Tablet .. 1000 milliGRAM(s) Oral once  ascorbic acid 2000 milliGRAM(s) Oral once  cefuroxime  IVPB 1500 milliGRAM(s) IV Intermittent once  chlorhexidine 0.12% Liquid 30 milliLiter(s) Swish and Spit once  chlorhexidine 2% Cloths 1 Application(s) Topical daily  digoxin     Tablet 125 MICROGram(s) Oral daily  gabapentin 300 milliGRAM(s) Oral once  LORazepam     Tablet 2 milliGRAM(s) Oral three times a day  mupirocin 2% Nasal 1 Application(s) Both Nostrils two times a day  nicotine - 21 mG/24Hr(s) Patch 1 Patch Transdermal daily  polyethylene glycol 3350 17 Gram(s) Oral daily  senna 2 Tablet(s) Oral at bedtime    MEDICATIONS  (PRN):  oxyCODONE    IR 20 milliGRAM(s) Oral every 6 hours PRN Severe Pain (7 - 10)      ALLERGIES:  Allergies    No Known Allergies    Intolerances        OBJECTIVE:  ICU Vital Signs Last 24 Hrs  T(C): 37.1 (2024 08:00), Max: 37.1 (15 Apr 2024 16:00)  T(F): 98.7 (2024 08:00), Max: 98.8 (15 Apr 2024 16:00)  HR: 77 (2024 08:00) (68 - 88)  BP: --  BP(mean): --  ABP: --  ABP(mean): --  RR: 22 (2024 08:00) (13 - 30)  SpO2: 95% (2024 08:00) (91% - 99%)    O2 Parameters below as of 2024 08:00  Patient On (Oxygen Delivery Method): room air            Adult Advanced Hemodynamics Last 24 Hrs  CVP(mm Hg): 1 (15 Apr 2024 16:00) (1 - 5)  CVP(cm H2O): --  CO: --  CI: --  PA: 16/4 (15 Apr 2024 16:00) (16/4 - 34/18)  PA(mean): 11 (15 Apr 2024 16:00) (9 - 26)  PCWP: --  SVR: --  SVRI: --  PVR: --  PVRI: --  CAPILLARY BLOOD GLUCOSE        Daily     Daily Weight in k.9 (2024 05:00)    PHYSICAL EXAM:  Constitutional: in no acute distress  HEENT: PERRLA, EOMI, no drainage or redness  Neck: supple  Respiratory: Breath Sounds equal & clear bilaterally to auscultation, no rales/rhonchi/wheezing, no accessory muscle use noted  Cardiovascular: Irregular rate and rhythm, normal S1, S2, difficult to assess due to IABP   Gastrointestinal: Soft, non-tender, non distended, + bowel sounds  Extremities: VELIZ x 4, no peripheral edema, no cyanosis, no clubbing. + peripheral pulses.   Neurological: A+O x 3; speech clear and intact; no motor deficits, Nonfocal.   Skin: cool, dry  Lines: R fem IABP and swan c/d/i      ============================I/O===========================   I&O's Detail    15 Apr 2024 07:01  -  2024 07:00  --------------------------------------------------------  IN:    Heparin: 288 mL    IV PiggyBack: 50 mL    Milrinone: 13.2 mL    Oral Fluid: 240 mL  Total IN: 591.2 mL    OUT:    Voided (mL): 1550 mL  Total OUT: 1550 mL    Total NET: -958.8 mL        ============================ LABS =========================                        12.1   8.36  )-----------( 229      ( 2024 00:34 )             36.4     -16    137  |  95<L>  |  26<H>  ----------------------------<  159<H>  3.7   |  28  |  0.83    Ca    9.5      2024 03:29  Phos  3.0       Mg     1.8         TPro  7.5  /  Alb  3.9  /  TBili  0.6  /  DBili  x   /  AST  41<H>  /  ALT  87<H>  /  AlkPhos  149<H>      LIVER FUNCTIONS - ( 2024 03:29 )  Alb: 3.9 g/dL / Pro: 7.5 g/dL / ALK PHOS: 149 U/L / ALT: 87 U/L / AST: 41 U/L / GGT: x           Troponin T, High Sensitivity Result: 17 ng/L (24 @ 16:14)    CKMB Units: 3.7 ng/mL (24 @ 16:14)    Creatine Kinase, Serum: 99 U/L (24 @ 16:14)          Blood Gas Venous - Lactate: 1.0 mmol/L (04-15-24 @ 13:40)  Blood Gas Venous - Lactate: 1.0 mmol/L (04-15-24 @ 01:04)  Blood Gas Venous - Lactate: 0.7 mmol/L (24 @ 16:53)    Urinalysis Basic - ( 2024 03:29 )    Color: x / Appearance: x / SG: x / pH: x  Gluc: 159 mg/dL / Ketone: x  / Bili: x / Urobili: x   Blood: x / Protein: x / Nitrite: x   Leuk Esterase: x / RBC: x / WBC x   Sq Epi: x / Non Sq Epi: x / Bacteria: x        PT/INR - ( 2024 04:33 )   PT: 11.7 sec;   INR: 1.07 ratio         PTT - ( 2024 04:33 )  PTT:62.1 sec    ======================Micro/Rad/Cardio=================  Telemetry: Reviewed   EKG: Reviewed  CXR: Reviewed  Culture: Reviewed   Echo:   Cath:   ======================================================

## 2024-04-16 NOTE — PROGRESS NOTE ADULT - SUBJECTIVE AND OBJECTIVE BOX
DENISE IRAHETA  MRN-[id number]  Patient is a 64y old  Female who presents with a chief complaint of cardiogenic shock (2024 08:10)    HPI:  Ms Denise Iraheta is a 65 y/o female who is a current smoker with a PMHx of HLD, HTN, AFib (diagnosed recently, not on AC), benzodiazepine/opioid and etoh abuse, and mitral valve prolapse presenting to the ER with shortness of breath. Pt reports developing shortness of breath, fatigue, and a sense of feeling unwell for the last week, with milder symptoms present longer. Her daughter at bedside reports pt is lethargic, not herself and was having difficulty breathing at home.      Upon arrival to the ED she was pale and ill appearing. Was found to be hypoxic to 77% on room air requiring 3L nasal cannula as well as hypotensive to the 70s requiring levophed. On pocus she was found to have a small pericardial effusion, severe MR w/ plethoric IVC and bilateral B lines. Cardiology was consulted for concern of cardiogenic shock. She is now s/p RHC with IABP and swan purnima catheter placement.     On admission to CICU she is A&O x4, saturating well on 4L nasal cannula, IABP 1:1 augmenting 100 on .1mcg/kg/min of norepinephrine.    (2024 20:11)      Surgery/Hospital course:  date/OR    brought to icu from OR , intubated. full vent support.  sedated on precedex  drips insuline and norepinephrine   BP labile, fluid resuscitated , titrate pressors     Physical Exam:  Gen:  mild lethargy post anaesthesia  CNS: non focal  Neck: no JVD  RES : clear , no wheezing    Chest:   + chest tubes                     CVS: Regular  rhythm. Normal S1/S2  Abd: Soft, non-distended. Bowel sounds present.  Skin: No rash.  Ext:  no edema    Vital Signs Last 24 Hrs  T(C): 34.6 (2024 21:00), Max: 37.1 (2024 08:00)  T(F): 94.3 (2024 21:00), Max: 98.7 (2024 08:00)  HR: 80 (2024 23:00) (63 - 87)  BP: 85/61 (2024 12:10) (85/61 - 85/61)  BP(mean): 69 (2024 12:10) (69 - 69)  RR: 12 (2024 23:00) (11 - 29)  SpO2: 100% (2024 23:00) (92% - 100%)    Parameters below as of 2024 21:00  Patient On (Oxygen Delivery Method): ventilator    O2 Concentration (%): 50    LABS:                        11.0   17.67 )-----------( 134      ( 2024 21:36 )             34.0     04-16    137  |  103  |  20  ----------------------------<  137<H>  4.4   |  23  |  0.71    Ca    9.0      2024 21:36  Phos  3.0     04-16  Mg     1.8     04-16    TPro  5.4<L>  /  Alb  3.0<L>  /  TBili  0.9  /  DBili  x   /  AST  103<H>  /  ALT  50<H>  /  AlkPhos  99  04-16    PT/INR - ( 2024 04:33 )   PT: 11.7 sec;   INR: 1.07 ratio         PTT - ( 2024 04:33 )  PTT:62.1 sec  Urinalysis Basic - ( 2024 21:36 )    Color: x / Appearance: x / SG: x / pH: x  Gluc: 137 mg/dL / Ketone: x  / Bili: x / Urobili: x   Blood: x / Protein: x / Nitrite: x   Leuk Esterase: x / RBC: x / WBC x   Sq Epi: x / Non Sq Epi: x / Bacteria: x      ABG - ( 2024 22:26 )  pH, Arterial: 7.34  pH, Blood: x     /  pCO2: 47    /  pO2: 128   / HCO3: 25    / Base Excess: -0.7  /  SaO2: 99.5              CARDIAC MARKERS ( 2024 21:36 )  x     / x     / 850 U/L / x     / 123.7 ng/mL                                    ============================I/O===========================   I&O's Detail    15 Apr 2024 07:01  -  2024 07:00  --------------------------------------------------------  IN:    Heparin: 288 mL    IV PiggyBack: 50 mL    Milrinone: 13.2 mL    Oral Fluid: 240 mL  Total IN: 591.2 mL    OUT:    Voided (mL): 1550 mL  Total OUT: 1550 mL    Total NET: -958.8 mL      2024 07:01  -  2024 23:28  --------------------------------------------------------  IN:    Dexmedetomidine: 66.6 mL    Oral Fluid: 200 mL    sodium chloride 0.9%: 30 mL  Total IN: 296.6 mL    OUT:    Chest Tube (mL): 0 mL    Chest Tube (mL): 30 mL    Insulin: 0 mL    Voided (mL): 400 mL  Total OUT: 430 mL    Total NET: -133.4 mL          =====================Rad/Cardio/cultures =================  CXR: Reviewed  Echo:Reviewed  Culture: Reviewed  ======================================================  Home Medications:  carvedilol 12.5 mg oral tablet: 1 tab(s) orally 2 times a day (2024 18:21)  clonazePAM 2 mg oral tablet, disintegratin tab(s) orally 3 times a day, As needed, Anxiety (2024 18:22)  Crestor 5 mg oral tablet: 1 tab(s) orally once a day (2024 18:21)  losartan-hydroCHLOROthiazide 100 mg-25 mg oral tablet: 1 tab(s) orally once a day (2024 18:22)  oxyCODONE 20 mg oral tablet: 1 tab(s) orally 4 times a day, As needed, Moderate Pain (4 - 6) (2024 18:21)    PAST MEDICAL & SURGICAL HISTORY:  HTN (hypertension)      Mitral valve prolapse      HLD (hyperlipidemia)        ====================ASSESMENT/MDM ==============  Dx:OR    At risk for respiratory insufficiency  post op pain  chest tube in place    Congestive heart failure acute chronic systolic diastolic combined  hemodynamic instability  arrhythmia   CAD/ASHD  Acute Respiratory insuficiency post op  hypovolemia fluid overload  ISABELA CKD ESRD/HD  Anemia blood loss    Hyperglycemia  Diabetes mellitus type 2  Hypothyroidism     infection sepsis       encounter weaning from and management of  respirator  encounter management of temporary Pacing  and wires interogation, VVI   encounter IABP management  encounter ECMO management    Plan:  ====================== NEUROLOGY============  pain control:     acetaminophen     Tablet .. 650 milliGRAM(s) Oral every 6 hours  aspirin Suppository 300 milliGRAM(s) Rectal once  dexMEDEtomidine Infusion 0.5 MICROgram(s)/kG/Hr (11.1 mL/Hr) IV Continuous <Continuous>  gabapentin 100 milliGRAM(s) Oral every 8 hours  HYDROmorphone  Injectable 0.5 milliGRAM(s) IV Push every 6 hours PRN Breakthrough Pain  meperidine     Injectable 25 milliGRAM(s) IV Push once  oxyCODONE    IR 10 milliGRAM(s) Oral every 4 hours PRN Severe Pain (7 - 10)  oxyCODONE    IR 5 milliGRAM(s) Oral every 4 hours PRN Moderate Pain (4 - 6)    ====================== RESPIRATORY============  O2: NC CPAP BIPAP HF VENT / settings  ABG - ( 2024 22:26 )  pH, Arterial: 7.34  pH, Blood: x     /  pCO2: 47    /  pO2: 128   / HCO3: 25    / Base Excess: -0.7  /  SaO2: 99.5              Mechanical Ventilation:  Mode: AC/ CMV (Assist Control/ Continuous Mandatory Ventilation)  RR (machine): 12  TV (machine): 500  FiO2: 50  PEEP: 5  ITime: 1  MAP: 8  PIP: 21      incentive spirometry  titrate FiO2 , keep sat above 92%  Monitor chest tube: outputs , Air leak,   continue chest tube to  suction , f/u cxr    bronchodilators , inhaled dornase alpha and hypertonic saline    ======================CARDIOVASCULAR =========  Monitor Hemodynamic, Telemetry  hemodynamics :   pressors: Levophed  Vasopressin Neosynphrine infusion, Midodrine , Droxidopa  inotropes: Epinephrine  Dobutamine  Primacor         aMIOdarone    Tablet 400 milliGRAM(s) Oral two times a day    titrate pressors for mean BP above 60  titrate inotropes     =======================Hematology===============                        11.0   17.67 )-----------( 134      ( 2024 21:36 )             34.0        PT/INR - ( 2024 04:33 )   PT: 11.7 sec;   INR: 1.07 ratio         PTT - ( 2024 04:33 )  PTT:62.1 sec   Hct:  Plts:  INR    antiplatets: ASA, Plavix iv Cangelor  Anticoagulation:  Heparin Argatroban Angiomax Lovenox  VTE PPX:   aspirin enteric coated 81 milliGRAM(s) Oral daily      monitor  Hb/Hct ,plts ,coags     ========================RENAL ===================  04-16    137  |  103  |  20  ----------------------------<  137<H>  4.4   |  23  |  0.71    Ca    9.0      2024 21:36  Phos  3.0     04-16  Mg     1.8     04-16    TPro  5.4<L>  /  Alb  3.0<L>  /  TBili  0.9  /  DBili  x   /  AST  103<H>  /  ALT  50<H>  /  AlkPhos  99  04-16    Diuresis  Iv Fluids:  Continue to monitor I/Os, BUN/Creatinine.   Replete lytes PRN  Jimenez Void     ======================== GASTROINTESTINAL========  Diet:NPO     GI prophylaxis :PPI  Pepcid  Bowel regimen:    LIVER FUNCTIONS - ( 2024 21:36 )  Alb: 3.0 g/dL / Pro: 5.4 g/dL / ALK PHOS: 99 U/L / ALT: 50 U/L / AST: 103 U/L / GGT: x             ascorbic acid 500 milliGRAM(s) Oral two times a day  famotidine Injectable 20 milliGRAM(s) IV Push every 12 hours  potassium chloride  10 mEq/50 mL IVPB 10 milliEquivalent(s) IV Intermittent every 1 hour  potassium chloride  10 mEq/50 mL IVPB 10 milliEquivalent(s) IV Intermittent every 1 hour  potassium chloride  10 mEq/50 mL IVPB 10 milliEquivalent(s) IV Intermittent every 1 hour  sodium chloride 0.9%. 1000 milliLiter(s) (10 mL/Hr) IV Continuous <Continuous>    ======================= ENDOCRINE  =============  HbA1c:  Glycemic control : insulin drip  , Lantus/NPH, Lispro sliding scale  statin   Hypothyroid : synthroid TSH    dextrose 50% Injectable 50 milliLiter(s) IV Push every 15 minutes  dextrose 50% Injectable 25 milliLiter(s) IV Push every 15 minutes  insulin regular Infusion 3 Unit(s)/Hr (3 mL/Hr) IV Continuous <Continuous>    ========================INFECTIOUS DISEASE========  No active antibiotic coverage    prophylactic antibiotics:  treatment antibiotics:  blood cx:  sputum cx:  urine cx:    cefuroxime  IVPB 1500 milliGRAM(s) IV Intermittent every 8 hours    -----------------------------------------------------------------------------------------------------------  ALL MEDICATIONS   MEDICATIONS  (STANDING):  acetaminophen     Tablet .. 650 milliGRAM(s) Oral every 6 hours  aMIOdarone    Tablet 400 milliGRAM(s) Oral two times a day  ascorbic acid 500 milliGRAM(s) Oral two times a day  aspirin enteric coated 81 milliGRAM(s) Oral daily  aspirin Suppository 300 milliGRAM(s) Rectal once  cefuroxime  IVPB 1500 milliGRAM(s) IV Intermittent every 8 hours  chlorhexidine 0.12% Liquid 15 milliLiter(s) Oral Mucosa every 12 hours  chlorhexidine 2% Cloths 1 Application(s) Topical daily  dexMEDEtomidine Infusion 0.5 MICROgram(s)/kG/Hr (11.1 mL/Hr) IV Continuous <Continuous>  dextrose 50% Injectable 50 milliLiter(s) IV Push every 15 minutes  dextrose 50% Injectable 25 milliLiter(s) IV Push every 15 minutes  famotidine Injectable 20 milliGRAM(s) IV Push every 12 hours  gabapentin 100 milliGRAM(s) Oral every 8 hours  insulin regular Infusion 3 Unit(s)/Hr (3 mL/Hr) IV Continuous <Continuous>  meperidine     Injectable 25 milliGRAM(s) IV Push once  potassium chloride  10 mEq/50 mL IVPB 10 milliEquivalent(s) IV Intermittent every 1 hour  potassium chloride  10 mEq/50 mL IVPB 10 milliEquivalent(s) IV Intermittent every 1 hour  potassium chloride  10 mEq/50 mL IVPB 10 milliEquivalent(s) IV Intermittent every 1 hour  sodium chloride 0.9%. 1000 milliLiter(s) (10 mL/Hr) IV Continuous <Continuous>    MEDICATIONS  (PRN):  HYDROmorphone  Injectable 0.5 milliGRAM(s) IV Push every 6 hours PRN Breakthrough Pain  oxyCODONE    IR 10 milliGRAM(s) Oral every 4 hours PRN Severe Pain (7 - 10)  oxyCODONE    IR 5 milliGRAM(s) Oral every 4 hours PRN Moderate Pain (4 - 6)    ------------------------------------------------------------------------------------------------------  .kyle Anderson MD  intensivist   MIKKI ARENAS   DENISE IRAHETA  MRN-[id number]  Patient is a 64y old  Female who presents with a chief complaint of cardiogenic shock (2024 08:10)    HPI:  Ms Denise Iraheta is a 65 y/o female who is a current smoker with a PMHx of HLD, HTN, AFib (diagnosed recently, not on AC), benzodiazepine/opioid and etoh abuse, and mitral valve prolapse presenting to the ER with shortness of breath. Pt reports developing shortness of breath, fatigue, and a sense of feeling unwell for the last week, with milder symptoms present longer. Her daughter at bedside reports pt is lethargic, not herself and was having difficulty breathing at home.      Upon arrival to the ED she was pale and ill appearing. Was found to be hypoxic to 77% on room air requiring 3L nasal cannula as well as hypotensive to the 70s requiring levophed. On pocus she was found to have a small pericardial effusion, severe MR w/ plethoric IVC and bilateral B lines. Cardiology was consulted for concern of cardiogenic shock. She is now s/p RHC with IABP and swan purnima catheter placement.     On admission to CICU she is A&O x4, saturating well on 4L nasal cannula, IABP 1:1 augmenting 100 on .1mcg/kg/min of norepinephrine.    (2024 20:11)    Surgery/Hospital course:  2024 s/p MVR, MAZE procedure, LAAL    brought to icu from OR , intubated. full vent support.  sedated on precedex  drips Dobutamine      Physical Exam:  Gen:  mild lethargy post anaesthesia  CNS: non focal  Neck: no JVD  RES : clear , no wheezing    Chest:   + chest tubes                     CVS: Regular  rhythm. Normal S1/S2  Abd: Soft, non-distended. Bowel sounds present.  Skin: No rash.  Ext:  no edema    Vital Signs Last 24 Hrs  T(C): 34.6 (2024 21:00), Max: 37.1 (2024 08:00)  T(F): 94.3 (2024 21:00), Max: 98.7 (2024 08:00)  HR: 80 (2024 23:00) (63 - 87)  BP: 85/61 (2024 12:10) (85/61 - 85/61)  BP(mean): 69 (2024 12:10) (69 - 69)  RR: 12 (2024 23:00) (11 - 29)  SpO2: 100% (2024 23:00) (92% - 100%)    Parameters below as of 2024 21:00  Patient On (Oxygen Delivery Method): ventilator    O2 Concentration (%): 50    LABS:                        11.0   17.67 )-----------( 134      ( 2024 21:36 )             34.0     04-16    137  |  103  |  20  ----------------------------<  137<H>  4.4   |  23  |  0.71    Ca    9.0      2024 21:36  Phos  3.0     04-16  Mg     1.8     04-16    TPro  5.4<L>  /  Alb  3.0<L>  /  TBili  0.9  /  DBili  x   /  AST  103<H>  /  ALT  50<H>  /  AlkPhos  99  04-16    PT/INR - ( 2024 04:33 )   PT: 11.7 sec;   INR: 1.07 ratio         PTT - ( 2024 04:33 )  PTT:62.1 sec  Urinalysis Basic - ( 2024 21:36 )    ABG - ( 2024 22:26 )  pH, Arterial: 7.34  pH, Blood: x     /  pCO2: 47    /  pO2: 128   / HCO3: 25    / Base Excess: -0.7  /  SaO2: 99.5          ============================I/O===========================   I&O's Detail    15 Apr 2024 07:01  -  2024 07:00  --------------------------------------------------------  IN:    Heparin: 288 mL    IV PiggyBack: 50 mL    Milrinone: 13.2 mL    Oral Fluid: 240 mL  Total IN: 591.2 mL    OUT:    Voided (mL): 1550 mL  Total OUT: 1550 mL    Total NET: -958.8 mL      2024 07:01  -  2024 23:28  --------------------------------------------------------  IN:    Dexmedetomidine: 66.6 mL    Oral Fluid: 200 mL    sodium chloride 0.9%: 30 mL  Total IN: 296.6 mL    OUT:    Chest Tube (mL): 0 mL    Chest Tube (mL): 30 mL    Insulin: 0 mL    Voided (mL): 400 mL  Total OUT: 430 mL    Total NET: -133.4 mL    =====================Rad/Cardio/cultures =================  CXR: Reviewed  Echo:Reviewed  ======================================================  Home Medications:  carvedilol 12.5 mg oral tablet: 1 tab(s) orally 2 times a day (2024 18:21)  clonazePAM 2 mg oral tablet, disintegratin tab(s) orally 3 times a day, As needed, Anxiety (2024 18:22)  Crestor 5 mg oral tablet: 1 tab(s) orally once a day (2024 18:21)  losartan-hydroCHLOROthiazide 100 mg-25 mg oral tablet: 1 tab(s) orally once a day (2024 18:22)  oxyCODONE 20 mg oral tablet: 1 tab(s) orally 4 times a day, As needed, Moderate Pain (4 - 6) (2024 18:21)    PAST MEDICAL & SURGICAL HISTORY:  HTN (hypertension)  Mitral valve prolapse  HLD (hyperlipidemia)    ====================ASSESMENT/MDM ==============  2024 s/p MVR, MAZE procedure, LAAL    At risk for respiratory insufficiency  post op pain  chest tube in place  hemodynamic instability  encounter weaning from and management of  respirator  encounter management of temporary Pacing  and wires interogation, VVI   encounter IABP management  HTN (hypertension)  Mitral valve prolapse  HLD (hyperlipidemia)    Plan:  ====================== NEUROLOGY============  pain control:     acetaminophen     Tablet .. 650 milliGRAM(s) Oral every 6 hours  aspirin Suppository 300 milliGRAM(s) Rectal once  dexMEDEtomidine Infusion 0.5 MICROgram(s)/kG/Hr (11.1 mL/Hr) IV Continuous <Continuous>  gabapentin 100 milliGRAM(s) Oral every 8 hours  HYDROmorphone  Injectable 0.5 milliGRAM(s) IV Push every 6 hours PRN Breakthrough Pain  meperidine     Injectable 25 milliGRAM(s) IV Push once  oxyCODONE    IR 10 milliGRAM(s) Oral every 4 hours PRN Severe Pain (7 - 10)  oxyCODONE    IR 5 milliGRAM(s) Oral every 4 hours PRN Moderate Pain (4 - 6)    ====================== RESPIRATORY============  O2: NC CPAP BIPAP HF VENT / settings  ABG - ( 2024 22:26 )  pH, Arterial: 7.34  pH, Blood: x     /  pCO2: 47    /  pO2: 128   / HCO3: 25    / Base Excess: -0.7  /  SaO2: 99.5      Mechanical Ventilation:  Mode: AC/ CMV (Assist Control/ Continuous Mandatory Ventilation)  RR (machine): 12  TV (machine): 500  FiO2: 50  PEEP: 5  ITime: 1  MAP: 8  PIP: 21    wean to extubate    ======================CARDIOVASCULAR =========  Monitor Hemodynamic, Telemetry  hemodynamics :   inotropes: Dobutamine         aMIOdarone    Tablet 400 milliGRAM(s) Oral two times a day    titrate pressors for mean BP above 60  titrate inotropes     =======================Hematology===============                        11.0   17.67 )-----------( 134      ( 2024 21:36 )             34.0        PT/INR - ( 2024 04:33 )   PT: 11.7 sec;   INR: 1.07 ratio         PTT - ( 2024 04:33 )  PTT:62.1 sec   aspirin enteric coated 81 milliGRAM(s) Oral daily      monitor  Hb/Hct ,plts ,coags     ========================RENAL ===================  16    137  |  103  |  20  ----------------------------<  137<H>  4.4   |  23  |  0.71    Ca    9.0      2024 21:36  Phos  3.0     -16  Mg     1.8         TPro  5.4<L>  /  Alb  3.0<L>  /  TBili  0.9  /  DBili  x   /  AST  103<H>  /  ALT  50<H>  /  AlkPhos  99  -16      ======================== GASTROINTESTINAL========  Diet:NPO     GI prophylaxis :PPI  Pepcid  Bowel regimen:    LIVER FUNCTIONS - ( 2024 21:36 )  Alb: 3.0 g/dL / Pro: 5.4 g/dL / ALK PHOS: 99 U/L / ALT: 50 U/L / AST: 103 U/L / GGT: x             ascorbic acid 500 milliGRAM(s) Oral two times a day  famotidine Injectable 20 milliGRAM(s) IV Push every 12 hours  potassium chloride  10 mEq/50 mL IVPB 10 milliEquivalent(s) IV Intermittent every 1 hour  potassium chloride  10 mEq/50 mL IVPB 10 milliEquivalent(s) IV Intermittent every 1 hour  potassium chloride  10 mEq/50 mL IVPB 10 milliEquivalent(s) IV Intermittent every 1 hour  sodium chloride 0.9%. 1000 milliLiter(s) (10 mL/Hr) IV Continuous <Continuous>    ======================= ENDOCRINE  =============  HbA1c:  Glycemic control : insulin drip  ,dextrose 50% Injectable 50 milliLiter(s) IV Push every 15 minutes  dextrose 50% Injectable 25 milliLiter(s) IV Push every 15 minutes  insulin regular Infusion 3 Unit(s)/Hr (3 mL/Hr) IV Continuous <Continuous>    ========================INFECTIOUS DISEASE========  No active antibiotic coverage    prophylactic antibiotics:  treatment antibiotics:  blood cx:  sputum cx:  urine cx:    cefuroxime  IVPB 1500 milliGRAM(s) IV Intermittent every 8 hours    -----------------------------------------------------------------------------------------------------------  ALL MEDICATIONS   MEDICATIONS  (STANDING):  acetaminophen     Tablet .. 650 milliGRAM(s) Oral every 6 hours  aMIOdarone    Tablet 400 milliGRAM(s) Oral two times a day  ascorbic acid 500 milliGRAM(s) Oral two times a day  aspirin enteric coated 81 milliGRAM(s) Oral daily  aspirin Suppository 300 milliGRAM(s) Rectal once  cefuroxime  IVPB 1500 milliGRAM(s) IV Intermittent every 8 hours  chlorhexidine 0.12% Liquid 15 milliLiter(s) Oral Mucosa every 12 hours  chlorhexidine 2% Cloths 1 Application(s) Topical daily  dexMEDEtomidine Infusion 0.5 MICROgram(s)/kG/Hr (11.1 mL/Hr) IV Continuous <Continuous>  dextrose 50% Injectable 50 milliLiter(s) IV Push every 15 minutes  dextrose 50% Injectable 25 milliLiter(s) IV Push every 15 minutes  famotidine Injectable 20 milliGRAM(s) IV Push every 12 hours  gabapentin 100 milliGRAM(s) Oral every 8 hours  insulin regular Infusion 3 Unit(s)/Hr (3 mL/Hr) IV Continuous <Continuous>  meperidine     Injectable 25 milliGRAM(s) IV Push once  potassium chloride  10 mEq/50 mL IVPB 10 milliEquivalent(s) IV Intermittent every 1 hour  potassium chloride  10 mEq/50 mL IVPB 10 milliEquivalent(s) IV Intermittent every 1 hour  potassium chloride  10 mEq/50 mL IVPB 10 milliEquivalent(s) IV Intermittent every 1 hour  sodium chloride 0.9%. 1000 milliLiter(s) (10 mL/Hr) IV Continuous <Continuous>    MEDICATIONS  (PRN):  HYDROmorphone  Injectable 0.5 milliGRAM(s) IV Push every 6 hours PRN Breakthrough Pain  oxyCODONE    IR 10 milliGRAM(s) Oral every 4 hours PRN Severe Pain (7 - 10)  oxyCODONE    IR 5 milliGRAM(s) Oral every 4 hours PRN Moderate Pain (4 - 6)    ------------------------------------------------------------------------------------------------------  Patient requires continuous monitoring with:  bedside rhythm monitoring, arterial line, O2 supplement and pulse oximetry monitoring ; ventilator management and monitoring; intermittent blood gas analysis.  Care plan discussed with ICU care team.  patient remain critical, at risk for life threatining decompensation; required more than usual post op care; I have spent 35 minutes providing non routine post op care, revaluated multiple times.      Jessica Anderson MD  intensivist   CT ICU

## 2024-04-16 NOTE — BRIEF OPERATIVE NOTE - OPERATION/FINDINGS
CPB: 101 mins | Aortic Cross Clamp: 74 mins  -----  Procedure:  MVR(t) - Epic 33mm valve  Maze procedure  LAAL - 40mm AtriClip  -----  Wires: 1A 1V

## 2024-04-16 NOTE — PROGRESS NOTE ADULT - ASSESSMENT
Ms Denise Bautista is a 65 y/o female who is a current smoker with a PMHx of HLD, HTN, AFib (diagnosed recently, not on AC), benzodiazepine/opioid and etoh abuse, and mitral valve prolapse presenting to the ER with shortness of breath, found to have severe TR/MR.  She is now s/p RHC with IABP and swan purnima catheter placement.     ====================== NEUROLOGY=====================   #Substance Use Disorder   #chronic back pain  - A&O x4 on exam  - takes oxycodone 20mg TID for chronic back pain s/t work injury, order as needed  - on lorazepam 2mg TID (on clonazepam at home)  - istop confirmed, printed in chart  - alcohol use disorder as per daughter, continue ciwa assessment  - continue to monitor mental status per protocol    ==================== RESPIRATORY======================   #Acute Hypoxic Respiratory Failure   - requiring 4L nasal cannula s/t pulmonary edema i/s/o severe MR  - wean nasal cannula w/ diuresis  - continue to monitor sp02    ====================CARDIOVASCULAR==================   #Cardiogenic Shock   - i/s/o severe MR and pHTN requiring IABP   - TTE 4/13: severe MR and TR with normal LV size, thickness, and EF of 60-65%.  LA severely dilated.  Moderate tricuspid regurgitation.  Mildly enlarged RV with reduced systolic function, severe pHTN  - s/p RHC w/ PAC and IABP placement  - CVP 10 on admission  - Hold diuresis  - HF consulted, appreciate recs  - Discontinue milrinone  - continue IABP 1:1 w/ heparin gtt  - f/u mv02, lactate and perfusion indices  - off norepinephrine  - continue strict I&O, daily weights, frequent mv02 trending w/ lactate monitoring  - Will hold on pre-operative TTE  - CT surgery for possible mitral valve repair     #Atrial Fibrillation/Flutter  - diagnosed as outpatient, not on AC at home  - rates > 100 on admission, continue to monitor w/ weaning levo  - Heparin gtt   - continue to monitor tele, electrolytes  - Discussed cardioversion with EP, will need to decide whether patient needs CT surgery intervention prior to determining whether she can be cardioverted    ==================== GASTROINTESTINAL===================   #Elevated LFTs  - likely i/s/o low flow state  - avoid hepatotoxins, continue to trend daily    - DASH diet   - continue bowel regimen      ===================== RENAL =========================   #ISABELA  - sCR 1.66 on admission, unknown baseline  - continue to trend, avoid nephrotoxins  - replete lytes PRN, keep K>4 and Mg>2      ===================HEMATOLOGIC/ONC ===================   - H/H and platelets stable  - continue to trend daily  #DVT: heparin gtt for IABP & a.fib    =======================    ENDOCRINE  =====================   - f.u a1c, lipid panel, tsh  - glucose controlled on cmp, continue to trend    ========================INFECTIOUS DISEASE================   - afebrile, no leukocytosis    - f/u blood cultures x2,   - continue to monitor WBC and fever curve      #full code  Lines: R fem IABP (4/13 -  R fem PAC (4/13 -     ======================= DISPOSITION  =====================  [X] Critical   [ ] Guarded    [ ] Stable    [X] Maintain in CICU  [ ] Downgrade to Telemtry  [ ] Discharge Home Ms Denise Bautista is a 65 y/o female who is a current smoker with a PMHx of HLD, HTN, AFib (diagnosed recently, not on AC), benzodiazepine/opioid and etoh abuse, and mitral valve prolapse presenting to the ER with shortness of breath, found to have severe TR/MR.  She is now s/p RHC with IABP and swan purnima catheter placement.     ====================== NEUROLOGY=====================   #Substance Use Disorder   #chronic back pain  - A&O x4 on exam  - takes oxycodone 20mg TID for chronic back pain s/t work injury, order as needed  - on lorazepam 2mg TID (on clonazepam at home)  - istop confirmed, printed in chart  - alcohol use disorder as per daughter, continue ciwa assessment  - continue to monitor mental status per protocol    ==================== RESPIRATORY======================   #Acute Hypoxic Respiratory Failure   - required 4L nasal cannula s/t pulmonary edema i/s/o severe MR in ED, ORA now  - continue to monitor sp02    ====================CARDIOVASCULAR==================   #Cardiogenic Shock   #MR  - i/s/o severe MR and pHTN requiring IABP   - TTE 4/13: severe MR and TR with normal LV size, thickness, and EF of 60-65%.  LA severely dilated.  Moderate tricuspid regurgitation.  Mildly enlarged RV with reduced systolic function, severe pHTN  - s/p RHC w/ PAC and IABP placement  - CVP 10 on admission  - Hold diuresis  - HF consulted, appreciate recs  - Discontinue milrinone  - continue IABP 1:1 w/ heparin gtt  - f/u mv02, lactate and perfusion indices  - off norepinephrine  - continue strict I&O, daily weights, frequent mv02 trending w/ lactate monitoring  - Will hold on pre-operative TTE  - CT surgery for possible mitral valve repair     #Atrial Fibrillation/Flutter  - diagnosed as outpatient, not on AC at home  - rates > 100 on admission, continue to monitor w/ weaning levo  - Heparin gtt   - continue to monitor tele, electrolytes  - Discussed cardioversion with EP, will need to decide whether patient needs CT surgery intervention prior to determining whether she can be cardioverted  - Continue digoxin    ==================== GASTROINTESTINAL===================   #Elevated LFTs  - likely i/s/o low flow state  - avoid hepatotoxins, continue to trend daily    - DASH diet   - continue bowel regimen      ===================== RENAL =========================   #ISABELA  - sCR 1.66 on admission, unknown baseline  - continue to trend, avoid nephrotoxins  - replete lytes PRN, keep K>4 and Mg>2      ===================HEMATOLOGIC/ONC ===================   - H/H and platelets stable  - continue to trend daily  #DVT: heparin gtt for IABP & a.fib    =======================    ENDOCRINE  =====================   - f.u a1c, lipid panel, tsh  - glucose controlled on cmp, continue to trend    ========================INFECTIOUS DISEASE================   - afebrile, no leukocytosis    - f/u blood cultures x2, NGTD  - On cefuroxime per CTS  - continue to monitor WBC and fever curve      #full code  Lines: R fem IABP (4/13 -  R fem PAC (4/13 -     ======================= DISPOSITION  =====================  [X] Critical   [ ] Guarded    [ ] Stable    [X] Maintain in CICU  [ ] Downgrade to Telemtry  [ ] Discharge Home Ms Denise Bautista is a 65 y/o female who is a current smoker with a PMHx of HLD, HTN, AFib (diagnosed recently, not on AC), benzodiazepine/opioid and etoh abuse, and mitral valve prolapse presenting to the ER with shortness of breath, found to have severe TR/MR.  She is now s/p RHC with IABP and swan purnima catheter placement.     ====================== NEUROLOGY=====================   #Substance Use Disorder   #chronic back pain  - A&O x4 on exam  - takes oxycodone 20mg TID for chronic back pain s/t work injury, order as needed  - on lorazepam 2mg TID (on clonazepam at home)  - istop confirmed, printed in chart  - alcohol use disorder as per daughter, continue ciwa assessment  - continue to monitor mental status per protocol    ==================== RESPIRATORY======================   #Acute Hypoxic Respiratory Failure   - required 4L nasal cannula s/t pulmonary edema i/s/o severe MR in ED, ORA now  - continue to monitor sp02    ====================CARDIOVASCULAR==================   #Cardiogenic Shock   #MR  - i/s/o severe MR and pHTN requiring IABP   - TTE 4/13: severe MR and TR with normal LV size, thickness, and EF of 60-65%.  LA severely dilated.  Moderate tricuspid regurgitation.  Mildly enlarged RV with reduced systolic function, severe pHTN  - s/p RHC w/ PAC and IABP placement  - CVP 10 on admission  - Hold diuresis  - HF consulted, appreciate recs  - Off milrinone  - continue IABP 1:1 w/ heparin gtt - gtt on hold iso surgery today  - f/u mv02, lactate and perfusion indices  - off norepinephrine  - continue strict I&O, daily weights, frequent mv02 trending w/ lactate monitoring  - Will hold on pre-operative BRUNA  - CT surgery for mitral valve repair and possible tricuspid valve repair today    #Atrial Fibrillation/Flutter  - diagnosed as outpatient, not on AC at home  - rates > 100 on admission, continue to monitor w/ weaning levo  - Heparin gtt on hold due to surgery today  - continue to monitor tele, electrolytes  - Discussed cardioversion with EP, will likely not cardiovert as patient is going for surgery and cannot be on AC afterwards  - Continue digoxin    ==================== GASTROINTESTINAL===================   #Elevated LFTs  - likely i/s/o low flow state  - avoid hepatotoxins, continue to trend daily    - DASH diet   - continue bowel regimen      ===================== RENAL =========================   #ISABELA  - sCR 1.66 on admission, unknown baseline  - continue to trend, avoid nephrotoxins  - replete lytes PRN, keep K>4 and Mg>2      ===================HEMATOLOGIC/ONC ===================   - H/H and platelets stable  - continue to trend daily  #DVT: heparin gtt for IABP & a.fib    =======================    ENDOCRINE  =====================   - A1c 5.6%, TSH 2.29, lipid panel reassuring  - glucose controlled on cmp, continue to trend    ========================INFECTIOUS DISEASE================   - afebrile, no leukocytosis    - f/u blood cultures x2, NGTD  - On cefuroxime per CTS  - continue to monitor WBC and fever curve      #full code  Lines: R fem IABP (4/13 -  R fem PAC (4/13 -     ======================= DISPOSITION  =====================  [X] Critical   [ ] Guarded    [ ] Stable    [X] Maintain in CICU  [ ] Downgrade to Telemtry  [ ] Discharge Home Ms Denise Bautista is a 65 y/o female who is a current smoker with a PMHx of HLD, HTN, AFib (diagnosed recently, not on AC), benzodiazepine/opioid and etoh abuse, and mitral valve prolapse presenting to the ER with shortness of breath, found to have severe TR/MR.  She is now s/p RHC with IABP and swan purnima catheter placement.     ====================== NEUROLOGY=====================   #Substance Use Disorder   #chronic back pain  - A&O x4 on exam  - takes oxycodone 20mg TID for chronic back pain s/t work injury, order as needed  - on lorazepam 2mg TID (on clonazepam at home)  - istop confirmed, printed in chart  - alcohol use disorder as per daughter, continue ciwa assessment  - continue to monitor mental status per protocol    ==================== RESPIRATORY======================   #Acute Hypoxic Respiratory Failure   - required 4L nasal cannula s/t pulmonary edema i/s/o severe MR in ED, ORA now  - continue to monitor sp02    ====================CARDIOVASCULAR==================   #Cardiogenic Shock   #MR  - i/s/o severe MR and pHTN requiring IABP   - TTE 4/13: severe MR and TR with normal LV size, thickness, and EF of 60-65%.  LA severely dilated.  Moderate tricuspid regurgitation.  Mildly enlarged RV with reduced systolic function, severe pHTN  - s/p RHC w/ PAC and IABP placement  - CVP 10 on admission  - Hold diuresis  - HF consulted, appreciate recs  - Off milrinone  - continue IABP 1:1 w/ heparin gtt - gtt on hold iso surgery today  - f/u mv02, lactate and perfusion indices  - off norepinephrine  - continue strict I&O, daily weights, frequent mv02 trending w/ lactate monitoring  - Will hold on pre-operative BRUNA  - CT surgery for mitral valve repair and possible tricuspid valve repair today    #Atrial Fibrillation/Flutter  - diagnosed as outpatient, not on AC at home  - rates > 100 on admission, continue to monitor w/ weaning levo  - Heparin gtt on hold due to surgery today  - continue to monitor tele, electrolytes  - Discussed cardioversion with EP, will likely not cardiovert as patient is going for surgery  - Continue digoxin    ==================== GASTROINTESTINAL===================   #Elevated LFTs  - likely i/s/o low flow state  - avoid hepatotoxins, continue to trend daily    - DASH diet   - continue bowel regimen      ===================== RENAL =========================   #ISABELA  - sCR 1.66 on admission, unknown baseline  - continue to trend, avoid nephrotoxins  - replete lytes PRN, keep K>4 and Mg>2      ===================HEMATOLOGIC/ONC ===================   - H/H and platelets stable  - continue to trend daily  #DVT: heparin gtt for IABP & a.fib    =======================    ENDOCRINE  =====================   - A1c 5.6%, TSH 2.29, lipid panel reassuring  - glucose controlled on cmp, continue to trend    ========================INFECTIOUS DISEASE================   - afebrile, no leukocytosis    - f/u blood cultures x2, NGTD  - On cefuroxime per CTS  - continue to monitor WBC and fever curve      #full code  Lines: R fem IABP (4/13 -  R fem PAC (4/13 -     ======================= DISPOSITION  =====================  [X] Critical   [ ] Guarded    [ ] Stable    [X] Maintain in CICU  [ ] Downgrade to Telemtry  [ ] Discharge Home

## 2024-04-17 DIAGNOSIS — I50.30 UNSPECIFIED DIASTOLIC (CONGESTIVE) HEART FAILURE: ICD-10-CM

## 2024-04-17 LAB
ALBUMIN SERPL ELPH-MCNC: 3.6 G/DL — SIGNIFICANT CHANGE UP (ref 3.3–5)
ALP SERPL-CCNC: 99 U/L — SIGNIFICANT CHANGE UP (ref 40–120)
ALT FLD-CCNC: 53 U/L — HIGH (ref 10–45)
ANION GAP SERPL CALC-SCNC: 10 MMOL/L — SIGNIFICANT CHANGE UP (ref 5–17)
APTT BLD: 28.8 SEC — SIGNIFICANT CHANGE UP (ref 24.5–35.6)
APTT BLD: 29.3 SEC — SIGNIFICANT CHANGE UP (ref 24.5–35.6)
AST SERPL-CCNC: 119 U/L — HIGH (ref 10–40)
BASE EXCESS BLDMV CALC-SCNC: -0.7 MMOL/L — SIGNIFICANT CHANGE UP (ref -3–3)
BASE EXCESS BLDMV CALC-SCNC: -0.9 MMOL/L — SIGNIFICANT CHANGE UP (ref -3–3)
BASE EXCESS BLDMV CALC-SCNC: 1.3 MMOL/L — SIGNIFICANT CHANGE UP (ref -3–3)
BASOPHILS # BLD AUTO: 0.03 K/UL — SIGNIFICANT CHANGE UP (ref 0–0.2)
BASOPHILS NFR BLD AUTO: 0.1 % — SIGNIFICANT CHANGE UP (ref 0–2)
BILIRUB SERPL-MCNC: 0.7 MG/DL — SIGNIFICANT CHANGE UP (ref 0.2–1.2)
BUN SERPL-MCNC: 21 MG/DL — SIGNIFICANT CHANGE UP (ref 7–23)
CALCIUM SERPL-MCNC: 9.1 MG/DL — SIGNIFICANT CHANGE UP (ref 8.4–10.5)
CHLORIDE SERPL-SCNC: 103 MMOL/L — SIGNIFICANT CHANGE UP (ref 96–108)
CO2 BLDMV-SCNC: 25 MMOL/L — SIGNIFICANT CHANGE UP (ref 21–29)
CO2 BLDMV-SCNC: 27 MMOL/L — SIGNIFICANT CHANGE UP (ref 21–29)
CO2 BLDMV-SCNC: 29 MMOL/L — SIGNIFICANT CHANGE UP (ref 21–29)
CO2 SERPL-SCNC: 25 MMOL/L — SIGNIFICANT CHANGE UP (ref 22–31)
CREAT SERPL-MCNC: 0.77 MG/DL — SIGNIFICANT CHANGE UP (ref 0.5–1.3)
EGFR: 86 ML/MIN/1.73M2 — SIGNIFICANT CHANGE UP
EOSINOPHIL # BLD AUTO: 0.01 K/UL — SIGNIFICANT CHANGE UP (ref 0–0.5)
EOSINOPHIL NFR BLD AUTO: 0 % — SIGNIFICANT CHANGE UP (ref 0–6)
FIBRINOGEN PPP-MCNC: 255 MG/DL — SIGNIFICANT CHANGE UP (ref 200–445)
GAS PNL BLDA: SIGNIFICANT CHANGE UP
GAS PNL BLDMV: SIGNIFICANT CHANGE UP
GLUCOSE BLDC GLUCOMTR-MCNC: 102 MG/DL — HIGH (ref 70–99)
GLUCOSE BLDC GLUCOMTR-MCNC: 105 MG/DL — HIGH (ref 70–99)
GLUCOSE BLDC GLUCOMTR-MCNC: 108 MG/DL — HIGH (ref 70–99)
GLUCOSE BLDC GLUCOMTR-MCNC: 115 MG/DL — HIGH (ref 70–99)
GLUCOSE BLDC GLUCOMTR-MCNC: 121 MG/DL — HIGH (ref 70–99)
GLUCOSE BLDC GLUCOMTR-MCNC: 121 MG/DL — HIGH (ref 70–99)
GLUCOSE BLDC GLUCOMTR-MCNC: 125 MG/DL — HIGH (ref 70–99)
GLUCOSE BLDC GLUCOMTR-MCNC: 136 MG/DL — HIGH (ref 70–99)
GLUCOSE BLDC GLUCOMTR-MCNC: 138 MG/DL — HIGH (ref 70–99)
GLUCOSE BLDC GLUCOMTR-MCNC: 141 MG/DL — HIGH (ref 70–99)
GLUCOSE BLDC GLUCOMTR-MCNC: 151 MG/DL — HIGH (ref 70–99)
GLUCOSE BLDC GLUCOMTR-MCNC: 151 MG/DL — HIGH (ref 70–99)
GLUCOSE BLDC GLUCOMTR-MCNC: 154 MG/DL — HIGH (ref 70–99)
GLUCOSE BLDC GLUCOMTR-MCNC: 94 MG/DL — SIGNIFICANT CHANGE UP (ref 70–99)
GLUCOSE BLDC GLUCOMTR-MCNC: 94 MG/DL — SIGNIFICANT CHANGE UP (ref 70–99)
GLUCOSE SERPL-MCNC: 136 MG/DL — HIGH (ref 70–99)
HCO3 BLDMV-SCNC: 24 MMOL/L — SIGNIFICANT CHANGE UP (ref 20–28)
HCO3 BLDMV-SCNC: 26 MMOL/L — SIGNIFICANT CHANGE UP (ref 20–28)
HCO3 BLDMV-SCNC: 27 MMOL/L — SIGNIFICANT CHANGE UP (ref 20–28)
HCT VFR BLD CALC: 33.6 % — LOW (ref 34.5–45)
HGB BLD-MCNC: 11 G/DL — LOW (ref 11.5–15.5)
HOROWITZ INDEX BLDMV+IHG-RTO: 28 — SIGNIFICANT CHANGE UP
HOROWITZ INDEX BLDMV+IHG-RTO: 44 — SIGNIFICANT CHANGE UP
HOROWITZ INDEX BLDMV+IHG-RTO: 50 — SIGNIFICANT CHANGE UP
IMM GRANULOCYTES NFR BLD AUTO: 0.8 % — SIGNIFICANT CHANGE UP (ref 0–0.9)
INR BLD: 1.11 RATIO — SIGNIFICANT CHANGE UP (ref 0.85–1.18)
INR BLD: 1.11 RATIO — SIGNIFICANT CHANGE UP (ref 0.85–1.18)
INR BLD: 1.17 RATIO — SIGNIFICANT CHANGE UP (ref 0.85–1.18)
LYMPHOCYTES # BLD AUTO: 0.81 K/UL — LOW (ref 1–3.3)
LYMPHOCYTES # BLD AUTO: 3.7 % — LOW (ref 13–44)
MAGNESIUM SERPL-MCNC: 3 MG/DL — HIGH (ref 1.6–2.6)
MCHC RBC-ENTMCNC: 30.8 PG — SIGNIFICANT CHANGE UP (ref 27–34)
MCHC RBC-ENTMCNC: 32.7 GM/DL — SIGNIFICANT CHANGE UP (ref 32–36)
MCV RBC AUTO: 94.1 FL — SIGNIFICANT CHANGE UP (ref 80–100)
MONOCYTES # BLD AUTO: 1.95 K/UL — HIGH (ref 0–0.9)
MONOCYTES NFR BLD AUTO: 9 % — SIGNIFICANT CHANGE UP (ref 2–14)
NEUTROPHILS # BLD AUTO: 18.66 K/UL — HIGH (ref 1.8–7.4)
NEUTROPHILS NFR BLD AUTO: 86.4 % — HIGH (ref 43–77)
NRBC # BLD: 0 /100 WBCS — SIGNIFICANT CHANGE UP (ref 0–0)
O2 CT VFR BLD CALC: 37 MMHG — SIGNIFICANT CHANGE UP (ref 30–65)
O2 CT VFR BLD CALC: 44 MMHG — SIGNIFICANT CHANGE UP (ref 30–65)
O2 CT VFR BLD CALC: 51 MMHG — SIGNIFICANT CHANGE UP (ref 30–65)
PCO2 BLDMV: 40 MMHG — SIGNIFICANT CHANGE UP (ref 30–65)
PCO2 BLDMV: 47 MMHG — SIGNIFICANT CHANGE UP (ref 30–65)
PCO2 BLDMV: 50 MMHG — SIGNIFICANT CHANGE UP (ref 30–65)
PH BLDMV: 7.32 — SIGNIFICANT CHANGE UP (ref 7.32–7.45)
PH BLDMV: 7.37 — SIGNIFICANT CHANGE UP (ref 7.32–7.45)
PH BLDMV: 7.39 — SIGNIFICANT CHANGE UP (ref 7.32–7.45)
PHOSPHATE SERPL-MCNC: 4.3 MG/DL — SIGNIFICANT CHANGE UP (ref 2.5–4.5)
PLATELET # BLD AUTO: 154 K/UL — SIGNIFICANT CHANGE UP (ref 150–400)
POTASSIUM SERPL-MCNC: 4.7 MMOL/L — SIGNIFICANT CHANGE UP (ref 3.5–5.3)
POTASSIUM SERPL-SCNC: 4.7 MMOL/L — SIGNIFICANT CHANGE UP (ref 3.5–5.3)
PROT SERPL-MCNC: 6.2 G/DL — SIGNIFICANT CHANGE UP (ref 6–8.3)
PROTHROM AB SERPL-ACNC: 11.6 SEC — SIGNIFICANT CHANGE UP (ref 9.5–13)
PROTHROM AB SERPL-ACNC: 12.2 SEC — SIGNIFICANT CHANGE UP (ref 9.5–13)
PROTHROM AB SERPL-ACNC: 12.2 SEC — SIGNIFICANT CHANGE UP (ref 9.5–13)
RBC # BLD: 3.57 M/UL — LOW (ref 3.8–5.2)
RBC # FLD: 15.1 % — HIGH (ref 10.3–14.5)
SAO2 % BLDMV: 65 — SIGNIFICANT CHANGE UP (ref 60–90)
SAO2 % BLDMV: 71.9 — SIGNIFICANT CHANGE UP (ref 60–90)
SAO2 % BLDMV: 81.4 — SIGNIFICANT CHANGE UP (ref 60–90)
SODIUM SERPL-SCNC: 138 MMOL/L — SIGNIFICANT CHANGE UP (ref 135–145)
WBC # BLD: 21.63 K/UL — HIGH (ref 3.8–10.5)
WBC # FLD AUTO: 21.63 K/UL — HIGH (ref 3.8–10.5)

## 2024-04-17 PROCEDURE — 99291 CRITICAL CARE FIRST HOUR: CPT

## 2024-04-17 PROCEDURE — 33968 REMOVE AORTIC ASSIST DEVICE: CPT

## 2024-04-17 PROCEDURE — 71045 X-RAY EXAM CHEST 1 VIEW: CPT | Mod: 26

## 2024-04-17 RX ORDER — FUROSEMIDE 40 MG
20 TABLET ORAL ONCE
Refills: 0 | Status: COMPLETED | OUTPATIENT
Start: 2024-04-17 | End: 2024-04-17

## 2024-04-17 RX ORDER — HYDROMORPHONE HYDROCHLORIDE 2 MG/ML
0.5 INJECTION INTRAMUSCULAR; INTRAVENOUS; SUBCUTANEOUS ONCE
Refills: 0 | Status: DISCONTINUED | OUTPATIENT
Start: 2024-04-17 | End: 2024-04-17

## 2024-04-17 RX ORDER — ALBUMIN HUMAN 25 %
100 VIAL (ML) INTRAVENOUS ONCE
Refills: 0 | Status: COMPLETED | OUTPATIENT
Start: 2024-04-17 | End: 2024-04-17

## 2024-04-17 RX ORDER — VASOPRESSIN 20 [USP'U]/ML
0.01 INJECTION INTRAVENOUS
Qty: 40 | Refills: 0 | Status: DISCONTINUED | OUTPATIENT
Start: 2024-04-17 | End: 2024-04-21

## 2024-04-17 RX ORDER — OXYCODONE HYDROCHLORIDE 5 MG/1
20 TABLET ORAL EVERY 4 HOURS
Refills: 0 | Status: DISCONTINUED | OUTPATIENT
Start: 2024-04-17 | End: 2024-04-22

## 2024-04-17 RX ORDER — WARFARIN SODIUM 2.5 MG/1
5 TABLET ORAL ONCE
Refills: 0 | Status: COMPLETED | OUTPATIENT
Start: 2024-04-17 | End: 2024-04-17

## 2024-04-17 RX ORDER — FAMOTIDINE 10 MG/ML
20 INJECTION INTRAVENOUS
Refills: 0 | Status: DISCONTINUED | OUTPATIENT
Start: 2024-04-17 | End: 2024-04-27

## 2024-04-17 RX ORDER — KETOROLAC TROMETHAMINE 30 MG/ML
15 SYRINGE (ML) INJECTION EVERY 8 HOURS
Refills: 0 | Status: DISCONTINUED | OUTPATIENT
Start: 2024-04-17 | End: 2024-04-19

## 2024-04-17 RX ORDER — HEPARIN SODIUM 5000 [USP'U]/ML
5000 INJECTION INTRAVENOUS; SUBCUTANEOUS EVERY 8 HOURS
Refills: 0 | Status: DISCONTINUED | OUTPATIENT
Start: 2024-04-17 | End: 2024-04-21

## 2024-04-17 RX ORDER — FENTANYL CITRATE 50 UG/ML
50 INJECTION INTRAVENOUS ONCE
Refills: 0 | Status: DISCONTINUED | OUTPATIENT
Start: 2024-04-17 | End: 2024-04-17

## 2024-04-17 RX ADMIN — OXYCODONE HYDROCHLORIDE 10 MILLIGRAM(S): 5 TABLET ORAL at 09:34

## 2024-04-17 RX ADMIN — Medication 15 MILLIGRAM(S): at 19:30

## 2024-04-17 RX ADMIN — OXYCODONE HYDROCHLORIDE 10 MILLIGRAM(S): 5 TABLET ORAL at 10:30

## 2024-04-17 RX ADMIN — HYDROMORPHONE HYDROCHLORIDE 0.5 MILLIGRAM(S): 2 INJECTION INTRAMUSCULAR; INTRAVENOUS; SUBCUTANEOUS at 06:04

## 2024-04-17 RX ADMIN — INSULIN HUMAN 3 UNIT(S)/HR: 100 INJECTION, SOLUTION SUBCUTANEOUS at 07:40

## 2024-04-17 RX ADMIN — FAMOTIDINE 20 MILLIGRAM(S): 10 INJECTION INTRAVENOUS at 17:34

## 2024-04-17 RX ADMIN — Medication 650 MILLIGRAM(S): at 17:34

## 2024-04-17 RX ADMIN — DEXMEDETOMIDINE HYDROCHLORIDE IN 0.9% SODIUM CHLORIDE 11.1 MICROGRAM(S)/KG/HR: 4 INJECTION INTRAVENOUS at 00:41

## 2024-04-17 RX ADMIN — HYDROMORPHONE HYDROCHLORIDE 0.5 MILLIGRAM(S): 2 INJECTION INTRAMUSCULAR; INTRAVENOUS; SUBCUTANEOUS at 22:20

## 2024-04-17 RX ADMIN — Medication 5.32 MICROGRAM(S)/KG/MIN: at 00:41

## 2024-04-17 RX ADMIN — Medication 15 MILLIGRAM(S): at 22:05

## 2024-04-17 RX ADMIN — HYDROMORPHONE HYDROCHLORIDE 0.5 MILLIGRAM(S): 2 INJECTION INTRAMUSCULAR; INTRAVENOUS; SUBCUTANEOUS at 06:34

## 2024-04-17 RX ADMIN — HYDROMORPHONE HYDROCHLORIDE 0.5 MILLIGRAM(S): 2 INJECTION INTRAMUSCULAR; INTRAVENOUS; SUBCUTANEOUS at 02:00

## 2024-04-17 RX ADMIN — HYDROMORPHONE HYDROCHLORIDE 0.5 MILLIGRAM(S): 2 INJECTION INTRAMUSCULAR; INTRAVENOUS; SUBCUTANEOUS at 22:05

## 2024-04-17 RX ADMIN — GABAPENTIN 100 MILLIGRAM(S): 400 CAPSULE ORAL at 15:46

## 2024-04-17 RX ADMIN — Medication 650 MILLIGRAM(S): at 18:30

## 2024-04-17 RX ADMIN — HYDROMORPHONE HYDROCHLORIDE 0.5 MILLIGRAM(S): 2 INJECTION INTRAMUSCULAR; INTRAVENOUS; SUBCUTANEOUS at 00:00

## 2024-04-17 RX ADMIN — Medication 50 MILLILITER(S): at 23:45

## 2024-04-17 RX ADMIN — DEXMEDETOMIDINE HYDROCHLORIDE IN 0.9% SODIUM CHLORIDE 11.1 MICROGRAM(S)/KG/HR: 4 INJECTION INTRAVENOUS at 07:40

## 2024-04-17 RX ADMIN — Medication 650 MILLIGRAM(S): at 13:00

## 2024-04-17 RX ADMIN — Medication 15 MILLIGRAM(S): at 22:20

## 2024-04-17 RX ADMIN — FENTANYL CITRATE 50 MICROGRAM(S): 50 INJECTION INTRAVENOUS at 13:29

## 2024-04-17 RX ADMIN — WARFARIN SODIUM 5 MILLIGRAM(S): 2.5 TABLET ORAL at 21:15

## 2024-04-17 RX ADMIN — Medication 50 MILLIGRAM(S): at 16:19

## 2024-04-17 RX ADMIN — Medication 5.32 MICROGRAM(S)/KG/MIN: at 08:20

## 2024-04-17 RX ADMIN — FENTANYL CITRATE 50 MICROGRAM(S): 50 INJECTION INTRAVENOUS at 13:45

## 2024-04-17 RX ADMIN — AMIODARONE HYDROCHLORIDE 400 MILLIGRAM(S): 400 TABLET ORAL at 17:34

## 2024-04-17 RX ADMIN — CHLORHEXIDINE GLUCONATE 1 APPLICATION(S): 213 SOLUTION TOPICAL at 12:01

## 2024-04-17 RX ADMIN — Medication 50 MILLIGRAM(S): at 22:05

## 2024-04-17 RX ADMIN — Medication 1 MILLIGRAM(S): at 15:57

## 2024-04-17 RX ADMIN — Medication 100 MILLIGRAM(S): at 00:00

## 2024-04-17 RX ADMIN — POLYETHYLENE GLYCOL 3350 17 GRAM(S): 17 POWDER, FOR SOLUTION ORAL at 12:04

## 2024-04-17 RX ADMIN — DEXMEDETOMIDINE HYDROCHLORIDE IN 0.9% SODIUM CHLORIDE 11.1 MICROGRAM(S)/KG/HR: 4 INJECTION INTRAVENOUS at 19:49

## 2024-04-17 RX ADMIN — CHLORHEXIDINE GLUCONATE 15 MILLILITER(S): 213 SOLUTION TOPICAL at 05:57

## 2024-04-17 RX ADMIN — CHLORHEXIDINE GLUCONATE 15 MILLILITER(S): 213 SOLUTION TOPICAL at 17:33

## 2024-04-17 RX ADMIN — Medication 500 MILLIGRAM(S): at 17:34

## 2024-04-17 RX ADMIN — Medication 100 MILLIGRAM(S): at 15:47

## 2024-04-17 RX ADMIN — GABAPENTIN 100 MILLIGRAM(S): 400 CAPSULE ORAL at 21:14

## 2024-04-17 RX ADMIN — HYDROMORPHONE HYDROCHLORIDE 0.5 MILLIGRAM(S): 2 INJECTION INTRAMUSCULAR; INTRAVENOUS; SUBCUTANEOUS at 12:15

## 2024-04-17 RX ADMIN — Medication 650 MILLIGRAM(S): at 06:26

## 2024-04-17 RX ADMIN — Medication 100 MILLIGRAM(S): at 23:51

## 2024-04-17 RX ADMIN — Medication 15 MILLIGRAM(S): at 20:00

## 2024-04-17 RX ADMIN — Medication 81 MILLIGRAM(S): at 12:03

## 2024-04-17 RX ADMIN — Medication 650 MILLIGRAM(S): at 12:03

## 2024-04-17 RX ADMIN — HYDROMORPHONE HYDROCHLORIDE 0.5 MILLIGRAM(S): 2 INJECTION INTRAMUSCULAR; INTRAVENOUS; SUBCUTANEOUS at 12:03

## 2024-04-17 RX ADMIN — Medication 20 MILLIGRAM(S): at 11:57

## 2024-04-17 RX ADMIN — FAMOTIDINE 20 MILLIGRAM(S): 10 INJECTION INTRAVENOUS at 05:57

## 2024-04-17 RX ADMIN — Medication 500 MILLIGRAM(S): at 05:57

## 2024-04-17 RX ADMIN — AMIODARONE HYDROCHLORIDE 400 MILLIGRAM(S): 400 TABLET ORAL at 05:57

## 2024-04-17 RX ADMIN — Medication 100 MILLIGRAM(S): at 07:01

## 2024-04-17 RX ADMIN — Medication 650 MILLIGRAM(S): at 23:58

## 2024-04-17 RX ADMIN — HYDROMORPHONE HYDROCHLORIDE 0.5 MILLIGRAM(S): 2 INJECTION INTRAMUSCULAR; INTRAVENOUS; SUBCUTANEOUS at 04:00

## 2024-04-17 RX ADMIN — GABAPENTIN 100 MILLIGRAM(S): 400 CAPSULE ORAL at 05:57

## 2024-04-17 RX ADMIN — HYDROMORPHONE HYDROCHLORIDE 0.5 MILLIGRAM(S): 2 INJECTION INTRAMUSCULAR; INTRAVENOUS; SUBCUTANEOUS at 03:30

## 2024-04-17 RX ADMIN — VASOPRESSIN 1.5 UNIT(S)/MIN: 20 INJECTION INTRAVENOUS at 19:51

## 2024-04-17 RX ADMIN — Medication 650 MILLIGRAM(S): at 05:56

## 2024-04-17 RX ADMIN — HYDROMORPHONE HYDROCHLORIDE 0.5 MILLIGRAM(S): 2 INJECTION INTRAMUSCULAR; INTRAVENOUS; SUBCUTANEOUS at 02:30

## 2024-04-17 RX ADMIN — HEPARIN SODIUM 5000 UNIT(S): 5000 INJECTION INTRAVENOUS; SUBCUTANEOUS at 22:05

## 2024-04-17 RX ADMIN — SENNA PLUS 2 TABLET(S): 8.6 TABLET ORAL at 21:14

## 2024-04-17 NOTE — PHYSICAL THERAPY INITIAL EVALUATION ADULT - DIAGNOSIS, PT EVAL
supplies; HgbA1C target levels; Factors  logging blood glucose levels for pattern recognition; ketone testing; safe lancet disposal. 2 3/10/20 PC  3 3/10/20 PC  Testing ac breakfast and supper    AC range 111-158    PC range     A1C 8.5%   Prevention, detection & treatment of acute complications:  Identify symptoms of hyper & hypoglycemia, and prevention & treatment strategies. Describe sick day guidelines & indications for ketone testing & physician notification. Identify short term consequences of poor control. 2 3/10/20 PC  3 3/10/20 PC    Does have lows and recognizes them. Rx with juice or candy   Prevention, detection & treatment of chronic complications:  Define the natural course of diabetes & describe the relationship of blood glucose levels to long term complications of diabetes. Identify preventative measures & standards of care. 2 3/10/20 PC  3 3/10/20 PC    Heart disease  High lipids  hypertension   Developing strategies to address psychosocial issues:  Describe feelings about living with diabetes; Describe how stress, depression & anxiety affect blood glucose; Identify coping strategies; Identify support needed & support network available. 2 3/10/20 PC  3 PHQ-9 Depression Screen Score: 1     Developing strategies to promote health/change behavior: Identify 7 self-care behaviors; Personal health risk factors; Benefits, challenges & strategies for behavioral change; Individualized goal selection.      Goal:     Identified Barriers to learning/adherence to self management plan:    None    Instruction Method:  Lecture/Discussion, Power Point Presentation , Handouts and Return demonstration     Education Materials/Equipment Provided: Self-management manual, Meal Plan, Nutritional Packet and Survival Packet        Encounter Type Date Attended Start Time End Time Comments No Show Dates   Assessment 3/10/20      Reviewed in person    Session 1 3/10/20  1800 2000      Session 2 3/11/20 / 1800 2000 Session 3         Session 4         Gestational Diabetes         Individual MNT        Meter Instrx        Insulin Instrx            Additional Comments:    DSMES Follow-up plan/Date: 6/2020  Contact Post Class Regarding:   HgbA1C   Weight   Hypertension  Follow-up with Physician  Medication compliance   Plate method/meal plan compliance   Self-Foot Exam Frequency   Monitoring Frequency   Exercise Routine   Goal Attainment  Personal Support Plan:      [] Keep all scheduled doctor appointments   [] Make and keep appointments with specialists (foot, eye, dentist) as recommended   [] Consult my pharmacist about all new medications or to ask any medication questions   [] Get tested for sleep apnea   [] Seek help for:   [] Make an appointment with:   [] Attend smoking cessation classes or call 1-800-QUIT-NOW  [] Attend Diabetes Support Group   [] Use diabetes magazines, books, or credible web-sites like the ADA for more information  [] Increase exercise at home or join an exercise program:   [] Other: Pt with decreased strength, endurance and balance leading to mild impairment in functional mobility.

## 2024-04-17 NOTE — DIETITIAN INITIAL EVALUATION ADULT - NSFNSGIIOFT_GEN_A_CORE
Last  4/16.     04-16-24 @ 07:01  -  04-17-24 @ 07:00  --------------------------------------------------------  OUT:    Chest Tube (mL): 110 mL    Chest Tube (mL): 90 mL  Total OUT: 200 mL    Total NET: -200 mL      04-17-24 @ 07:01  -  04-17-24 @ 11:28  --------------------------------------------------------  OUT:    Chest Tube (mL): 60 mL    Chest Tube (mL): 15 mL  Total OUT: 75 mL    Total NET: -75 mL

## 2024-04-17 NOTE — PHYSICAL THERAPY INITIAL EVALUATION ADULT - GENERAL OBSERVATIONS, REHAB EVAL
Pt received seated in chair, A&Ox3, +4L O2 via NC, +external pacer, +venodynes, +2 chest tubes, +joseph, +central line, +IVL, +A-line, +cardiac monitor, agreeable to physical therapy abelardo fountain 45 min.

## 2024-04-17 NOTE — PROGRESS NOTE ADULT - PROBLEM SELECTOR PLAN 1
- ECHO 4/17 shows EF 60-65%, with LVDD 4.9 cm. There are no regional wall motion abnormalities seen. The left ventricular diastolic function is indeterminate.   - IABP removed 4/17  - remains on  @ 2 mcg/kg/min  - diuretics PRN to target CVP 6-8  - continue to replete K 4-5, Mg >2.2  - plan to obtain ECHO tomorrow to asses mitral valve

## 2024-04-17 NOTE — ANESTHESIA FOLLOW-UP NOTE - NSEVALATION_GEN_ALL_CORE
- We reviewed her HCGs:  6/6/22 - 32 6/7/22 - 31 6/8/22 - 46  - We reviewed the differential diagnosis  (1) Abnormal intrauterine pregnancy  (2) Ectopic pregnancy  - We reviewed that given her bleeding in the last 24 hours and lack of abdominal pain with normal exam, recommend expectant management with additional serial HCG    Suspect HCG will fall     - Discussed that if HCG continues to rise her options would be   (a) continued expectant management with close serial HCG and follow up  (b) diagnostic D&C (e g  office MVA) to look for intrauterine decidual tissue  (c) empiric treatment with methotrexate No apparent complications or complaints regarding anesthesia care at this time/All questions were answered

## 2024-04-17 NOTE — AIRWAY REMOVAL NOTE  ADULT & PEDS - ARTIFICAL AIRWAY REMOVAL COMMENTS
Written order for extubation verified. The patient was identified by full name and birth date compared to the identification band. Present during the procedure was Indigo GILL.

## 2024-04-17 NOTE — PROGRESS NOTE ADULT - SUBJECTIVE AND OBJECTIVE BOX
Patient seen and examined at the bedside.    Remains critically ill on continuous ICU monitoring.      Brief Summary:  63 yo F s/p MVR-t, MAZE, and LAAC on 4/16/24.  IABP was placed preop.      24 Hour events:      Objective:  Vital Signs Last 24 Hrs  T(C): 35.4 (17 Apr 2024 08:00), Max: 37.1 (16 Apr 2024 12:10)  T(F): 95.7 (17 Apr 2024 08:00), Max: 98.8 (17 Apr 2024 04:00)  HR: 80 (17 Apr 2024 09:00) (63 - 87)  BP: 85/61 (16 Apr 2024 12:10) (85/61 - 85/61)  BP(mean): 69 (16 Apr 2024 12:10) (69 - 69)  RR: 17 (17 Apr 2024 09:00) (9 - 29)  SpO2: 100% (17 Apr 2024 09:00) (91% - 100%)    Parameters below as of 17 Apr 2024 08:00  Patient On (Oxygen Delivery Method): nasal cannula  O2 Flow (L/min): 4              Physical Exam:   General: Alert and interactive   Neurology: Oriented, following commands  Respiratory: Clear bilaterally   CV: Sinus  Abdominal: Soft, Nontender  Extremities: Warm, well-perfused  Jimenez  IABP site ok    -------------------------------------------------------------------------------------------------------------------------------    Labs:                        11.0   21.63 )-----------( 154      ( 17 Apr 2024 02:40 )             33.6     04-17    138  |  103  |  21  ----------------------------<  136<H>  4.7   |  25  |  0.77    Ca    9.1      17 Apr 2024 02:40  Phos  4.3     04-17  Mg     3.0     04-17    TPro  6.2  /  Alb  3.6  /  TBili  0.7  /  DBili  x   /  AST  119<H>  /  ALT  53<H>  /  AlkPhos  99  04-17    LIVER FUNCTIONS - ( 17 Apr 2024 02:40 )  Alb: 3.6 g/dL / Pro: 6.2 g/dL / ALK PHOS: 99 U/L / ALT: 53 U/L / AST: 119 U/L / GGT: x           PT/INR - ( 17 Apr 2024 02:40 )   PT: 11.6 sec;   INR: 1.11 ratio         PTT - ( 17 Apr 2024 02:40 )  PTT:28.8 sec  ABG - ( 17 Apr 2024 03:35 )  pH, Arterial: 7.39  pH, Blood: x     /  pCO2: 42    /  pO2: 109   / HCO3: 25    / Base Excess: 0.3   /  SaO2: 98.9          ------------------------------------------------------------------------------------------------------------------------------  Assessment:  63 yo F s/p MVR-t, MAZE, and LAAC on 4/16/24.  IABP was placed preop.    Postop acute respiratory insufficiency      Plan:   ***Neuro***  Postoperative acute pain control with Tylenol, Gabapentin, and prns.    ***Cardiovascular***  At high risk for hemodynamic instability and cardiac arrhythmias.    Monitor chest tube output.    ***Pulmonary***  Postoperative acute respiratory insufficiency   Deep breathing and coughing exercises.  Wean oxygen as able.    ***GI***  Advance diet.  Protonix/Pepcid for stress ulcer prophylaxis   Bowel regimen.    ***Renal***  Trend Creatinine   Jimenez catheter for strict I/O measurements. *remove if not present*  Replete electrolytes as indicated.    ***ID***  Perioperative coverage with Cefuroxime.    ***Endocrine***  Hyperglycemia - Insulin infusion.     ***Hematology***  No current transfusion indication  Lovenox for VTE prophylaxis once IABP is removed.        Care plan discussed with the ICU care team.   Patient remains critical, at risk for life threatening decompensation.    I have spent 45 minutes providing critical care management to this patient.      Electronically signed: Shira Kirk MD Patient seen and examined at the bedside.    Remains critically ill on continuous ICU monitoring.      Brief Summary:  63 yo F s/p MVR-t, MAZE, and LAAC on 4/16/24.  IABP was placed preop for cardiogenic shock.      24 Hour events:  Extubated postop.  Reports significant pain - Oxycodone increased and pain service consulted.      Objective:  Vital Signs Last 24 Hrs  T(C): 35.4 (17 Apr 2024 08:00), Max: 37.1 (16 Apr 2024 12:10)  T(F): 95.7 (17 Apr 2024 08:00), Max: 98.8 (17 Apr 2024 04:00)  HR: 80 (17 Apr 2024 09:00) (63 - 87)  BP: 85/61 (16 Apr 2024 12:10) (85/61 - 85/61)  BP(mean): 69 (16 Apr 2024 12:10) (69 - 69)  RR: 17 (17 Apr 2024 09:00) (9 - 29)  SpO2: 100% (17 Apr 2024 09:00) (91% - 100%)    Parameters below as of 17 Apr 2024 08:00  Patient On (Oxygen Delivery Method): nasal cannula  O2 Flow (L/min): 4              Physical Exam:   General: Alert and interactive   Neurology: Oriented, following commands  Respiratory: Clear bilaterally   CV: Sinus  Abdominal: Soft, Nontender  Extremities: Warm, well-perfused  Jimenez  IABP site ok    -------------------------------------------------------------------------------------------------------------------------------    Labs:                        11.0   21.63 )-----------( 154      ( 17 Apr 2024 02:40 )             33.6     04-17    138  |  103  |  21  ----------------------------<  136<H>  4.7   |  25  |  0.77    Ca    9.1      17 Apr 2024 02:40  Phos  4.3     04-17  Mg     3.0     04-17    TPro  6.2  /  Alb  3.6  /  TBili  0.7  /  DBili  x   /  AST  119<H>  /  ALT  53<H>  /  AlkPhos  99  04-17    LIVER FUNCTIONS - ( 17 Apr 2024 02:40 )  Alb: 3.6 g/dL / Pro: 6.2 g/dL / ALK PHOS: 99 U/L / ALT: 53 U/L / AST: 119 U/L / GGT: x           PT/INR - ( 17 Apr 2024 02:40 )   PT: 11.6 sec;   INR: 1.11 ratio         PTT - ( 17 Apr 2024 02:40 )  PTT:28.8 sec  ABG - ( 17 Apr 2024 03:35 )  pH, Arterial: 7.39  pH, Blood: x     /  pCO2: 42    /  pO2: 109   / HCO3: 25    / Base Excess: 0.3   /  SaO2: 98.9          ------------------------------------------------------------------------------------------------------------------------------  Assessment:  63 yo F s/p MVR-t, MAZE, and LAAC on 4/16/24.  IABP was placed preop for cardiogenic shock.    Postop acute respiratory insufficiency  Hemodynamic instability  Hyperglycemia      Plan:   ***Neuro***  Postoperative acute pain control with Tylenol, Gabapentin, and prns.  Patient is on Oxycodone at home - will as acute pain service to consult for PCA  Consider Toradol.    ***Cardiovascular***  At high risk for ongoing hemodynamic instability and cardiac arrhythmias.  Remove IABP.  Trend Cardiac index and mixed venous saturation.  Remains on Dobutamine.  Pressors if needed for MAP > 65 mm Hg.  Plan for TTE tomorrow.  Monitor chest tube output.    ***Pulmonary***  Postoperative acute respiratory insufficiency   Deep breathing and coughing exercises.  Wean oxygen as able.    ***GI***  Advance diet.  Pepcid for stress ulcer prophylaxis   Bowel regimen.    ***Renal***  Trend Creatinine   Jimenez catheter for strict I/O measurements.   Replete electrolytes as indicated.    ***ID***  Perioperative coverage with Cefuroxime.    ***Endocrine***  Hyperglycemia - Insulin infusion.     ***Hematology***  No current transfusion indication  Lovenox for VTE prophylaxis once IABP is removed.  Coumadin to start tonight or tomorrow per Dr. Maki      Care plan discussed with the ICU care team.   Patient remains critical, at risk for life threatening decompensation.    I have spent 45 minutes providing critical care management to this patient.      Electronically signed: Shira Kirk MD

## 2024-04-17 NOTE — DIETITIAN INITIAL EVALUATION ADULT - OTHER INFO
- S/p MVR-t, MAZE, and LAAC on 4/16/24. IABP placed pre-op.   - HbA1c 5.6%; post-op stress hyperglycemia managed with insulin drip   - Ordered for Lasix   - Hemodynamic support with vasopressin     Weight Hx:   - Dosing weight 195.5lbs, current wt 194.2lbs.  - S/p MVR-t, MAZE, and LAAC on 4/16/24. IABP placed pre-op.   - HbA1c 5.6%; post-op stress hyperglycemia managed with insulin drip   - Ordered for Lasix   - Hemodynamic support with vasopressin     Weight Hx:   - Dosing weight 195.5lbs, current wt 194.2lbs. Weight fluctuations expected secondary to perioperative fluid shifts/diuresis, will continue to monitor/trend as able.

## 2024-04-17 NOTE — DIETITIAN INITIAL EVALUATION ADULT - ADD RECOMMEND
****IN PROGRESS/INCOMPLETE****  RD remains available to obtain further subjective information on follow up/as able. Provide encouragement with PO intake, menu selections, and assistance with meals as needed. Continue to monitor nutritional intake, labs, weights, BM, skin, clinical course.

## 2024-04-17 NOTE — PROVIDER CONTACT NOTE (OTHER) - ASSESSMENT
Upon assessment patient stated" I do not want IV pain meds! It makes me see things and think of things I haven't thought about in years! I don't want to go through that ever again." I don't like hallucinations!!(patient received IV dilaudid).  Primary nurse states patient experienced hallucinations with Fentanyl as well. When questioned patient stated that oxycontin and oxycodone has worked for her in the past and she is willing to take that along with her anti-anxiety meds she takes at home. However, at this time patient is refusing PCA for pain management.

## 2024-04-17 NOTE — PHYSICAL THERAPY INITIAL EVALUATION ADULT - PERTINENT HX OF CURRENT PROBLEM, REHAB EVAL
65 y/o female who is a current smoker with a PMHx of HLD, HTN, AFib (diagnosed recently, not on AC), benzodiazepine/opioid and etoh abuse, and mitral valve prolapse presenting to the ER with shortness of breath. Pt reports developing shortness of breath, fatigue, and a sense of feeling unwell for the last week, with milder symptoms present longer. Her daughter at bedside reports pt is lethargic, not herself and was having difficulty breathing at home.   Upon arrival to the ED she was pale and ill appearing. Was found to be hypoxic to 77% on room air requiring 3L nasal cannula as well as hypotensive to the 70s requiring levophed. On pocus she was found to have a small pericardial effusion, severe MR w/ plethoric IVC and bilateral B lines. Cardiology was consulted for concern of cardiogenic shock. She is now s/p RHC with IABP and swan purnima catheter placement. Pt s/p intubation, MVR(t), TIMMY clip and maze procedure on 4/16/24. 63 y/o female who is a current smoker with a PMHx of HLD, HTN, AFib (diagnosed recently, not on AC), benzodiazepine/opioid and etoh abuse, and mitral valve prolapse presenting to the ER with shortness of breath. Pt reports developing shortness of breath, fatigue, and a sense of feeling unwell for the last week, with milder symptoms present longer. Her daughter at bedside reports pt is lethargic, not herself and was having difficulty breathing at home.   Upon arrival to the ED she was pale and ill appearing. Was found to be hypoxic to 77% on room air requiring 3L nasal cannula as well as hypotensive to the 70s requiring levophed. On pocus she was found to have a small pericardial effusion, severe MR w/ plethoric IVC and bilateral B lines. Cardiology was consulted for concern of cardiogenic shock. She is now s/p RHC with IABP and swan purnima catheter placement. Pt s/p intubation, MVR(t), TIMMY clip and maze procedure on 4/16/24. Pt s/p removal of iABP on 4/17. 65 y/o female who is a current smoker with a PMHx of HLD, HTN, AFib (diagnosed recently, not on AC), benzodiazepine/opioid and etoh abuse, and mitral valve prolapse presenting to the ER with shortness of breath. Pt reports developing shortness of breath, fatigue, and a sense of feeling unwell for the last week, with milder symptoms present longer. Her daughter at bedside reports pt is lethargic, not herself and was having difficulty breathing at home.  Upon arrival to the ED she was pale and ill appearing. Was found to be hypoxic to 77% on room air requiring 3L nasal cannula as well as hypotensive to the 70s requiring levophed. On pocus she was found to have a small pericardial effusion, severe MR w/ plethoric IVC and bilateral B lines. Cardiology was consulted for concern of cardiogenic shock. She is now s/p RHC with IABP and swan purnima catheter placement. Pt s/p intubation, MVR(t), TIMMY clip and maze procedure on 4/16/24. Pt s/p removal of iABP on 4/17.

## 2024-04-17 NOTE — PROGRESS NOTE ADULT - ASSESSMENT
64F with PMHx of HLD, HTN, AFib (diagnosed recently, not on AC), and mitral valve prolapse found to have severe mitral regurgitation complicated by cardiogenic shock, now s/p RHC with IABP placement. She ultimately underwent MVR(t) with Maze procedure with Dr Maki on 4/16, extubated the following day. IABP removed 4/17. She overall is progressing well,  remains on @ 2mcg/kg/min. 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

## 2024-04-17 NOTE — DIETITIAN INITIAL EVALUATION ADULT - NUTRITION DIAGNOSIS
Anastacia Porter is a 76 y.o. male who presents today for the following:  Chief Complaint   Patient presents with    Rash     c/o continued issue w/ rash (all over: knees, thighs, shins, arms) x2 months       Allergies   Allergen Reactions    Pseudoephedrine Hcl Other (See Comments)     Agitated & blisters on hands & feet. Triprolidine-Pseudoephedrine Other (See Comments)     Make him hyper and the palms of his hands blister and peel    Valsartan Rash       Current Outpatient Medications   Medication Sig Dispense Refill    clotrimazole-betamethasone (LOTRISONE) 1-0.05 % cream Apply topically 2 times daily for up to two weeks to rash. 45 g 1    LUTEIN PO Take by mouth      Multiple Vitamins-Minerals (PRESERVISION AREDS PO) Take by mouth      fexofenadine (ALLEGRA) 180 MG tablet 1 tablet      Multiple Vitamins-Minerals (MULTIVITAMIN ADULT EXTRA C PO)       omeprazole (PRILOSEC) 40 MG delayed release capsule Take 1 capsule by mouth daily 90 capsule 3    Multiple Vitamin (MULTI-DAY PO) Take by mouth      ascorbic acid (VITAMIN C) 500 MG tablet Take 1,000 mg by mouth daily      vitamin D 25 MCG (1000 UT) CAPS Take 2,000 Units by mouth daily       No current facility-administered medications for this visit.        Past Medical History:   Diagnosis Date    Allergic rhinitis 4/23/2014    Aortic stenosis 10/16/2015    Arrhythmia     Arthritis     Atelectasis 7/10/2015    Dysphagia 5/21/2015    Mostly with \"stringy\" foods     Environmental allergies     GERD (gastroesophageal reflux disease)     Hiatal hernia     History of tuberculosis exposure     tested positive 30 years ago     Hypernatremia 7/10/2015    Hypertension     Mononucleosis     Onychomycosis 4/23/2014    S/P AVR (aortic valve replacement) 7/8/2015    Seborrheic keratosis 4/23/2014    Severe aortic valve stenosis 7/9/2015    7/8/15 (Dr cL Guzman) AORTIC VALVE REPLACEMENT (AVR)with 25 mm Tilley bovine pericardial valve; ascending aortoplasty 4/25/15 ECHO Ejection fraction was estimated in the range of 60 % to 65 %      Thoracic aortic aneurysm 10/16/2015       Past Surgical History:   Procedure Laterality Date    CARDIAC CATHETERIZATION      CARDIAC VALVE SURGERY  07/2015    AVR bioprothetic with aortaplasty ascending aortaa     COLONOSCOPY      CYST REMOVAL Left     shoulder and back     VASECTOMY         Social History     Tobacco Use    Smoking status: Never    Smokeless tobacco: Never   Substance Use Topics    Alcohol use: Yes        Family History   Problem Relation Age of Onset    Cancer Mother     Hypertension Sister     Diabetes Sister        Rash  Pertinent negatives include no fever. Pt presents for acute visit for body rash. He is established with Dr. Daxa Walker. Noted hx of seborrheic keratosis and onychomycosis. He was seen in December for rash with thought was from house infested with fleas and dog with fleas and secondary infection from flea bites on antibiotics. He was on valsartan for 5 days and not taking it for 3 weeks. Stopped when the palms of hands started itching which happened with previous drug allergy. Reports multiple flea bites with itching and rash thought to possibly be also d/t valsartan has convalesced and mainly is concerned about secondary infection and possibly needing antibiotics. The rash was noted of pin point size scabs and flesh color papules scattered to back, chest, and abdomen. Pt has few scattered to legs and right arm and outer axillary region. Scratch marks to right shin. OTC cortisone cream and eczema cream as needed; cool shower/baths, and continue to keep areas clean and dry and avoid scratching. He had declined hydroxyzine. Current symptoms:  Itching and rash. Cortisone 10 did not help; helped with itching. Flea bites started. Bacterial cream helps  Cream softens rash and feels better. No facial itching or rash. Review of Systems   Constitutional:  Negative for chills and fever.    Respiratory: Negative for wheezing. Skin:  Positive for rash. Allergic/Immunologic: Positive for environmental allergies. /78   Pulse 62   Ht 6' 2\" (1.88 m)   Wt 206 lb 12.8 oz (93.8 kg)   SpO2 95%   BMI 26.55 kg/m²     Physical Exam  Constitutional:       General: He is not in acute distress. Appearance: Normal appearance. He is normal weight. He is not ill-appearing. HENT:      Head: Normocephalic and atraumatic. Right Ear: External ear normal.      Left Ear: External ear normal.   Eyes:      Extraocular Movements: Extraocular movements intact. Conjunctiva/sclera: Conjunctivae normal.      Pupils: Pupils are equal, round, and reactive to light. Cardiovascular:      Rate and Rhythm: Normal rate and regular rhythm. Pulses: Normal pulses. Heart sounds: Normal heart sounds. Pulmonary:      Effort: Pulmonary effort is normal.      Breath sounds: Normal breath sounds. Abdominal:      General: There is no distension. Musculoskeletal:         General: Normal range of motion. Cervical back: Normal range of motion and neck supple. Right lower leg: No edema. Left lower leg: No edema. Skin:     General: Skin is warm and dry. Coloration: Skin is not jaundiced or pale. Findings: Erythema and rash present. Comments: Sunburn erythema color appearance to nose. Hx treated as rosacea. Pinpoint macular scant purple rash to left anterior shin. Scant flesh colored circular scales to left upper back and right inner arm and inner thighs bilaterally. Inner thigh rash with purplish color from possible scratching. Neurological:      General: No focal deficit present. Mental Status: He is alert and oriented to person, place, and time. Psychiatric:         Mood and Affect: Mood normal.         Behavior: Behavior normal.         Thought Content:  Thought content normal.         Judgment: Judgment normal.        1. Tinea  -     clotrimazole-betamethasone (LOTRISONE) 1-0.05 % cream; Apply topically 2 times daily for up to two weeks to rash., Disp-45 g, R-1, Normal     Will try antifungal.  Consider eczema if no improvement and higher potency corticosteroid and/or dermatology if no improvement. Discussed continued avoiding hot bath/showers (cooler temp), rubbing areas, scented hygiene products and scented fabric detergents. Patient informed, we will call with blood work results within one week. If you have not heard regarding results in over a week, please contact office. You can also review results on mychart. Follow-up and Dispositions    Return if symptoms worsen or fail to improve.          Shira Harrell, VIKTORIA - CNP yes...

## 2024-04-17 NOTE — DIETITIAN INITIAL EVALUATION ADULT - PERTINENT MEDS FT
MEDICATIONS  (STANDING):  acetaminophen     Tablet .. 650 milliGRAM(s) Oral every 6 hours  aMIOdarone    Tablet 400 milliGRAM(s) Oral two times a day  ascorbic acid 500 milliGRAM(s) Oral two times a day  aspirin enteric coated 81 milliGRAM(s) Oral daily  aspirin Suppository 300 milliGRAM(s) Rectal once  cefuroxime  IVPB 1500 milliGRAM(s) IV Intermittent every 8 hours  chlorhexidine 0.12% Liquid 15 milliLiter(s) Oral Mucosa every 12 hours  chlorhexidine 2% Cloths 1 Application(s) Topical daily  dexMEDEtomidine Infusion 0.5 MICROgram(s)/kG/Hr (11.1 mL/Hr) IV Continuous <Continuous>  dextrose 50% Injectable 50 milliLiter(s) IV Push every 15 minutes  dextrose 50% Injectable 25 milliLiter(s) IV Push every 15 minutes  DOBUTamine Infusion 2 MICROgram(s)/kG/Min (5.32 mL/Hr) IV Continuous <Continuous>  famotidine Injectable 20 milliGRAM(s) IV Push every 12 hours  furosemide   Injectable 20 milliGRAM(s) IV Push once  gabapentin 100 milliGRAM(s) Oral every 8 hours  insulin regular Infusion 3 Unit(s)/Hr (3 mL/Hr) IV Continuous <Continuous>  meperidine     Injectable 25 milliGRAM(s) IV Push once  polyethylene glycol 3350 17 Gram(s) Oral daily  potassium chloride  10 mEq/50 mL IVPB 10 milliEquivalent(s) IV Intermittent every 1 hour  potassium chloride  10 mEq/50 mL IVPB 10 milliEquivalent(s) IV Intermittent every 1 hour  potassium chloride  10 mEq/50 mL IVPB 10 milliEquivalent(s) IV Intermittent every 1 hour  senna 2 Tablet(s) Oral at bedtime  sodium chloride 0.9%. 1000 milliLiter(s) (10 mL/Hr) IV Continuous <Continuous>  vasopressin Infusion 0.01 Unit(s)/Min (1.5 mL/Hr) IV Continuous <Continuous>    MEDICATIONS  (PRN):  HYDROmorphone  Injectable 0.5 milliGRAM(s) IV Push every 6 hours PRN Breakthrough Pain  LORazepam     Tablet 1 milliGRAM(s) Oral every 8 hours PRN Anxiety  oxyCODONE    IR 20 milliGRAM(s) Oral every 4 hours PRN Severe Pain (7 - 10)

## 2024-04-17 NOTE — PHYSICAL THERAPY INITIAL EVALUATION ADULT - NSPTDMEREC_GEN_A_CORE
Patient will require a rolling walker at home due to decreased balance, strength and endurance to help complete MRADL's (Mobility related aides for daily living). Pt owns shower chair and grab bars in bathroom.

## 2024-04-17 NOTE — PHYSICAL THERAPY INITIAL EVALUATION ADULT - NSPTDISCHREC_GEN_A_CORE
Assistance from spouse as needed for adl's (retired). Next session progress with stairs, 19 steps total to bedroom./Home PT

## 2024-04-17 NOTE — PROGRESS NOTE ADULT - SUBJECTIVE AND OBJECTIVE BOX
ADVANCED HEART FAILURE & TRANSPLANT- PROGRESS NOTE  *To reach the NS1 Team from 8am to 5pm, please call [phone].  ___________________________________________________________________________    Subjective:  - s/p MVR    Medications:  acetaminophen     Tablet .. 650 milliGRAM(s) Oral every 6 hours  aMIOdarone    Tablet 400 milliGRAM(s) Oral two times a day  ascorbic acid 500 milliGRAM(s) Oral two times a day  aspirin enteric coated 81 milliGRAM(s) Oral daily  aspirin Suppository 300 milliGRAM(s) Rectal once  cefuroxime  IVPB 1500 milliGRAM(s) IV Intermittent every 8 hours  chlorhexidine 0.12% Liquid 15 milliLiter(s) Oral Mucosa every 12 hours  chlorhexidine 2% Cloths 1 Application(s) Topical daily  dexMEDEtomidine Infusion 0.5 MICROgram(s)/kG/Hr IV Continuous <Continuous>  dextrose 50% Injectable 50 milliLiter(s) IV Push every 15 minutes  dextrose 50% Injectable 25 milliLiter(s) IV Push every 15 minutes  DOBUTamine Infusion 2 MICROgram(s)/kG/Min IV Continuous <Continuous>  famotidine Injectable 20 milliGRAM(s) IV Push every 12 hours  gabapentin 100 milliGRAM(s) Oral every 8 hours  HYDROmorphone  Injectable 0.5 milliGRAM(s) IV Push every 6 hours PRN  insulin regular Infusion 3 Unit(s)/Hr IV Continuous <Continuous>  LORazepam     Tablet 1 milliGRAM(s) Oral every 8 hours PRN  meperidine     Injectable 25 milliGRAM(s) IV Push once  oxyCODONE    IR 20 milliGRAM(s) Oral every 4 hours PRN  polyethylene glycol 3350 17 Gram(s) Oral daily  potassium chloride  10 mEq/50 mL IVPB 10 milliEquivalent(s) IV Intermittent every 1 hour  potassium chloride  10 mEq/50 mL IVPB 10 milliEquivalent(s) IV Intermittent every 1 hour  potassium chloride  10 mEq/50 mL IVPB 10 milliEquivalent(s) IV Intermittent every 1 hour  senna 2 Tablet(s) Oral at bedtime  sodium chloride 0.9%. 1000 milliLiter(s) IV Continuous <Continuous>  vasopressin Infusion 0.01 Unit(s)/Min IV Continuous <Continuous>      ICU Vital Signs Last 24 Hrs  T(C): 36 (2024 12:00), Max: 37.1 (2024 04:00)  T(F): 96.8 (2024 12:00), Max: 98.8 (2024 04:00)  HR: 80 (:) (63 - 80)  BP: --  BP(mean): --  ABP: 108/62 (2024 12:00) (91/53 - 161/42)  ABP(mean): 88 (:00) (67 - 221)  RR: 20 (:00) (9 - 29)  SpO2: 99% (:00) (91% - 100%)    O2 Parameters below as of 2024 12:00  Patient On (Oxygen Delivery Method): nasal cannula  O2 Flow (L/min): 2          Weight in k.1 (- @ 00:00)    I&O's Summary    2024 07:  -  2024 07:00  --------------------------------------------------------  IN: 767.7 mL / OUT: 1040 mL / NET: -272.3 mL    2024 07:01  -  2024 12:59  --------------------------------------------------------  IN: 1105.6 mL / OUT: 315 mL / NET: 790.6 mL        Physical Exam  General: No distress. Comfortable.  Neck: JVP ~ 10 cm   Chest: Clear to auscultation bilaterally  CV: Normal S1 and S2  Abdomen: Soft, non-distended, non-tender  Skin: No rashes or skin breakdown  Neurology: Alert and oriented    IABP remains 1:1     Labs:                        11.0   21.63 )-----------( 154      ( 2024 02:40 )             33.6         138  |  103  |  21  ----------------------------<  136<H>  4.7   |  25  |  0.77    Ca    9.1      2024 02:40  Phos  4.3       Mg     3.0         TPro  6.2  /  Alb  3.6  /  TBili  0.7  /  DBili  x   /  AST  119<H>  /  ALT  53<H>  /  AlkPhos  99      PT/INR - ( 2024 02:40 )   PT: 11.6 sec;   INR: 1.11 ratio         PTT - ( 2024 02:40 )  PTT:28.8 sec  CARDIAC MARKERS ( 2024 21:36 )  x     / x     / 850 U/L / x     / 123.7 ng/mL      Creatine Kinase, Serum: 850 U/L (24 @ 21:36)  Creatine Kinase, Serum: 850 U/L (24 @ 21:36)    Oxygen Saturation, Mixed: 71.9 ( @ 03:35)  Oxygen Saturation, Mixed: 81.4 ( @ 23:55)  Oxygen Saturation, Mixed: 66.5 ( @ 22:26)  Oxygen Saturation, Mixed: 74.5 ( @ 21:30)  Oxygen Saturation, Mixed: 55.0 (04-15 @ 13:40)  Oxygen Saturation, Mixed: 71.1 (04-15 @ 01:04)  Oxygen Saturation, Mixed: 66.4 ( @ 16:53)

## 2024-04-17 NOTE — DIETITIAN INITIAL EVALUATION ADULT - PERTINENT LABORATORY DATA
04-17    138  |  103  |  21  ----------------------------<  136<H>  4.7   |  25  |  0.77    Ca    9.1      17 Apr 2024 02:40  Phos  4.3     04-17  Mg     3.0     04-17    TPro  6.2  /  Alb  3.6  /  TBili  0.7  /  DBili  x   /  AST  119<H>  /  ALT  53<H>  /  AlkPhos  99  04-17  POCT Blood Glucose.: 115 mg/dL (04-17-24 @ 10:50)  A1C with Estimated Average Glucose Result: 5.6 % (04-13-24 @ 20:35)

## 2024-04-17 NOTE — DIETITIAN INITIAL EVALUATION ADULT - REASON INDICATOR FOR ASSESSMENT
Assessment warranted for length of stay.  Information obtained from pt, family, medical record.  Assessment warranted for ICU length of stay.  Information obtained from interdisciplinary team, medical record.

## 2024-04-17 NOTE — PHYSICAL THERAPY INITIAL EVALUATION ADULT - PHYSICAL ASSIST/NONPHYSICAL ASSIST, REHAB EVAL
verbal cues/nonverbal cues (demo/gestures)/1 person assist Continue Regimen: Hydrocortisone cream 2.5% Render In Strict Bullet Format?: No Detail Level: Detailed

## 2024-04-17 NOTE — PROCEDURE NOTE - ADDITIONAL PROCEDURE DETAILS
PROCEDURE NOTE  REMOVAL OF INTRA AORTIC BALLOON PUMP    The IABP (intra-aortic balloon pump) was weaned according to protocol.  Hemodynamics remained stable.  Pulses in the right lower extreity are palpable/audible by doppler/absent.  The patient was placed in the supine position.  The insertion site was identified and the sutures were removed.  The IABP was turned off and the balloon deflated.  The IABP was then removed.  Direct pressure was applied for 45  minutes.  Monitoring of the  right groin and both lower extremities including neuro-vascular checks and vital signs every 15 minutes  x4, then every 30 minutes x 2, then every 1 hr x 4 was ordered. Patient tolerated procedure well. No evidence of hematoma and active bleeding noted.

## 2024-04-17 NOTE — PHYSICAL THERAPY INITIAL EVALUATION ADULT - MANUAL MUSCLE TESTING RESULTS, REHAB EVAL
1. The severity of signs/symptoms.(See ED/admit documents)
Bilateral UE & LE grossly 3/5./grossly assessed due to

## 2024-04-17 NOTE — PROGRESS NOTE ADULT - NS ATTEND AMEND GEN_ALL_CORE FT
Cardiogenick shock in the setting severe MR, s/p Bio MVR, Maze, TIMMY clip.   on low dose  and IABP  follow hemodynamics and perfusion markers  Wean and remove IABP  will cont   diuresis for goal RA < 10  Will get TTE post IABP removal for LV function assessment  Plan d/w ICU team  cont rest of care per CT ICU team

## 2024-04-17 NOTE — DIETITIAN INITIAL EVALUATION ADULT - ORAL INTAKE PTA/DIET HISTORY
Unable to interview pt x 2 attempts due to bedside procedures/medical care. Per H&P, no noted micronutrient use PTA. NKFA or intolerances. Per chart notes, pt with alcohol use disorder per daughter. No noted chewing/swallowing difficulty.

## 2024-04-17 NOTE — PHYSICAL THERAPY INITIAL EVALUATION ADULT - DID THE PATIENT HAVE SURGERY?
Pt s/p intubation, MVR(t), TIMMY clip and maze procedure on 4/16/24./yes Pt s/p intubation, MVR(t), TIMMY clip and maze procedure on 4/16/24, pt extubated 4/17./yes Pt s/p RHC with IABP and swan purnima catheter placement. Pt s/p intubation, MVR(t), TIMMY clip and maze procedure on 4/16/24. Pt s/p removal of iABP on 4/17./yes

## 2024-04-18 ENCOUNTER — RESULT REVIEW (OUTPATIENT)
Age: 64
End: 2024-04-18

## 2024-04-18 LAB
ALBUMIN SERPL ELPH-MCNC: 3.3 G/DL — SIGNIFICANT CHANGE UP (ref 3.3–5)
ALP SERPL-CCNC: 99 U/L — SIGNIFICANT CHANGE UP (ref 40–120)
ALT FLD-CCNC: 42 U/L — SIGNIFICANT CHANGE UP (ref 10–45)
ANION GAP SERPL CALC-SCNC: 12 MMOL/L — SIGNIFICANT CHANGE UP (ref 5–17)
AST SERPL-CCNC: 75 U/L — HIGH (ref 10–40)
BASE EXCESS BLDMV CALC-SCNC: -1.1 MMOL/L — SIGNIFICANT CHANGE UP (ref -3–3)
BASE EXCESS BLDMV CALC-SCNC: -1.8 MMOL/L — SIGNIFICANT CHANGE UP (ref -3–3)
BASE EXCESS BLDMV CALC-SCNC: -3 MMOL/L — SIGNIFICANT CHANGE UP (ref -3–3)
BASE EXCESS BLDMV CALC-SCNC: 0 MMOL/L — SIGNIFICANT CHANGE UP (ref -3–3)
BASOPHILS # BLD AUTO: 0.03 K/UL — SIGNIFICANT CHANGE UP (ref 0–0.2)
BASOPHILS NFR BLD AUTO: 0.2 % — SIGNIFICANT CHANGE UP (ref 0–2)
BILIRUB SERPL-MCNC: 0.5 MG/DL — SIGNIFICANT CHANGE UP (ref 0.2–1.2)
BUN SERPL-MCNC: 24 MG/DL — HIGH (ref 7–23)
CALCIUM SERPL-MCNC: 8.7 MG/DL — SIGNIFICANT CHANGE UP (ref 8.4–10.5)
CHLORIDE SERPL-SCNC: 95 MMOL/L — LOW (ref 96–108)
CO2 BLDMV-SCNC: 24 MMOL/L — SIGNIFICANT CHANGE UP (ref 21–29)
CO2 BLDMV-SCNC: 25 MMOL/L — SIGNIFICANT CHANGE UP (ref 21–29)
CO2 BLDMV-SCNC: 25 MMOL/L — SIGNIFICANT CHANGE UP (ref 21–29)
CO2 BLDMV-SCNC: 26 MMOL/L — SIGNIFICANT CHANGE UP (ref 21–29)
CO2 SERPL-SCNC: 21 MMOL/L — LOW (ref 22–31)
CREAT SERPL-MCNC: 0.89 MG/DL — SIGNIFICANT CHANGE UP (ref 0.5–1.3)
CULTURE RESULTS: SIGNIFICANT CHANGE UP
CULTURE RESULTS: SIGNIFICANT CHANGE UP
EGFR: 72 ML/MIN/1.73M2 — SIGNIFICANT CHANGE UP
EOSINOPHIL # BLD AUTO: 0.09 K/UL — SIGNIFICANT CHANGE UP (ref 0–0.5)
EOSINOPHIL NFR BLD AUTO: 0.5 % — SIGNIFICANT CHANGE UP (ref 0–6)
GAS PNL BLDA: SIGNIFICANT CHANGE UP
GAS PNL BLDMV: SIGNIFICANT CHANGE UP
GLUCOSE BLDC GLUCOMTR-MCNC: 123 MG/DL — HIGH (ref 70–99)
GLUCOSE BLDC GLUCOMTR-MCNC: 142 MG/DL — HIGH (ref 70–99)
GLUCOSE BLDC GLUCOMTR-MCNC: 151 MG/DL — HIGH (ref 70–99)
GLUCOSE BLDC GLUCOMTR-MCNC: 171 MG/DL — HIGH (ref 70–99)
GLUCOSE SERPL-MCNC: 116 MG/DL — HIGH (ref 70–99)
HCO3 BLDMV-SCNC: 23 MMOL/L — SIGNIFICANT CHANGE UP (ref 20–28)
HCO3 BLDMV-SCNC: 24 MMOL/L — SIGNIFICANT CHANGE UP (ref 20–28)
HCO3 BLDMV-SCNC: 24 MMOL/L — SIGNIFICANT CHANGE UP (ref 20–28)
HCO3 BLDMV-SCNC: 25 MMOL/L — SIGNIFICANT CHANGE UP (ref 20–28)
HCT VFR BLD CALC: 28 % — LOW (ref 34.5–45)
HGB BLD-MCNC: 9.1 G/DL — LOW (ref 11.5–15.5)
HOROWITZ INDEX BLDMV+IHG-RTO: 21 — SIGNIFICANT CHANGE UP
HOROWITZ INDEX BLDMV+IHG-RTO: 36 — SIGNIFICANT CHANGE UP
HOROWITZ INDEX BLDMV+IHG-RTO: 36 — SIGNIFICANT CHANGE UP
IMM GRANULOCYTES NFR BLD AUTO: 0.7 % — SIGNIFICANT CHANGE UP (ref 0–0.9)
INR BLD: 1.26 RATIO — HIGH (ref 0.85–1.18)
LYMPHOCYTES # BLD AUTO: 1.03 K/UL — SIGNIFICANT CHANGE UP (ref 1–3.3)
LYMPHOCYTES # BLD AUTO: 5.8 % — LOW (ref 13–44)
MAGNESIUM SERPL-MCNC: 2.2 MG/DL — SIGNIFICANT CHANGE UP (ref 1.6–2.6)
MCHC RBC-ENTMCNC: 30.5 PG — SIGNIFICANT CHANGE UP (ref 27–34)
MCHC RBC-ENTMCNC: 32.5 GM/DL — SIGNIFICANT CHANGE UP (ref 32–36)
MCV RBC AUTO: 94 FL — SIGNIFICANT CHANGE UP (ref 80–100)
MONOCYTES # BLD AUTO: 1.45 K/UL — HIGH (ref 0–0.9)
MONOCYTES NFR BLD AUTO: 8.2 % — SIGNIFICANT CHANGE UP (ref 2–14)
NEUTROPHILS # BLD AUTO: 14.91 K/UL — HIGH (ref 1.8–7.4)
NEUTROPHILS NFR BLD AUTO: 84.6 % — HIGH (ref 43–77)
NRBC # BLD: 0 /100 WBCS — SIGNIFICANT CHANGE UP (ref 0–0)
O2 CT VFR BLD CALC: 31 MMHG — SIGNIFICANT CHANGE UP (ref 30–65)
O2 CT VFR BLD CALC: 33 MMHG — SIGNIFICANT CHANGE UP (ref 30–65)
O2 CT VFR BLD CALC: 35 MMHG — SIGNIFICANT CHANGE UP (ref 30–65)
O2 CT VFR BLD CALC: 42 MMHG — SIGNIFICANT CHANGE UP (ref 30–65)
PCO2 BLDMV: 38 MMHG — SIGNIFICANT CHANGE UP (ref 30–65)
PCO2 BLDMV: 40 MMHG — SIGNIFICANT CHANGE UP (ref 30–65)
PCO2 BLDMV: 42 MMHG — SIGNIFICANT CHANGE UP (ref 30–65)
PCO2 BLDMV: 42 MMHG — SIGNIFICANT CHANGE UP (ref 30–65)
PH BLDMV: 7.34 — SIGNIFICANT CHANGE UP (ref 7.32–7.45)
PH BLDMV: 7.36 — SIGNIFICANT CHANGE UP (ref 7.32–7.45)
PH BLDMV: 7.4 — SIGNIFICANT CHANGE UP (ref 7.32–7.45)
PH BLDMV: 7.4 — SIGNIFICANT CHANGE UP (ref 7.32–7.45)
PHOSPHATE SERPL-MCNC: 3.5 MG/DL — SIGNIFICANT CHANGE UP (ref 2.5–4.5)
PLATELET # BLD AUTO: 121 K/UL — LOW (ref 150–400)
POTASSIUM SERPL-MCNC: 4 MMOL/L — SIGNIFICANT CHANGE UP (ref 3.5–5.3)
POTASSIUM SERPL-SCNC: 4 MMOL/L — SIGNIFICANT CHANGE UP (ref 3.5–5.3)
PROT SERPL-MCNC: 5.8 G/DL — LOW (ref 6–8.3)
PROTHROM AB SERPL-ACNC: 13.1 SEC — HIGH (ref 9.5–13)
RBC # BLD: 2.98 M/UL — LOW (ref 3.8–5.2)
RBC # FLD: 14.9 % — HIGH (ref 10.3–14.5)
SAO2 % BLDMV: 58.5 — LOW (ref 60–90)
SAO2 % BLDMV: 62.3 — SIGNIFICANT CHANGE UP (ref 60–90)
SAO2 % BLDMV: 65.1 — SIGNIFICANT CHANGE UP (ref 60–90)
SAO2 % BLDMV: 71.5 — SIGNIFICANT CHANGE UP (ref 60–90)
SODIUM SERPL-SCNC: 128 MMOL/L — LOW (ref 135–145)
SPECIMEN SOURCE: SIGNIFICANT CHANGE UP
SPECIMEN SOURCE: SIGNIFICANT CHANGE UP
WBC # BLD: 17.63 K/UL — HIGH (ref 3.8–10.5)
WBC # FLD AUTO: 17.63 K/UL — HIGH (ref 3.8–10.5)

## 2024-04-18 PROCEDURE — 93306 TTE W/DOPPLER COMPLETE: CPT | Mod: 26

## 2024-04-18 PROCEDURE — 71045 X-RAY EXAM CHEST 1 VIEW: CPT | Mod: 26

## 2024-04-18 PROCEDURE — 99291 CRITICAL CARE FIRST HOUR: CPT

## 2024-04-18 RX ORDER — ALBUMIN HUMAN 25 %
100 VIAL (ML) INTRAVENOUS ONCE
Refills: 0 | Status: COMPLETED | OUTPATIENT
Start: 2024-04-18 | End: 2024-04-18

## 2024-04-18 RX ORDER — FUROSEMIDE 40 MG
20 TABLET ORAL ONCE
Refills: 0 | Status: COMPLETED | OUTPATIENT
Start: 2024-04-18 | End: 2024-04-18

## 2024-04-18 RX ORDER — ALBUMIN HUMAN 25 %
250 VIAL (ML) INTRAVENOUS ONCE
Refills: 0 | Status: COMPLETED | OUTPATIENT
Start: 2024-04-18 | End: 2024-04-18

## 2024-04-18 RX ORDER — INSULIN LISPRO 100/ML
VIAL (ML) SUBCUTANEOUS
Refills: 0 | Status: DISCONTINUED | OUTPATIENT
Start: 2024-04-18 | End: 2024-04-26

## 2024-04-18 RX ORDER — POTASSIUM CHLORIDE 20 MEQ
10 PACKET (EA) ORAL
Refills: 0 | Status: COMPLETED | OUTPATIENT
Start: 2024-04-18 | End: 2024-04-18

## 2024-04-18 RX ORDER — FUROSEMIDE 40 MG
40 TABLET ORAL ONCE
Refills: 0 | Status: COMPLETED | OUTPATIENT
Start: 2024-04-18 | End: 2024-04-18

## 2024-04-18 RX ORDER — FUROSEMIDE 40 MG
5 TABLET ORAL
Qty: 500 | Refills: 0 | Status: DISCONTINUED | OUTPATIENT
Start: 2024-04-18 | End: 2024-04-20

## 2024-04-18 RX ORDER — WARFARIN SODIUM 2.5 MG/1
5 TABLET ORAL ONCE
Refills: 0 | Status: COMPLETED | OUTPATIENT
Start: 2024-04-18 | End: 2024-04-18

## 2024-04-18 RX ORDER — POTASSIUM CHLORIDE 20 MEQ
40 PACKET (EA) ORAL ONCE
Refills: 0 | Status: COMPLETED | OUTPATIENT
Start: 2024-04-18 | End: 2024-04-18

## 2024-04-18 RX ORDER — FUROSEMIDE 40 MG
20 TABLET ORAL ONCE
Refills: 0 | Status: DISCONTINUED | OUTPATIENT
Start: 2024-04-18 | End: 2024-04-18

## 2024-04-18 RX ADMIN — Medication 500 MILLIGRAM(S): at 17:58

## 2024-04-18 RX ADMIN — HYDROMORPHONE HYDROCHLORIDE 0.5 MILLIGRAM(S): 2 INJECTION INTRAMUSCULAR; INTRAVENOUS; SUBCUTANEOUS at 17:59

## 2024-04-18 RX ADMIN — HYDROMORPHONE HYDROCHLORIDE 0.5 MILLIGRAM(S): 2 INJECTION INTRAMUSCULAR; INTRAVENOUS; SUBCUTANEOUS at 12:11

## 2024-04-18 RX ADMIN — Medication 20 MILLIGRAM(S): at 14:36

## 2024-04-18 RX ADMIN — OXYCODONE HYDROCHLORIDE 20 MILLIGRAM(S): 5 TABLET ORAL at 16:04

## 2024-04-18 RX ADMIN — OXYCODONE HYDROCHLORIDE 20 MILLIGRAM(S): 5 TABLET ORAL at 17:00

## 2024-04-18 RX ADMIN — Medication 650 MILLIGRAM(S): at 11:18

## 2024-04-18 RX ADMIN — Medication 650 MILLIGRAM(S): at 05:59

## 2024-04-18 RX ADMIN — SENNA PLUS 2 TABLET(S): 8.6 TABLET ORAL at 21:04

## 2024-04-18 RX ADMIN — Medication 50 MILLILITER(S): at 14:35

## 2024-04-18 RX ADMIN — Medication 50 MILLIGRAM(S): at 06:03

## 2024-04-18 RX ADMIN — HEPARIN SODIUM 5000 UNIT(S): 5000 INJECTION INTRAVENOUS; SUBCUTANEOUS at 13:25

## 2024-04-18 RX ADMIN — HYDROMORPHONE HYDROCHLORIDE 0.5 MILLIGRAM(S): 2 INJECTION INTRAMUSCULAR; INTRAVENOUS; SUBCUTANEOUS at 12:30

## 2024-04-18 RX ADMIN — Medication 1 MILLIGRAM(S): at 13:31

## 2024-04-18 RX ADMIN — Medication 500 MILLIGRAM(S): at 06:00

## 2024-04-18 RX ADMIN — Medication 40 MILLIEQUIVALENT(S): at 12:11

## 2024-04-18 RX ADMIN — Medication 125 MILLILITER(S): at 01:15

## 2024-04-18 RX ADMIN — Medication 15 MILLIGRAM(S): at 21:30

## 2024-04-18 RX ADMIN — Medication 650 MILLIGRAM(S): at 12:00

## 2024-04-18 RX ADMIN — Medication 2.5 MG/HR: at 19:35

## 2024-04-18 RX ADMIN — FAMOTIDINE 20 MILLIGRAM(S): 10 INJECTION INTRAVENOUS at 05:59

## 2024-04-18 RX ADMIN — OXYCODONE HYDROCHLORIDE 20 MILLIGRAM(S): 5 TABLET ORAL at 21:30

## 2024-04-18 RX ADMIN — Medication 15 MILLIGRAM(S): at 06:00

## 2024-04-18 RX ADMIN — Medication 15 MILLIGRAM(S): at 21:04

## 2024-04-18 RX ADMIN — OXYCODONE HYDROCHLORIDE 20 MILLIGRAM(S): 5 TABLET ORAL at 09:29

## 2024-04-18 RX ADMIN — Medication 81 MILLIGRAM(S): at 11:18

## 2024-04-18 RX ADMIN — HYDROMORPHONE HYDROCHLORIDE 0.5 MILLIGRAM(S): 2 INJECTION INTRAMUSCULAR; INTRAVENOUS; SUBCUTANEOUS at 06:15

## 2024-04-18 RX ADMIN — OXYCODONE HYDROCHLORIDE 20 MILLIGRAM(S): 5 TABLET ORAL at 21:00

## 2024-04-18 RX ADMIN — Medication 650 MILLIGRAM(S): at 20:00

## 2024-04-18 RX ADMIN — CHLORHEXIDINE GLUCONATE 1 APPLICATION(S): 213 SOLUTION TOPICAL at 11:03

## 2024-04-18 RX ADMIN — HEPARIN SODIUM 5000 UNIT(S): 5000 INJECTION INTRAVENOUS; SUBCUTANEOUS at 06:31

## 2024-04-18 RX ADMIN — DEXMEDETOMIDINE HYDROCHLORIDE IN 0.9% SODIUM CHLORIDE 11.1 MICROGRAM(S)/KG/HR: 4 INJECTION INTRAVENOUS at 07:19

## 2024-04-18 RX ADMIN — FAMOTIDINE 20 MILLIGRAM(S): 10 INJECTION INTRAVENOUS at 17:58

## 2024-04-18 RX ADMIN — Medication 650 MILLIGRAM(S): at 17:58

## 2024-04-18 RX ADMIN — Medication 40 MILLIGRAM(S): at 17:58

## 2024-04-18 RX ADMIN — Medication 50 MILLILITER(S): at 09:30

## 2024-04-18 RX ADMIN — Medication 50 MILLIEQUIVALENT(S): at 23:01

## 2024-04-18 RX ADMIN — VASOPRESSIN 1.5 UNIT(S)/MIN: 20 INJECTION INTRAVENOUS at 07:19

## 2024-04-18 RX ADMIN — HYDROMORPHONE HYDROCHLORIDE 0.5 MILLIGRAM(S): 2 INJECTION INTRAMUSCULAR; INTRAVENOUS; SUBCUTANEOUS at 05:59

## 2024-04-18 RX ADMIN — Medication 50 MILLIGRAM(S): at 13:26

## 2024-04-18 RX ADMIN — GABAPENTIN 100 MILLIGRAM(S): 400 CAPSULE ORAL at 06:31

## 2024-04-18 RX ADMIN — GABAPENTIN 100 MILLIGRAM(S): 400 CAPSULE ORAL at 13:26

## 2024-04-18 RX ADMIN — DEXMEDETOMIDINE HYDROCHLORIDE IN 0.9% SODIUM CHLORIDE 11.1 MICROGRAM(S)/KG/HR: 4 INJECTION INTRAVENOUS at 19:36

## 2024-04-18 RX ADMIN — Medication 20 MILLIGRAM(S): at 09:30

## 2024-04-18 RX ADMIN — Medication 50 MILLIEQUIVALENT(S): at 23:02

## 2024-04-18 RX ADMIN — Medication 50 MILLIEQUIVALENT(S): at 23:24

## 2024-04-18 RX ADMIN — Medication 1 MILLIGRAM(S): at 00:37

## 2024-04-18 RX ADMIN — Medication 100 MILLIGRAM(S): at 06:45

## 2024-04-18 RX ADMIN — VASOPRESSIN 1.5 UNIT(S)/MIN: 20 INJECTION INTRAVENOUS at 19:36

## 2024-04-18 RX ADMIN — GABAPENTIN 100 MILLIGRAM(S): 400 CAPSULE ORAL at 21:04

## 2024-04-18 RX ADMIN — Medication 1: at 16:08

## 2024-04-18 RX ADMIN — AMIODARONE HYDROCHLORIDE 400 MILLIGRAM(S): 400 TABLET ORAL at 17:58

## 2024-04-18 RX ADMIN — POLYETHYLENE GLYCOL 3350 17 GRAM(S): 17 POWDER, FOR SOLUTION ORAL at 11:19

## 2024-04-18 RX ADMIN — OXYCODONE HYDROCHLORIDE 20 MILLIGRAM(S): 5 TABLET ORAL at 10:30

## 2024-04-18 RX ADMIN — Medication 5.32 MICROGRAM(S)/KG/MIN: at 19:35

## 2024-04-18 RX ADMIN — Medication 1 MILLIGRAM(S): at 23:00

## 2024-04-18 RX ADMIN — HYDROMORPHONE HYDROCHLORIDE 0.5 MILLIGRAM(S): 2 INJECTION INTRAMUSCULAR; INTRAVENOUS; SUBCUTANEOUS at 18:15

## 2024-04-18 RX ADMIN — Medication 15 MILLIGRAM(S): at 13:30

## 2024-04-18 RX ADMIN — WARFARIN SODIUM 5 MILLIGRAM(S): 2.5 TABLET ORAL at 21:14

## 2024-04-18 RX ADMIN — Medication 50 MILLIGRAM(S): at 21:01

## 2024-04-18 RX ADMIN — Medication 15 MILLIGRAM(S): at 13:31

## 2024-04-18 RX ADMIN — HEPARIN SODIUM 5000 UNIT(S): 5000 INJECTION INTRAVENOUS; SUBCUTANEOUS at 21:04

## 2024-04-18 RX ADMIN — AMIODARONE HYDROCHLORIDE 400 MILLIGRAM(S): 400 TABLET ORAL at 06:00

## 2024-04-18 RX ADMIN — Medication 5.32 MICROGRAM(S)/KG/MIN: at 07:18

## 2024-04-18 RX ADMIN — Medication 2.5 MG/HR: at 18:54

## 2024-04-18 NOTE — PROGRESS NOTE ADULT - NS ATTEND AMEND GEN_ALL_CORE FT
Cardiogenick shock in the setting severe MR, s/p Bio MVR, Maze, TIMMY clip.   on low dose  and IABP  follow hemodynamics and perfusion markers  Wean and remove IABP  will cont   diuresis for goal RA < 10  Will get TTE post IABP removal for LV function assessment  Plan d/w ICU team  cont rest of care per CT ICU team Cardiogenick shock in the setting severe MR, s/p Bio MVR, Maze, TIMMY clip.   IABP removed    TTE with new low EF 35% from 60% pre op, LVEDD 6cms, bioMVR, MG 5.7, no paravalvular leak./valvuylar MR, mild TR   3, Lasix drip  follow hemodynamics and perfusion markers  diuresis for goal RA < 10  OBB to chair  Plan d/w ICU team

## 2024-04-18 NOTE — PROGRESS NOTE ADULT - SUBJECTIVE AND OBJECTIVE BOX
Patient seen and examined at the bedside.    Remains critically ill on continuous ICU monitoring.      Brief Summary:  63 yo F s/p MVR-t, MAZE, and LAAC on 4/16/24.  IABP was placed preop for cardiogenic shock.      24 Hour events:      Objective:  Vital Signs Last 24 Hrs  T(C): 36.8 (18 Apr 2024 04:00), Max: 36.8 (18 Apr 2024 04:00)  T(F): 98.2 (18 Apr 2024 04:00), Max: 98.2 (18 Apr 2024 04:00)  HR: 80 (18 Apr 2024 07:00) (80 - 80)  BP: --  BP(mean): --  RR: 22 (18 Apr 2024 07:00) (16 - 36)  SpO2: 98% (18 Apr 2024 07:00) (94% - 100%)    Parameters below as of 18 Apr 2024 08:00  Patient On (Oxygen Delivery Method): nasal cannula    O2 Concentration (%): 4                Physical Exam:   General: Alert and interactive   Neurology: Oriented, following commands  Respiratory: Clear bilaterally   CV: Paced  Abdominal: Soft, Nontender  Extremities: Warm, well-perfused  Jimenez  IABP site ok    -------------------------------------------------------------------------------------------------------------------------------    Labs:                        9.1    17.63 )-----------( 121      ( 18 Apr 2024 00:34 )             28.0     04-18    128<L>  |  95<L>  |  24<H>  ----------------------------<  116<H>  4.0   |  21<L>  |  0.89    Ca    8.7      18 Apr 2024 00:34  Phos  3.5     04-18  Mg     2.2     04-18    TPro  5.8<L>  /  Alb  3.3  /  TBili  0.5  /  DBili  x   /  AST  75<H>  /  ALT  42  /  AlkPhos  99  04-18    LIVER FUNCTIONS - ( 18 Apr 2024 00:34 )  Alb: 3.3 g/dL / Pro: 5.8 g/dL / ALK PHOS: 99 U/L / ALT: 42 U/L / AST: 75 U/L / GGT: x           PT/INR - ( 17 Apr 2024 14:26 )   PT: 12.2 sec;   INR: 1.11 ratio         PTT - ( 17 Apr 2024 02:40 )  PTT:28.8 sec  ABG - ( 18 Apr 2024 00:44 )  pH, Arterial: 7.41  pH, Blood: x     /  pCO2: 36    /  pO2: 130   / HCO3: 23    / Base Excess: -1.6  /  SaO2: 99.6            ------------------------------------------------------------------------------------------------------------------------------  Assessment:  63 yo F s/p MVR-t, MAZE, and LAAC on 4/16/24.  IABP was placed preop for cardiogenic shock.    Postop acute respiratory insufficiency  Hemodynamic instability  Hyperglycemia  Leukocytosis   Thrombocytopenia       Plan:   ***Neuro***  Postoperative acute pain control with Tylenol, Gabapentin, and prns.  Patient is on Oxycodone at home - will as acute pain service to consult for PCA  Lorazepam and Chlordiazepoxide for anxiety  Consider Toradol.  Sedated with Precedex.     ***Cardiovascular***  At high risk for ongoing hemodynamic instability and cardiac arrhythmias.  Remove IABP.  Trend Cardiac index and mixed venous saturation.  Remains on Dobutamine.  Pressors if needed for MAP > 65 mm Hg.  Amiodarone for A. Fib prophylaxis   ASA daily   Plan for TTE today.  Monitor chest tube output.    ***Pulmonary***  Postoperative acute respiratory insufficiency   Deep breathing and coughing exercises.  Wean oxygen as able.    ***GI***  Advance diet.  Pepcid for stress ulcer prophylaxis   Bowel regimen.    ***Renal***  Trend Creatinine   Jimenez catheter for strict I/O measurements.   Replete electrolytes as indicated.    ***ID***  No active antibiotic coverage     ***Endocrine***  Hyperglycemia - Insulin infusion.     ***Hematology***  Thrombocytopenia No current transfusion indication  Heparin for VTE prophylaxis   Plan to start Coumadin per Dr. Maki          Patient requires continuous monitoring with bedside rhythm monitoring, pulse oximetry monitoring, and continuous central venous and arterial pressure monitoring; and intermittent blood gas analysis. Care plan discussed with the ICU care team.   Patient remains critical, at risk for life threatening decompensation.    I have spent 30 minutes providing critical care management to this patient.    By signing my name below, I, Roberto Carmona, attest that this documentation has been prepared under the direction and in the presence of Shira Kirk MD  Electronically signed: Roberto Carmona, 04-18-24 @ 07:53    I, Shira Kirk MD, personally performed the services described in this documentation. all medical record entries made by the scribe were at my direction and in my presence. I have reviewed the chart and agree that the record reflects my personal performance and is accurate and complete  Electronically signed: Shira Kirk MD Patient seen and examined at the bedside.    Remains critically ill on continuous ICU monitoring.      Brief Summary:  65 yo F s/p MVR-t, MAZE, and LAAC on 4/16/24.  IABP was placed preop for cardiogenic shock.      24 Hour events:  IABP removed yesterday  Slept overnight on Precedex.  OOB to chair this morning.      Objective:  Vital Signs Last 24 Hrs  T(C): 36.8 (18 Apr 2024 04:00), Max: 36.8 (18 Apr 2024 04:00)  T(F): 98.2 (18 Apr 2024 04:00), Max: 98.2 (18 Apr 2024 04:00)  HR: 80 (18 Apr 2024 07:00) (80 - 80)  BP: --  BP(mean): --  RR: 22 (18 Apr 2024 07:00) (16 - 36)  SpO2: 98% (18 Apr 2024 07:00) (94% - 100%)    Parameters below as of 18 Apr 2024 08:00  Patient On (Oxygen Delivery Method): nasal cannula    O2 Concentration (%): 4                Physical Exam:   General: Alert and interactive   Neurology: Oriented, following commands  Respiratory: Clear bilaterally   CV: Paced (junctional 40s under pacer)  Abdominal: Soft, Nontender  Extremities: Warm, well-perfused  Jimenez      -------------------------------------------------------------------------------------------------------------------------------    Labs:                        9.1    17.63 )-----------( 121      ( 18 Apr 2024 00:34 )             28.0     04-18    128<L>  |  95<L>  |  24<H>  ----------------------------<  116<H>  4.0   |  21<L>  |  0.89    Ca    8.7      18 Apr 2024 00:34  Phos  3.5     04-18  Mg     2.2     04-18    TPro  5.8<L>  /  Alb  3.3  /  TBili  0.5  /  DBili  x   /  AST  75<H>  /  ALT  42  /  AlkPhos  99  04-18    LIVER FUNCTIONS - ( 18 Apr 2024 00:34 )  Alb: 3.3 g/dL / Pro: 5.8 g/dL / ALK PHOS: 99 U/L / ALT: 42 U/L / AST: 75 U/L / GGT: x           PT/INR - ( 17 Apr 2024 14:26 )   PT: 12.2 sec;   INR: 1.11 ratio         PTT - ( 17 Apr 2024 02:40 )  PTT:28.8 sec  ABG - ( 18 Apr 2024 00:44 )  pH, Arterial: 7.41  pH, Blood: x     /  pCO2: 36    /  pO2: 130   / HCO3: 23    / Base Excess: -1.6  /  SaO2: 99.6          ------------------------------------------------------------------------------------------------------------------------------  Assessment:  65 yo F s/p MVR-t, MAZE, and LAAC on 4/16/24.  IABP was placed preop for cardiogenic shock.    Postop acute respiratory insufficiency  Hemodynamic instability  Hyperglycemia  Leukocytosis   Hyponatremia  Acute blood loss anemia  Thrombocytopenia       Plan:   ***Neuro***  Postoperative acute pain control with Tylenol, Gabapentin, and prns.  Patient is on Oxycodone at home - will ask acute pain service to consult   Ativan for anxiety.  At high risk for alcohol withdrawal - Librium taper.  Sedated with Precedex.     ***Cardiovascular***  At high risk for ongoing hemodynamic instability and cardiac arrhythmias.  Wean Dobutamine.   May be able to remove PA catheter, trend central venous saturation.  Amiodarone for A. Fib prophylaxis   ASA daily   Monitor chest tube output.    ***Pulmonary***  Postoperative acute respiratory insufficiency   Deep breathing and coughing exercises.  Wean oxygen as able.    ***GI***  Advance diet.  Pepcid for stress ulcer prophylaxis   Bowel regimen.    ***Renal***  Trend Creatinine   Jimenez catheter for strict I/O measurements.   Hyponatremia - Lasix ordered, will trend.    ***ID***  No active antibiotic coverage   Trend leukocytosis.    ***Endocrine***  Hyperglycemia - Insulin infusion - transition to sliding scale.    ***Hematology***  Acute blood Thrombocytopenia - No current transfusion indication  Heparin for VTE prophylaxis   Coumadin nightly - trend INR        Patient requires continuous monitoring with bedside rhythm monitoring, pulse oximetry monitoring, and continuous central venous and arterial pressure monitoring; and intermittent blood gas analysis. Care plan discussed with the ICU care team.   Patient remains critical, at risk for life threatening decompensation.    I have spent 50 minutes providing critical care management to this patient.    By signing my name below, I, Roberto Carmona, attest that this documentation has been prepared under the direction and in the presence of Shira Kirk MD  Electronically signed: Roberto Carmona, 04-18-24 @ 07:53    I, Shira Kirk MD, personally performed the services described in this documentation. all medical record entries made by the scribe were at my direction and in my presence. I have reviewed the chart and agree that the record reflects my personal performance and is accurate and complete  Electronically signed: Shira Kirk MD

## 2024-04-18 NOTE — PROGRESS NOTE ADULT - SUBJECTIVE AND OBJECTIVE BOX
ADVANCED HEART FAILURE & TRANSPLANT- PROGRESS NOTE  *To reach the NS1 Team from 8am to 5pm, please call [phone].  ___________________________________________________________________________    Subjective:  - no issues    Medications:  acetaminophen     Tablet .. 650 milliGRAM(s) Oral every 6 hours  aMIOdarone    Tablet 400 milliGRAM(s) Oral two times a day  ascorbic acid 500 milliGRAM(s) Oral two times a day  aspirin enteric coated 81 milliGRAM(s) Oral daily  aspirin Suppository 300 milliGRAM(s) Rectal once  bisacodyl Suppository 10 milliGRAM(s) Rectal once  chlordiazePOXIDE 50 milliGRAM(s) Oral every 8 hours  chlordiazePOXIDE   Oral   chlorhexidine 2% Cloths 1 Application(s) Topical daily  dexMEDEtomidine Infusion 0.5 MICROgram(s)/kG/Hr IV Continuous <Continuous>  dextrose 50% Injectable 50 milliLiter(s) IV Push every 15 minutes  dextrose 50% Injectable 25 milliLiter(s) IV Push every 15 minutes  DOBUTamine Infusion 2 MICROgram(s)/kG/Min IV Continuous <Continuous>  famotidine    Tablet 20 milliGRAM(s) Oral two times a day  gabapentin 100 milliGRAM(s) Oral every 8 hours  heparin   Injectable 5000 Unit(s) SubCutaneous every 8 hours  HYDROmorphone  Injectable 0.5 milliGRAM(s) IV Push every 6 hours PRN  insulin regular Infusion 3 Unit(s)/Hr IV Continuous <Continuous>  ketorolac   Injectable 15 milliGRAM(s) IV Push every 8 hours  LORazepam     Tablet 1 milliGRAM(s) Oral every 8 hours PRN  meperidine     Injectable 25 milliGRAM(s) IV Push once  oxyCODONE    IR 20 milliGRAM(s) Oral every 4 hours PRN  polyethylene glycol 3350 17 Gram(s) Oral daily  potassium chloride  10 mEq/50 mL IVPB 10 milliEquivalent(s) IV Intermittent every 1 hour  potassium chloride  10 mEq/50 mL IVPB 10 milliEquivalent(s) IV Intermittent every 1 hour  potassium chloride  10 mEq/50 mL IVPB 10 milliEquivalent(s) IV Intermittent every 1 hour  senna 2 Tablet(s) Oral at bedtime  sodium chloride 0.9%. 1000 milliLiter(s) IV Continuous <Continuous>  vasopressin Infusion 0.01 Unit(s)/Min IV Continuous <Continuous>      ICU Vital Signs Last 24 Hrs  T(C): 37.4 (2024 12:00), Max: 37.5 (2024 08:00)  T(F): 99.3 (2024 12:00), Max: 99.5 (2024 08:00)  HR: 80 (2024 12:00) (80 - 80)  BP: --  BP(mean): --  ABP: 102/55 (2024 12:00) (3/-21 - 137/69)  ABP(mean): 73 (2024 12:00) (-13 - 132)  RR: 22 (2024 12:00) (16 - 36)  SpO2: 98% (2024 12:00) (94% - 100%)    O2 Parameters below as of 2024 09:19  Patient On (Oxygen Delivery Method): nasal cannula  O2 Flow (L/min): 4        Weight in k.6 (-18 @ 00:00)    I&O's Summary    2024 07:01  -  2024 07:00  --------------------------------------------------------  IN: 2744.4 mL / OUT: 1725 mL / NET: 1019.4 mL    2024 07:01  -  2024 12:34  --------------------------------------------------------  IN: 391.6 mL / OUT: 555 mL / NET: -163.4 mL        Physical Exam  General: No distress. Comfortable.  Neck: JVP ~ 14-16 cm   Chest: Clear to auscultation bilaterally  CV: Normal S1 and S2.   Abdomen: Soft, non-distended, non-tender  Extremities: warm and perfused, 1+ edema   Neurology: Alert and oriented times three.     Labs:                        9.1    17.63 )-----------( 121      ( 2024 00:34 )             28.0     04-18    128<L>  |  95<L>  |  24<H>  ----------------------------<  116<H>  4.0   |  21<L>  |  0.89    Ca    8.7      2024 00:34  Phos  3.5     -18  Mg     2.2     -18    TPro  5.8<L>  /  Alb  3.3  /  TBili  0.5  /  DBili  x   /  AST  75<H>  /  ALT  42  /  AlkPhos  99  -18    PT/INR - ( 2024 14:26 )   PT: 12.2 sec;   INR: 1.11 ratio         PTT - ( 2024 02:40 )  PTT:28.8 sec  CARDIAC MARKERS ( 2024 21:36 )  x     / x     / 850 U/L / x     / 123.7 ng/mL      Creatine Kinase, Serum: 850 U/L (24 @ 21:36)  Creatine Kinase, Serum: 850 U/L (24 @ 21:36)    Oxygen Saturation, Mixed: 62.3 ( @ 10:00)  Oxygen Saturation, Mixed: 71.5 ( @ 00:08)  Oxygen Saturation, Mixed: 65.0 ( @ 20:21)  Oxygen Saturation, Mixed: 71.9 ( @ 03:35)  Oxygen Saturation, Mixed: 81.4 ( @ 23:55)  Oxygen Saturation, Mixed: 66.5 ( @ 22:26)  Oxygen Saturation, Mixed: 74.5 ( @ 21:30)  Oxygen Saturation, Mixed: 55.0 (04-15 @ 13:40)

## 2024-04-18 NOTE — PROGRESS NOTE ADULT - PROBLEM SELECTOR PLAN 1
- ECHO 4/17 shows EF 60-65%, with LVDD 4.9 cm. There are no regional wall motion abnormalities seen. The left ventricular diastolic function is indeterminate.   - IABP removed 4/17  - remains on  @ 1 mcg/kg/min (was reduced this AM)  - will continue with diuretics to target CVP 6-8  - continue to replete K 4-5, Mg >2.2  - ECHO completed this AM, will follow up on read

## 2024-04-18 NOTE — PROGRESS NOTE ADULT - ASSESSMENT
64F with PMHx of HLD, HTN, AFib (diagnosed recently, not on AC), and mitral valve prolapse found to have severe mitral regurgitation complicated by cardiogenic shock, now s/p RHC with IABP placement. She ultimately underwent MVR(t) with Maze procedure with Dr Maki on 4/16, extubated the following day. IABP removed 4/17. She overall is progressing well,  being weaned as tolerated. She is noted to be volume overloaded today with elevated CVP, will continue to diuresis and begin to introduce GDMT when feasible         Cardiac Studies:  TTE 4/13/24 - EF 60-65%, reduced RV function, severe MR with MS as well likely 2/2 rheumatic mitral valve disease   LHC: Normal coronaries   RHC: RA 16 (vwave 21), PA 60/35/48, PCW 41 (vwave 58), LVEDP 20, CI 1.6/CO 3.24

## 2024-04-19 DIAGNOSIS — I50.9 HEART FAILURE, UNSPECIFIED: ICD-10-CM

## 2024-04-19 LAB
ALBUMIN SERPL ELPH-MCNC: 3.6 G/DL — SIGNIFICANT CHANGE UP (ref 3.3–5)
ALP SERPL-CCNC: 145 U/L — HIGH (ref 40–120)
ALT FLD-CCNC: 29 U/L — SIGNIFICANT CHANGE UP (ref 10–45)
ANION GAP SERPL CALC-SCNC: 13 MMOL/L — SIGNIFICANT CHANGE UP (ref 5–17)
APTT BLD: 41.7 SEC — HIGH (ref 24.5–35.6)
AST SERPL-CCNC: 43 U/L — HIGH (ref 10–40)
BASE EXCESS BLDMV CALC-SCNC: 0.5 MMOL/L — SIGNIFICANT CHANGE UP (ref -3–3)
BASE EXCESS BLDMV CALC-SCNC: 1.2 MMOL/L — SIGNIFICANT CHANGE UP (ref -3–3)
BASE EXCESS BLDV CALC-SCNC: -1.3 MMOL/L — SIGNIFICANT CHANGE UP (ref -2–3)
BASE EXCESS BLDV CALC-SCNC: 1 MMOL/L — SIGNIFICANT CHANGE UP (ref -2–3)
BASE EXCESS BLDV CALC-SCNC: 2 MMOL/L — SIGNIFICANT CHANGE UP (ref -2–3)
BASOPHILS # BLD AUTO: 0.03 K/UL — SIGNIFICANT CHANGE UP (ref 0–0.2)
BASOPHILS NFR BLD AUTO: 0.2 % — SIGNIFICANT CHANGE UP (ref 0–2)
BILIRUB SERPL-MCNC: 0.5 MG/DL — SIGNIFICANT CHANGE UP (ref 0.2–1.2)
BUN SERPL-MCNC: 22 MG/DL — SIGNIFICANT CHANGE UP (ref 7–23)
CALCIUM SERPL-MCNC: 8.8 MG/DL — SIGNIFICANT CHANGE UP (ref 8.4–10.5)
CHLORIDE SERPL-SCNC: 94 MMOL/L — LOW (ref 96–108)
CO2 BLDMV-SCNC: 27 MMOL/L — SIGNIFICANT CHANGE UP (ref 21–29)
CO2 BLDMV-SCNC: 27 MMOL/L — SIGNIFICANT CHANGE UP (ref 21–29)
CO2 BLDV-SCNC: 26 MMOL/L — SIGNIFICANT CHANGE UP (ref 22–26)
CO2 BLDV-SCNC: 28 MMOL/L — HIGH (ref 22–26)
CO2 BLDV-SCNC: 29 MMOL/L — HIGH (ref 22–26)
CO2 SERPL-SCNC: 22 MMOL/L — SIGNIFICANT CHANGE UP (ref 22–31)
CREAT SERPL-MCNC: 0.87 MG/DL — SIGNIFICANT CHANGE UP (ref 0.5–1.3)
EGFR: 74 ML/MIN/1.73M2 — SIGNIFICANT CHANGE UP
EOSINOPHIL # BLD AUTO: 0.17 K/UL — SIGNIFICANT CHANGE UP (ref 0–0.5)
EOSINOPHIL NFR BLD AUTO: 0.9 % — SIGNIFICANT CHANGE UP (ref 0–6)
GAS PNL BLDA: SIGNIFICANT CHANGE UP
GAS PNL BLDMV: SIGNIFICANT CHANGE UP
GAS PNL BLDMV: SIGNIFICANT CHANGE UP
GAS PNL BLDV: SIGNIFICANT CHANGE UP
GLUCOSE BLDC GLUCOMTR-MCNC: 108 MG/DL — HIGH (ref 70–99)
GLUCOSE BLDC GLUCOMTR-MCNC: 110 MG/DL — HIGH (ref 70–99)
GLUCOSE BLDC GLUCOMTR-MCNC: 111 MG/DL — HIGH (ref 70–99)
GLUCOSE BLDC GLUCOMTR-MCNC: 119 MG/DL — HIGH (ref 70–99)
GLUCOSE BLDC GLUCOMTR-MCNC: 123 MG/DL — HIGH (ref 70–99)
GLUCOSE BLDC GLUCOMTR-MCNC: 125 MG/DL — HIGH (ref 70–99)
GLUCOSE BLDC GLUCOMTR-MCNC: 151 MG/DL — HIGH (ref 70–99)
GLUCOSE BLDC GLUCOMTR-MCNC: 161 MG/DL — HIGH (ref 70–99)
GLUCOSE BLDC GLUCOMTR-MCNC: 167 MG/DL — HIGH (ref 70–99)
GLUCOSE BLDC GLUCOMTR-MCNC: 179 MG/DL — HIGH (ref 70–99)
GLUCOSE BLDC GLUCOMTR-MCNC: 189 MG/DL — HIGH (ref 70–99)
GLUCOSE BLDC GLUCOMTR-MCNC: 203 MG/DL — HIGH (ref 70–99)
GLUCOSE BLDC GLUCOMTR-MCNC: 85 MG/DL — SIGNIFICANT CHANGE UP (ref 70–99)
GLUCOSE BLDC GLUCOMTR-MCNC: 92 MG/DL — SIGNIFICANT CHANGE UP (ref 70–99)
GLUCOSE SERPL-MCNC: 118 MG/DL — HIGH (ref 70–99)
HCO3 BLDMV-SCNC: 26 MMOL/L — SIGNIFICANT CHANGE UP (ref 20–28)
HCO3 BLDMV-SCNC: 26 MMOL/L — SIGNIFICANT CHANGE UP (ref 20–28)
HCO3 BLDV-SCNC: 24 MMOL/L — SIGNIFICANT CHANGE UP (ref 22–29)
HCO3 BLDV-SCNC: 27 MMOL/L — SIGNIFICANT CHANGE UP (ref 22–29)
HCO3 BLDV-SCNC: 27 MMOL/L — SIGNIFICANT CHANGE UP (ref 22–29)
HCT VFR BLD CALC: 23.4 % — LOW (ref 34.5–45)
HCT VFR BLD CALC: 24 % — LOW (ref 34.5–45)
HGB BLD-MCNC: 7.9 G/DL — LOW (ref 11.5–15.5)
HGB BLD-MCNC: 8.1 G/DL — LOW (ref 11.5–15.5)
HOROWITZ INDEX BLDMV+IHG-RTO: 32 — SIGNIFICANT CHANGE UP
HOROWITZ INDEX BLDMV+IHG-RTO: 36 — SIGNIFICANT CHANGE UP
HOROWITZ INDEX BLDV+IHG-RTO: 21 — SIGNIFICANT CHANGE UP
IMM GRANULOCYTES NFR BLD AUTO: 1.1 % — HIGH (ref 0–0.9)
INR BLD: 1.11 RATIO — SIGNIFICANT CHANGE UP (ref 0.85–1.18)
LYMPHOCYTES # BLD AUTO: 1.73 K/UL — SIGNIFICANT CHANGE UP (ref 1–3.3)
LYMPHOCYTES # BLD AUTO: 9.5 % — LOW (ref 13–44)
MAGNESIUM SERPL-MCNC: 1.8 MG/DL — SIGNIFICANT CHANGE UP (ref 1.6–2.6)
MCHC RBC-ENTMCNC: 30.5 PG — SIGNIFICANT CHANGE UP (ref 27–34)
MCHC RBC-ENTMCNC: 31.4 PG — SIGNIFICANT CHANGE UP (ref 27–34)
MCHC RBC-ENTMCNC: 32.9 GM/DL — SIGNIFICANT CHANGE UP (ref 32–36)
MCHC RBC-ENTMCNC: 34.6 GM/DL — SIGNIFICANT CHANGE UP (ref 32–36)
MCV RBC AUTO: 90.7 FL — SIGNIFICANT CHANGE UP (ref 80–100)
MCV RBC AUTO: 92.7 FL — SIGNIFICANT CHANGE UP (ref 80–100)
MONOCYTES # BLD AUTO: 1.67 K/UL — HIGH (ref 0–0.9)
MONOCYTES NFR BLD AUTO: 9.1 % — SIGNIFICANT CHANGE UP (ref 2–14)
NEUTROPHILS # BLD AUTO: 14.49 K/UL — HIGH (ref 1.8–7.4)
NEUTROPHILS NFR BLD AUTO: 79.2 % — HIGH (ref 43–77)
NRBC # BLD: 0 /100 WBCS — SIGNIFICANT CHANGE UP (ref 0–0)
NRBC # BLD: 0 /100 WBCS — SIGNIFICANT CHANGE UP (ref 0–0)
O2 CT VFR BLD CALC: 41 MMHG — SIGNIFICANT CHANGE UP (ref 30–65)
O2 CT VFR BLD CALC: 45 MMHG — SIGNIFICANT CHANGE UP (ref 30–65)
PCO2 BLDMV: 41 MMHG — SIGNIFICANT CHANGE UP (ref 30–65)
PCO2 BLDMV: 44 MMHG — SIGNIFICANT CHANGE UP (ref 30–65)
PCO2 BLDV: 43 MMHG — HIGH (ref 39–42)
PCO2 BLDV: 44 MMHG — HIGH (ref 39–42)
PCO2 BLDV: 45 MMHG — HIGH (ref 39–42)
PH BLDMV: 7.38 — SIGNIFICANT CHANGE UP (ref 7.32–7.45)
PH BLDMV: 7.41 — SIGNIFICANT CHANGE UP (ref 7.32–7.45)
PH BLDV: 7.36 — SIGNIFICANT CHANGE UP (ref 7.32–7.43)
PH BLDV: 7.38 — SIGNIFICANT CHANGE UP (ref 7.32–7.43)
PH BLDV: 7.4 — SIGNIFICANT CHANGE UP (ref 7.32–7.43)
PHOSPHATE SERPL-MCNC: 2.4 MG/DL — LOW (ref 2.5–4.5)
PLATELET # BLD AUTO: 109 K/UL — LOW (ref 150–400)
PLATELET # BLD AUTO: 118 K/UL — LOW (ref 150–400)
PO2 BLDV: 35 MMHG — SIGNIFICANT CHANGE UP (ref 25–45)
PO2 BLDV: 38 MMHG — SIGNIFICANT CHANGE UP (ref 25–45)
PO2 BLDV: 41 MMHG — SIGNIFICANT CHANGE UP (ref 25–45)
POTASSIUM BLDA-SCNC: 3.8 MMOL/L — SIGNIFICANT CHANGE UP (ref 3.5–5.1)
POTASSIUM BLDA-SCNC: 4.2 MMOL/L — SIGNIFICANT CHANGE UP (ref 3.5–5.1)
POTASSIUM SERPL-MCNC: 3.9 MMOL/L — SIGNIFICANT CHANGE UP (ref 3.5–5.3)
POTASSIUM SERPL-SCNC: 3.9 MMOL/L — SIGNIFICANT CHANGE UP (ref 3.5–5.3)
PROT SERPL-MCNC: 6.1 G/DL — SIGNIFICANT CHANGE UP (ref 6–8.3)
PROTHROM AB SERPL-ACNC: 12.2 SEC — SIGNIFICANT CHANGE UP (ref 9.5–13)
RBC # BLD: 2.58 M/UL — LOW (ref 3.8–5.2)
RBC # BLD: 2.59 M/UL — LOW (ref 3.8–5.2)
RBC # FLD: 14.6 % — HIGH (ref 10.3–14.5)
RBC # FLD: 14.8 % — HIGH (ref 10.3–14.5)
SAO2 % BLDMV: 64.9 — SIGNIFICANT CHANGE UP (ref 60–90)
SAO2 % BLDMV: 75.9 — SIGNIFICANT CHANGE UP (ref 60–90)
SAO2 % BLDV: 63.3 % — LOW (ref 67–88)
SAO2 % BLDV: 65.7 % — LOW (ref 67–88)
SAO2 % BLDV: 72.9 % — SIGNIFICANT CHANGE UP (ref 67–88)
SODIUM SERPL-SCNC: 129 MMOL/L — LOW (ref 135–145)
WBC # BLD: 16.04 K/UL — HIGH (ref 3.8–10.5)
WBC # BLD: 18.3 K/UL — HIGH (ref 3.8–10.5)
WBC # FLD AUTO: 16.04 K/UL — HIGH (ref 3.8–10.5)
WBC # FLD AUTO: 18.3 K/UL — HIGH (ref 3.8–10.5)

## 2024-04-19 PROCEDURE — 99291 CRITICAL CARE FIRST HOUR: CPT

## 2024-04-19 PROCEDURE — 99292 CRITICAL CARE ADDL 30 MIN: CPT

## 2024-04-19 PROCEDURE — 71045 X-RAY EXAM CHEST 1 VIEW: CPT | Mod: 26

## 2024-04-19 RX ORDER — THIAMINE MONONITRATE (VIT B1) 100 MG
100 TABLET ORAL DAILY
Refills: 0 | Status: COMPLETED | OUTPATIENT
Start: 2024-04-19 | End: 2024-04-21

## 2024-04-19 RX ORDER — WARFARIN SODIUM 2.5 MG/1
8 TABLET ORAL ONCE
Refills: 0 | Status: COMPLETED | OUTPATIENT
Start: 2024-04-19 | End: 2024-04-19

## 2024-04-19 RX ORDER — POTASSIUM CHLORIDE 20 MEQ
10 PACKET (EA) ORAL
Refills: 0 | Status: COMPLETED | OUTPATIENT
Start: 2024-04-19 | End: 2024-04-19

## 2024-04-19 RX ORDER — METHOCARBAMOL 500 MG/1
750 TABLET, FILM COATED ORAL ONCE
Refills: 0 | Status: COMPLETED | OUTPATIENT
Start: 2024-04-19 | End: 2024-04-19

## 2024-04-19 RX ORDER — CALCIUM GLUCONATE 100 MG/ML
1 VIAL (ML) INTRAVENOUS ONCE
Refills: 0 | Status: COMPLETED | OUTPATIENT
Start: 2024-04-19 | End: 2024-04-19

## 2024-04-19 RX ORDER — FOLIC ACID 0.8 MG
1 TABLET ORAL DAILY
Refills: 0 | Status: COMPLETED | OUTPATIENT
Start: 2024-04-19 | End: 2024-04-21

## 2024-04-19 RX ORDER — ATORVASTATIN CALCIUM 80 MG/1
20 TABLET, FILM COATED ORAL AT BEDTIME
Refills: 0 | Status: DISCONTINUED | OUTPATIENT
Start: 2024-04-19 | End: 2024-04-27

## 2024-04-19 RX ORDER — MAGNESIUM SULFATE 500 MG/ML
2 VIAL (ML) INJECTION ONCE
Refills: 0 | Status: COMPLETED | OUTPATIENT
Start: 2024-04-19 | End: 2024-04-19

## 2024-04-19 RX ORDER — INSULIN HUMAN 100 [IU]/ML
3 INJECTION, SOLUTION SUBCUTANEOUS
Qty: 100 | Refills: 0 | Status: DISCONTINUED | OUTPATIENT
Start: 2024-04-19 | End: 2024-04-20

## 2024-04-19 RX ORDER — KETOROLAC TROMETHAMINE 30 MG/ML
30 SYRINGE (ML) INJECTION ONCE
Refills: 0 | Status: DISCONTINUED | OUTPATIENT
Start: 2024-04-19 | End: 2024-04-19

## 2024-04-19 RX ADMIN — FAMOTIDINE 20 MILLIGRAM(S): 10 INJECTION INTRAVENOUS at 18:25

## 2024-04-19 RX ADMIN — Medication 650 MILLIGRAM(S): at 12:50

## 2024-04-19 RX ADMIN — OXYCODONE HYDROCHLORIDE 20 MILLIGRAM(S): 5 TABLET ORAL at 23:45

## 2024-04-19 RX ADMIN — Medication 650 MILLIGRAM(S): at 18:25

## 2024-04-19 RX ADMIN — HEPARIN SODIUM 5000 UNIT(S): 5000 INJECTION INTRAVENOUS; SUBCUTANEOUS at 21:27

## 2024-04-19 RX ADMIN — INSULIN HUMAN 3 UNIT(S)/HR: 100 INJECTION, SOLUTION SUBCUTANEOUS at 21:27

## 2024-04-19 RX ADMIN — Medication 6.65 MICROGRAM(S)/KG/MIN: at 21:27

## 2024-04-19 RX ADMIN — OXYCODONE HYDROCHLORIDE 20 MILLIGRAM(S): 5 TABLET ORAL at 02:00

## 2024-04-19 RX ADMIN — Medication 1 MILLIGRAM(S): at 18:25

## 2024-04-19 RX ADMIN — Medication 650 MILLIGRAM(S): at 18:55

## 2024-04-19 RX ADMIN — OXYCODONE HYDROCHLORIDE 20 MILLIGRAM(S): 5 TABLET ORAL at 19:45

## 2024-04-19 RX ADMIN — Medication 25 GRAM(S): at 02:01

## 2024-04-19 RX ADMIN — SENNA PLUS 2 TABLET(S): 8.6 TABLET ORAL at 21:26

## 2024-04-19 RX ADMIN — Medication 15 MILLIGRAM(S): at 05:28

## 2024-04-19 RX ADMIN — Medication 85 MILLIMOLE(S): at 02:09

## 2024-04-19 RX ADMIN — POLYETHYLENE GLYCOL 3350 17 GRAM(S): 17 POWDER, FOR SOLUTION ORAL at 11:51

## 2024-04-19 RX ADMIN — Medication 50 MILLIEQUIVALENT(S): at 16:30

## 2024-04-19 RX ADMIN — Medication 1 MILLIGRAM(S): at 13:25

## 2024-04-19 RX ADMIN — Medication 1 MILLIGRAM(S): at 08:46

## 2024-04-19 RX ADMIN — Medication 7.98 MICROGRAM(S)/KG/MIN: at 06:41

## 2024-04-19 RX ADMIN — DEXMEDETOMIDINE HYDROCHLORIDE IN 0.9% SODIUM CHLORIDE 11.1 MICROGRAM(S)/KG/HR: 4 INJECTION INTRAVENOUS at 23:47

## 2024-04-19 RX ADMIN — GABAPENTIN 100 MILLIGRAM(S): 400 CAPSULE ORAL at 14:39

## 2024-04-19 RX ADMIN — ATORVASTATIN CALCIUM 20 MILLIGRAM(S): 80 TABLET, FILM COATED ORAL at 21:26

## 2024-04-19 RX ADMIN — OXYCODONE HYDROCHLORIDE 20 MILLIGRAM(S): 5 TABLET ORAL at 07:25

## 2024-04-19 RX ADMIN — Medication 50 MILLIGRAM(S): at 06:13

## 2024-04-19 RX ADMIN — OXYCODONE HYDROCHLORIDE 20 MILLIGRAM(S): 5 TABLET ORAL at 15:45

## 2024-04-19 RX ADMIN — OXYCODONE HYDROCHLORIDE 20 MILLIGRAM(S): 5 TABLET ORAL at 16:15

## 2024-04-19 RX ADMIN — OXYCODONE HYDROCHLORIDE 20 MILLIGRAM(S): 5 TABLET ORAL at 11:25

## 2024-04-19 RX ADMIN — HEPARIN SODIUM 5000 UNIT(S): 5000 INJECTION INTRAVENOUS; SUBCUTANEOUS at 14:38

## 2024-04-19 RX ADMIN — FAMOTIDINE 20 MILLIGRAM(S): 10 INJECTION INTRAVENOUS at 05:14

## 2024-04-19 RX ADMIN — Medication 25 MILLIGRAM(S): at 21:26

## 2024-04-19 RX ADMIN — Medication 15 MILLIGRAM(S): at 05:13

## 2024-04-19 RX ADMIN — Medication 50 MILLIEQUIVALENT(S): at 18:00

## 2024-04-19 RX ADMIN — Medication 30 MILLIGRAM(S): at 11:52

## 2024-04-19 RX ADMIN — OXYCODONE HYDROCHLORIDE 20 MILLIGRAM(S): 5 TABLET ORAL at 20:30

## 2024-04-19 RX ADMIN — INSULIN HUMAN 3 UNIT(S)/HR: 100 INJECTION, SOLUTION SUBCUTANEOUS at 13:05

## 2024-04-19 RX ADMIN — Medication 50 MILLIEQUIVALENT(S): at 16:00

## 2024-04-19 RX ADMIN — Medication 650 MILLIGRAM(S): at 05:29

## 2024-04-19 RX ADMIN — WARFARIN SODIUM 8 MILLIGRAM(S): 2.5 TABLET ORAL at 21:26

## 2024-04-19 RX ADMIN — Medication 25 MILLIGRAM(S): at 14:38

## 2024-04-19 RX ADMIN — GABAPENTIN 100 MILLIGRAM(S): 400 CAPSULE ORAL at 06:13

## 2024-04-19 RX ADMIN — Medication 25 GRAM(S): at 08:46

## 2024-04-19 RX ADMIN — Medication 100 GRAM(S): at 13:40

## 2024-04-19 RX ADMIN — Medication 30 MILLIGRAM(S): at 12:07

## 2024-04-19 RX ADMIN — Medication 500 MILLIGRAM(S): at 05:14

## 2024-04-19 RX ADMIN — Medication 650 MILLIGRAM(S): at 11:51

## 2024-04-19 RX ADMIN — OXYCODONE HYDROCHLORIDE 20 MILLIGRAM(S): 5 TABLET ORAL at 07:55

## 2024-04-19 RX ADMIN — Medication 81 MILLIGRAM(S): at 11:52

## 2024-04-19 RX ADMIN — Medication 100 MILLIGRAM(S): at 13:03

## 2024-04-19 RX ADMIN — METHOCARBAMOL 750 MILLIGRAM(S): 500 TABLET, FILM COATED ORAL at 22:51

## 2024-04-19 RX ADMIN — AMIODARONE HYDROCHLORIDE 400 MILLIGRAM(S): 400 TABLET ORAL at 05:14

## 2024-04-19 RX ADMIN — OXYCODONE HYDROCHLORIDE 20 MILLIGRAM(S): 5 TABLET ORAL at 02:30

## 2024-04-19 RX ADMIN — HEPARIN SODIUM 5000 UNIT(S): 5000 INJECTION INTRAVENOUS; SUBCUTANEOUS at 06:13

## 2024-04-19 RX ADMIN — OXYCODONE HYDROCHLORIDE 20 MILLIGRAM(S): 5 TABLET ORAL at 11:15

## 2024-04-19 RX ADMIN — Medication 1 MILLIGRAM(S): at 13:03

## 2024-04-19 RX ADMIN — GABAPENTIN 100 MILLIGRAM(S): 400 CAPSULE ORAL at 21:26

## 2024-04-19 RX ADMIN — VASOPRESSIN 1.5 UNIT(S)/MIN: 20 INJECTION INTRAVENOUS at 21:27

## 2024-04-19 RX ADMIN — Medication 50 MILLIEQUIVALENT(S): at 08:46

## 2024-04-19 RX ADMIN — CHLORHEXIDINE GLUCONATE 1 APPLICATION(S): 213 SOLUTION TOPICAL at 13:35

## 2024-04-19 RX ADMIN — Medication 50 MILLIEQUIVALENT(S): at 18:34

## 2024-04-19 RX ADMIN — Medication 500 MILLIGRAM(S): at 18:24

## 2024-04-19 RX ADMIN — Medication 650 MILLIGRAM(S): at 05:14

## 2024-04-19 NOTE — PROGRESS NOTE ADULT - NS ATTEND AMEND GEN_ALL_CORE FT
Cardiogenick shock in the setting severe MR, s/p Bio MVR, Maze, TIMMY clip.   IABP removed  TTE with new low EF 35% from 60% pre op, LVEDD 6cms, bioMVR, MG 5.7, no paravalvular leak./valvuylar MR, mild TR   3, Lasix drip, good u/o  add 3% HS x 1 dose   wean pressors for MAP > 65  remove swan, keep cordis for CVP and SVC sat  will add GDMT when tolerated  drop in Hb  leukocytosis  hyponatremia  on AC with coumadin   OBB to chair  Plan d/w ICU team

## 2024-04-19 NOTE — PROGRESS NOTE ADULT - SUBJECTIVE AND OBJECTIVE BOX
Patient seen and examined at the bedside.    Remained critically ill on continuous ICU monitoring.    OBJECTIVE:  Vital Signs Last 24 Hrs  T(C): 37.1 (19 Apr 2024 20:00), Max: 37.6 (19 Apr 2024 08:00)  T(F): 98.8 (19 Apr 2024 20:00), Max: 99.7 (19 Apr 2024 08:00)  HR: 80 (19 Apr 2024 20:00) (80 - 85)  BP: --  BP(mean): --  RR: 18 (19 Apr 2024 20:00) (14 - 37)  SpO2: 93% (19 Apr 2024 20:00) (91% - 100%)    Parameters below as of 19 Apr 2024 20:00  Patient On (Oxygen Delivery Method): room air      Physical Exam:   Neurology: Oriented, following commands  Respiratory: Clear bilaterally   CV: Paced (junctional 40s under pacer)  [x] Mediastinal CT x1 [x] RP   [x] TPM -DDD- 80  Abdominal: Soft, Nontender  Extremities: Warm, well-perfused  Jimenez      Assessment:  65 yo F s/p MVR-t, MAZE, and LAAC on 4/16/24.  IABP was placed preop for cardiogenic shock.    Postop acute pulmonary insufficiency  Hyperglycemia  Leukocytosis   Hyponatremia  Hypomagnesemia  Acute blood loss anemia  Thrombocytopenia       Plan:   ***Neuro***  [x] Precedex   [x] Tylenol, Gapapentin for pain management  Post operative neuro assessment     ***Cardiovascular***  Invasive hemodynamic monitoring, assess perfusion indices   NE / CVP 9 / MAP 69 / Hct 23.4/ Lactate 0.6   [x] Vasopressin 0.01 Units/ min   [x] Dobutamine 2.5 mcgs   Reassessment of hemodynamics post resuscitation   Monitor chest tube outputs    [x] AC therapy with Heparin   [x] ASA [x] Statin   Serial EKG and cardiac enzymes     ***Pulmonary***  [x] Room Air  Encourage incentive spirometry, continue pulse ox monitoring, follow ABGs     ***GI***  [x] CC Diet   [x] Pepcid  [x] Miralax and Senna for Bowel Regimen    ***Renal***  Continue to monitor I/Os, BUN/Creatinine.   Replete lytes PRN  Diuresis with Lasix   Jimenez present      ***ID***  No active antibiotic coverage      ***Endocrine***  [x] Stress Hyperglycemia : HbA1c 5.6 %             - [x] ISS [x] Insulin gtt              - Need tight glycemic control to prevent wound infection.          Patient requires continuous monitoring with bedside rhythm monitoring, pulse oximetry monitoring, and continuous central venous and arterial pressure monitoring; and intermittent blood gas analysis. Care plan discussed with the ICU care team.   Patient remained critical, at risk for life threatening decompensation.    I have spent 55 minutes providing critical care management to this patient.    By signing my name below, I, Annie Castillo, attest that this documentation has been prepared under the direction and in the presence of DEVEN Johnson.   Electronically signed: Purvi Merchant, 04-19-24 @ 21:01    I, Sid Montero, personally performed the services described in this documentation. all medical record entries made by the carlos aibe were at my direction and in my presence. I have reviewed the chart and agree that the record reflects my personal performance and is accurate and complete  Electronically signed:  DEVEN Johnson.  Patient seen and examined at the bedside.    Remained critically ill on continuous ICU monitoring.    OBJECTIVE:  Vital Signs Last 24 Hrs  T(C): 37.1 (19 Apr 2024 20:00), Max: 37.6 (19 Apr 2024 08:00)  T(F): 98.8 (19 Apr 2024 20:00), Max: 99.7 (19 Apr 2024 08:00)  HR: 80 (19 Apr 2024 20:00) (80 - 85)  BP(mean): 69  RR: 18 (19 Apr 2024 20:00) (14 - 37)  SpO2: 93% (19 Apr 2024 20:00) (91% - 100%)    Parameters below as of 19 Apr 2024 20:00  Patient On (Oxygen Delivery Method): room air    Physical Exam:   Neurology: Oriented, following commands  Respiratory: Clear bilaterally   CV: Paced (junctional 40s under pacer)  [x] Mediastinal CT x1 [x] RP   [x] TPM -DDD- 80  Abdominal: Soft, Nontender  Extremities: Warm, well-perfused  Jimenez    Assessment:  63 yo F s/p MVR-t, MAZE, and LAAC on 4/16/24.  IABP was placed preop for cardiogenic shock.    Postop acute pulmonary insufficiency  Hyperglycemia  Leukocytosis   Hyponatremia  Hypomagnesemia  Acute blood loss anemia  Thrombocytopenia     Plan:   ***Neuro***  [x] Precedex 0.3mcg/kg/min  [x] Tylenol, Gapapentin for pain management  Post operative neuro assessment     ***Cardiovascular***  Invasive hemodynamic monitoring, assess perfusion indices   AR / CVP 9 / MAP 69 / Hct 23.4/ Lactate 0.6   [x] Vasopressin 0.03 Units/ min   [x] Dobutamine 2.5 mcg/kg/min  Reassessment of hemodynamics post resuscitation   Monitor chest tube outputs    [x] AC therapy with Coumadin for MVR. HSQ for DVT ppx.  [x] ASA [x] Statin     ***Pulmonary***  [x] Room Air  Encourage incentive spirometry, continue pulse ox monitoring, follow ABGs     ***GI***  [x] CC Diet   [x] Pepcid  [x] Miralax and Senna for Bowel Regimen    ***Renal***  Continue to monitor I/Os, BUN/Creatinine.   Replete lytes PRN  Diuresis with Lasix gtt @ 5mg/hr  Jimenez present      ***ID***  No active antibiotic coverage      ***Endocrine***  [x] Stress Hyperglycemia : HbA1c 5.6 %             - [x] ISS [x] Insulin gtt              - Need tight glycemic control to prevent wound infection.    Patient requires continuous monitoring with bedside rhythm monitoring, pulse oximetry monitoring, and continuous central venous and arterial pressure monitoring; and intermittent blood gas analysis. Care plan discussed with the ICU care team.   Patient remained critical, at risk for life threatening decompensation.    I have spent 55 minutes providing critical care management to this patient.    By signing my name below, I, Annie Castillo, attest that this documentation has been prepared under the direction and in the presence of DEVEN Johnson.   Electronically signed: Purvi Merchant, 04-19-24 @ 21:01    I, Sid Montero, personally performed the services described in this documentation. all medical record entries made by the scribe were at my direction and in my presence. I have reviewed the chart and agree that the record reflects my personal performance and is accurate and complete  Electronically signed:  DEVEN Johnson.

## 2024-04-19 NOTE — PROGRESS NOTE ADULT - ASSESSMENT
64F with PMHx of HLD, HTN, AFib (diagnosed recently, not on AC), and mitral valve prolapse found to have severe mitral regurgitation complicated by cardiogenic shock, now s/p RHC with IABP placement. She ultimately underwent MVR(t) with Maze procedure with Dr Maki on 4/16, extubated the following day. IABP removed 4/17. She overall is progressing well, remains on /Vaso which is being weaned as tolerated. She is noted to be volume overloaded with elevated CVP, remains on lasix gtt. 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

## 2024-04-19 NOTE — PROGRESS NOTE ADULT - SUBJECTIVE AND OBJECTIVE BOX
Patient seen and examined at the bedside.    Remains critically ill on continuous ICU monitoring.      Brief Summary:  65 yo F s/p MVR-t, MAZE, and LAAC on 4/16/24.  IABP was placed preop for cardiogenic shock.        24 Hour events:      Objective:  Vital Signs Last 24 Hrs  T(C): 37.3 (19 Apr 2024 04:00), Max: 37.5 (18 Apr 2024 08:00)  T(F): 99.1 (19 Apr 2024 04:00), Max: 99.5 (18 Apr 2024 08:00)  HR: 80 (19 Apr 2024 06:00) (80 - 80)  BP: --  BP(mean): --  RR: 22 (19 Apr 2024 06:00) (20 - 29)  SpO2: 97% (19 Apr 2024 06:00) (94% - 100%)    Parameters below as of 19 Apr 2024 04:00  Patient On (Oxygen Delivery Method): nasal cannula  O2 Flow (L/min): 4                  Physical Exam:   General: Alert and interactive   Neurology: Oriented, following commands  Respiratory: Clear bilaterally   CV: Paced (junctional 40s under pacer)  Abdominal: Soft, Nontender  Extremities: Warm, well-perfused  Jimenez    -------------------------------------------------------------------------------------------------------------------------------    Labs:                        7.9    18.30 )-----------( 118      ( 19 Apr 2024 00:33 )             24.0     04-19    129<L>  |  94<L>  |  22  ----------------------------<  118<H>  3.9   |  22  |  0.87    Ca    8.8      19 Apr 2024 00:33  Phos  2.4     04-19  Mg     1.8     04-19    TPro  6.1  /  Alb  3.6  /  TBili  0.5  /  DBili  x   /  AST  43<H>  /  ALT  29  /  AlkPhos  145<H>  04-19    LIVER FUNCTIONS - ( 19 Apr 2024 00:33 )  Alb: 3.6 g/dL / Pro: 6.1 g/dL / ALK PHOS: 145 U/L / ALT: 29 U/L / AST: 43 U/L / GGT: x           PT/INR - ( 18 Apr 2024 13:53 )   PT: 13.1 sec;   INR: 1.26 ratio           ABG - ( 19 Apr 2024 00:23 )  pH, Arterial: 7.42  pH, Blood: x     /  pCO2: 37    /  pO2: 128   / HCO3: 24    / Base Excess: -0.4  /  SaO2: 98.6              ------------------------------------------------------------------------------------------------------------------------------  Assessment:  65 yo F s/p MVR-t, MAZE, and LAAC on 4/16/24.  IABP was placed preop for cardiogenic shock.    Postop acute respiratory insufficiency  Hemodynamic instability  Hyperglycemia  Leukocytosis   Hyponatremia  Acute blood loss anemia  Thrombocytopenia       Plan:   ***Neuro***  Postoperative acute pain control with Tylenol, Gabapentin, and prns.  Patient is on Oxycodone at home - will ask acute pain service to consult   Lorazepam PRN for anxiety.  At high risk for alcohol withdrawal - Librium taper.  Sedated with Precedex.     ***Cardiovascular***  At high risk for ongoing hemodynamic instability and cardiac arrhythmias.  Wean pressors for MAP > 65 mm Hg   Wean Dobutamine.   May be able to remove PA catheter, trend central venous saturation.   ASA daily   Monitor chest tube output.    ***Pulmonary***  Postoperative acute respiratory insufficiency   Deep breathing and coughing exercises.  Wean oxygen as able.    ***GI***  Tolerating diet.   Pepcid for stress ulcer prophylaxis   Bowel regimen.    ***Renal***  Trend Creatinine   Jimenez catheter for strict I/O measurements.   Hyponatremia- Lasix infusion    ***ID***  No active antibiotic coverage   Trend leukocytosis.    ***Endocrine***  Hyperglycemia - Insulin Sliding Scale     ***Hematology***  Acute blood Thrombocytopenia - No current transfusion indication  Heparin for VTE prophylaxis   Coumadin nightly - trend INR          Patient requires continuous monitoring with bedside rhythm monitoring, pulse oximetry monitoring, and continuous central venous and arterial pressure monitoring; and intermittent blood gas analysis. Care plan discussed with the ICU care team.   Patient remains critical, at risk for life threatening decompensation.    I have spent 30 minutes providing critical care management to this patient.    By signing my name below, I, Nathan Webb, attest that this documentation has been prepared under the direction and in the presence of Shira Kirk MD  Electronically signed: Nathan Webb, 04-19-24 @ 06:41    I, Shira Kirk MD, personally performed the services described in this documentation. all medical record entries made by the scribe were at my direction and in my presence. I have reviewed the chart and agree that the record reflects my personal performance and is accurate and complete  Electronically signed: Shira Kirk MD Patient seen and examined at the bedside.    Remains critically ill on continuous ICU monitoring.      Brief Summary:  63 yo F s/p MVR-t, MAZE, and LAAC on 4/16/24.  IABP was placed preop for cardiogenic shock.    24 Hour events:  Remains on Dobutamine (increased to 3 mcg/kg/min)  Lasix infusion started to increase diuresis.  Remains on Vasopressin  Delirious with hallucinations overnight - required high dose Precedex last evening.      Objective:  Vital Signs Last 24 Hrs  T(C): 37.3 (19 Apr 2024 04:00), Max: 37.5 (18 Apr 2024 08:00)  T(F): 99.1 (19 Apr 2024 04:00), Max: 99.5 (18 Apr 2024 08:00)  HR: 80 (19 Apr 2024 06:00) (80 - 80)  BP: --  BP(mean): --  RR: 22 (19 Apr 2024 06:00) (20 - 29)  SpO2: 97% (19 Apr 2024 06:00) (94% - 100%)    Parameters below as of 19 Apr 2024 04:00  Patient On (Oxygen Delivery Method): nasal cannula  O2 Flow (L/min): 4      Physical Exam:   General: Alert and interactive, restless  Neurology: Oriented, following commands  Respiratory: Clear bilaterally   CV: Paced (junctional 40s under pacer)  Abdominal: Soft, Nontender  Extremities: Warm, well-perfused  Jimenez    -------------------------------------------------------------------------------------------------------------------------------    Labs:                        7.9    18.30 )-----------( 118      ( 19 Apr 2024 00:33 )             24.0     04-19    129<L>  |  94<L>  |  22  ----------------------------<  118<H>  3.9   |  22  |  0.87    Ca    8.8      19 Apr 2024 00:33  Phos  2.4     04-19  Mg     1.8     04-19    TPro  6.1  /  Alb  3.6  /  TBili  0.5  /  DBili  x   /  AST  43<H>  /  ALT  29  /  AlkPhos  145<H>  04-19    LIVER FUNCTIONS - ( 19 Apr 2024 00:33 )  Alb: 3.6 g/dL / Pro: 6.1 g/dL / ALK PHOS: 145 U/L / ALT: 29 U/L / AST: 43 U/L / GGT: x           PT/INR - ( 18 Apr 2024 13:53 )   PT: 13.1 sec;   INR: 1.26 ratio         ABG - ( 19 Apr 2024 00:23 )  pH, Arterial: 7.42  pH, Blood: x     /  pCO2: 37    /  pO2: 128   / HCO3: 24    / Base Excess: -0.4  /  SaO2: 98.6        ------------------------------------------------------------------------------------------------------------------------------  Assessment:  63 yo F s/p MVR-t, MAZE, and LAAC on 4/16/24.  IABP was placed preop for cardiogenic shock.    Postop acute pulmonary insufficiency  Hyperglycemia  Leukocytosis   Hyponatremia  Hypomagnesemia  Acute blood loss anemia  Thrombocytopenia       Plan:   ***Neuro***  Postoperative acute pain control with Tylenol, Gabapentin, and prns.  Patient is on Oxycodone at home  Lorazepam PRN for anxiety.  At high risk for alcohol withdrawal - Librium taper.  Precedex overnight as needed.    ***Cardiovascular***  At high risk for ongoing hemodynamic instability and cardiac arrhythmias.  Wean pressors for MAP > 65 mm Hg   Remains on Dobutamine  Remove PA catheter, trend central venous saturation.   ASA daily   Monitor chest tube output.    ***Pulmonary***  Postoperative acute pulmonary insufficiency   Deep breathing and coughing exercises.  Wean oxygen as able.    ***GI***  Tolerating diet.   Pepcid for stress ulcer prophylaxis   Bowel regimen.    ***Renal***  Trend Creatinine   Jimenez catheter for strict I/O measurements.   Hyponatremia - Lasix infusion, will trend sodium  Hypomagnesemia - repleted.    ***ID***  No active antibiotic coverage   Trend leukocytosis.    ***Endocrine***  Hyperglycemia - Insulin infusion  May be able to transition to bolus Insulin today,    ***Hematology***  Acute blood and Thrombocytopenia - s/p 1 unit prbcs  No current transfusion indication  Heparin for VTE prophylaxis   Coumadin nightly - trend INR      Patient requires continuous monitoring with bedside rhythm monitoring, pulse oximetry monitoring, and continuous central venous and arterial pressure monitoring; and intermittent blood gas analysis. Care plan discussed with the ICU care team.   Patient remains critical, at risk for life threatening decompensation.    I have spent 50 minutes providing critical care management to this patient.    By signing my name below, I, Nathan Webb, attest that this documentation has been prepared under the direction and in the presence of Shira Kirk MD  Electronically signed: Nathan Webb, 04-19-24 @ 06:41    I, Shira Kirk MD, personally performed the services described in this documentation. all medical record entries made by the scribe were at my direction and in my presence. I have reviewed the chart and agree that the record reflects my personal performance and is accurate and complete  Electronically signed: Shira Kirk MD

## 2024-04-19 NOTE — PROGRESS NOTE ADULT - SUBJECTIVE AND OBJECTIVE BOX
ADVANCED HEART FAILURE [facility] TRANSPL[facility]- PROGRESS NOTE  *To reach the NS1 Team from 8am to 5pm, please call [phone].  ___________________________________________________________________________    Subjective:  -now on vaso    Medications:  acetaminophen     Tablet .. 650 milliGRAM(s) Oral every 6 hours PRN  acetaminophen     Tablet .. 650 milliGRAM(s) Oral every 6 hours  ascorbic acid 500 milliGRAM(s) Oral two times a day  aspirin enteric coated 81 milliGRAM(s) Oral daily  atorvastatin 20 milliGRAM(s) Oral at bedtime  bisacodyl Suppository 10 milliGRAM(s) Rectal once  chlordiazePOXIDE 25 milliGRAM(s) Oral every 8 hours  chlordiazePOXIDE   Oral   chlorhexidine 2% Cloths 1 Application(s) Topical daily  dexMEDEtomidine Infusion 0.5 MICROgram(s)/kG/Hr IV Continuous <Continuous>  dextrose 50% Injectable 50 milliLiter(s) IV Push every 15 minutes  DOBUTamine Infusion 3 MICROgram(s)/kG/Min IV Continuous <Continuous>  famotidine    Tablet 20 milliGRAM(s) Oral two times a day  folic acid 1 milliGRAM(s) Oral daily  furosemide Infusion 5 mG/Hr IV Continuous <Continuous>  gabapentin 100 milliGRAM(s) Oral every 8 hours  heparin   Injectable 5000 Unit(s) SubCutaneous every 8 hours  insulin lispro (ADMELOG) corrective regimen sliding scale   SubCutaneous three times a day before meals  insulin regular Infusion 3 Unit(s)/Hr IV Continuous <Continuous>  LORazepam     Tablet 1 milliGRAM(s) Oral every 8 hours PRN  oxyCODONE    IR 20 milliGRAM(s) Oral every 4 hours PRN  polyethylene glycol 3350 17 Gram(s) Oral daily  senna 2 Tablet(s) Oral at bedtime  sodium chloride 0.9%. 1000 milliLiter(s) IV Continuous <Continuous>  thiamine 100 milliGRAM(s) Oral daily  vasopressin Infusion 0.01 Unit(s)/Min IV Continuous <Continuous>      ICU Vital Signs Last 24 Hrs  T(C): 37.4 (2024 12:00), Max: 37.6 (2024 08:00)  T(F): 99.3 (2024 12:00), Max: 99.7 (2024 08:00)  HR: 80 (2024 11:45) (80 - 85)  BP: --  BP(mean): --  ABP: 115/54 (2024 11:45) (24/24 - 349/349)  ABP(mean): 74 (2024 11:45) (24 - 349)  RR: 23 (2024 11:45) (20 - 37)  SpO2: 93% (2024 11:45) (92% - 100%)    O2 Parameters below as of 2024 12:00  Patient On (Oxygen Delivery Method): nasal cannula  O2 Flow (L/min): 3          Weight in k.7 ( @ 00:00)    I&O's Summary    2024 07:01  -  2024 07:00  --------------------------------------------------------  IN: 2948.7 mL / OUT: 4120 mL / NET: -1171.3 mL    2024 07:01  -  2024 12:51  --------------------------------------------------------  IN: 554.8 mL / OUT: 880 mL / NET: -325.2 mL        Physical Exam  General: No distress. Comfortable.  Neck: JVP ~ 14-16 cm   Chest: Clear to auscultation bilaterally  CV: Normal S1 and S2.   Abdomen: Soft, non-distended, non-tender  Extremities: warm and perfused   Neurology: Alert and oriented times three.    Labs:                        7.9    18.30 )-----------( 118      ( 2024 00:33 )             24.0     04    129<L>  |  94<L>  |  22  ----------------------------<  118<H>  3.9   |  22  |  0.87    Ca    8.8      2024 00:33  Phos  2.4       Mg     1.8         TPro  6.1  /  Alb  3.6  /  TBili  0.5  /  DBili  x   /  AST  43<H>  /  ALT  29  /  AlkPhos  145<H>      PT/INR - ( 2024 13:53 )   PT: 13.1 sec;   INR: 1.26 ratio               Creatine Kinase, Serum: 850 U/L (24 @ 21:36)  Creatine Kinase, Serum: 850 U/L (24 @ 21:36)    Oxygen Saturation, Mixed: 75.9 ( @ 08:33)  Oxygen Saturation, Mixed: 64.9 ( @ 00:23)  Oxygen Saturation, Mixed: 65.1 ( @ 22:06)  Oxygen Saturation, Mixed: 58.5 ( @ 17:09)  Oxygen Saturation, Mixed: 62.3 ( @ 10:00)  Oxygen Saturation, Mixed: 71.5 ( @ 00:08)  Oxygen Saturation, Mixed: 65.0 ( @ 20:21)  Oxygen Saturation, Mixed: 71.9 ( @ 03:35)  Oxygen Saturation, Mixed: 81.4 ( @ 23:55)  Oxygen Saturation, Mixed: 66.5 ( @ 22:26)  Oxygen Saturation, Mixed: 74.5 ( @ 21:30)            Imaging Studies

## 2024-04-19 NOTE — PROGRESS NOTE ADULT - PROBLEM SELECTOR PLAN 1
- most recent ECHO from 4/18 shows reduce EF of 35%  - IABP removed 4/17  - at this time remains on  @ 3 mcg/kg/min and Vaso gtt, will continue to wean as tolerated  - currently on Lasix gtt @ 5 mg/hr, will continue to adjust to target CVP 6-8  - continue to replete K 4-5, Mg >2.2

## 2024-04-20 DIAGNOSIS — Z98.890 OTHER SPECIFIED POSTPROCEDURAL STATES: Chronic | ICD-10-CM

## 2024-04-20 LAB
ALBUMIN SERPL ELPH-MCNC: 3.7 G/DL — SIGNIFICANT CHANGE UP (ref 3.3–5)
ALP SERPL-CCNC: 242 U/L — HIGH (ref 40–120)
ALT FLD-CCNC: 30 U/L — SIGNIFICANT CHANGE UP (ref 10–45)
ANION GAP SERPL CALC-SCNC: 11 MMOL/L — SIGNIFICANT CHANGE UP (ref 5–17)
APTT BLD: 29.3 SEC — SIGNIFICANT CHANGE UP (ref 24.5–35.6)
AST SERPL-CCNC: 35 U/L — SIGNIFICANT CHANGE UP (ref 10–40)
BASE EXCESS BLDV CALC-SCNC: 0 MMOL/L — SIGNIFICANT CHANGE UP (ref -2–3)
BASE EXCESS BLDV CALC-SCNC: 2 MMOL/L — SIGNIFICANT CHANGE UP (ref -2–3)
BASE EXCESS BLDV CALC-SCNC: 2 MMOL/L — SIGNIFICANT CHANGE UP (ref -2–3)
BASE EXCESS BLDV CALC-SCNC: 2.2 MMOL/L — SIGNIFICANT CHANGE UP (ref -2–3)
BASE EXCESS BLDV CALC-SCNC: 2.2 MMOL/L — SIGNIFICANT CHANGE UP (ref -2–3)
BASE EXCESS BLDV CALC-SCNC: 3.5 MMOL/L — HIGH (ref -2–3)
BASOPHILS # BLD AUTO: 0.04 K/UL — SIGNIFICANT CHANGE UP (ref 0–0.2)
BASOPHILS NFR BLD AUTO: 0.3 % — SIGNIFICANT CHANGE UP (ref 0–2)
BILIRUB SERPL-MCNC: 0.7 MG/DL — SIGNIFICANT CHANGE UP (ref 0.2–1.2)
BLD GP AB SCN SERPL QL: NEGATIVE — SIGNIFICANT CHANGE UP
BUN SERPL-MCNC: 20 MG/DL — SIGNIFICANT CHANGE UP (ref 7–23)
CA-I SERPL-SCNC: 1.25 MMOL/L — SIGNIFICANT CHANGE UP (ref 1.15–1.33)
CALCIUM SERPL-MCNC: 9.1 MG/DL — SIGNIFICANT CHANGE UP (ref 8.4–10.5)
CHLORIDE BLDV-SCNC: 92 MMOL/L — LOW (ref 96–108)
CHLORIDE SERPL-SCNC: 91 MMOL/L — LOW (ref 96–108)
CO2 BLDV-SCNC: 27 MMOL/L — HIGH (ref 22–26)
CO2 BLDV-SCNC: 29 MMOL/L — HIGH (ref 22–26)
CO2 BLDV-SCNC: 31 MMOL/L — HIGH (ref 22–26)
CO2 SERPL-SCNC: 25 MMOL/L — SIGNIFICANT CHANGE UP (ref 22–31)
CORTIS AM PEAK SERPL-MCNC: 16.6 UG/DL — SIGNIFICANT CHANGE UP (ref 6–18.4)
CREAT SERPL-MCNC: 0.78 MG/DL — SIGNIFICANT CHANGE UP (ref 0.5–1.3)
EGFR: 85 ML/MIN/1.73M2 — SIGNIFICANT CHANGE UP
EOSINOPHIL # BLD AUTO: 0.19 K/UL — SIGNIFICANT CHANGE UP (ref 0–0.5)
EOSINOPHIL NFR BLD AUTO: 1.3 % — SIGNIFICANT CHANGE UP (ref 0–6)
FIBRINOGEN PPP-MCNC: 456 MG/DL — HIGH (ref 200–445)
GAS PNL BLDA: SIGNIFICANT CHANGE UP
GAS PNL BLDV: 126 MMOL/L — LOW (ref 136–145)
GAS PNL BLDV: SIGNIFICANT CHANGE UP
GLUCOSE BLDC GLUCOMTR-MCNC: 104 MG/DL — HIGH (ref 70–99)
GLUCOSE BLDC GLUCOMTR-MCNC: 116 MG/DL — HIGH (ref 70–99)
GLUCOSE BLDC GLUCOMTR-MCNC: 128 MG/DL — HIGH (ref 70–99)
GLUCOSE BLDC GLUCOMTR-MCNC: 130 MG/DL — HIGH (ref 70–99)
GLUCOSE BLDC GLUCOMTR-MCNC: 156 MG/DL — HIGH (ref 70–99)
GLUCOSE BLDV-MCNC: 103 MG/DL — HIGH (ref 70–99)
GLUCOSE SERPL-MCNC: 103 MG/DL — HIGH (ref 70–99)
HCO3 BLDV-SCNC: 25 MMOL/L — SIGNIFICANT CHANGE UP (ref 22–29)
HCO3 BLDV-SCNC: 28 MMOL/L — SIGNIFICANT CHANGE UP (ref 22–29)
HCO3 BLDV-SCNC: 29 MMOL/L — SIGNIFICANT CHANGE UP (ref 22–29)
HCT VFR BLD CALC: 23.2 % — LOW (ref 34.5–45)
HCT VFR BLDA CALC: 29 % — LOW (ref 34.5–46.5)
HGB BLD CALC-MCNC: 9.5 G/DL — LOW (ref 11.7–16.1)
HGB BLD-MCNC: 7.8 G/DL — LOW (ref 11.5–15.5)
HOROWITZ INDEX BLDV+IHG-RTO: 28 — SIGNIFICANT CHANGE UP
HOROWITZ INDEX BLDV+IHG-RTO: 36 — SIGNIFICANT CHANGE UP
IMM GRANULOCYTES NFR BLD AUTO: 1.1 % — HIGH (ref 0–0.9)
INR BLD: 1.15 RATIO — SIGNIFICANT CHANGE UP (ref 0.85–1.18)
INR BLD: 1.27 RATIO — HIGH (ref 0.85–1.18)
LACTATE BLDV-MCNC: 0.7 MMOL/L — SIGNIFICANT CHANGE UP (ref 0.5–2)
LYMPHOCYTES # BLD AUTO: 1.03 K/UL — SIGNIFICANT CHANGE UP (ref 1–3.3)
LYMPHOCYTES # BLD AUTO: 6.9 % — LOW (ref 13–44)
MAGNESIUM SERPL-MCNC: 1.9 MG/DL — SIGNIFICANT CHANGE UP (ref 1.6–2.6)
MCHC RBC-ENTMCNC: 30.6 PG — SIGNIFICANT CHANGE UP (ref 27–34)
MCHC RBC-ENTMCNC: 33.6 GM/DL — SIGNIFICANT CHANGE UP (ref 32–36)
MCV RBC AUTO: 91 FL — SIGNIFICANT CHANGE UP (ref 80–100)
MONOCYTES # BLD AUTO: 1.66 K/UL — HIGH (ref 0–0.9)
MONOCYTES NFR BLD AUTO: 11.1 % — SIGNIFICANT CHANGE UP (ref 2–14)
NEUTROPHILS # BLD AUTO: 11.88 K/UL — HIGH (ref 1.8–7.4)
NEUTROPHILS NFR BLD AUTO: 79.3 % — HIGH (ref 43–77)
NRBC # BLD: 0 /100 WBCS — SIGNIFICANT CHANGE UP (ref 0–0)
NT-PROBNP SERPL-SCNC: 6388 PG/ML — HIGH (ref 0–300)
OSMOLALITY SERPL: 263 MOSMOL/KG — LOW (ref 280–301)
PCO2 BLDV: 43 MMHG — HIGH (ref 39–42)
PCO2 BLDV: 46 MMHG — HIGH (ref 39–42)
PCO2 BLDV: 46 MMHG — HIGH (ref 39–42)
PCO2 BLDV: 47 MMHG — HIGH (ref 39–42)
PCO2 BLDV: 47 MMHG — HIGH (ref 39–42)
PCO2 BLDV: 48 MMHG — HIGH (ref 39–42)
PH BLDV: 7.38 — SIGNIFICANT CHANGE UP (ref 7.32–7.43)
PH BLDV: 7.39 — SIGNIFICANT CHANGE UP (ref 7.32–7.43)
PHOSPHATE SERPL-MCNC: 2.7 MG/DL — SIGNIFICANT CHANGE UP (ref 2.5–4.5)
PLATELET # BLD AUTO: 125 K/UL — LOW (ref 150–400)
PO2 BLDV: 39 MMHG — SIGNIFICANT CHANGE UP (ref 25–45)
PO2 BLDV: 40 MMHG — SIGNIFICANT CHANGE UP (ref 25–45)
PO2 BLDV: 40 MMHG — SIGNIFICANT CHANGE UP (ref 25–45)
PO2 BLDV: 42 MMHG — SIGNIFICANT CHANGE UP (ref 25–45)
PO2 BLDV: 45 MMHG — SIGNIFICANT CHANGE UP (ref 25–45)
PO2 BLDV: 47 MMHG — HIGH (ref 25–45)
POTASSIUM BLDV-SCNC: 4 MMOL/L — SIGNIFICANT CHANGE UP (ref 3.5–5.1)
POTASSIUM SERPL-MCNC: 3.9 MMOL/L — SIGNIFICANT CHANGE UP (ref 3.5–5.3)
POTASSIUM SERPL-SCNC: 3.9 MMOL/L — SIGNIFICANT CHANGE UP (ref 3.5–5.3)
PROT SERPL-MCNC: 6.5 G/DL — SIGNIFICANT CHANGE UP (ref 6–8.3)
PROTHROM AB SERPL-ACNC: 12.6 SEC — SIGNIFICANT CHANGE UP (ref 9.5–13)
PROTHROM AB SERPL-ACNC: 13.2 SEC — HIGH (ref 9.5–13)
RBC # BLD: 2.55 M/UL — LOW (ref 3.8–5.2)
RBC # FLD: 14.6 % — HIGH (ref 10.3–14.5)
RH IG SCN BLD-IMP: NEGATIVE — SIGNIFICANT CHANGE UP
SAO2 % BLDV: 69.9 % — SIGNIFICANT CHANGE UP (ref 67–88)
SAO2 % BLDV: 70.9 % — SIGNIFICANT CHANGE UP (ref 67–88)
SAO2 % BLDV: 72.1 % — SIGNIFICANT CHANGE UP (ref 67–88)
SAO2 % BLDV: 72.5 % — SIGNIFICANT CHANGE UP (ref 67–88)
SAO2 % BLDV: 78.5 % — SIGNIFICANT CHANGE UP (ref 67–88)
SAO2 % BLDV: 81.6 % — SIGNIFICANT CHANGE UP (ref 67–88)
SODIUM SERPL-SCNC: 127 MMOL/L — LOW (ref 135–145)
WBC # BLD: 14.96 K/UL — HIGH (ref 3.8–10.5)
WBC # FLD AUTO: 14.96 K/UL — HIGH (ref 3.8–10.5)

## 2024-04-20 PROCEDURE — 99291 CRITICAL CARE FIRST HOUR: CPT

## 2024-04-20 PROCEDURE — 71045 X-RAY EXAM CHEST 1 VIEW: CPT | Mod: 26,76

## 2024-04-20 PROCEDURE — 99222 1ST HOSP IP/OBS MODERATE 55: CPT | Mod: GC

## 2024-04-20 RX ORDER — POTASSIUM CHLORIDE 20 MEQ
10 PACKET (EA) ORAL
Refills: 0 | Status: COMPLETED | OUTPATIENT
Start: 2024-04-20 | End: 2024-04-20

## 2024-04-20 RX ORDER — HYDROMORPHONE HYDROCHLORIDE 2 MG/ML
0.5 INJECTION INTRAMUSCULAR; INTRAVENOUS; SUBCUTANEOUS EVERY 8 HOURS
Refills: 0 | Status: DISCONTINUED | OUTPATIENT
Start: 2024-04-20 | End: 2024-04-21

## 2024-04-20 RX ORDER — GABAPENTIN 400 MG/1
300 CAPSULE ORAL EVERY 8 HOURS
Refills: 0 | Status: COMPLETED | OUTPATIENT
Start: 2024-04-20 | End: 2024-04-25

## 2024-04-20 RX ORDER — SPIRONOLACTONE 25 MG/1
25 TABLET, FILM COATED ORAL DAILY
Refills: 0 | Status: DISCONTINUED | OUTPATIENT
Start: 2024-04-20 | End: 2024-04-24

## 2024-04-20 RX ORDER — FUROSEMIDE 40 MG
40 TABLET ORAL DAILY
Refills: 0 | Status: DISCONTINUED | OUTPATIENT
Start: 2024-04-21 | End: 2024-04-24

## 2024-04-20 RX ORDER — MAGNESIUM SULFATE 500 MG/ML
2 VIAL (ML) INJECTION ONCE
Refills: 0 | Status: COMPLETED | OUTPATIENT
Start: 2024-04-20 | End: 2024-04-20

## 2024-04-20 RX ORDER — POTASSIUM CHLORIDE 20 MEQ
10 PACKET (EA) ORAL ONCE
Refills: 0 | Status: COMPLETED | OUTPATIENT
Start: 2024-04-20 | End: 2024-04-20

## 2024-04-20 RX ORDER — WARFARIN SODIUM 2.5 MG/1
8 TABLET ORAL ONCE
Refills: 0 | Status: COMPLETED | OUTPATIENT
Start: 2024-04-20 | End: 2024-04-20

## 2024-04-20 RX ADMIN — Medication 100 MILLIGRAM(S): at 11:13

## 2024-04-20 RX ADMIN — HYDROMORPHONE HYDROCHLORIDE 0.5 MILLIGRAM(S): 2 INJECTION INTRAMUSCULAR; INTRAVENOUS; SUBCUTANEOUS at 19:15

## 2024-04-20 RX ADMIN — OXYCODONE HYDROCHLORIDE 20 MILLIGRAM(S): 5 TABLET ORAL at 04:20

## 2024-04-20 RX ADMIN — Medication 1 MILLIGRAM(S): at 11:13

## 2024-04-20 RX ADMIN — OXYCODONE HYDROCHLORIDE 20 MILLIGRAM(S): 5 TABLET ORAL at 13:07

## 2024-04-20 RX ADMIN — SENNA PLUS 2 TABLET(S): 8.6 TABLET ORAL at 22:18

## 2024-04-20 RX ADMIN — Medication 1 MILLIGRAM(S): at 22:18

## 2024-04-20 RX ADMIN — WARFARIN SODIUM 8 MILLIGRAM(S): 2.5 TABLET ORAL at 22:18

## 2024-04-20 RX ADMIN — POLYETHYLENE GLYCOL 3350 17 GRAM(S): 17 POWDER, FOR SOLUTION ORAL at 11:14

## 2024-04-20 RX ADMIN — OXYCODONE HYDROCHLORIDE 20 MILLIGRAM(S): 5 TABLET ORAL at 21:00

## 2024-04-20 RX ADMIN — Medication 1 MILLIGRAM(S): at 13:08

## 2024-04-20 RX ADMIN — GABAPENTIN 300 MILLIGRAM(S): 400 CAPSULE ORAL at 13:07

## 2024-04-20 RX ADMIN — Medication 25 MILLIGRAM(S): at 06:23

## 2024-04-20 RX ADMIN — Medication 50 MILLIEQUIVALENT(S): at 04:21

## 2024-04-20 RX ADMIN — ATORVASTATIN CALCIUM 20 MILLIGRAM(S): 80 TABLET, FILM COATED ORAL at 22:18

## 2024-04-20 RX ADMIN — HYDROMORPHONE HYDROCHLORIDE 0.5 MILLIGRAM(S): 2 INJECTION INTRAMUSCULAR; INTRAVENOUS; SUBCUTANEOUS at 20:45

## 2024-04-20 RX ADMIN — HEPARIN SODIUM 5000 UNIT(S): 5000 INJECTION INTRAVENOUS; SUBCUTANEOUS at 22:18

## 2024-04-20 RX ADMIN — Medication 500 MILLIGRAM(S): at 18:27

## 2024-04-20 RX ADMIN — Medication 50 MILLIEQUIVALENT(S): at 11:22

## 2024-04-20 RX ADMIN — Medication 81 MILLIGRAM(S): at 11:13

## 2024-04-20 RX ADMIN — OXYCODONE HYDROCHLORIDE 20 MILLIGRAM(S): 5 TABLET ORAL at 09:18

## 2024-04-20 RX ADMIN — CHLORHEXIDINE GLUCONATE 1 APPLICATION(S): 213 SOLUTION TOPICAL at 11:30

## 2024-04-20 RX ADMIN — OXYCODONE HYDROCHLORIDE 20 MILLIGRAM(S): 5 TABLET ORAL at 21:45

## 2024-04-20 RX ADMIN — HEPARIN SODIUM 5000 UNIT(S): 5000 INJECTION INTRAVENOUS; SUBCUTANEOUS at 13:08

## 2024-04-20 RX ADMIN — FAMOTIDINE 20 MILLIGRAM(S): 10 INJECTION INTRAVENOUS at 18:27

## 2024-04-20 RX ADMIN — GABAPENTIN 300 MILLIGRAM(S): 400 CAPSULE ORAL at 22:18

## 2024-04-20 RX ADMIN — Medication 25 MILLIGRAM(S): at 13:07

## 2024-04-20 RX ADMIN — Medication 25 MILLIGRAM(S): at 22:18

## 2024-04-20 RX ADMIN — OXYCODONE HYDROCHLORIDE 20 MILLIGRAM(S): 5 TABLET ORAL at 13:37

## 2024-04-20 RX ADMIN — Medication 1 MILLIGRAM(S): at 04:29

## 2024-04-20 RX ADMIN — Medication 1: at 16:30

## 2024-04-20 RX ADMIN — Medication 50 MILLIEQUIVALENT(S): at 11:59

## 2024-04-20 RX ADMIN — OXYCODONE HYDROCHLORIDE 20 MILLIGRAM(S): 5 TABLET ORAL at 08:48

## 2024-04-20 RX ADMIN — GABAPENTIN 100 MILLIGRAM(S): 400 CAPSULE ORAL at 06:23

## 2024-04-20 RX ADMIN — OXYCODONE HYDROCHLORIDE 20 MILLIGRAM(S): 5 TABLET ORAL at 00:30

## 2024-04-20 RX ADMIN — OXYCODONE HYDROCHLORIDE 20 MILLIGRAM(S): 5 TABLET ORAL at 05:00

## 2024-04-20 RX ADMIN — Medication 25 GRAM(S): at 04:21

## 2024-04-20 RX ADMIN — FAMOTIDINE 20 MILLIGRAM(S): 10 INJECTION INTRAVENOUS at 06:23

## 2024-04-20 RX ADMIN — HEPARIN SODIUM 5000 UNIT(S): 5000 INJECTION INTRAVENOUS; SUBCUTANEOUS at 06:23

## 2024-04-20 RX ADMIN — Medication 500 MILLIGRAM(S): at 06:23
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
## 2024-04-20 NOTE — BH CONSULTATION LIAISON ASSESSMENT NOTE - RISK ASSESSMENT
Acute Risk factors: insomnia, impulsivity, acute psychosocial stressors, acute medical stressors including ICU admission c/b delirium, medication changes.   Chronic: chronic depression, chronic pain, remote SA, loss of loved ones     Protective factors: stable housing, family support, identifies reasons for living, future oriented, intact marriage, strong social supports, help-seeking behaviors.    Overall, pt is a low risk of harm to self/others and does not meet criteria for psychiatric admission. Acute Risk factors: insomnia, impulsivity, acute psychosocial stressors, acute medical stressors including ICU admission c/b delirium, medication changes.   Chronic: chronic depression, chronic pain, remote SA, loss of loved ones     Protective factors: stable housing, family support, identifies reasons for living, future oriented, intact marriage, strong social supports, help-seeking behaviors.    Overall, pt is a low acute risk of harm to self/others and does not meet criteria for psychiatric admission.

## 2024-04-20 NOTE — BH CONSULTATION LIAISON ASSESSMENT NOTE - NSBHATTESTCOMMENTATTENDFT_PSY_A_CORE
64F single, living alone, has an adult daughter, with PPhx depression and polysubstance abuse by hx (opiates, BDZ, ETOH), no prior inpt psych admissions, remote SA at age 16, on BDZ per PCP, no active substance abuse, PMH significant for HLD, HTN, AFib (diagnosed recently, not on AC), mitral valve prolapse found to have severe mitral and tricuspid regurgitation with signs of cardiogenic shock, s/p CT surgery, psychiatry consulted for delirium and SI.  Pt states she has been depressed for years, worsened recently over past 3 mos in the s/o declining health, daughter being pregnant, and weaning down on BDZ.  Pt states she has not had active or passive SI, but felt nervous about the ongoing recommendation for heart surgery so she had delayed it and wound up in cardiogenic shock requiring emergent surgery.  States she was confused post-op from pain meds, was hallucinating, not currently but still does not feel back to baseline (though currently AOx4).  States she “abuses” her prescriptions, which she defines as taking too many on some days and none on other days, denies buying additional pills or misuse of illicit substances.  Denies ETOH use in 4 years. Denies access to guns.  Collateral obtained from pt’s sister at bedside who states pt was “saying weird things” while delirious, at one point commented “maybe she shouldn’t have had the surgery” but does not think the pt is suicidal or at risk of harm, also does not think the pt is using ETOH or illicit substances.  Pt amenable for OP psychotherapy referrals.  Dx: Delirium 2/2 GMC, r/o dysthymia vs. adjustment d/o.  Agree with resident’s assessment and plan as above.  64F single, living alone, has an adult daughter, with PPhx depression and polysubstance abuse by hx (opiates, BDZ, ETOH), no prior inpt psych admissions, remote SA at age 16, on BDZ per PCP, no active substance abuse, PMH significant for HLD, HTN, AFib (diagnosed recently, not on AC), mitral valve prolapse found to have severe mitral and tricuspid regurgitation with signs of cardiogenic shock, s/p CT surgery, psychiatry consulted for delirium and SI.  Pt states she has been depressed for years, worsened recently over past 3 mos in the s/o declining health, daughter being pregnant, and weaning down on BDZ.  Pt states she has not had active or passive SI, but felt nervous about the ongoing recommendation for heart surgery so she had delayed it and wound up in cardiogenic shock requiring emergent surgery.  States she was confused post-op from pain meds, was hallucinating, not currently but still does not feel back to baseline (though currently AOx4).  States she “abuses” her prescriptions, which she defines as taking too many on some days and none on other days, denies buying additional pills or misuse of illicit substances.  Denies ETOH use in 4 years, no tremors on exam. Denies access to guns.  Collateral obtained from pt’s sister at bedside who states pt was “saying weird things” while delirious, at one point commented “maybe she shouldn’t have had the surgery” but does not think the pt is suicidal or at risk of harm, also does not think the pt is using ETOH or illicit substances.  Pt amenable for OP psychotherapy referrals.  Dx: Delirium 2/2 GMC, r/o dysthymia vs. adjustment d/o.  Agree with resident’s assessment and plan as above.

## 2024-04-20 NOTE — BH CONSULTATION LIAISON ASSESSMENT NOTE - NSBHCONSULTFOLLOWAFTERCARE_PSY_A_CORE FT
Pt would benefit from further discussions regarding outpatient referrals once she is more medically stable.

## 2024-04-20 NOTE — BH CONSULTATION LIAISON ASSESSMENT NOTE - CURRENT MEDICATION
MEDICATIONS  (STANDING):  ascorbic acid 500 milliGRAM(s) Oral two times a day  aspirin enteric coated 81 milliGRAM(s) Oral daily  atorvastatin 20 milliGRAM(s) Oral at bedtime  bisacodyl Suppository 10 milliGRAM(s) Rectal once  chlordiazePOXIDE 25 milliGRAM(s) Oral every 8 hours  chlordiazePOXIDE   Oral   chlorhexidine 2% Cloths 1 Application(s) Topical daily  dextrose 50% Injectable 50 milliLiter(s) IV Push every 15 minutes  DOBUTamine Infusion 2.5 MICROgram(s)/kG/Min (6.65 mL/Hr) IV Continuous <Continuous>  famotidine    Tablet 20 milliGRAM(s) Oral two times a day  folic acid 1 milliGRAM(s) Oral daily  gabapentin 300 milliGRAM(s) Oral every 8 hours  heparin   Injectable 5000 Unit(s) SubCutaneous every 8 hours  insulin lispro (ADMELOG) corrective regimen sliding scale   SubCutaneous three times a day before meals  polyethylene glycol 3350 17 Gram(s) Oral daily  senna 2 Tablet(s) Oral at bedtime  sodium chloride 0.9%. 1000 milliLiter(s) (10 mL/Hr) IV Continuous <Continuous>  thiamine 100 milliGRAM(s) Oral daily  vasopressin Infusion 0.01 Unit(s)/Min (1.5 mL/Hr) IV Continuous <Continuous>    MEDICATIONS  (PRN):  acetaminophen     Tablet .. 650 milliGRAM(s) Oral every 6 hours PRN Mild Pain (1 - 3)  HYDROmorphone  Injectable 0.5 milliGRAM(s) IV Push every 8 hours PRN Moderate Pain (4 - 6)  LORazepam     Tablet 1 milliGRAM(s) Oral every 8 hours PRN Anxiety  oxyCODONE    IR 20 milliGRAM(s) Oral every 4 hours PRN Severe Pain (7 - 10)

## 2024-04-20 NOTE — BH CONSULTATION LIAISON ASSESSMENT NOTE - OTHER
pt on tele, multiple lines in place, sternal dressing clean dry intact half-sister  pt POD 4 cardiac surgery, still requiring cardiac ICU Tearful at times

## 2024-04-20 NOTE — BH CONSULTATION LIAISON ASSESSMENT NOTE - NSBHCHARTREVIEWLAB_PSY_A_CORE FT
Labs reviewed.                        7.8    14.96 )-----------( 125      ( 20 Apr 2024 00:19 )             23.2     04-20    127<L>  |  91<L>  |  20  ----------------------------<  103<H>  3.9   |  25  |  0.78    Ca    9.1      20 Apr 2024 00:19  Phos  2.7     04-20  Mg     1.9     04-20    TPro  6.5  /  Alb  3.7  /  TBili  0.7  /  DBili  x   /  AST  35  /  ALT  30  /  AlkPhos  242<H>  04-20    Urinalysis Basic - ( 20 Apr 2024 00:19 )    Color: x / Appearance: x / SG: x / pH: x  Gluc: 103 mg/dL / Ketone: x  / Bili: x / Urobili: x   Blood: x / Protein: x / Nitrite: x   Leuk Esterase: x / RBC: x / WBC x   Sq Epi: x / Non Sq Epi: x / Bacteria: x
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
## 2024-04-20 NOTE — PROGRESS NOTE ADULT - SUBJECTIVE AND OBJECTIVE BOX
Patient seen and examined at the bedside.    Remained critically ill on continuous ICU monitoring.    OBJECTIVE:  Vital Signs Last 24 Hrs  T(C): 37.1 (20 Apr 2024 08:00), Max: 37.4 (19 Apr 2024 12:00)  T(F): 98.7 (20 Apr 2024 08:00), Max: 99.3 (19 Apr 2024 12:00)  HR: 80 (20 Apr 2024 09:45) (80 - 81)  BP: --  BP(mean): --  RR: 25 (20 Apr 2024 09:30) (12 - 32)  SpO2: 94% (20 Apr 2024 09:30) (85% - 100%)    Parameters below as of 20 Apr 2024 08:00  Patient On (Oxygen Delivery Method): nasal cannula          Physical Exam:   General: NAD   Neurology: Oriented, following commands  Eyes: bilateral pupils equal and reactive   ENT/Neck: Neck supple, trachea midline, No JVD   Respiratory: Clear bilaterally   CV: S1S2, no murmurs        [x] Sternal dressing, [x] Mediastinal CT, [x] R. Pleural CT        [x] Sinus rhythm, [x] Temporary pacing - DDD 80  Abdominal: Soft, NT  Extremities: Warm, well-perfused  Skin: No Rashes, Hematoma, Ecchymosis                           Assessment:  65 yo F s/p MVR-t, MAZE, and LAAC on 4/16/24.  IABP was placed preop for cardiogenic shock.    Postop acute pulmonary insufficiency  Hyperglycemia  Leukocytosis   Hyponatremia  Hypomagnesemia  Acute blood loss anemia  Thrombocytopenia     Plan:   ***Neuro***  [x] Nonfocal  [x] Tylenol, Gabapentin and PRN Oxycodone for pain management  Post operative neuro assessment   PRN Lorazepam for anxiety  Continue Chlordiazepoxide    ***Cardiovascular***  Invasive hemodynamic monitoring, assess perfusion indices   SR / CVP 9 / MAP 74 / Hct 23.2% / Lactate 0.5  [x] Vasopressin 0.01 Units/ min [x] Dobutamine 2.5 mcg/kg/min  Volume: [x] 1u PRBC  Reassessment of hemodynamics post resuscitation   Monitor chest tube outputs    [x] AC therapy with Coumadin for MVR. HSQ for DVT ppx.  [x] ASA [x] Statin   [place] removed yesterday     ***Pulmonary***  [x] Room Air  Encourage incentive spirometry, continue pulse ox monitoring, follow ABGs     ***GI***  [x] CC Diet   [x] Pepcid  [x] Miralax and Senna for Bowel Regimen    ***Renal***  Continue to monitor I/Os, BUN/Creatinine.   Replete lytes PRN  Jimenez present      ***ID***  No active antibiotic coverage      ***Endocrine***  [x] Stress Hyperglycemia : HbA1c 5.6%             - [x] ISS [x] Insulin gtt              - Need tight glycemic control to prevent wound infection.              Patient requires continuous monitoring with bedside rhythm monitoring, pulse oximetry monitoring, and continuous central venous and arterial pressure monitoring; and intermittent blood gas analysis. Care plan discussed with the ICU care team.   Patient remained critical, at risk for life threatening decompensation.    I have spent 30 minutes providing critical care management to this patient.    By signing my name below, I, Silke Montenegro, attest that this documentation has been prepared under the direction and in the presence of Keke Kendrick. PA  Electronically signed: Purvi Bowen, 04-20-24 @ 10:08    Keke BURCH. PA, personally performed the services described in this documentation. all medical record entries made by the carlos aiblink were at my direction and in my presence. I have reviewed the chart and agree that the record reflects my personal performance and is accurate and complete  Electronically signed: Keke Kendrick. PA Patient seen and examined at the bedside.    Remained critically ill on continuous ICU monitoring.    OBJECTIVE:  Vital Signs Last 24 Hrs  T(C): 37.1 (20 Apr 2024 08:00), Max: 37.4 (19 Apr 2024 12:00)  T(F): 98.7 (20 Apr 2024 08:00), Max: 99.3 (19 Apr 2024 12:00)  HR: 80 (20 Apr 2024 09:45) (80 - 81)  BP: --  BP(mean): --  RR: 25 (20 Apr 2024 09:30) (12 - 32)  SpO2: 94% (20 Apr 2024 09:30) (85% - 100%)    Parameters below as of 20 Apr 2024 08:00  Patient On (Oxygen Delivery Method): nasal cannula      Physical Exam:   General: NAD.    Neurology: Oriented, following commands  Eyes: bilateral pupils equal and reactive   ENT/Neck: Neck supple, trachea midline, No JVD   Respiratory: Clear bilaterally   CV: S1S2, no murmurs        [x] Sternal dressing, [x] Mediastinal CT, [x] R. Pleural CT        [x] Sinus rhythm, [x] Temporary pacing - DDD 80  Abdominal: Soft, NT  Extremities: Warm, well-perfused  Skin: No Rashes, Hematoma, Ecchymosis                         Assessment:  63 yo F s/p MVR-t, MAZE, and LAAC on 4/16/24.  IABP was placed preop for cardiogenic shock.    Postop acute pulmonary insufficiency  Hyperglycemia  Leukocytosis   Hyponatremia  Hypomagnesemia  Acute blood loss anemia  Thrombocytopenia     Plan:   ***Neuro***  [x] Nonfocal  [x] Tylenol, Gabapentin and PRN Oxycodone for pain management  Post operative neuro assessment   PRN Lorazepam for anxiety  Continue Chlordiazepoxide  PRN dilaudid for pain control    ***Cardiovascular***  Invasive hemodynamic monitoring, assess perfusion indices   SR / CVP 9 / MAP 74 / Hct 23.2% / Lactate 0.5  [x] Vasopressin 0.01 Units/ min [x] Dobutamine 2.5 mcg/kg/min - will wean dobutamine down today as tolerated.  Volume: [x] 1u PRBC  Reassessment of hemodynamics post resuscitation   Will remove both chest tubes - zero to minimal outputs.     [x] AC therapy with Coumadin for MVR. HSQ for DVT ppx.  [x] ASA [x] Statin   [place] removed yesterday   Will obtain afternoon INR for coumadin dose tonight.   EP consult for junctional rhythm, currently paced with epicardial wires DDD 80,10,10    ***Pulmonary***  [x] Room Air  Encourage incentive spirometry, continue pulse ox monitoring, follow ABGs     ***GI***  [x] CC Diet   [x] Pepcid  [x] Miralax and Senna for Bowel Regimen    ***Renal***  Continue to monitor I/Os, BUN/Creatinine.   Replete lytes PRN  Jimenez present      ***ID***  No active antibiotic coverage      ***Endocrine***  [x] Stress Hyperglycemia : HbA1c 5.6%             - [x] ISS             - Need tight glycemic control to prevent wound infection.    Patient requires continuous monitoring with bedside rhythm monitoring, pulse oximetry monitoring, and continuous central venous and arterial pressure monitoring; and intermittent blood gas analysis. Care plan discussed with the ICU care team.   Patient remained critical, at risk for life threatening decompensation.    I have spent 30 minutes providing critical care management to this patient.    By signing my name below, I, Silke Montenegro, attest that this documentation has been prepared under the direction and in the presence of Keke CARVALHO  Electronically signed: Purvi Bowen, 04-20-24 @ 10:08    Keke BURCH, personally performed the services described in this documentation. all medical record entries made by the scribe were at my direction and in my presence. I have reviewed the chart and agree that the record reflects my personal performance and is accurate and complete  Electronically signed: Keke CARVALHO

## 2024-04-20 NOTE — BH CONSULTATION LIAISON ASSESSMENT NOTE - NSICDXPASTMEDICALHX_GEN_ALL_CORE_FT
PAST MEDICAL HISTORY:  Chronic back pain     HLD (hyperlipidemia)     HTN (hypertension)     Mitral valve prolapse

## 2024-04-20 NOTE — BH CONSULTATION LIAISON ASSESSMENT NOTE - DIFFERENTIAL
Delirium due to multiple etiologies.  MDD vs dysthymia vs adjustment; also possible substance use disorder. Withdrawal induced sxs also possibly contributing to presentation. 
[id number]

## 2024-04-20 NOTE — BH CONSULTATION LIAISON ASSESSMENT NOTE - SUMMARY
Pt is a 63yo F, , adult children (Dtr is a [employer] RN, 2 step-sons), domiciled with , retired; pmhx HLD, HTN, AFib (diagnosed recently, not on AC), and mitral valve prolapse with severe MR; pphx depression and opioid/benzodiazapine dependence (managed by PCP), no outpt psych tx, no prior psych hospitalizations, remote h/o SA by OD in teens, current opioid/benzo dependence (managed by PCP) with possible past ETOH abuse; BIB for cardiogenic shock requiring emergent MVR, now POD4. Post op course c/b hallucinations and suicidal remarks in the setting of AMS. CTU consulted psychiatry for further evaluation and mgmt.     On exam pt is cooperative, alert and fully oriented although she notes fluctuating confusion, hallucinations and paranoia in the context of multiple medical stressors. Per collateral pt made suicidal remarks during periods of acutely altered mental status, although pt currently denies suicidal ideation/intent/plan. Sxs most consistent with acute delirium and expected to resolve as underlying causes are treated during hospitalization. Pt also with acute on chronic depression for the last 3 months. Dx impression is dysthymia vs adjustment disorder vs MDD. Other possible contributors are chronic back pain requiring longterm opioid use, withdrawal sxs from recent outpatient tapers of opioids and benzodiazapine, maladaptive coping/personality dysfunction. Pt unlikely with active substance use disorder, although may benefit from further diagnostic clarification. Pt interested in connection with outpatient care, although at this point more therapy vs medication. Will benefit from further assessment once she is more medically stable.     Plan:  - Pt not endorsing SI or HI, appropriate for routine obs at this time.   - See below for PRNs. Hold meds if qtc >500.   - Delirium precautions (minimize deliriogenic agents, maintain sleep wake cycle, address nutrition and pain mgmt, provide frequent reorientation and redirection).   - No psychiatric contraindications to discharge. Psych will continue to follow. 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 Post op course c/b hallucinations and suicidal remarks in the setting of AMS. CTU consulted psychiatry for further evaluation and mgmt.     On exam pt is cooperative, alert and fully oriented although she notes fluctuating confusion, hallucinations and paranoia in the context of multiple medical stressors. Per collateral pt made suicidal remarks during periods of acutely altered mental status, although pt currently denies suicidal ideation/intent/plan. Sxs most consistent with acute delirium and expected to resolve as underlying causes are treated during hospitalization. Pt also with acute on chronic depression for the last 3 months. Dx impression is dysthymia vs adjustment disorder vs MDD. Other possible contributors are chronic back pain requiring longterm opioid use, withdrawal sxs from recent outpatient tapers of opioids and benzodiazapine, maladaptive coping/personality dysfunction. Pt unlikely with active substance use disorder, although may benefit from further diagnostic clarification. Pt interested in connection with outpatient care, although at this point more therapy vs medication. Will benefit from further assessment once she is more medically stable.     Plan:   - Delirium precautions (minimize deliriogenic agents, maintain sleep wake cycle, address nutrition and pain mgmt, provide frequent reorientation and redirection).   - No psychiatric contraindications to discharge. Psych will continue to follow.

## 2024-04-20 NOTE — BH CONSULTATION LIAISON ASSESSMENT NOTE - NSBHCHARTREVIEWVS_PSY_A_CORE FT
Vital Signs Last 24 Hrs  T(C): 36.8 (20 Apr 2024 16:00), Max: 37.2 (20 Apr 2024 00:00)  T(F): 98.3 (20 Apr 2024 16:00), Max: 98.9 (20 Apr 2024 00:00)  HR: 63 (20 Apr 2024 16:15) (61 - 82)  BP: --  BP(mean): --  RR: 25 (20 Apr 2024 16:15) (12 - 35)  SpO2: 93% (20 Apr 2024 16:15) (85% - 100%)    Parameters below as of 20 Apr 2024 16:00  Patient On (Oxygen Delivery Method): nasal cannula  O2 Flow (L/min): 2
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
## 2024-04-20 NOTE — BH CONSULTATION LIAISON ASSESSMENT NOTE - NSBHCHARTREVIEWINVESTIGATE_PSY_A_CORE FT
Tele, EKG, imaging reviewed.  < from: 12 Lead ECG (04.16.24 @ 05:47) >      Ventricular Rate 65 BPM    Atrial Rate 326 BPM    QRS Duration 98 ms    Q-T Interval 402 ms    QTC Calculation(Bazett) 418 ms    R Axis 90 degrees    T Axis 86 degrees    Diagnosis Line ATRIAL FLUTTER WITH VARIABLE A-V BLOCK  RIGHTWARD AXIS  NONSPECIFIC ST AND T WAVE ABNORMALITY  ABNORMAL ECG  NO PREVIOUS ECGS AVAILABLE  Confirmed by HERBER CHAPA MD ([id number]) on 4/16/2024 9:55:07 PM    < end of copied text >    =

## 2024-04-21 DIAGNOSIS — F11.20 OPIOID DEPENDENCE, UNCOMPLICATED: ICD-10-CM

## 2024-04-21 DIAGNOSIS — Z95.2 PRESENCE OF PROSTHETIC HEART VALVE: ICD-10-CM

## 2024-04-21 LAB
ALBUMIN SERPL ELPH-MCNC: 3.8 G/DL — SIGNIFICANT CHANGE UP (ref 3.3–5)
ALP SERPL-CCNC: 297 U/L — HIGH (ref 40–120)
ALT FLD-CCNC: 28 U/L — SIGNIFICANT CHANGE UP (ref 10–45)
ANION GAP SERPL CALC-SCNC: 11 MMOL/L — SIGNIFICANT CHANGE UP (ref 5–17)
ANISOCYTOSIS BLD QL: SLIGHT — SIGNIFICANT CHANGE UP
APTT BLD: 31.5 SEC — SIGNIFICANT CHANGE UP (ref 24.5–35.6)
AST SERPL-CCNC: 28 U/L — SIGNIFICANT CHANGE UP (ref 10–40)
BASOPHILS # BLD AUTO: 0 K/UL — SIGNIFICANT CHANGE UP (ref 0–0.2)
BASOPHILS NFR BLD AUTO: 0 % — SIGNIFICANT CHANGE UP (ref 0–2)
BILIRUB SERPL-MCNC: 0.6 MG/DL — SIGNIFICANT CHANGE UP (ref 0.2–1.2)
BUN SERPL-MCNC: 15 MG/DL — SIGNIFICANT CHANGE UP (ref 7–23)
CALCIUM SERPL-MCNC: 10.1 MG/DL — SIGNIFICANT CHANGE UP (ref 8.4–10.5)
CHLORIDE SERPL-SCNC: 94 MMOL/L — LOW (ref 96–108)
CO2 SERPL-SCNC: 26 MMOL/L — SIGNIFICANT CHANGE UP (ref 22–31)
CREAT SERPL-MCNC: 0.65 MG/DL — SIGNIFICANT CHANGE UP (ref 0.5–1.3)
EGFR: 98 ML/MIN/1.73M2 — SIGNIFICANT CHANGE UP
ELLIPTOCYTES BLD QL SMEAR: SLIGHT — SIGNIFICANT CHANGE UP
EOSINOPHIL # BLD AUTO: 0 K/UL — SIGNIFICANT CHANGE UP (ref 0–0.5)
EOSINOPHIL NFR BLD AUTO: 0 % — SIGNIFICANT CHANGE UP (ref 0–6)
FIBRINOGEN PPP-MCNC: 594 MG/DL — HIGH (ref 200–445)
FLUAV AG NPH QL: SIGNIFICANT CHANGE UP
FLUBV AG NPH QL: SIGNIFICANT CHANGE UP
GAS PNL BLDA: SIGNIFICANT CHANGE UP
GIANT PLATELETS BLD QL SMEAR: PRESENT — SIGNIFICANT CHANGE UP
GLUCOSE BLDC GLUCOMTR-MCNC: 101 MG/DL — HIGH (ref 70–99)
GLUCOSE BLDC GLUCOMTR-MCNC: 101 MG/DL — HIGH (ref 70–99)
GLUCOSE BLDC GLUCOMTR-MCNC: 134 MG/DL — HIGH (ref 70–99)
GLUCOSE SERPL-MCNC: 101 MG/DL — HIGH (ref 70–99)
HCT VFR BLD CALC: 28.3 % — LOW (ref 34.5–45)
HGB BLD-MCNC: 9.4 G/DL — LOW (ref 11.5–15.5)
INR BLD: 1.33 RATIO — HIGH (ref 0.85–1.18)
LYMPHOCYTES # BLD AUTO: 0.8 K/UL — LOW (ref 1–3.3)
LYMPHOCYTES # BLD AUTO: 6.2 % — LOW (ref 13–44)
MAGNESIUM SERPL-MCNC: 2.4 MG/DL — SIGNIFICANT CHANGE UP (ref 1.6–2.6)
MANUAL SMEAR VERIFICATION: SIGNIFICANT CHANGE UP
MCHC RBC-ENTMCNC: 30.7 PG — SIGNIFICANT CHANGE UP (ref 27–34)
MCHC RBC-ENTMCNC: 33.2 GM/DL — SIGNIFICANT CHANGE UP (ref 32–36)
MCV RBC AUTO: 92.5 FL — SIGNIFICANT CHANGE UP (ref 80–100)
MICROCYTES BLD QL: SLIGHT — SIGNIFICANT CHANGE UP
MONOCYTES # BLD AUTO: 0.81 K/UL — SIGNIFICANT CHANGE UP (ref 0–0.9)
MONOCYTES NFR BLD AUTO: 6.3 % — SIGNIFICANT CHANGE UP (ref 2–14)
NEUTROPHILS # BLD AUTO: 11.27 K/UL — HIGH (ref 1.8–7.4)
NEUTROPHILS NFR BLD AUTO: 87.5 % — HIGH (ref 43–77)
OVALOCYTES BLD QL SMEAR: SLIGHT — SIGNIFICANT CHANGE UP
PHOSPHATE SERPL-MCNC: 2.7 MG/DL — SIGNIFICANT CHANGE UP (ref 2.5–4.5)
PLAT MORPH BLD: NORMAL — SIGNIFICANT CHANGE UP
PLATELET # BLD AUTO: 190 K/UL — SIGNIFICANT CHANGE UP (ref 150–400)
POIKILOCYTOSIS BLD QL AUTO: SLIGHT — SIGNIFICANT CHANGE UP
POLYCHROMASIA BLD QL SMEAR: SLIGHT — SIGNIFICANT CHANGE UP
POTASSIUM SERPL-MCNC: 4.2 MMOL/L — SIGNIFICANT CHANGE UP (ref 3.5–5.3)
POTASSIUM SERPL-SCNC: 4.2 MMOL/L — SIGNIFICANT CHANGE UP (ref 3.5–5.3)
PROT SERPL-MCNC: 7.1 G/DL — SIGNIFICANT CHANGE UP (ref 6–8.3)
PROTHROM AB SERPL-ACNC: 13.8 SEC — HIGH (ref 9.5–13)
RBC # BLD: 3.06 M/UL — LOW (ref 3.8–5.2)
RBC # FLD: 14.9 % — HIGH (ref 10.3–14.5)
RBC BLD AUTO: ABNORMAL
RSV RNA NPH QL NAA+NON-PROBE: SIGNIFICANT CHANGE UP
SARS-COV-2 RNA SPEC QL NAA+PROBE: SIGNIFICANT CHANGE UP
SODIUM SERPL-SCNC: 131 MMOL/L — LOW (ref 135–145)
STOMATOCYTES BLD QL SMEAR: SIGNIFICANT CHANGE UP
WBC # BLD: 12.88 K/UL — HIGH (ref 3.8–10.5)
WBC # FLD AUTO: 12.88 K/UL — HIGH (ref 3.8–10.5)

## 2024-04-21 PROCEDURE — 71045 X-RAY EXAM CHEST 1 VIEW: CPT | Mod: 26

## 2024-04-21 PROCEDURE — 99233 SBSQ HOSP IP/OBS HIGH 50: CPT

## 2024-04-21 RX ORDER — NICOTINE POLACRILEX 2 MG
1 GUM BUCCAL
Refills: 0 | Status: DISCONTINUED | OUTPATIENT
Start: 2024-04-22 | End: 2024-04-27

## 2024-04-21 RX ORDER — ENOXAPARIN SODIUM 100 MG/ML
40 INJECTION SUBCUTANEOUS EVERY 24 HOURS
Refills: 0 | Status: DISCONTINUED | OUTPATIENT
Start: 2024-04-21 | End: 2024-04-22

## 2024-04-21 RX ORDER — HYDROMORPHONE HYDROCHLORIDE 2 MG/ML
2 INJECTION INTRAMUSCULAR; INTRAVENOUS; SUBCUTANEOUS EVERY 6 HOURS
Refills: 0 | Status: DISCONTINUED | OUTPATIENT
Start: 2024-04-21 | End: 2024-04-27

## 2024-04-21 RX ORDER — NICOTINE POLACRILEX 2 MG
1 GUM BUCCAL ONCE
Refills: 0 | Status: COMPLETED | OUTPATIENT
Start: 2024-04-21 | End: 2024-04-21

## 2024-04-21 RX ORDER — WARFARIN SODIUM 2.5 MG/1
5 TABLET ORAL ONCE
Refills: 0 | Status: COMPLETED | OUTPATIENT
Start: 2024-04-21 | End: 2024-04-21

## 2024-04-21 RX ADMIN — GABAPENTIN 300 MILLIGRAM(S): 400 CAPSULE ORAL at 21:09

## 2024-04-21 RX ADMIN — HYDROMORPHONE HYDROCHLORIDE 2 MILLIGRAM(S): 2 INJECTION INTRAMUSCULAR; INTRAVENOUS; SUBCUTANEOUS at 22:00

## 2024-04-21 RX ADMIN — OXYCODONE HYDROCHLORIDE 20 MILLIGRAM(S): 5 TABLET ORAL at 15:50

## 2024-04-21 RX ADMIN — Medication 25 MILLIGRAM(S): at 06:50

## 2024-04-21 RX ADMIN — Medication 1 MILLIGRAM(S): at 06:50

## 2024-04-21 RX ADMIN — OXYCODONE HYDROCHLORIDE 20 MILLIGRAM(S): 5 TABLET ORAL at 15:04

## 2024-04-21 RX ADMIN — Medication 500 MILLIGRAM(S): at 17:03

## 2024-04-21 RX ADMIN — Medication 81 MILLIGRAM(S): at 11:19

## 2024-04-21 RX ADMIN — OXYCODONE HYDROCHLORIDE 20 MILLIGRAM(S): 5 TABLET ORAL at 01:45

## 2024-04-21 RX ADMIN — ATORVASTATIN CALCIUM 20 MILLIGRAM(S): 80 TABLET, FILM COATED ORAL at 21:09

## 2024-04-21 RX ADMIN — SENNA PLUS 2 TABLET(S): 8.6 TABLET ORAL at 21:09

## 2024-04-21 RX ADMIN — CHLORHEXIDINE GLUCONATE 1 APPLICATION(S): 213 SOLUTION TOPICAL at 13:00

## 2024-04-21 RX ADMIN — GABAPENTIN 300 MILLIGRAM(S): 400 CAPSULE ORAL at 13:07

## 2024-04-21 RX ADMIN — OXYCODONE HYDROCHLORIDE 20 MILLIGRAM(S): 5 TABLET ORAL at 01:06

## 2024-04-21 RX ADMIN — Medication 1 MILLIGRAM(S): at 11:19

## 2024-04-21 RX ADMIN — Medication 1 PATCH: at 18:45

## 2024-04-21 RX ADMIN — OXYCODONE HYDROCHLORIDE 20 MILLIGRAM(S): 5 TABLET ORAL at 06:44

## 2024-04-21 RX ADMIN — FAMOTIDINE 20 MILLIGRAM(S): 10 INJECTION INTRAVENOUS at 17:03

## 2024-04-21 RX ADMIN — Medication 1 PATCH: at 18:28

## 2024-04-21 RX ADMIN — Medication 100 MILLIGRAM(S): at 11:20

## 2024-04-21 RX ADMIN — Medication 500 MILLIGRAM(S): at 06:50

## 2024-04-21 RX ADMIN — OXYCODONE HYDROCHLORIDE 20 MILLIGRAM(S): 5 TABLET ORAL at 05:38

## 2024-04-21 RX ADMIN — HEPARIN SODIUM 5000 UNIT(S): 5000 INJECTION INTRAVENOUS; SUBCUTANEOUS at 06:51

## 2024-04-21 RX ADMIN — SPIRONOLACTONE 25 MILLIGRAM(S): 25 TABLET, FILM COATED ORAL at 06:50

## 2024-04-21 RX ADMIN — Medication 40 MILLIGRAM(S): at 06:50

## 2024-04-21 RX ADMIN — GABAPENTIN 300 MILLIGRAM(S): 400 CAPSULE ORAL at 06:52

## 2024-04-21 RX ADMIN — POLYETHYLENE GLYCOL 3350 17 GRAM(S): 17 POWDER, FOR SOLUTION ORAL at 11:20

## 2024-04-21 RX ADMIN — OXYCODONE HYDROCHLORIDE 20 MILLIGRAM(S): 5 TABLET ORAL at 09:42

## 2024-04-21 RX ADMIN — HYDROMORPHONE HYDROCHLORIDE 2 MILLIGRAM(S): 2 INJECTION INTRAMUSCULAR; INTRAVENOUS; SUBCUTANEOUS at 21:09

## 2024-04-21 RX ADMIN — Medication 25 MILLIGRAM(S): at 17:02

## 2024-04-21 RX ADMIN — WARFARIN SODIUM 5 MILLIGRAM(S): 2.5 TABLET ORAL at 21:09

## 2024-04-21 RX ADMIN — ENOXAPARIN SODIUM 40 MILLIGRAM(S): 100 INJECTION SUBCUTANEOUS at 13:07

## 2024-04-21 RX ADMIN — FAMOTIDINE 20 MILLIGRAM(S): 10 INJECTION INTRAVENOUS at 06:50

## 2024-04-21 NOTE — PROGRESS NOTE ADULT - ASSESSMENT
This is a   63 y/o female with PMHx of HLD, HTN, AFib (diagnosed recently, not on AC), benzodiazepine/opioid & ETOH abuse, and mitral valve prolapse found to have severe mitral and tricuspid regurgitation w/ signs of cardiogenic shock, now s/p RHC with IABP placement.   follwed by HF team  CTS consulted MR/TR  4/16 MVR(t) - Epic 33mm valve [place] procedure LAAL - 40mm AtriClip recovered in CTU  extubated OR day  4/17 VSS  dobutamine for  inotropic support  /  lasix gtt - VASO /IABP d/c l seen by PT disposition to home with PT librium thiamine and folic acid-  4/18TTE 4/18 EF 35%, no intervalvular MR  junctional rhythm  coumadin for MVR  4/19 Precedex for delirium   4/20 VSS seen by behavioural health recommend  Seroquel 12.5-25  PO q6 for agitation chest tubes d/c  dobutamine d/c  4/21 VSS right brachial felicia d/c transfer to [facility] +  upon arrival .  d/c IV benzo + PW VVI 40  TELE- NSR   disposition to home this week

## 2024-04-21 NOTE — PROGRESS NOTE ADULT - SUBJECTIVE AND OBJECTIVE BOX
Patient seen and examined at the bedside.    Remains critically ill on continuous ICU monitoring.      Brief Summary:  65 yo F s/p MVR-t, MAZE, and LAAC on 4/16/24.  IABP was placed preop for cardiogenic shock.    24 Hour events:  Chest tubes and joseph removed   Transfused 1u prbc yesterday   Hyponatremia - Diuresing with Lasix and aldactone     Objective:  Vital Signs Last 24 Hrs  T(C): 36.7 (21 Apr 2024 00:00), Max: 37.1 (20 Apr 2024 08:00)  T(F): 98 (21 Apr 2024 00:00), Max: 98.7 (20 Apr 2024 08:00)  HR: 66 (21 Apr 2024 06:00) (61 - 82)  BP: --  BP(mean): --  RR: 31 (21 Apr 2024 06:00) (13 - 46)  SpO2: 97% (21 Apr 2024 06:00) (83% - 100%)    Parameters below as of 21 Apr 2024 04:00  Patient On (Oxygen Delivery Method): nasal cannula  O2 Flow (L/min): 3               Physical Exam:   General: Alert and interactive, restless  Neurology: Oriented, following commands  Respiratory: Clear bilaterally   CV: Sinus rhythm  Abdominal: Soft, Nontender  Extremities: Warm, well-perfused    -------------------------------------------------------------------------------------------------------------------------------    Labs:                        9.4    12.88 )-----------( 190      ( 21 Apr 2024 00:23 )             28.3     04-21    131<L>  |  94<L>  |  15  ----------------------------<  101<H>  4.2   |  26  |  0.65    Ca    10.1      21 Apr 2024 00:23  Phos  2.7     04-21  Mg     2.4     04-21    TPro  7.1  /  Alb  3.8  /  TBili  0.6  /  DBili  x   /  AST  28  /  ALT  28  /  AlkPhos  297<H>  04-21    LIVER FUNCTIONS - ( 21 Apr 2024 00:23 )  Alb: 3.8 g/dL / Pro: 7.1 g/dL / ALK PHOS: 297 U/L / ALT: 28 U/L / AST: 28 U/L / GGT: x           PT/INR - ( 21 Apr 2024 00:23 )   PT: 13.8 sec;   INR: 1.33 ratio         PTT - ( 21 Apr 2024 00:23 )  PTT:31.5 sec  ABG - ( 21 Apr 2024 00:08 )  pH, Arterial: 7.37  pH, Blood: x     /  pCO2: 48    /  pO2: 80    / HCO3: 28    / Base Excess: 1.9   /  SaO2: 98.0      ------------------------------------------------------------------------------------------------------------------------------  Assessment:  65 yo F s/p MVR-t, MAZE, and LAAC on 4/16/24.  IABP was placed preop for cardiogenic shock.    Postop acute pulmonary insufficiency  Hyperglycemia  Leukocytosis   Hyponatremia  Hypomagnesemia  Acute blood loss anemia  Thrombocytopenia       Plan:   ***Neuro***  Postoperative acute pain control with Tylenol, Gabapentin, Oxycodone, and prns.  Patient is on Oxycodone at home  Lorazepam PRN for anxiety.  At high risk for alcohol withdrawal - Librium taper.  Precedex overnight as needed.    ***Cardiovascular***  At high risk for ongoing hemodynamic instability and cardiac arrhythmias.  Pressors for MAP > 65 mm Hg weaned to off  Dobutamine d/byron  Remove PA catheter, trend central venous saturation.   ASA/Statin daily     ***Pulmonary***  Postoperative acute pulmonary insufficiency   Deep breathing and coughing exercises.  Wean oxygen as able.    ***GI***  Tolerating diet.   Pepcid for stress ulcer prophylaxis   Bowel regimen.    ***Renal***  Trend Creatinine   Joseph catheter for strict I/O measurements.   Hyponatremia - Lasix 40mg will trend sodium  Hypomagnesemia - repleted.  Diuresed with Aldactone    ***ID***  No active antibiotic coverage   Trend leukocytosis.    ***Endocrine***  Hyperglycemia - Insulin sliding scale    ***Hematology***  Acute blood and Thrombocytopenia - s/p 1 unit prbcs transfused yesterday   No current transfusion indication  SQ Heparin for VTE prophylaxis   Coumadin nightly - trend INR        Patient requires continuous monitoring with bedside rhythm monitoring, pulse oximetry monitoring, and continuous central venous and arterial pressure monitoring; and intermittent blood gas analysis. Care plan discussed with the ICU care team.   Patient remains critical, at risk for life threatening decompensation.    I have spent 30 minutes providing critical care management to this patient.    By signing my name below, I, Delmy Brice, attest that this documentation has been prepared under the direction and in the presence of Shira Kirk MD  Electronically signed: Delmy Brice, 04-21-24 @ 07:17    I, Shira Kirk MD, personally performed the services described in this documentation. all medical record entries made by the scribe were at my direction and in my presence. I have reviewed the chart and agree that the record reflects my personal performance and is accurate and complete  Electronically signed: Shira Kirk MD  Patient seen and examined at the bedside.        Brief Summary:  63 yo F s/p MVR-t, MAZE, and LAAC on 4/16/24.  IABP was placed preop for cardiogenic shock.    24 Hour events:  Chest tubes and Jimenez removed   Transfused 1u prbc yesterday   Dobutamine weaned to off.      Objective:  Vital Signs Last 24 Hrs  T(C): 36.7 (21 Apr 2024 00:00), Max: 37.1 (20 Apr 2024 08:00)  T(F): 98 (21 Apr 2024 00:00), Max: 98.7 (20 Apr 2024 08:00)  HR: 66 (21 Apr 2024 06:00) (61 - 82)  BP: --  BP(mean): --  RR: 31 (21 Apr 2024 06:00) (13 - 46)  SpO2: 97% (21 Apr 2024 06:00) (83% - 100%)    Parameters below as of 21 Apr 2024 04:00  Patient On (Oxygen Delivery Method): nasal cannula  O2 Flow (L/min): 3      Physical Exam:   General: Alert and interactive, restless  Neurology: Oriented, following commands  Respiratory: Clear bilaterally   CV: Sinus rhythm  Abdominal: Soft, Nontender  Extremities: Warm, well-perfused    -------------------------------------------------------------------------------------------------------------------------------    Labs:                        9.4    12.88 )-----------( 190      ( 21 Apr 2024 00:23 )             28.3     04-21    131<L>  |  94<L>  |  15  ----------------------------<  101<H>  4.2   |  26  |  0.65    Ca    10.1      21 Apr 2024 00:23  Phos  2.7     04-21  Mg     2.4     04-21    TPro  7.1  /  Alb  3.8  /  TBili  0.6  /  DBili  x   /  AST  28  /  ALT  28  /  AlkPhos  297<H>  04-21    LIVER FUNCTIONS - ( 21 Apr 2024 00:23 )  Alb: 3.8 g/dL / Pro: 7.1 g/dL / ALK PHOS: 297 U/L / ALT: 28 U/L / AST: 28 U/L / GGT: x           PT/INR - ( 21 Apr 2024 00:23 )   PT: 13.8 sec;   INR: 1.33 ratio         PTT - ( 21 Apr 2024 00:23 )  PTT:31.5 sec  ABG - ( 21 Apr 2024 00:08 )  pH, Arterial: 7.37  pH, Blood: x     /  pCO2: 48    /  pO2: 80    / HCO3: 28    / Base Excess: 1.9   /  SaO2: 98.0      ------------------------------------------------------------------------------------------------------------------------------  Assessment:  63 yo F s/p MVR-t, MAZE, and LAAC on 4/16/24.  IABP was placed preop for cardiogenic shock.    Acute on chronic systolic heart failure  Hyperglycemia   Hyponatremia  Acute blood loss anemia      Plan:   ***Neuro***  Postoperative acute pain control with Tylenol, Gabapentin, Oxycodone, and prns.  Patient is on Oxycodone at home  Lorazepam PRN for anxiety.  At high risk for alcohol withdrawal - Librium taper.    ***Cardiovascular***  At high risk for ongoing hemodynamic instability and cardiac arrhythmias.  No beta blocker given recent pacing requirement, currently in sinus.  ASA/Statin daily     ***Pulmonary***  Tolerating room air.  Deep breathing and coughing exercises.    ***GI***  Tolerating diet.   Pepcid for stress ulcer prophylaxis   Bowel regimen.    ***Renal***  Trend Creatinine   Hyponatremia - Lasix / Aldactone    ***ID***  No acute issues.    ***Endocrine***  Hyperglycemia - Insulin sliding scale    ***Hematology***  Acute blood and Thrombocytopenia - s/p 1 unit prbcs transfused yesterday   No current transfusion indication  SQ Heparin for VTE prophylaxis   Will transition to Lovenox now that ISABELA has resolved.  Coumadin nightly - trend INR      Care plan discussed with the ICU care team.       By signing my name below, I, Delmy Brice, attest that this documentation has been prepared under the direction and in the presence of Shira Kirk MD  Electronically signed: Delmy Brice, 04-21-24 @ 07:17    I, Shira Kirk MD, personally performed the services described in this documentation. all medical record entries made by the scribe were at my direction and in my presence. I have reviewed the chart and agree that the record reflects my personal performance and is accurate and complete  Electronically signed: Shira Kirk MD  Patient seen and examined at the bedside.        Brief Summary:  65 yo F s/p MVR-t, MAZE, and LAAC on 4/16/24.  IABP was placed preop for cardiogenic shock.    24 Hour events:  Chest tubes and Jimenez removed   Transfused 1u prbc yesterday   Dobutamine weaned to off.      Objective:  Vital Signs Last 24 Hrs  T(C): 36.7 (21 Apr 2024 00:00), Max: 37.1 (20 Apr 2024 08:00)  T(F): 98 (21 Apr 2024 00:00), Max: 98.7 (20 Apr 2024 08:00)  HR: 66 (21 Apr 2024 06:00) (61 - 82)  BP: --  BP(mean): --  RR: 31 (21 Apr 2024 06:00) (13 - 46)  SpO2: 97% (21 Apr 2024 06:00) (83% - 100%)    Parameters below as of 21 Apr 2024 04:00  Patient On (Oxygen Delivery Method): nasal cannula  O2 Flow (L/min): 3      Physical Exam:   General: Alert and interactive  Neurology: Oriented, following commands  Respiratory: Clear bilaterally   CV: Sinus rhythm  Abdominal: Soft, Nontender  Extremities: Warm, well-perfused    ---------------------------------------------------------------------------------------------------    Labs:                        9.4    12.88 )-----------( 190      ( 21 Apr 2024 00:23 )             28.3     04-21    131<L>  |  94<L>  |  15  ----------------------------<  101<H>  4.2   |  26  |  0.65    Ca    10.1      21 Apr 2024 00:23  Phos  2.7     04-21  Mg     2.4     04-21    TPro  7.1  /  Alb  3.8  /  TBili  0.6  /  DBili  x   /  AST  28  /  ALT  28  /  AlkPhos  297<H>  04-21    LIVER FUNCTIONS - ( 21 Apr 2024 00:23 )  Alb: 3.8 g/dL / Pro: 7.1 g/dL / ALK PHOS: 297 U/L / ALT: 28 U/L / AST: 28 U/L / GGT: x           PT/INR - ( 21 Apr 2024 00:23 )   PT: 13.8 sec;   INR: 1.33 ratio         PTT - ( 21 Apr 2024 00:23 )  PTT:31.5 sec  ABG - ( 21 Apr 2024 00:08 )  pH, Arterial: 7.37  pH, Blood: x     /  pCO2: 48    /  pO2: 80    / HCO3: 28    / Base Excess: 1.9   /  SaO2: 98.0      ------------------------------------------------------------------------------------------------------------------------------  Assessment:  65 yo F s/p MVR-t, MAZE, and LAAC on 4/16/24.  IABP was placed preop for cardiogenic shock.    Acute on chronic systolic heart failure  Hyperglycemia   Hyponatremia  Acute blood loss anemia      Plan:   ***Neuro***  Postoperative acute pain control with Tylenol, Gabapentin, Oxycodone, and prns.  Patient is on Oxycodone at home  Lorazepam PRN for anxiety.  At high risk for alcohol withdrawal - Librium taper.    ***Cardiovascular***  At risk for hemodynamic instability and cardiac arrhythmias.  No beta blocker given recent pacing requirement, currently in sinus.  EP following.  ASA/Statin daily     ***Pulmonary***  Tolerating room air.  Deep breathing and coughing exercises.    ***GI***  Tolerating diet.   Pepcid for stress ulcer prophylaxis   Bowel regimen.    ***Renal***  Trend Creatinine   Hyponatremia - Lasix / Aldactone, will trend.    ***ID***  No acute issues.    ***Endocrine***  Hyperglycemia - Insulin sliding scale    ***Hematology***  Acute blood and Thrombocytopenia - s/p 1 unit prbcs transfused yesterday   No current transfusion indication  SQ Heparin for VTE prophylaxis   Will transition to Lovenox now that ISABELA has resolved.  Coumadin nightly - trend INR      Care plan discussed with the ICU care team.       By signing my name below, I, Delmy Brice, attest that this documentation has been prepared under the direction and in the presence of Shira Kirk MD  Electronically signed: Delmy Brice, 04-21-24 @ 07:17    I, Shira Kirk MD, personally performed the services described in this documentation. all medical record entries made by the scribe were at my direction and in my presence. I have reviewed the chart and agree that the record reflects my personal performance and is accurate and complete  Electronically signed: Shira Kirk MD

## 2024-04-21 NOTE — PROGRESS NOTE ADULT - SUBJECTIVE AND OBJECTIVE BOX
Subjective ' hello I am feeling better already"    VITAL SIGNS    Telemetry:  NSR 60    Vital Signs Last 24 Hrs  T(C): 38.3 (24 @ 08:00), Max: 38.3 (24 @ 08:00)  T(F): 100.9 (24 @ 08:00), Max: 100.9 (24 @ 08:00)  HR: 66 (24 @ 10:00) (61 - 82)  RR: 17 (24 @ 10:00) (13 - 46)  SpO2: 97% (24 @ 10:00) (83% - 100%)          @ 07:01  -   @ 07:00  --------------------------------------------------------  IN: 391.5 mL / OUT: 1680 mL / NET: -1288.5 mL     @ 07:01  -   @ 11:55  --------------------------------------------------------  IN: 20 mL / OUT: 550 mL / NET: -530 mL    Daily Weight in k.9 (2024 00:00)                          9.4    12.88 )-----------( 190      ( 2024 00:23 )             28.3         131<L>  |  94<L>  |  15  ----------------------------<  101<H>  4.2   |  26  |  0.65    Ca    10.1      2024 00:23  Phos  2.7       Mg     2.4         TPro  7.1  /  Alb  3.8  /  TBili  0.6  /  DBili  x   /  AST  28  /  ALT  28  /  AlkPhos  297<H>    MEDICATIONS  (STANDING):  ascorbic acid 500 milliGRAM(s) Oral two times a day  aspirin enteric coated 81 milliGRAM(s) Oral daily  atorvastatin 20 milliGRAM(s) Oral at bedtime  bisacodyl Suppository 10 milliGRAM(s) Rectal once  chlordiazePOXIDE   Oral   chlordiazePOXIDE 25 milliGRAM(s) Oral every 12 hours  chlorhexidine 2% Cloths 1 Application(s) Topical daily  dextrose 50% Injectable 50 milliLiter(s) IV Push every 15 minutes  enoxaparin Injectable 40 milliGRAM(s) SubCutaneous every 24 hours  famotidine    Tablet 20 milliGRAM(s) Oral two times a day  furosemide    Tablet 40 milliGRAM(s) Oral daily  gabapentin 300 milliGRAM(s) Oral every 8 hours  insulin lispro (ADMELOG) corrective regimen sliding scale   SubCutaneous three times a day before meals  polyethylene glycol 3350 17 Gram(s) Oral daily  senna 2 Tablet(s) Oral at bedtime  sodium chloride 0.9%. 1000 milliLiter(s) (10 mL/Hr) IV Continuous <Continuous>  spironolactone 25 milliGRAM(s) Oral daily    MEDICATIONS  (PRN):  acetaminophen     Tablet .. 650 milliGRAM(s) Oral every 6 hours PRN Mild Pain (1 - 3)  HYDROmorphone  Injectable 0.5 milliGRAM(s) IV Push every 8 hours PRN Moderate Pain (4 - 6)  LORazepam     Tablet 1 milliGRAM(s) Oral every 8 hours PRN Anxiety  oxyCODONE    IR 20 milliGRAM(s) Oral every 4 hours PRN Severe Pain (7 - 10)      POCT Blood Glucose.: 101 mg/dL (2024 11:16)  POCT Blood Glucose.: 134 mg/dL (2024 06:56)  POCT Blood Glucose.: 156 mg/dL (2024 16:20)            Pacing Wires        [ x]   Settings: VVI 40 VMA 10  vsens 0.8                                 Isolated  [  ]    Coumadin    [ ]x YES          [  ]      NO         Reason:    MVR                          PHYSICAL EXAM        Neurology: alert and oriented x 3, nonfocal, no gross deficits    CV :[id number]    Sternal Wound :    CDI  sternum Stable    Lungs: B/l breathsound s 2l nc    Abdomen: soft, nontender, nondistended, positive bowel sounds, last bowel movement     :  voids             Extremities: warm well perfused equal strength throughout   B/lle  + DP  + edema   no calftenderness                                      Physical Therapy Rec:   Home  [  ]   Home w/ PT  [ x ]  Rehab  [  ]    Discussed with Cardiothoracic Team at AM rounds.

## 2024-04-21 NOTE — PROGRESS NOTE ADULT - PROBLEM SELECTOR PLAN 1
4/16 MVR(t) - Epic 33mm valve Maze procedure LAAL - 40mm AtriClip   coumadin 5mg tonight  dosed by INR 1.33  ASA 81 Atorvastatin 20  Lasix 20 mg qd  aldactone  25 qd for diuresis - followed by HF team  Lovenox  for DVT ppx  insulin sliding scale   + PW VVI 40   bed/ chair alarm- impulsive  incentive spirometry ambulate tid   disposition to home

## 2024-04-21 NOTE — PROGRESS NOTE ADULT - PROBLEM SELECTOR PLAN 2
seen by behavioral health team   chlordiazePOXIDE 25 milliGRAM(s) Oral every 12 hours  LORazepam     Tablet 1 milliGRAM(s) Oral every 8 hours PRN Anxiety  oxyCODONE    IR 20 milliGRAM(s) Oral every 4 hours PRN Severe Pain (7 - 10)  Seroquel 12.5-25  PO q6 for agitation  chair/ bed alarm

## 2024-04-22 LAB
ANION GAP SERPL CALC-SCNC: 12 MMOL/L — SIGNIFICANT CHANGE UP (ref 5–17)
BASOPHILS # BLD AUTO: 0.05 K/UL — SIGNIFICANT CHANGE UP (ref 0–0.2)
BASOPHILS NFR BLD AUTO: 0.5 % — SIGNIFICANT CHANGE UP (ref 0–2)
BUN SERPL-MCNC: 16 MG/DL — SIGNIFICANT CHANGE UP (ref 7–23)
CALCIUM SERPL-MCNC: 9.4 MG/DL — SIGNIFICANT CHANGE UP (ref 8.4–10.5)
CHLORIDE SERPL-SCNC: 95 MMOL/L — LOW (ref 96–108)
CO2 SERPL-SCNC: 27 MMOL/L — SIGNIFICANT CHANGE UP (ref 22–31)
CREAT SERPL-MCNC: 0.72 MG/DL — SIGNIFICANT CHANGE UP (ref 0.5–1.3)
EGFR: 93 ML/MIN/1.73M2 — SIGNIFICANT CHANGE UP
EOSINOPHIL # BLD AUTO: 0.25 K/UL — SIGNIFICANT CHANGE UP (ref 0–0.5)
EOSINOPHIL NFR BLD AUTO: 2.6 % — SIGNIFICANT CHANGE UP (ref 0–6)
GLUCOSE BLDC GLUCOMTR-MCNC: 116 MG/DL — HIGH (ref 70–99)
GLUCOSE BLDC GLUCOMTR-MCNC: 121 MG/DL — HIGH (ref 70–99)
GLUCOSE BLDC GLUCOMTR-MCNC: 134 MG/DL — HIGH (ref 70–99)
GLUCOSE BLDC GLUCOMTR-MCNC: 141 MG/DL — HIGH (ref 70–99)
GLUCOSE SERPL-MCNC: 104 MG/DL — HIGH (ref 70–99)
HCT VFR BLD CALC: 26.4 % — LOW (ref 34.5–45)
HGB BLD-MCNC: 8.6 G/DL — LOW (ref 11.5–15.5)
IMM GRANULOCYTES NFR BLD AUTO: 1.3 % — HIGH (ref 0–0.9)
INR BLD: 1.62 RATIO — HIGH (ref 0.85–1.18)
LYMPHOCYTES # BLD AUTO: 1.35 K/UL — SIGNIFICANT CHANGE UP (ref 1–3.3)
LYMPHOCYTES # BLD AUTO: 14.3 % — SIGNIFICANT CHANGE UP (ref 13–44)
MAGNESIUM SERPL-MCNC: 2.1 MG/DL — SIGNIFICANT CHANGE UP (ref 1.6–2.6)
MCHC RBC-ENTMCNC: 30.7 PG — SIGNIFICANT CHANGE UP (ref 27–34)
MCHC RBC-ENTMCNC: 32.6 GM/DL — SIGNIFICANT CHANGE UP (ref 32–36)
MCV RBC AUTO: 94.3 FL — SIGNIFICANT CHANGE UP (ref 80–100)
MONOCYTES # BLD AUTO: 1.78 K/UL — HIGH (ref 0–0.9)
MONOCYTES NFR BLD AUTO: 18.8 % — HIGH (ref 2–14)
NEUTROPHILS # BLD AUTO: 5.9 K/UL — SIGNIFICANT CHANGE UP (ref 1.8–7.4)
NEUTROPHILS NFR BLD AUTO: 62.5 % — SIGNIFICANT CHANGE UP (ref 43–77)
NRBC # BLD: 0 /100 WBCS — SIGNIFICANT CHANGE UP (ref 0–0)
PHOSPHATE SERPL-MCNC: 2.9 MG/DL — SIGNIFICANT CHANGE UP (ref 2.5–4.5)
PLATELET # BLD AUTO: 234 K/UL — SIGNIFICANT CHANGE UP (ref 150–400)
POTASSIUM SERPL-MCNC: 3.8 MMOL/L — SIGNIFICANT CHANGE UP (ref 3.5–5.3)
POTASSIUM SERPL-SCNC: 3.8 MMOL/L — SIGNIFICANT CHANGE UP (ref 3.5–5.3)
PROTHROM AB SERPL-ACNC: 17.6 SEC — HIGH (ref 9.5–13)
RBC # BLD: 2.8 M/UL — LOW (ref 3.8–5.2)
RBC # FLD: 15.1 % — HIGH (ref 10.3–14.5)
SODIUM SERPL-SCNC: 134 MMOL/L — LOW (ref 135–145)
WBC # BLD: 9.45 K/UL — SIGNIFICANT CHANGE UP (ref 3.8–10.5)
WBC # FLD AUTO: 9.45 K/UL — SIGNIFICANT CHANGE UP (ref 3.8–10.5)

## 2024-04-22 PROCEDURE — 93010 ELECTROCARDIOGRAM REPORT: CPT

## 2024-04-22 PROCEDURE — 99223 1ST HOSP IP/OBS HIGH 75: CPT

## 2024-04-22 PROCEDURE — 99233 SBSQ HOSP IP/OBS HIGH 50: CPT

## 2024-04-22 RX ORDER — ACETAMINOPHEN 500 MG
650 TABLET ORAL EVERY 6 HOURS
Refills: 0 | Status: DISCONTINUED | OUTPATIENT
Start: 2024-04-22 | End: 2024-04-27

## 2024-04-22 RX ORDER — AMIODARONE HYDROCHLORIDE 400 MG/1
200 TABLET ORAL DAILY
Refills: 0 | Status: DISCONTINUED | OUTPATIENT
Start: 2024-04-22 | End: 2024-04-23

## 2024-04-22 RX ORDER — WARFARIN SODIUM 2.5 MG/1
5 TABLET ORAL ONCE
Refills: 0 | Status: COMPLETED | OUTPATIENT
Start: 2024-04-22 | End: 2024-04-22

## 2024-04-22 RX ORDER — ENOXAPARIN SODIUM 100 MG/ML
80 INJECTION SUBCUTANEOUS EVERY 12 HOURS
Refills: 0 | Status: DISCONTINUED | OUTPATIENT
Start: 2024-04-22 | End: 2024-04-23

## 2024-04-22 RX ORDER — LOSARTAN POTASSIUM 100 MG/1
25 TABLET, FILM COATED ORAL DAILY
Refills: 0 | Status: DISCONTINUED | OUTPATIENT
Start: 2024-04-22 | End: 2024-04-27

## 2024-04-22 RX ORDER — OXYCODONE HYDROCHLORIDE 5 MG/1
20 TABLET ORAL EVERY 4 HOURS
Refills: 0 | Status: DISCONTINUED | OUTPATIENT
Start: 2024-04-22 | End: 2024-04-27

## 2024-04-22 RX ADMIN — AMIODARONE HYDROCHLORIDE 200 MILLIGRAM(S): 400 TABLET ORAL at 09:11

## 2024-04-22 RX ADMIN — OXYCODONE HYDROCHLORIDE 20 MILLIGRAM(S): 5 TABLET ORAL at 11:51

## 2024-04-22 RX ADMIN — OXYCODONE HYDROCHLORIDE 20 MILLIGRAM(S): 5 TABLET ORAL at 16:18

## 2024-04-22 RX ADMIN — ATORVASTATIN CALCIUM 20 MILLIGRAM(S): 80 TABLET, FILM COATED ORAL at 19:53

## 2024-04-22 RX ADMIN — SENNA PLUS 2 TABLET(S): 8.6 TABLET ORAL at 19:54

## 2024-04-22 RX ADMIN — Medication 25 MILLIGRAM(S): at 05:16

## 2024-04-22 RX ADMIN — Medication 1 PATCH: at 17:06

## 2024-04-22 RX ADMIN — FAMOTIDINE 20 MILLIGRAM(S): 10 INJECTION INTRAVENOUS at 17:03

## 2024-04-22 RX ADMIN — Medication 81 MILLIGRAM(S): at 11:21

## 2024-04-22 RX ADMIN — WARFARIN SODIUM 5 MILLIGRAM(S): 2.5 TABLET ORAL at 19:54

## 2024-04-22 RX ADMIN — Medication 40 MILLIGRAM(S): at 05:16

## 2024-04-22 RX ADMIN — GABAPENTIN 300 MILLIGRAM(S): 400 CAPSULE ORAL at 05:16

## 2024-04-22 RX ADMIN — HYDROMORPHONE HYDROCHLORIDE 2 MILLIGRAM(S): 2 INJECTION INTRAMUSCULAR; INTRAVENOUS; SUBCUTANEOUS at 07:51

## 2024-04-22 RX ADMIN — ENOXAPARIN SODIUM 40 MILLIGRAM(S): 100 INJECTION SUBCUTANEOUS at 13:30

## 2024-04-22 RX ADMIN — OXYCODONE HYDROCHLORIDE 20 MILLIGRAM(S): 5 TABLET ORAL at 20:23

## 2024-04-22 RX ADMIN — Medication 1 MILLIGRAM(S): at 09:11

## 2024-04-22 RX ADMIN — OXYCODONE HYDROCHLORIDE 20 MILLIGRAM(S): 5 TABLET ORAL at 15:48

## 2024-04-22 RX ADMIN — OXYCODONE HYDROCHLORIDE 20 MILLIGRAM(S): 5 TABLET ORAL at 11:21

## 2024-04-22 RX ADMIN — LOSARTAN POTASSIUM 25 MILLIGRAM(S): 100 TABLET, FILM COATED ORAL at 13:29

## 2024-04-22 RX ADMIN — OXYCODONE HYDROCHLORIDE 20 MILLIGRAM(S): 5 TABLET ORAL at 01:19

## 2024-04-22 RX ADMIN — SPIRONOLACTONE 25 MILLIGRAM(S): 25 TABLET, FILM COATED ORAL at 05:17

## 2024-04-22 RX ADMIN — FAMOTIDINE 20 MILLIGRAM(S): 10 INJECTION INTRAVENOUS at 05:16

## 2024-04-22 RX ADMIN — HYDROMORPHONE HYDROCHLORIDE 2 MILLIGRAM(S): 2 INJECTION INTRAMUSCULAR; INTRAVENOUS; SUBCUTANEOUS at 07:21

## 2024-04-22 RX ADMIN — ENOXAPARIN SODIUM 80 MILLIGRAM(S): 100 INJECTION SUBCUTANEOUS at 20:01

## 2024-04-22 RX ADMIN — OXYCODONE HYDROCHLORIDE 20 MILLIGRAM(S): 5 TABLET ORAL at 19:53

## 2024-04-22 RX ADMIN — Medication 1 MILLIGRAM(S): at 21:54

## 2024-04-22 RX ADMIN — GABAPENTIN 300 MILLIGRAM(S): 400 CAPSULE ORAL at 13:29

## 2024-04-22 RX ADMIN — GABAPENTIN 300 MILLIGRAM(S): 400 CAPSULE ORAL at 19:53

## 2024-04-22 RX ADMIN — Medication 1 PATCH: at 17:02

## 2024-04-22 RX ADMIN — Medication 1 PATCH: at 20:34

## 2024-04-22 NOTE — CONSULT NOTE ADULT - ASSESSMENT
65 y/o female who is a current smoker with a PMHx of HLD, HTN, new AF at time of admission, benzodiazepine/opioid and ETOH abuse, and mitral valve prolapse presenting to the ER with shortness of breath on 4/13/24. She was found to have severe MR and underwent MVR(t) (Epic 33mm valve) Maze procedure and LAAL (40mm AtriClip) on 4/16/24. She was in AF prior to surgery and has been in SR post op. She was noted to go into AF on 4/21 at about 23:05.     1. Severe MR s/p MV(t), MAZE procedure and LAAL (4/16/24)  2. AF    - AF 60-100s since 4/21 at about 23:05  - Continue Coumadin, INR 1.62 today - would consider full dose Lovenox tonight so patient will be on full dose AC <24 hrs within AF initiation, on Lovenox 40 mg daily now  - Started on Amiodarone 200 mg daily by CTS today - could increase to load initially  - Intraop BRUNA revealed TIMMY closed with no flow  - EF 35% on last echo - was 60-65% preop and >75% on intraop BRUNA  - If AF persists would consider DCCV +/- BRUNA when INR therapeutic  - Close telemetry monitoring  - Maintain K>4.0 and Mg>2.0
64F with PMHx of HLD, HTN, AFib (diagnosed recently, not on AC), and mitral valve prolapse found to have severe mitral regurgitation complicated by cardiogenic shock, now s/p RHC with IABP placement. Admitted to CICU for further management.     Cardiac Studies:  TTE 4/13/24 - EF 60-65%, reduced RV function, severe MR with MS as well likely 2/2 rheumatic mitral valve disease   LHC: Normal coronaries   RHC: RA 16 (vwave 21), PA 60/35/48, PCW 41 (vwave 58), LVEDP 20, CI 1.6/CO 3.24

## 2024-04-22 NOTE — PROGRESS NOTE ADULT - PROBLEM SELECTOR PLAN 3
EP cslt     amio 200 qd   and follow up ep  recs  ? dccv in am    will dw  Dr luis       will need  LVX ernesto evening   and  am

## 2024-04-22 NOTE — CONSULT NOTE ADULT - NS ATTEND AMEND GEN_ALL_CORE FT
s/p MVR/TIMMY cip/MAZE wqith post op AF. On amiodarone and coumadin load. Plan for BRUNA/DCCV if AF persists.

## 2024-04-22 NOTE — PROGRESS NOTE ADULT - PROBLEM SELECTOR PLAN 1
4/16 MVR(t) - Epic 33mm valve Maze procedure LAAL - 40mm AtriClip   coumadin 5mg tonigh  consider lvx tonight if plan for DCCV  ASA 81 Atorvastatin 20  Lasix 20 mg qd  aldactone  25 qd for diuresis - followed by HF team    + PW VVI 40      disposition to home

## 2024-04-22 NOTE — OCCUPATIONAL THERAPY INITIAL EVALUATION ADULT - GENERAL OBSERVATIONS, REHAB EVAL
Upon entry, patient seated in chair at bedside +ex pacer +tele +IVL +chair alarm, patient agreeable to OT eval, cleared for OT evaluation as per BRIDGET schroeder

## 2024-04-22 NOTE — OCCUPATIONAL THERAPY INITIAL EVALUATION ADULT - MD ORDER
Occupational therapy to evaluate and treat. Activity: out of bed to chair, out of bed with assist. As per chart and RN, Pt in AFib, able to see for OT evaluation and mobilize

## 2024-04-22 NOTE — PROGRESS NOTE ADULT - ASSESSMENT
This is a   63 y/o female with PMHx of HLD, HTN, AFib (diagnosed recently, not on AC), benzodiazepine/opioid & ETOH abuse, and mitral valve prolapse found to have severe mitral and tricuspid regurgitation w/ signs of cardiogenic shock, now s/p RHC with IABP placement.   follwed by HF team  CTS consulted MR/TR  4/16 MVR(t) - Epic 33mm valve Maze procedure LAAL - 40mm AtriClip recovered in CTU  extubated OR day  4/17 VSS  dobutamine for  inotropic support  /  lasix gtt - VASO /IABP d/c l seen by PT disposition to home with PT librium thiamine and folic acid-  4/18TTE 4/18 EF 35%, no intervalvular MR  junctional rhythm  coumadin for MVR  4/19 Precedex for delirium   4/20 VSS seen by behavioural health recommend  Seroquel 12.5-25  PO q6 for agitation chest tubes d/c  dobutamine d/c  4/21 VSS right brachial felicia d/c transfer to [facility] +  upon arrival .  d/c IV benzo + PW VVI 40  TELE- NSR   disposition to home this week  4/22     afib  72   amio 200 QD   EP  cslt   + PW   to epm

## 2024-04-22 NOTE — PROGRESS NOTE ADULT - ASSESSMENT
64F with PMHx of HLD, HTN, AFib (diagnosed recently, not on AC), and mitral valve prolapse found to have severe mitral regurgitation complicated by cardiogenic shock, now s/p RHC with IABP placement. She ultimately underwent MVR(t) with Maze procedure with Dr Maki on 4/16, extubated the following day. IABP removed 4/17. Her most recent ECHO shows reduced EF of 35%. She appears euvolemic on exam and will continue to optimize her GDMT as tolerated. Of note she was noted to go into Afib today, EP consulted and started on Amio.        Cardiac Studies:  TTE 4/18 EF 35%, no intervalvular MR   TTE 4/13/24 - EF 60-65%, reduced RV function, severe MR with MS as well likely 2/2 rheumatic mitral valve disease   LHC: Normal coronaries   RHC: RA 16 (vwave 21), PA 60/35/48, PCW 41 (vwave 58), LVEDP 20, CI 1.6/CO 3.24   Mercedes Flap Text: The defect edges were debeveled with a #15 scalpel blade.  Given the location of the defect, shape of the defect and the proximity to free margins a Mercedes flap was deemed most appropriate.  Using a sterile surgical marker, an appropriate advancement flap was drawn incorporating the defect and placing the expected incisions within the relaxed skin tension lines where possible. The area thus outlined was incised deep to adipose tissue with a #15 scalpel blade.  The skin margins were undermined to an appropriate distance in all directions utilizing iris scissors.

## 2024-04-22 NOTE — PROGRESS NOTE ADULT - PROBLEM SELECTOR PLAN 1
- most recent ECHO from 4/18 shows reduce EF of 35%  - IABP removed 4/17  - plan to start Losartan 25 mg PO QD today, if able to tolerate will consider transitioning to Entresto tomorrow  - will likely be started on BB tomorrow  - continue Lasix 40 mg PO QD and Aldactone 25 mg PO QD  - obtain daily standing weight and strict I&Os   - continue to replete K 4-5, Mg >2.2 - most recent ECHO from 4/18 shows reduce EF of 35%  - IABP removed 4/17  - plan to start Losartan 25 mg PO QD today, if able to tolerate will transition to Entresto tomorrow  - will likely be started on BB tomorrow  - continue Lasix 40 mg PO QD and Aldactone 25 mg PO QD  - obtain daily standing weight and strict I&Os   - continue to replete K 4-5, Mg >2.2

## 2024-04-22 NOTE — OCCUPATIONAL THERAPY INITIAL EVALUATION ADULT - DIAGNOSIS, OT EVAL
Impaired balance, limited ROM, decreased functional activity endurance, decreased strength impacting ability to perform ADL and functional mobility.

## 2024-04-22 NOTE — OCCUPATIONAL THERAPY INITIAL EVALUATION ADULT - PERTINENT HX OF CURRENT PROBLEM, REHAB EVAL
63 y/o female who is a current smoker with a PMHx of HLD, HTN, AFib (diagnosed recently, not on AC), benzodiazepine/opioid and etoh abuse, and mitral valve prolapse presenting to the ER with shortness of breath. Pt reports developing shortness of breath, fatigue, and a sense of feeling unwell for the last week, with milder symptoms present longer. Her daughter at bedside reports pt is lethargic, not herself and was having difficulty breathing at home.  Upon arrival to the ED she was pale and ill appearing. Was found to be hypoxic to 77% on room air requiring 3L nasal cannula as well as hypotensive to the 70s requiring levophed. On pocus she was found to have a small pericardial effusion, severe MR w/ plethoric IVC and bilateral B lines. Cardiology was consulted for concern of cardiogenic shock. She is now s/p RHC with IABP and swan purnima catheter placement. Pt s/p intubation, MVR(t), TIMMY clip and maze procedure on 4/16/24. Pt s/p removal of iABP on 4/17, 4/21 VSS right brachial felicia d/c transfer to [facility]

## 2024-04-22 NOTE — CONSULT NOTE ADULT - SUBJECTIVE AND OBJECTIVE BOX
HISTORY OF PRESENT ILLNESS: 63 y/o female who is a current smoker with a PMHx of HLD, HTN, new AF at time of admission, benzodiazepine/opioid and ETOH abuse, and mitral valve prolapse presenting to the ER with shortness of breath on 4/13/24. She reports developing shortness of breath, fatigue, and a sense of feeling unwell for the last week, with milder symptoms present longer. Upon arrival to the ED she was found to be hypoxic to 77% on room air requiring 3L nasal cannula as well as hypotensive to the 70s requiring levophed. On pocus she was found to have a small pericardial effusion, severe MR w/ plethoric IVC and bilateral B lines. Cardiology was consulted for concern of cardiogenic shock. She underwent s/p RHC with IABP and swan purnima catheter placement (4/13/24). She underwent MVR(t) (Epic 33mm valve) Maze procedure and LAAL (40mm AtriClip) on 4/16/24. She was in AF prior to surgery and has been in SR post op, with brief junctional rhythm immediately post. She was noted to go into AF on 4/21 at about 23:05. She currently feels well and denies any SOB, CORRALES, dizziness, LH, palpitations, presyncope or syncope.       Allergies  No Known Allergies        MEDICATIONS:  aMIOdarone    Tablet 200 milliGRAM(s) Oral daily  aspirin enteric coated 81 milliGRAM(s) Oral daily  enoxaparin Injectable 40 milliGRAM(s) SubCutaneous every 24 hours  furosemide    Tablet 40 milliGRAM(s) Oral daily  spironolactone 25 milliGRAM(s) Oral daily  warfarin 5 milliGRAM(s) Oral once  acetaminophen     Tablet .. 650 milliGRAM(s) Oral every 6 hours PRN  chlordiazePOXIDE   Oral   chlordiazePOXIDE 25 milliGRAM(s) Oral every 12 hours  gabapentin 300 milliGRAM(s) Oral every 8 hours  HYDROmorphone   Tablet 2 milliGRAM(s) Oral every 6 hours PRN  LORazepam     Tablet 1 milliGRAM(s) Oral every 8 hours PRN  oxyCODONE    IR 20 milliGRAM(s) Oral every 4 hours PRN  bisacodyl Suppository 10 milliGRAM(s) Rectal once  famotidine    Tablet 20 milliGRAM(s) Oral two times a day  polyethylene glycol 3350 17 Gram(s) Oral daily  senna 2 Tablet(s) Oral at bedtime  atorvastatin 20 milliGRAM(s) Oral at bedtime  dextrose 50% Injectable 50 milliLiter(s) IV Push every 15 minutes  insulin lispro (ADMELOG) corrective regimen sliding scale   SubCutaneous three times a day before meals    sodium chloride 0.9%. 1000 milliLiter(s) IV Continuous <Continuous>      PAST MEDICAL & SURGICAL HISTORY:  HTN (hypertension)      Mitral valve prolapse      HLD (hyperlipidemia)      Chronic back pain      S/P MVR (mitral valve repair)        SOCIAL HISTORY:    Current smoker and ETOH use      REVIEW OF SYSTEMS:  See HPI. Otherwise, 12 point ROS done and otherwise negative.    PHYSICAL EXAM:  T(C): 37.1 (04-22-24 @ 05:22), Max: 37.1 (04-21-24 @ 20:34)  HR: 83 (04-22-24 @ 07:33) (60 - 97)  BP: 112/70 (04-22-24 @ 05:22) (88/60 - 118/72)  RR: 18 (04-22-24 @ 05:22) (18 - 19)  SpO2: 93% (04-22-24 @ 05:22) (93% - 99%)  Wt(kg): --  I&O's Summary    21 Apr 2024 07:01  -  22 Apr 2024 07:00  --------------------------------------------------------  IN: 200 mL / OUT: 1450 mL / NET: -1250 mL    22 Apr 2024 07:01  -  22 Apr 2024 10:15  --------------------------------------------------------  IN: 0 mL / OUT: 300 mL / NET: -300 mL        Appearance: Normal	  HEENT:  PERRL, EOMI	  Cardiovascular: Normal S1 S2, irreg, No JVD, No murmurs, No edema  Respiratory: Lungs clear to auscultation	  Psychiatry: A & O x 3, Mood & affect appropriate  Gastrointestinal:  Soft, Non-tender, + BS	  Skin: No rashes, No ecchymoses, No cyanosis	  Neurologic: Non-focal  Extremities: No clubbing, cyanosis or edema  Vascular: Peripheral pulses palpable 2+ bilaterally        LABS:	 	    CBC Full  -  ( 22 Apr 2024 06:26 )  WBC Count : 9.45 K/uL  Hemoglobin : 8.6 g/dL  Hematocrit : 26.4 %  Platelet Count - Automated : 234 K/uL  Mean Cell Volume : 94.3 fl  Mean Cell Hemoglobin : 30.7 pg  Mean Cell Hemoglobin Concentration : 32.6 gm/dL  Auto Neutrophil # : 5.90 K/uL  Auto Lymphocyte # : 1.35 K/uL  Auto Monocyte # : 1.78 K/uL  Auto Eosinophil # : 0.25 K/uL  Auto Basophil # : 0.05 K/uL  Auto Neutrophil % : 62.5 %  Auto Lymphocyte % : 14.3 %  Auto Monocyte % : 18.8 %  Auto Eosinophil % : 2.6 %  Auto Basophil % : 0.5 %    04-22    134<L>  |  95<L>  |  16  ----------------------------<  104<H>  3.8   |  27  |  0.72  04-21    131<L>  |  94<L>  |  15  ----------------------------<  101<H>  4.2   |  26  |  0.65    Ca    9.4      22 Apr 2024 06:25  Ca    10.1      21 Apr 2024 00:23  Phos  2.9     04-22  Phos  2.7     04-21  Mg     2.1     04-22  Mg     2.4     04-21    TPro  7.1  /  Alb  3.8  /  TBili  0.6  /  DBili  x   /  AST  28  /  ALT  28  /  AlkPhos  297<H>  04-21    TELEMETRY: SR 60-70s to AF 60-100s on 4/21 at 23:05    < from: TTE Congenital Anomalies W or WO Ultrasound Enhancing Agent (04.18.24 @ 09:25) >  TRANSTHORACIC ECHOCARDIOGRAM REPORT  ________________________________________________________________________________                                      _______       *Report Contains Critical Result*       Pt. Name:       HERBER IRAHETA Study Date:    4/18/2024  MRN:            [id number]       YOB: 1960  Accession #:    [id number]        Age:           64 years  Account#:       [id number]     Gender:        F  Heart Rate:                      Height:        70.00 in (177.80cm)  Rhythm:                          Weight:        195.00 lb (88.45 kg)  Blood Pressure: 107/62 mmHg      BSA/BMI:       2.06 m² / 27.98 kg/m²  ________________________________________________________________________________________  Referring Physician:    [id number] Prabhu Maki  Interpreting Physician: Riley Umanzor M.D.  Primary Sonographer:    John Mckinney RDCS    CPT:               ECHO TTE WO CON COMP W DOPP - 02904.m  Indication(s):     Cardiac murmur, unspecified - R01.1  Procedure:        Transthoracic echocardiogram with 2-D, M-mode and complete                     spectral and color flow Doppler.  Ordering Location: [facility]  Admission Status:  Inpatient  Study Information: Image quality for this study is fair.    _______________________________________________________________________________________     CONCLUSIONS:      1. Left ventricular cavity is moderately dilated. Left ventricular wall thickness is normal. Left ventricular systolic function is moderately decreased with an ejection fraction of 35 % by Colon's method of disks. There are no regional wall motion abnormalities seen.   2. Unable to assess left ventricular diastolic function due to insufficient data, with elevated filling pressure.   3. Normal right ventricular cavity size, with normal wall thickness, and normal systolic function.   4. The left atrium is severely dilated.   5. Bioprosthetic valve present.Bovine bioprosthesis in the mitral position. Well seated prosthetic mitral valve with normal function. There is No intravalvular mitral regurgitation. No paravalvular mitral regurgitation.   6. Normal left and right atrial size.   7. No significant valvular disease.   8. Compared to the transthoracic echocardiogram performed on 4/13/2024, therehas been an interval placment of a mitral valve prosthesis. There has been an interval decline in LVEF.    ________________________________________________________________________________________  *Report Contains Critical Result*  Critical Findings:New LV dysfunction (EF <40%) or Change in LVEF (EF <40%).  Silke Tovar and Shira Kirk was informed of the findings by 2-way  electronic message on 4/18/2024 at 1:10:02 PM.       ________________________________________________________________________________________  FINDINGS:     Left Ventricle:  The left ventricular cavity is moderately dilated. Left ventricular wall thickness is normal. Left ventricular systolic function is moderately decreased with a calculated ejection fraction of 35 %by the Colon's biplane method of disks. There are no regional wall motion abnormalities seen. Unable to assess left ventricular diastolic function due to insufficient data, with elevated filling pressure.     Right Ventricle:  The right ventricularcavity is normal in size, with normal wall thickness and normal systolic function. Tricuspid annular plane systolic excursion (TAPSE) is 0.6 cm (normal >=1.7 cm). Tricuspid annular tissue Doppler S' is 8.3 cm/s (normal >10 cm/s).     Left Atrium:  The left atrium is severely dilated.     Right Atrium:  The right atrium is normal in size with an indexed volume of 12.11 ml/m².     Interatrial Septum:  The interatrial septum appears intact.     Aortic Valve:  The aortic valve is tricuspid with normal leaflet excursion. There is no aortic valve stenosis. There is no evidence of aortic regurgitation.     Mitral Valve:  Bioprosthetic valve is present in the mitral position. Bovine mitral valve bioprosthesis in the mitral valve position. The mitral valve prosthesis is well seated with normal function. No intravalvular mitral regurgitation. There is no paravalvular regurgitation. The mitral valve peak gradient is 18.3 mmHg and a mean gradient is 5.75 mmHg at a heart rate of 126 bpm.     TricuspidValve:  Structurally normal tricuspid valve with normal leaflet excursion. There is mild tricuspid regurgitation.     Pulmonic Valve:  Structurally normal pulmonic valve with normal leaflet excursion. There is trace pulmonic regurgitation.     Aorta:  The aortic root appears normal in size.     Pericardium:  There is a moderate pericardial effusion measuring 1.00 cm.  ____________________________________________________________________  QUANTITATIVE DATA:  Left Ventricle Measurements: (Indexed toBSA)     IVSd (2D):   0.7 cm  LVPWd (2D):  0.8 cm  LVIDd (2D):  6.0 cm  LVIDs (2D):  4.2 cm  LV Mass:     173 g  83.7 g/m²  BiPlane LV EF%: 35 %     MV E Vmax:    1.70 m/s  e' lateral:   6.85 cm/s  e' medial:    5.87 cm/s  E/e' lateral: 24.82  E/e' medial:  28.96  E/e' Average: 26.73    Aorta Measurements: (Normal range) (Indexed to BSA)     Sinuses of Valsalva: 3.20 cm (2.7 - 3.3 cm)  Ao Asc prox:         3.20 cm  Ao Arch:             3.6 cm       Left Atrium Measurements: (Indexed to BSA)  LA Diam 2D:        4.30 cm  LA Vol s, MOD A4C: 116.00 ml.  LA Vol s, MOD A2C: 109.00 ml.    Right Ventricle Measurements: Right Atrial Measurements:     TAPSE:           0.6 cm       RA Vol:       25.00 ml  RV S' Vmax:      8.27 cm/s    RA Vol Index: 12.11 ml/m²  RV Base (RVID1): 3.0 cm  RV Mid (RVID2):  1.6 cm       LVOT / RVOT/ Qp/Qs Data: (Indexed to BSA)  LVOT Diameter:  2.10 cm  LVOT Area:      3.46 cm²  LVOT Vmax:      1.16 m/s  LVOT Vmn:       0.854 m/s  LVOT VTI:       20.20 cm  LVOT peak grad: 5 mmHg  LVOT mean grad: 2.7 mmHg  LVOT SV:        70.0 ml   33.88 ml/m²    Mitral Valve Measurements:     MV Vmax:      2.11 m/s  MV VTI, jarred   0.466 m  MV Mean Grad: 5.8 mmHg  MV Peak Grad: 18.3 mmHg  MV E Vmax:    1.7 m/s       Tricuspid Valve Measurements:     TV S'             8.3 cm/s  TR Vmax:          2.7 m/s  TR Peak Gradient: 30.0 mmHg    < end of copied text >

## 2024-04-22 NOTE — OCCUPATIONAL THERAPY INITIAL EVALUATION ADULT - VISUAL ASSESSMENT: SCANNING
----- Message from HANNAH Ceja sent at 12/15/2023  7:50 AM CST -----  No mass tumor lesion no stroke. Normal finding for age.    WFL

## 2024-04-22 NOTE — PROGRESS NOTE ADULT - SUBJECTIVE AND OBJECTIVE BOX
VITAL SIGNS    Telemetry:  afib   72    Vital Signs Last 24 Hrs  T(C): 37.1 (24 @ 05:22), Max: 37.1 (24 @ 20:34)  T(F): 98.8 (24 @ 05:22), Max: 98.8 (24 @ 20:34)  HR: 68 (24 @ 10:18) (60 - 97)  BP: 110/69 (24 @ 10:18) (88/60 - 118/72)  RR: 18 (24 @ 05:22) (18 - 19)  SpO2: 95% (24 @ 10:18) (93% - 99%)                   Daily     Daily Weight in k.5 (2024 08:19)        CAPILLARY BLOOD GLUCOSE      POCT Blood Glucose.: 116 mg/dL (2024 07:41)  POCT Blood Glucose.: 101 mg/dL (2024 16:38)  POCT Blood Glucose.: 101 mg/dL (2024 11:16)       Pacing Wires        [  ]   Settings:                                    PHYSICAL EXAM    Neurology: alert and oriented x 3, moves all extremities with no defecits  CV :  RRR  Sternal Wound :   some  skin stairs     + stable  Lungs:   CTA B/L  Abdomen: soft, nontender, nondistended, positive bowel sounds  Extremities:     trace pedal edema

## 2024-04-22 NOTE — PROGRESS NOTE ADULT - SUBJECTIVE AND OBJECTIVE BOX
ADVANCED HEART FAILURE & TRANSPLANT- PROGRESS NOTE  *To reach the NS1 Team from 8am to 5pm, please call [phone].  ___________________________________________________________________________    Subjective:  - no issues, overall feels well    Medications:  acetaminophen     Tablet .. 650 milliGRAM(s) Oral every 6 hours PRN  aMIOdarone    Tablet 200 milliGRAM(s) Oral daily  aspirin enteric coated 81 milliGRAM(s) Oral daily  atorvastatin 20 milliGRAM(s) Oral at bedtime  bisacodyl Suppository 10 milliGRAM(s) Rectal once  chlordiazePOXIDE 25 milliGRAM(s) Oral every 12 hours  chlordiazePOXIDE   Oral   dextrose 50% Injectable 50 milliLiter(s) IV Push every 15 minutes  enoxaparin Injectable 40 milliGRAM(s) SubCutaneous every 24 hours  famotidine    Tablet 20 milliGRAM(s) Oral two times a day  furosemide    Tablet 40 milliGRAM(s) Oral daily  gabapentin 300 milliGRAM(s) Oral every 8 hours  HYDROmorphone   Tablet 2 milliGRAM(s) Oral every 6 hours PRN  insulin lispro (ADMELOG) corrective regimen sliding scale   SubCutaneous three times a day before meals  LORazepam     Tablet 1 milliGRAM(s) Oral every 8 hours PRN  losartan 25 milliGRAM(s) Oral daily  nicotine - 21 mG/24Hr(s) Patch 1 Patch Transdermal <User Schedule>  oxyCODONE    IR 20 milliGRAM(s) Oral every 4 hours PRN  polyethylene glycol 3350 17 Gram(s) Oral daily  senna 2 Tablet(s) Oral at bedtime  sodium chloride 0.9%. 1000 milliLiter(s) IV Continuous <Continuous>  spironolactone 25 milliGRAM(s) Oral daily  warfarin 5 milliGRAM(s) Oral once    Vitals:  Vital Signs Last 24 Hours  T(C): 37 (24 @ 11:39), Max: 37.1 (24 @ 20:34)  HR: 74 (24 @ 11:39) (60 - 97)  BP: 108/69 (24 @ 11:39) (108/69 - 118/72)  RR: 18 (24 @ 11:39) (18 - 19)  SpO2: 93% (24 @ 11:39) (93% - 96%)    Weight in k.5 ( @ 08:19)    I&O's Summary    2024 07:01  -  2024 07:00  --------------------------------------------------------  IN: 200 mL / OUT: 1450 mL / NET: -1250 mL    2024 07:01  -  2024 12:52  --------------------------------------------------------  IN: 480 mL / OUT: 600 mL / NET: -120 mL        Physical Exam  General: No distress. Comfortable.  Neck: JVP ~ 10 cm   Chest: Clear to auscultation bilaterally  CV: IR IR S1 and S2.   Abdomen: Soft, non-distended, non-tender  Extremities: warm and perfused  Neurology: Alert and oriented times three    Labs:                        8.6    9.45  )-----------( 234      ( 2024 06:26 )             26.4         134<L>  |  95<L>  |  16  ----------------------------<  104<H>  3.8   |  27  |  0.72    Ca    9.4      2024 06:25  Phos  2.9       Mg     2.1         TPro  7.1  /  Alb  3.8  /  TBili  0.6  /  DBili  x   /  AST  28  /  ALT  28  /  AlkPhos  297<H>      PT/INR - ( 2024 06:26 )   PT: 17.6 sec;   INR: 1.62 ratio         PTT - ( 2024 00:23 )  PTT:31.5 sec                  Imaging Studies

## 2024-04-22 NOTE — OCCUPATIONAL THERAPY INITIAL EVALUATION ADULT - LIVES WITH, PROFILE
Pt lives in private home with , 3 steps to enter and ~15 up to bedroom/bathroom (tub shower +shower chair +grab bars) and ~10 down to basement. Prior to admission, Pt independent with ADL and ambulation with no device. Pt reports daughter will be helping when she returns home after d/c. Pt reports she owns RW from  that she will use./spouse

## 2024-04-22 NOTE — PROGRESS NOTE ADULT - NS ATTEND AMEND GEN_ALL_CORE FT
65 YO F with a history of rheumatic heart disease and valvular AF who presented with cardiogenic shock in the setting of severe mitral regurgitation (etiology mixed atriofunctional and primary) requiring IABP. She is now s/p MVR/MAZE by Dr. Maki on 4/16. She initially had preserved LVEF but now with moderate LV dysfunction after correction of mitral regurgitation.    GDMT: start losartan 25 mg daily, if tolerates will switch to entresto 24-26 mg BID tomorrow. will likely start BB tomorrow. continue spironolactone 25 mg daily. SGLT2i likely tomorrow  Diuretics: euvolemic, continue lasix 40 mg PO daily   Valvular AF: was in SR after MAZE but reverted to AF 4/21. EP following, on amiodarone with consideration of DCCV if does not chemically convert. receiving coumadin.

## 2024-04-23 ENCOUNTER — RESULT REVIEW (OUTPATIENT)
Age: 64
End: 2024-04-23

## 2024-04-23 LAB
ANION GAP SERPL CALC-SCNC: 11 MMOL/L — SIGNIFICANT CHANGE UP (ref 5–17)
APTT BLD: 31.6 SEC — SIGNIFICANT CHANGE UP (ref 24.5–35.6)
BUN SERPL-MCNC: 13 MG/DL — SIGNIFICANT CHANGE UP (ref 7–23)
CALCIUM SERPL-MCNC: 9.3 MG/DL — SIGNIFICANT CHANGE UP (ref 8.4–10.5)
CHLORIDE SERPL-SCNC: 93 MMOL/L — LOW (ref 96–108)
CO2 SERPL-SCNC: 28 MMOL/L — SIGNIFICANT CHANGE UP (ref 22–31)
CREAT SERPL-MCNC: 0.69 MG/DL — SIGNIFICANT CHANGE UP (ref 0.5–1.3)
EGFR: 97 ML/MIN/1.73M2 — SIGNIFICANT CHANGE UP
GLUCOSE BLDC GLUCOMTR-MCNC: 118 MG/DL — HIGH (ref 70–99)
GLUCOSE BLDC GLUCOMTR-MCNC: 128 MG/DL — HIGH (ref 70–99)
GLUCOSE BLDC GLUCOMTR-MCNC: 133 MG/DL — HIGH (ref 70–99)
GLUCOSE BLDC GLUCOMTR-MCNC: 98 MG/DL — SIGNIFICANT CHANGE UP (ref 70–99)
GLUCOSE SERPL-MCNC: 99 MG/DL — SIGNIFICANT CHANGE UP (ref 70–99)
HCT VFR BLD CALC: 27.3 % — LOW (ref 34.5–45)
HGB BLD-MCNC: 8.8 G/DL — LOW (ref 11.5–15.5)
INR BLD: 1.63 RATIO — HIGH (ref 0.85–1.18)
MAGNESIUM SERPL-MCNC: 1.9 MG/DL — SIGNIFICANT CHANGE UP (ref 1.6–2.6)
MCHC RBC-ENTMCNC: 30.3 PG — SIGNIFICANT CHANGE UP (ref 27–34)
MCHC RBC-ENTMCNC: 32.2 GM/DL — SIGNIFICANT CHANGE UP (ref 32–36)
MCV RBC AUTO: 94.1 FL — SIGNIFICANT CHANGE UP (ref 80–100)
NRBC # BLD: 0 /100 WBCS — SIGNIFICANT CHANGE UP (ref 0–0)
PHOSPHATE SERPL-MCNC: 2.7 MG/DL — SIGNIFICANT CHANGE UP (ref 2.5–4.5)
PLATELET # BLD AUTO: 299 K/UL — SIGNIFICANT CHANGE UP (ref 150–400)
POTASSIUM SERPL-MCNC: 3.8 MMOL/L — SIGNIFICANT CHANGE UP (ref 3.5–5.3)
POTASSIUM SERPL-SCNC: 3.8 MMOL/L — SIGNIFICANT CHANGE UP (ref 3.5–5.3)
PROTHROM AB SERPL-ACNC: 16.9 SEC — HIGH (ref 9.5–13)
RBC # BLD: 2.9 M/UL — LOW (ref 3.8–5.2)
RBC # FLD: 15 % — HIGH (ref 10.3–14.5)
SODIUM SERPL-SCNC: 132 MMOL/L — LOW (ref 135–145)
SURGICAL PATHOLOGY STUDY: SIGNIFICANT CHANGE UP
WBC # BLD: 9.79 K/UL — SIGNIFICANT CHANGE UP (ref 3.8–10.5)
WBC # FLD AUTO: 9.79 K/UL — SIGNIFICANT CHANGE UP (ref 3.8–10.5)

## 2024-04-23 PROCEDURE — 93312 ECHO TRANSESOPHAGEAL: CPT | Mod: 26

## 2024-04-23 PROCEDURE — 93010 ELECTROCARDIOGRAM REPORT: CPT

## 2024-04-23 PROCEDURE — 93320 DOPPLER ECHO COMPLETE: CPT | Mod: 26

## 2024-04-23 PROCEDURE — 92960 CARDIOVERSION ELECTRIC EXT: CPT

## 2024-04-23 PROCEDURE — 93325 DOPPLER ECHO COLOR FLOW MAPG: CPT | Mod: 26

## 2024-04-23 PROCEDURE — 99233 SBSQ HOSP IP/OBS HIGH 50: CPT

## 2024-04-23 RX ORDER — AMIODARONE HYDROCHLORIDE 400 MG/1
400 TABLET ORAL
Refills: 0 | Status: DISCONTINUED | OUTPATIENT
Start: 2024-04-23 | End: 2024-04-27

## 2024-04-23 RX ORDER — WARFARIN SODIUM 2.5 MG/1
5 TABLET ORAL ONCE
Refills: 0 | Status: COMPLETED | OUTPATIENT
Start: 2024-04-23 | End: 2024-04-23

## 2024-04-23 RX ORDER — AMIODARONE HYDROCHLORIDE 400 MG/1
200 TABLET ORAL DAILY
Refills: 0 | Status: CANCELLED | OUTPATIENT
Start: 2024-05-07 | End: 2024-04-27

## 2024-04-23 RX ORDER — AMIODARONE HYDROCHLORIDE 400 MG/1
200 TABLET ORAL
Refills: 0 | Status: CANCELLED | OUTPATIENT
Start: 2024-04-30 | End: 2024-04-27

## 2024-04-23 RX ORDER — ENOXAPARIN SODIUM 100 MG/ML
90 INJECTION SUBCUTANEOUS EVERY 12 HOURS
Refills: 0 | Status: DISCONTINUED | OUTPATIENT
Start: 2024-04-23 | End: 2024-04-24

## 2024-04-23 RX ADMIN — Medication 1 PATCH: at 17:14

## 2024-04-23 RX ADMIN — OXYCODONE HYDROCHLORIDE 20 MILLIGRAM(S): 5 TABLET ORAL at 05:14

## 2024-04-23 RX ADMIN — Medication 1 MILLIGRAM(S): at 22:10

## 2024-04-23 RX ADMIN — GABAPENTIN 300 MILLIGRAM(S): 400 CAPSULE ORAL at 13:05

## 2024-04-23 RX ADMIN — AMIODARONE HYDROCHLORIDE 200 MILLIGRAM(S): 400 TABLET ORAL at 05:14

## 2024-04-23 RX ADMIN — Medication 25 MILLIGRAM(S): at 06:48

## 2024-04-23 RX ADMIN — GABAPENTIN 300 MILLIGRAM(S): 400 CAPSULE ORAL at 05:14

## 2024-04-23 RX ADMIN — OXYCODONE HYDROCHLORIDE 20 MILLIGRAM(S): 5 TABLET ORAL at 00:49

## 2024-04-23 RX ADMIN — SPIRONOLACTONE 25 MILLIGRAM(S): 25 TABLET, FILM COATED ORAL at 05:15

## 2024-04-23 RX ADMIN — SENNA PLUS 2 TABLET(S): 8.6 TABLET ORAL at 22:10

## 2024-04-23 RX ADMIN — OXYCODONE HYDROCHLORIDE 20 MILLIGRAM(S): 5 TABLET ORAL at 18:01

## 2024-04-23 RX ADMIN — OXYCODONE HYDROCHLORIDE 20 MILLIGRAM(S): 5 TABLET ORAL at 05:44

## 2024-04-23 RX ADMIN — OXYCODONE HYDROCHLORIDE 20 MILLIGRAM(S): 5 TABLET ORAL at 11:19

## 2024-04-23 RX ADMIN — Medication 81 MILLIGRAM(S): at 11:27

## 2024-04-23 RX ADMIN — OXYCODONE HYDROCHLORIDE 20 MILLIGRAM(S): 5 TABLET ORAL at 22:10

## 2024-04-23 RX ADMIN — OXYCODONE HYDROCHLORIDE 20 MILLIGRAM(S): 5 TABLET ORAL at 01:19

## 2024-04-23 RX ADMIN — Medication 1 MILLIGRAM(S): at 13:05

## 2024-04-23 RX ADMIN — ENOXAPARIN SODIUM 90 MILLIGRAM(S): 100 INJECTION SUBCUTANEOUS at 17:13

## 2024-04-23 RX ADMIN — HYDROMORPHONE HYDROCHLORIDE 2 MILLIGRAM(S): 2 INJECTION INTRAMUSCULAR; INTRAVENOUS; SUBCUTANEOUS at 19:59

## 2024-04-23 RX ADMIN — Medication 1 PATCH: at 07:35

## 2024-04-23 RX ADMIN — Medication 1 PATCH: at 19:00

## 2024-04-23 RX ADMIN — Medication 40 MILLIGRAM(S): at 05:14

## 2024-04-23 RX ADMIN — ENOXAPARIN SODIUM 80 MILLIGRAM(S): 100 INJECTION SUBCUTANEOUS at 05:20

## 2024-04-23 RX ADMIN — FAMOTIDINE 20 MILLIGRAM(S): 10 INJECTION INTRAVENOUS at 17:14

## 2024-04-23 RX ADMIN — OXYCODONE HYDROCHLORIDE 20 MILLIGRAM(S): 5 TABLET ORAL at 22:40

## 2024-04-23 RX ADMIN — OXYCODONE HYDROCHLORIDE 20 MILLIGRAM(S): 5 TABLET ORAL at 17:14

## 2024-04-23 RX ADMIN — Medication 1 PATCH: at 17:00

## 2024-04-23 RX ADMIN — WARFARIN SODIUM 5 MILLIGRAM(S): 2.5 TABLET ORAL at 22:10

## 2024-04-23 RX ADMIN — OXYCODONE HYDROCHLORIDE 20 MILLIGRAM(S): 5 TABLET ORAL at 10:21

## 2024-04-23 RX ADMIN — FAMOTIDINE 20 MILLIGRAM(S): 10 INJECTION INTRAVENOUS at 05:16

## 2024-04-23 RX ADMIN — ATORVASTATIN CALCIUM 20 MILLIGRAM(S): 80 TABLET, FILM COATED ORAL at 22:10

## 2024-04-23 RX ADMIN — GABAPENTIN 300 MILLIGRAM(S): 400 CAPSULE ORAL at 22:10

## 2024-04-23 RX ADMIN — HYDROMORPHONE HYDROCHLORIDE 2 MILLIGRAM(S): 2 INJECTION INTRAMUSCULAR; INTRAVENOUS; SUBCUTANEOUS at 19:29

## 2024-04-23 NOTE — PROGRESS NOTE ADULT - SUBJECTIVE AND OBJECTIVE BOX
24H hour events: No over night events.  Remains in AFib without complaints of CP, palpitations or SOB.     MEDICATIONS:  aMIOdarone    Tablet 200 milliGRAM(s) Oral daily  aspirin enteric coated 81 milliGRAM(s) Oral daily  enoxaparin Injectable 80 milliGRAM(s) SubCutaneous every 12 hours  furosemide    Tablet 40 milliGRAM(s) Oral daily  losartan 25 milliGRAM(s) Oral daily  spironolactone 25 milliGRAM(s) Oral daily    acetaminophen     Tablet .. 650 milliGRAM(s) Oral every 6 hours PRN  chlordiazePOXIDE   Oral   gabapentin 300 milliGRAM(s) Oral every 8 hours  HYDROmorphone   Tablet 2 milliGRAM(s) Oral every 6 hours PRN  LORazepam     Tablet 1 milliGRAM(s) Oral every 8 hours PRN  oxyCODONE    IR 20 milliGRAM(s) Oral every 4 hours PRN    bisacodyl Suppository 10 milliGRAM(s) Rectal once  famotidine    Tablet 20 milliGRAM(s) Oral two times a day  polyethylene glycol 3350 17 Gram(s) Oral daily  senna 2 Tablet(s) Oral at bedtime    atorvastatin 20 milliGRAM(s) Oral at bedtime  dextrose 50% Injectable 50 milliLiter(s) IV Push every 15 minutes  insulin lispro (ADMELOG) corrective regimen sliding scale   SubCutaneous three times a day before meals    sodium chloride 0.9%. 1000 milliLiter(s) IV Continuous <Continuous>      REVIEW OF SYSTEMS:  Complete 12point ROS negative.    PHYSICAL EXAM:  T(C): 37.1 (04-23-24 @ 04:33), Max: 37.1 (04-22-24 @ 19:48)  HR: 72 (04-23-24 @ 04:33) (71 - 74)  BP: 99/69 (04-23-24 @ 04:33) (99/69 - 117/62)  RR: 18 (04-23-24 @ 04:33) (18 - 18)  SpO2: 93% (04-23-24 @ 04:33) (93% - 94%)    22 Apr 2024 07:01  -  23 Apr 2024 07:00  --------------------------------------------------------  IN: 960 mL / OUT: 1650 mL / NET: -690 mL    23 Apr 2024 07:01  -  23 Apr 2024 10:45  --------------------------------------------------------  IN: 0 mL / OUT: 700 mL / NET: -700 mL    Appearance: Normal	  HEENT:   Normal oral mucosa, PERRL, EOMI	  Cardiovascular: Normal S1 S2, No JVD, No murmurs, No edema  Respiratory: Lungs clear to auscultation	  Psychiatry: A & O x 3, Mood & affect appropriate  Gastrointestinal:  Soft, Non-tender, + BS	  Skin: mid sternal incision open to air, clean and intact. 	  Extremities: Normal range of motion, No clubbing, cyanosis or edema  Vascular: Peripheral pulses palpable 2+ bilaterally    LABS:	 	    CBC Full  -  ( 23 Apr 2024 05:02 )  WBC Count : 9.79 K/uL  Hemoglobin : 8.8 g/dL  Hematocrit : 27.3 %  Platelet Count - Automated : 299 K/uL  Mean Cell Volume : 94.1 fl  Mean Cell Hemoglobin : 30.3 pg  Mean Cell Hemoglobin Concentration : 32.2 gm/dL  Auto Neutrophil # : x  Auto Lymphocyte # : x  Auto Monocyte # : x  Auto Eosinophil # : x  Auto Basophil # : x  Auto Neutrophil % : x  Auto Lymphocyte % : x  Auto Monocyte % : x  Auto Eosinophil % : x  Auto Basophil % : x    04-23    132<L>  |  93<L>  |  13  ----------------------------<  99  3.8   |  28  |  0.69  04-22    134<L>  |  95<L>  |  16  ----------------------------<  104<H>  3.8   |  27  |  0.72    Ca    9.3      23 Apr 2024 05:02  Ca    9.4      22 Apr 2024 06:25  Phos  2.7     04-23  Phos  2.9     04-22  Mg     1.9     04-23  Mg     2.1     04-22      TELEMETRY: 	  AFib 80-90's   ECG:

## 2024-04-23 NOTE — PROGRESS NOTE ADULT - SUBJECTIVE AND OBJECTIVE BOX
VITAL SIGNS    Telemetry:      Vital Signs Last 24 Hrs  T(C): 37.1 (24 @ 04:33), Max: 37.1 (24 @ 19:48)  T(F): 98.8 (- @ 04:33), Max: 98.8 (24 @ 19:48)  HR: 72 (24 @ 04:33) (68 - 83)  BP: 99/69 (- @ 04:33) (99/69 - 117/62)  RR: 18 (24 @ 04:33) (18 - 18)  SpO2: 93% (24 @ 04:33) (93% - 95%)                   Daily     Daily Weight in k.8 (2024 06:10)        CAPILLARY BLOOD GLUCOSE      POCT Blood Glucose.: 141 mg/dL (2024 21:47)  POCT Blood Glucose.: 134 mg/dL (2024 16:24)  POCT Blood Glucose.: 121 mg/dL (2024 11:53)  POCT Blood Glucose.: 116 mg/dL (  Pacing Wires        [  ]   Settings:                              Coumadin    [ ] YES                             PHYSICAL EXAM    Neurology: alert and oriented x 3, moves all extremities with no defecits  CV :  RRR  Sternal Wound :  CDI , Stable  Lungs:   CTA B/L  Abdomen: soft, nontender, nondistended, positive bowel sounds, last bowel movement   Extremities:                                            VITAL SIGNS    Telemetry:     sr    80       Vital Signs Last 24 Hrs  T(C): 37.1 (24 @ 04:33), Max: 37.1 (24 @ 19:48)  T(F): 98.8 (24 @ 04:33), Max: 98.8 (24 @ 19:48)  HR: 72 (24 @ 04:33) (68 - 83)  BP: 99/69 (24 @ 04:33) (99/69 - 117/62)  RR: 18 (24 @ 04:33) (18 - 18)  SpO2: 93% (24 @ 04:33) (93% - 95%)                   Daily     Daily Weight in k.8 (2024 06:10)        CAPILLARY BLOOD GLUCOSE      POCT Blood Glucose.: 141 mg/dL (2024 21:47)  POCT Blood Glucose.: 134 mg/dL (2024 16:24)  POCT Blood Glucose.: 121 mg/dL (2024 11:53)  POCT Blood Glucose.: 116 mg/dL (  Pacing Wires        [  ]   Settings:                              Coumadin    [ ] YES                             PHYSICAL EXAM    Neurology: alert and oriented x 3, moves all extremities with no defecits  CV :  RRR  Sternal Wound :  CDI , Stable   + pw  Lungs:   CTA B/L  Abdomen: soft, nontender, nondistende  Extremities:     trace pedal edema

## 2024-04-23 NOTE — PROGRESS NOTE ADULT - PROBLEM SELECTOR PLAN 3
EP cslt     amio 200 qd   and follow up ep  recs  ? dccv in am    will dw  Dr luis       will need  LVX ernesto evening   and  am EP   for dccv       amio 200 qd     Lvx until    INR   theurapeutic

## 2024-04-23 NOTE — PROGRESS NOTE ADULT - PROBLEM SELECTOR PLAN 2
seen by behavioral health team   chlordiazePOXIDE 25 milliGRAM(s) Oral every 12 hours  LORazepam     Tablet 1 milliGRAM(s) Oral every 8 hours PRN Anxiety  oxyCODONE    IR 20 milliGRAM(s) Oral every 4 hours PRN Severe Pain (7 - 10)  Seroquel 12.5-25  PO q6 for agitation  chair/ bed alarm behavioral health team   chlordiazePOXIDE 25 milliGRAM(s) Oral every 12 hours  LORazepam     Tablet 1 milliGRAM(s) Oral every 8 hours PRN Anxiety  oxyCODONE    IR 20 milliGRAM(s) Oral every 4 hours PRN Severe Pain (7 - 10)  Seroquel 12.5-25  PO q6 for agitation

## 2024-04-23 NOTE — PRE-ANESTHESIA EVALUATION ADULT - NSANTHOSAYNRD_GEN_A_CORE
No. NEERU screening performed.  STOP BANG Legend: 0-2 = LOW Risk; 3-4 = INTERMEDIATE Risk; 5-8 = HIGH Risk
No. NEERU screening performed.  STOP BANG Legend: 0-2 = LOW Risk; 3-4 = INTERMEDIATE Risk; 5-8 = HIGH Risk

## 2024-04-23 NOTE — PROGRESS NOTE ADULT - ASSESSMENT
This is a   63 y/o female with PMHx of HLD, HTN, AFib (diagnosed recently, not on AC), benzodiazepine/opioid & ETOH abuse, and mitral valve prolapse found to have severe mitral and tricuspid regurgitation w/ signs of cardiogenic shock, now s/p RHC with IABP placement.   follwed by HF team  CTS consulted MR/TR  4/16 MVR(t) - Epic 33mm valve Maze procedure LAAL - 40mm AtriClip recovered in CTU  extubated OR day  4/17 VSS  dobutamine for  inotropic support  /  lasix gtt - VASO /IABP d/c l seen by PT disposition to home with PT librium thiamine and folic acid-  4/18TTE 4/18 EF 35%, no intervalvular MR  junctional rhythm  coumadin for MVR  4/19 Precedex for delirium   4/20 VSS seen by behavioural health recommend  Seroquel 12.5-25  PO q6 for agitation chest tubes d/c  dobutamine d/c  4/21 VSS right brachial felicia d/c transfer to [facility] +  upon arrival .  d/c IV benzo + PW VVI 40  TELE- NSR   disposition to home this week  4/22     afib  72   amio 200 QD   EP  cslt   + PW   to epm      This is a   65 y/o female with PMHx of HLD, HTN, AFib (diagnosed recently, not on AC), benzodiazepine/opioid & ETOH abuse, and mitral valve prolapse found to have severe mitral and tricuspid regurgitation w/ signs of cardiogenic shock, now s/p RHC with IABP placement.   follwed by HF team  CTS consulted MR/TR  4/16 MVR(t) - Epic 33mm valve Maze procedure LAAL - 40mm AtriClip recovered in CTU  extubated OR day  4/17 VSS  dobutamine for  inotropic support  /  lasix gtt - VASO /IABP d/c l seen by PT disposition to home with PT librium thiamine and folic acid-  4/18TTE 4/18 EF 35%, no intervalvular MR  junctional rhythm  coumadin for MVR  4/19 Precedex for delirium   4/20 VSS seen by behavioural health recommend  Seroquel 12.5-25  PO q6 for agitation chest tubes d/c  dobutamine d/c  4/21 VSS right brachial felicia d/c transfer to [facility] +  upon arrival .  d/c IV benzo + PW VVI 40  TELE- NSR   disposition to home this week  4/22     afib  72   amio 200 QD   EP  cslt   + PW   to epm  4/23   coumadin 5 mg       lvx 90 q12,    npo  for DCCV   +pw   amio 200 qd

## 2024-04-23 NOTE — PROGRESS NOTE ADULT - PROBLEM SELECTOR PLAN 1
4/16 MVR(t) - Epic 33mm valve Maze procedure LAAL - 40mm AtriClip   coumadin 5mg tonigh  consider lvx tonight if plan for DCCV  ASA 81 Atorvastatin 20  Lasix 20 mg qd  aldactone  25 qd for diuresis - followed by HF team    + PW VVI 40      disposition to home 4/16 MVR(t) - Epic 33mm valve Maze procedure LAAL - 40mm AtriClip   coumadin 5mg tonight  lvx    for   DCCV  today  ASA 81 Atorvastatin 20  Lasix 20 mg qd  aldactone  25 qd for diuresis - followed by HF team    + PW VVI 40      disposition to home

## 2024-04-23 NOTE — PROGRESS NOTE ADULT - ASSESSMENT
63 y/o female who is a current smoker with a PMHx of HLD, HTN, new AF at time of admission, benzodiazepine/opioid and ETOH abuse, and mitral valve prolapse presenting to the ER with shortness of breath on 4/13/24. She was found to have severe MR and underwent MVR(t) (Epic 33mm valve) Maze procedure and LAAL (40mm AtriClip) on 4/16/24. She was in AF prior to surgery and has been in SR post op. She was noted to go into AF on 4/21 at about 23:05.     1. Severe MR s/p MV(t), MAZE procedure and LAAL (4/16/24)  2. New onset Aib/ AFlutter     - AF 60-100s, unclear onset as patient was in AFL rate controlled prior to AFib   - Continue Coumadin to Lovenox Bridge, INR 1.63 today   - Started on Amiodarone 200 mg daily by CTS   - EF 35% on last echo - was 60-65% preop and >75% on intraop BRUNA  - NPO for BRUNA/DCCV today  - Close telemetry monitoring  - Maintain K>4.0 and Mg>2.0    L. G[facility]afi[facility]  [phone]  65 y/o female who is a current smoker with a PMHx of HLD, HTN, new AF at time of admission, benzodiazepine/opioid and ETOH abuse, and mitral valve prolapse presenting to the ER with shortness of breath on 4/13/24. She was found to have severe MR and underwent MVR(t) (Epic 33mm valve) Maze procedure and LAAL (40mm AtriClip) on 4/16/24. She was in AF prior to surgery and has been in SR post op. She was noted to go into AF on 4/21 at about 23:05.     1. Severe MR s/p MV(t), MAZE procedure and LAAL (4/16/24)  2. New onset Aib/ AFlutter     - AF 60-100s, unclear onset as patient was in AFL rate controlled prior to AFib   - Continue Coumadin to weight based Lovenox Bridge, INR 1.63 today.    - Started on Amiodarone 200 mg daily by CTS   - EF 35% on last echo - was 60-65% preop and >75% on intraop BRUNA  - NPO for BRUNA/DCCV today  - Close telemetry monitoring  - Maintain K>4.0 and Mg>2.0    L. Galeotafiore [facility]  [phone]  65 y/o female who is a current smoker with a PMHx of HLD, HTN, new AF at time of admission, benzodiazepine/opioid and ETOH abuse, and mitral valve prolapse presenting to the ER with shortness of breath on 4/13/24. She was found to have severe MR and underwent MVR(t) (Epic 33mm valve) Maze procedure and LAAL (40mm AtriClip) on 4/16/24. She was in AF prior to surgery and has been in SR post op. She was noted to go into AF on 4/21 at about 23:05.     1. Severe MR s/p MV(t), MAZE procedure and LAAL (4/16/24)  2. New onset Aib/ AFlutter     - AF 60-100s, unclear onset as patient was in AFL rate controlled prior to AFib   - Continue Coumadin to weight based Lovenox Bridge, INR 1.63 today.    - Started on Amiodarone 200 mg daily by CTS   - EF 35% on last echo - was 60-65% preop and >75% on intraop BRUNA  - NPO for BRUNA/DCCV today  - Close telemetry monitoring  - Maintain K>4.0 and Mg>2.0    [facility]  [phone]     s/p successful BRUNA/DCCV today 200J one attempt back to NSR 60-70's.  Would recommend starting on Amiodarone load 400mg BID for 1 week, then 200mg BID for one week, then 200mg PO daily thereafter.  Baseline TSH 2.29 on 4/13.  AST 28, ALT 28 from 4/21.    Will need out patient monitoring of TSH, LFT's, Opthalmology eval and PFT's.       [facility]  [phone]

## 2024-04-24 PROBLEM — M54.9 DORSALGIA, UNSPECIFIED: Chronic | Status: ACTIVE | Noted: 2024-04-20

## 2024-04-24 LAB
ANION GAP SERPL CALC-SCNC: 14 MMOL/L — SIGNIFICANT CHANGE UP (ref 5–17)
APTT BLD: 41.3 SEC — HIGH (ref 24.5–35.6)
BUN SERPL-MCNC: 13 MG/DL — SIGNIFICANT CHANGE UP (ref 7–23)
CALCIUM SERPL-MCNC: 9.6 MG/DL — SIGNIFICANT CHANGE UP (ref 8.4–10.5)
CHLORIDE SERPL-SCNC: 93 MMOL/L — LOW (ref 96–108)
CO2 SERPL-SCNC: 29 MMOL/L — SIGNIFICANT CHANGE UP (ref 22–31)
CREAT SERPL-MCNC: 0.78 MG/DL — SIGNIFICANT CHANGE UP (ref 0.5–1.3)
EGFR: 85 ML/MIN/1.73M2 — SIGNIFICANT CHANGE UP
GLUCOSE BLDC GLUCOMTR-MCNC: 114 MG/DL — HIGH (ref 70–99)
GLUCOSE BLDC GLUCOMTR-MCNC: 118 MG/DL — HIGH (ref 70–99)
GLUCOSE BLDC GLUCOMTR-MCNC: 131 MG/DL — HIGH (ref 70–99)
GLUCOSE BLDC GLUCOMTR-MCNC: 155 MG/DL — HIGH (ref 70–99)
GLUCOSE SERPL-MCNC: 108 MG/DL — HIGH (ref 70–99)
HCT VFR BLD CALC: 28.1 % — LOW (ref 34.5–45)
HGB BLD-MCNC: 8.9 G/DL — LOW (ref 11.5–15.5)
INR BLD: 2.17 RATIO — HIGH (ref 0.85–1.18)
MCHC RBC-ENTMCNC: 30.2 PG — SIGNIFICANT CHANGE UP (ref 27–34)
MCHC RBC-ENTMCNC: 31.7 GM/DL — LOW (ref 32–36)
MCV RBC AUTO: 95.3 FL — SIGNIFICANT CHANGE UP (ref 80–100)
NRBC # BLD: 0 /100 WBCS — SIGNIFICANT CHANGE UP (ref 0–0)
PLATELET # BLD AUTO: 340 K/UL — SIGNIFICANT CHANGE UP (ref 150–400)
POTASSIUM SERPL-MCNC: 3.5 MMOL/L — SIGNIFICANT CHANGE UP (ref 3.5–5.3)
POTASSIUM SERPL-SCNC: 3.5 MMOL/L — SIGNIFICANT CHANGE UP (ref 3.5–5.3)
PROTHROM AB SERPL-ACNC: 22.3 SEC — HIGH (ref 9.5–13)
RBC # BLD: 2.95 M/UL — LOW (ref 3.8–5.2)
RBC # FLD: 15 % — HIGH (ref 10.3–14.5)
SODIUM SERPL-SCNC: 136 MMOL/L — SIGNIFICANT CHANGE UP (ref 135–145)
WBC # BLD: 9.64 K/UL — SIGNIFICANT CHANGE UP (ref 3.8–10.5)
WBC # FLD AUTO: 9.64 K/UL — SIGNIFICANT CHANGE UP (ref 3.8–10.5)

## 2024-04-24 PROCEDURE — 99232 SBSQ HOSP IP/OBS MODERATE 35: CPT

## 2024-04-24 RX ORDER — METOPROLOL TARTRATE 50 MG
25 TABLET ORAL DAILY
Refills: 0 | Status: DISCONTINUED | OUTPATIENT
Start: 2024-04-24 | End: 2024-04-27

## 2024-04-24 RX ORDER — SPIRONOLACTONE 25 MG/1
25 TABLET, FILM COATED ORAL ONCE
Refills: 0 | Status: COMPLETED | OUTPATIENT
Start: 2024-04-24 | End: 2024-04-24

## 2024-04-24 RX ORDER — DAPAGLIFLOZIN 10 MG/1
10 TABLET, FILM COATED ORAL DAILY
Refills: 0 | Status: DISCONTINUED | OUTPATIENT
Start: 2024-04-24 | End: 2024-04-27

## 2024-04-24 RX ORDER — POTASSIUM BICARBONATE 978 MG/1
25 TABLET, EFFERVESCENT ORAL ONCE
Refills: 0 | Status: COMPLETED | OUTPATIENT
Start: 2024-04-24 | End: 2024-04-24

## 2024-04-24 RX ORDER — SPIRONOLACTONE 25 MG/1
50 TABLET, FILM COATED ORAL DAILY
Refills: 0 | Status: DISCONTINUED | OUTPATIENT
Start: 2024-04-25 | End: 2024-04-27

## 2024-04-24 RX ORDER — WARFARIN SODIUM 2.5 MG/1
5 TABLET ORAL ONCE
Refills: 0 | Status: COMPLETED | OUTPATIENT
Start: 2024-04-24 | End: 2024-04-24

## 2024-04-24 RX ORDER — FUROSEMIDE 40 MG
20 TABLET ORAL DAILY
Refills: 0 | Status: DISCONTINUED | OUTPATIENT
Start: 2024-04-25 | End: 2024-04-27

## 2024-04-24 RX ORDER — BACITRACIN ZINC 500 UNIT/G
1 OINTMENT IN PACKET (EA) TOPICAL DAILY
Refills: 0 | Status: DISCONTINUED | OUTPATIENT
Start: 2024-04-24 | End: 2024-04-27

## 2024-04-24 RX ADMIN — SPIRONOLACTONE 25 MILLIGRAM(S): 25 TABLET, FILM COATED ORAL at 04:49

## 2024-04-24 RX ADMIN — ENOXAPARIN SODIUM 90 MILLIGRAM(S): 100 INJECTION SUBCUTANEOUS at 04:50

## 2024-04-24 RX ADMIN — AMIODARONE HYDROCHLORIDE 400 MILLIGRAM(S): 400 TABLET ORAL at 04:50

## 2024-04-24 RX ADMIN — Medication 25 MILLIGRAM(S): at 17:17

## 2024-04-24 RX ADMIN — HYDROMORPHONE HYDROCHLORIDE 2 MILLIGRAM(S): 2 INJECTION INTRAMUSCULAR; INTRAVENOUS; SUBCUTANEOUS at 12:10

## 2024-04-24 RX ADMIN — Medication 25 MILLIGRAM(S): at 12:24

## 2024-04-24 RX ADMIN — DAPAGLIFLOZIN 10 MILLIGRAM(S): 10 TABLET, FILM COATED ORAL at 12:53

## 2024-04-24 RX ADMIN — GABAPENTIN 300 MILLIGRAM(S): 400 CAPSULE ORAL at 22:18

## 2024-04-24 RX ADMIN — GABAPENTIN 300 MILLIGRAM(S): 400 CAPSULE ORAL at 04:50

## 2024-04-24 RX ADMIN — GABAPENTIN 300 MILLIGRAM(S): 400 CAPSULE ORAL at 13:29

## 2024-04-24 RX ADMIN — OXYCODONE HYDROCHLORIDE 20 MILLIGRAM(S): 5 TABLET ORAL at 04:50

## 2024-04-24 RX ADMIN — WARFARIN SODIUM 5 MILLIGRAM(S): 2.5 TABLET ORAL at 22:19

## 2024-04-24 RX ADMIN — ATORVASTATIN CALCIUM 20 MILLIGRAM(S): 80 TABLET, FILM COATED ORAL at 22:19

## 2024-04-24 RX ADMIN — OXYCODONE HYDROCHLORIDE 20 MILLIGRAM(S): 5 TABLET ORAL at 22:48

## 2024-04-24 RX ADMIN — Medication 1 PATCH: at 16:48

## 2024-04-24 RX ADMIN — Medication 1 PATCH: at 17:12

## 2024-04-24 RX ADMIN — SENNA PLUS 2 TABLET(S): 8.6 TABLET ORAL at 22:19

## 2024-04-24 RX ADMIN — LOSARTAN POTASSIUM 25 MILLIGRAM(S): 100 TABLET, FILM COATED ORAL at 06:14

## 2024-04-24 RX ADMIN — OXYCODONE HYDROCHLORIDE 20 MILLIGRAM(S): 5 TABLET ORAL at 05:20

## 2024-04-24 RX ADMIN — POTASSIUM BICARBONATE 25 MILLIEQUIVALENT(S): 978 TABLET, EFFERVESCENT ORAL at 12:32

## 2024-04-24 RX ADMIN — Medication 1 PATCH: at 20:54

## 2024-04-24 RX ADMIN — Medication 40 MILLIGRAM(S): at 04:49

## 2024-04-24 RX ADMIN — OXYCODONE HYDROCHLORIDE 20 MILLIGRAM(S): 5 TABLET ORAL at 22:18

## 2024-04-24 RX ADMIN — Medication 81 MILLIGRAM(S): at 11:40

## 2024-04-24 RX ADMIN — HYDROMORPHONE HYDROCHLORIDE 2 MILLIGRAM(S): 2 INJECTION INTRAMUSCULAR; INTRAVENOUS; SUBCUTANEOUS at 18:40

## 2024-04-24 RX ADMIN — AMIODARONE HYDROCHLORIDE 400 MILLIGRAM(S): 400 TABLET ORAL at 17:13

## 2024-04-24 RX ADMIN — HYDROMORPHONE HYDROCHLORIDE 2 MILLIGRAM(S): 2 INJECTION INTRAMUSCULAR; INTRAVENOUS; SUBCUTANEOUS at 19:10

## 2024-04-24 RX ADMIN — FAMOTIDINE 20 MILLIGRAM(S): 10 INJECTION INTRAVENOUS at 04:50

## 2024-04-24 RX ADMIN — HYDROMORPHONE HYDROCHLORIDE 2 MILLIGRAM(S): 2 INJECTION INTRAMUSCULAR; INTRAVENOUS; SUBCUTANEOUS at 11:40

## 2024-04-24 RX ADMIN — POLYETHYLENE GLYCOL 3350 17 GRAM(S): 17 POWDER, FOR SOLUTION ORAL at 11:40

## 2024-04-24 RX ADMIN — Medication 1: at 11:41

## 2024-04-24 RX ADMIN — FAMOTIDINE 20 MILLIGRAM(S): 10 INJECTION INTRAVENOUS at 17:13

## 2024-04-24 RX ADMIN — Medication 1 MILLIGRAM(S): at 18:41

## 2024-04-24 RX ADMIN — Medication 1 PATCH: at 07:34

## 2024-04-24 RX ADMIN — SPIRONOLACTONE 25 MILLIGRAM(S): 25 TABLET, FILM COATED ORAL at 12:32

## 2024-04-24 NOTE — PROGRESS NOTE ADULT - PROBLEM SELECTOR PLAN 3
EP   for dccv       amio 200 qd     Lvx until    INR   theurapeutic EP following. S/p DCCV 4/23  on amio 400mg BID x1 wk, followed by 200mg BID x1 wk, followed by 200mg qD  s/p LVX-coumadin bridge until INR therapeutic  dose coumadin daily per INR  +PW

## 2024-04-24 NOTE — PROGRESS NOTE ADULT - PROBLEM SELECTOR PLAN 2
behavioral health team   chlordiazePOXIDE 25 milliGRAM(s) Oral every 12 hours  LORazepam     Tablet 1 milliGRAM(s) Oral every 8 hours PRN Anxiety  oxyCODONE    IR 20 milliGRAM(s) Oral every 4 hours PRN Severe Pain (7 - 10)  Seroquel 12.5-25  PO q6 for agitation behavioral health team consulted  chlordiazePOXIDE 25 milliGRAM(s) Oral every 12 hours  LORazepam     Tablet 1 milliGRAM(s) Oral every 8 hours PRN Anxiety  oxyCODONE    IR 20 milliGRAM(s) Oral every 4 hours PRN Severe Pain (7 - 10)  Seroquel 12.5-25  PO q6 for agitation

## 2024-04-24 NOTE — PROGRESS NOTE ADULT - PROBLEM SELECTOR PLAN 5
- preop was noted to have initial Cr 1.6, now improved   - diuretics as stated above  - trend daily
- preop was noted to have initial Cr 1.6, now improving to 1.3  - diuretics as stated above  - trend daily
- preop was noted to have initial Cr 1.6, now improving to 1.3  - diuretics as stated above  - trend daily
- preop was noted to have initial Cr 1.6, now improved   - diuretics as stated above  - trend daily
- preop was noted to have initial Cr 1.6, now improving to 1.3  - diuretics as stated above  - trend daily

## 2024-04-24 NOTE — PROGRESS NOTE ADULT - NS ATTEND AMEND GEN_ALL_CORE FT
63 YO F with a history of rheumatic heart disease and valvular AF who presented with cardiogenic shock in the setting of severe mitral regurgitation (etiology mixed atriofunctional and primary) requiring IABP. She is now s/p MVR/MAZE by Dr. Maki on 4/16. She initially had preserved LVEF but now with moderate LV dysfunction after correction of mitral regurgitation.    GDMT: continue losartan 25 mg daily. start metoprolol succinate 25 mg daily. continue spironolactone but increase to 50 mg daily with hypokalemia. start Farxiga 10 mg daily. defer ARB->ARNI for now with soft BP's but can do as outpatient   Diuretics: euvolemic, decrease lasix to 20 mg PO daily while starting SGLT2i   Valvular AF: was in SR after MAZE but reverted to AF 4/21. EP following, now s/p DCCV 4/23 and on amiodarone     No contraindication to d/c home from HF perspective. Will arrange f/u in our clinic

## 2024-04-24 NOTE — PROGRESS NOTE ADULT - PROBLEM SELECTOR PLAN 1
4/16 MVR(t) - Epic 33mm valve Maze procedure LAAL - 40mm AtriClip   coumadin 5mg tonight  lvx    for   DCCV  today  ASA 81 Atorvastatin 20  Lasix 20 mg qd  aldactone  25 qd for diuresis - followed by HF team    + PW VVI 40      disposition to home 4/16 MVR(t) - Epic 33mm valve Maze procedure LAAL - 40mm AtriClip   on ASA 81 Atorvastatin 20  + PW VVI 40   dose coumadin daily per INR; due for 5mg coumadin tonight  EP following -> s/p DCCV 4/23  Heart failure team following: started on Toprol 25mg qD and farxiga 10mg qD; increased aldactone 50mg qD, and decreased lasix to 20mg qD  monitor electrolytes, keep K >4 and Mg >2  monitor I/Os  daily weight  disposition to home, once medically stable  Plan of care d/w CTS Attending

## 2024-04-24 NOTE — PROGRESS NOTE ADULT - SUBJECTIVE AND OBJECTIVE BOX
SUBJECTIVE: "Hi." Denies acute chest pain, palpitations, or shortness of breath. No acute overnight events reported.    TELEMETRY:  SR, 70s    Vital Signs Last 24 Hrs  T(C): 37.1 (24 @ 04:41), Max: 37.1 (24 @ 04:41)  T(F): 98.8 (24 @ 04:41), Max: 98.8 (24 @ 04:41)  HR: 79 (24 @ 04:41) (72 - 81)  BP: 100/62 (24 @ 04:41) (91/52 - 120/67)  RR: 18 (24 @ 04:41) (16 - 19)  SpO2: 92% (24 @ 04:41) (92% - 97%)           INPUT/OUTPUT:   @ 07:01  -   @ 07:00  --------------------------------------------------------  IN: 480 mL / OUT: 1100 mL / NET: -620 mL   @ 07:01  -   @ 11:15  --------------------------------------------------------  IN: 120 mL / OUT: 0 mL / NET: 120 mL      LABS:    136  |  93<L>  |  13  ----------------------------<  108<H>  3.5   |  29  |  0.78  Ca    9.6      2024 06:46             8.9    9.64  )-----------( 340      ( 2024 06:47 )             28.1   PT/INR - ( 2024 06:47 )   PT: 22.3 sec;   INR: 2.17 ratio    PTT - ( 2024 06:47 )  PTT:41.3 sec      Daily Height in cm: 180.34 (2024 14:18)    Daily Weight in k.7 (2024 06:00)      CAPILLARY BLOOD GLUCOSE  POCT Blood Glucose.: 114 mg/dL (2024 07:27)  POCT Blood Glucose.: 133 mg/dL (2024 21:20)  POCT Blood Glucose.: 98 mg/dL (2024 16:57)  POCT Blood Glucose.: 128 mg/dL (2024 11:55)          PHYSICAL EXAM:  Neurology: alert and oriented; no gross, focal neurological deficit appreciated at time of evaluatoin  CV :  s1, s2  Sternal Wound :  CDI , Stable   + PW  Lungs: nonlabored respirations with no use of accessory muscles  Abdomen: soft, nontender, nondistende  Extremities:  peripheral pulses intact bilaterally;  +trace pedal edema       ACTIVE MEDICATIONS:  acetaminophen Tablet .. 650 milliGRAM(s) Oral every 6 hours PRN  aMIOdarone Tablet 400 milliGRAM(s) Oral two times a day  aspirin enteric coated 81 milliGRAM(s) Oral daily  atorvastatin 20 milliGRAM(s) Oral at bedtime  bisacodyl Suppository 10 milliGRAM(s) Rectal once  dextrose 50% Injectable 50 milliLiter(s) IV Push every 15 minutes  famotidine Tablet 20 milliGRAM(s) Oral two times a day  furosemide Tablet 40 milliGRAM(s) Oral daily  gabapentin 300 milliGRAM(s) Oral every 8 hours  HYDROmorphone Tablet 2 milliGRAM(s) Oral every 6 hours PRN  insulin lispro (ADMELOG) corrective regimen sliding scale   SubCutaneous three times a day before meals  LORazepam Tablet 1 milliGRAM(s) Oral every 8 hours PRN  losartan 25 milliGRAM(s) Oral daily  nicotine - 21 mG/24Hr(s) Patch 1 Patch Transdermal <User Schedule>  oxyCODONE IR 20 milliGRAM(s) Oral every 4 hours PRN  polyethylene glycol 3350 17 Gram(s) Oral daily  senna 2 Tablet(s) Oral at bedtime  sodium chloride 0.9%. 1000 milliLiter(s) IV Continuous <Continuous>  spironolactone 25 milliGRAM(s) Oral daily  warfarin 5 milliGRAM(s) Oral once      Case discussed in detail with Cardiothoracic Team and Attending Dr. Maki. Plan below as per discussion. SUBJECTIVE: "Hi." Denies acute chest pain, palpitations, or shortness of breath. No acute overnight events reported.    TELEMETRY:  SR, 70s    Vital Signs Last 24 Hrs  T(C): 37.1 (24 @ 04:41), Max: 37.1 (24 @ 04:41)  T(F): 98.8 (24 @ 04:41), Max: 98.8 (24 @ 04:41)  HR: 79 (24 @ 04:41) (72 - 81)  BP: 100/62 (24 @ 04:41) (91/52 - 120/67)  RR: 18 (24 @ 04:41) (16 - 19)  SpO2: 92% (24 @ 04:41) (92% - 97%)           INPUT/OUTPUT:   @ 07:01  -   @ 07:00  --------------------------------------------------------  IN: 480 mL / OUT: 1100 mL / NET: -620 mL   @ 07:01  -   @ 11:15  --------------------------------------------------------  IN: 120 mL / OUT: 0 mL / NET: 120 mL      LABS:    136  |  93<L>  |  13  ----------------------------<  108<H>  3.5   |  29  |  0.78  Ca    9.6      2024 06:46             8.9    9.64  )-----------( 340      ( 2024 06:47 )             28.1   PT/INR - ( 2024 06:47 )   PT: 22.3 sec;   INR: 2.17 ratio    PTT - ( 2024 06:47 )  PTT:41.3 sec      Daily Height in cm: 180.34 (2024 14:18)    Daily Weight in k.7 (2024 06:00)      CAPILLARY BLOOD GLUCOSE  POCT Blood Glucose.: 114 mg/dL (2024 07:27)  POCT Blood Glucose.: 133 mg/dL (2024 21:20)  POCT Blood Glucose.: 98 mg/dL (2024 16:57)  POCT Blood Glucose.: 128 mg/dL (2024 11:55)          PHYSICAL EXAM:  Neurology: alert and oriented; no gross, focal neurological deficit appreciated at time of evaluation   CV :  s1, s2  Sternal Wound :  CDI , Stable   + PW  Lungs: nonlabored respirations with no use of accessory muscles  Abdomen: soft, nontender, nondistended  Extremities:  peripheral pulses intact bilaterally;  +trace pedal edema       ACTIVE MEDICATIONS:  acetaminophen Tablet .. 650 milliGRAM(s) Oral every 6 hours PRN  aMIOdarone Tablet 400 milliGRAM(s) Oral two times a day  aspirin enteric coated 81 milliGRAM(s) Oral daily  atorvastatin 20 milliGRAM(s) Oral at bedtime  bisacodyl Suppository 10 milliGRAM(s) Rectal once  dextrose 50% Injectable 50 milliLiter(s) IV Push every 15 minutes  famotidine Tablet 20 milliGRAM(s) Oral two times a day  furosemide Tablet 40 milliGRAM(s) Oral daily  gabapentin 300 milliGRAM(s) Oral every 8 hours  HYDROmorphone Tablet 2 milliGRAM(s) Oral every 6 hours PRN  insulin lispro (ADMELOG) corrective regimen sliding scale   SubCutaneous three times a day before meals  LORazepam Tablet 1 milliGRAM(s) Oral every 8 hours PRN  losartan 25 milliGRAM(s) Oral daily  nicotine - 21 mG/24Hr(s) Patch 1 Patch Transdermal <User Schedule>  oxyCODONE IR 20 milliGRAM(s) Oral every 4 hours PRN  polyethylene glycol 3350 17 Gram(s) Oral daily  senna 2 Tablet(s) Oral at bedtime  sodium chloride 0.9%. 1000 milliLiter(s) IV Continuous <Continuous>  spironolactone 25 milliGRAM(s) Oral daily  warfarin 5 milliGRAM(s) Oral once      Case discussed in detail with Cardiothoracic Team and Attending Dr. Maki. Plan below as per discussion.

## 2024-04-24 NOTE — PROGRESS NOTE ADULT - SUBJECTIVE AND OBJECTIVE BOX
ADVANCED HEART FAILURE & TRANSPLANT- PROGRESS NOTE  *To reach the NS1 Team from 8am to 5pm, please call [phone].  ___________________________________________________________________________    Subjective:  - no issues but overall feels tired today  - states to have some difficulty urinating and having bowel movement     Medications:  acetaminophen     Tablet .. 650 milliGRAM(s) Oral every 6 hours PRN  aMIOdarone    Tablet 400 milliGRAM(s) Oral two times a day  aspirin enteric coated 81 milliGRAM(s) Oral daily  atorvastatin 20 milliGRAM(s) Oral at bedtime  bisacodyl Suppository 10 milliGRAM(s) Rectal once  dapagliflozin 10 milliGRAM(s) Oral daily  dextrose 50% Injectable 50 milliLiter(s) IV Push every 15 minutes  famotidine    Tablet 20 milliGRAM(s) Oral two times a day  gabapentin 300 milliGRAM(s) Oral every 8 hours  HYDROmorphone   Tablet 2 milliGRAM(s) Oral every 6 hours PRN  insulin lispro (ADMELOG) corrective regimen sliding scale   SubCutaneous three times a day before meals  LORazepam     Tablet 1 milliGRAM(s) Oral every 8 hours PRN  losartan 25 milliGRAM(s) Oral daily  metoprolol succinate ER 25 milliGRAM(s) Oral daily  nicotine - 21 mG/24Hr(s) Patch 1 Patch Transdermal <User Schedule>  oxyCODONE    IR 20 milliGRAM(s) Oral every 4 hours PRN  polyethylene glycol 3350 17 Gram(s) Oral daily  potassium bicarbonate/potassium citrate 25 milliEquivalent(s) Oral once  senna 2 Tablet(s) Oral at bedtime  sodium chloride 0.9%. 1000 milliLiter(s) IV Continuous <Continuous>  spironolactone 25 milliGRAM(s) Oral once  warfarin 5 milliGRAM(s) Oral once      Vitals:  Vital Signs Last 24 Hours  T(C): 37.1 (24 @ 04:41), Max: 37.1 (24 @ 04:41)  HR: 79 (24 @ 04:41) (72 - 81)  BP: 100/62 (24 @ 04:41) (91/52 - 120/67)  RR: 18 (24 @ 04:41) (16 - 19)  SpO2: 92% (24 @ 04:41) (92% - 97%)    Weight in k.7 ( @ 06:00)    I&O's Summary    2024 07:  -  2024 07:00  --------------------------------------------------------  IN: 480 mL / OUT: 1100 mL / NET: -620 mL    2024 07:01  -  2024 11:49  --------------------------------------------------------  IN: 120 mL / OUT: 0 mL / NET: 120 mL        Physical Exam  General: No distress. Comfortable.  Neck: Neck supple. JVP not elevated. No masses  Chest: Clear to auscultation bilaterally  CV: Normal S1 and S2.   Abdomen: Soft, non-distended, non-tender  Extremities: warm and perfused  Neurology: Alert and oriented times three      Labs:                        8.9    9.64  )-----------( 340      ( 2024 06:47 )             28.1         136  |  93<L>  |  13  ----------------------------<  108<H>  3.5   |  29  |  0.78    Ca    9.6      2024 06:46  Phos  2.7       Mg     1.9     23      PT/INR - ( 2024 06:47 )   PT: 22.3 sec;   INR: 2.17 ratio         PTT - ( 2024 06:47 )  PTT:41.3 sec

## 2024-04-24 NOTE — PROGRESS NOTE ADULT - ASSESSMENT
63 y/o female who is a current smoker with a PMHx of HLD, HTN, new AF at time of admission, benzodiazepine/opioid and ETOH abuse, and mitral valve prolapse presenting to the ER with shortness of breath on 4/13/24. She was found to have severe MR and underwent MVR(t) (Epic 33mm valve) Maze procedure and LAAL (40mm AtriClip) on 4/16/24. She was in AF prior to surgery and has been in SR post op. She was noted to go into AF on 4/21 at about 23:05.     1. Severe MR s/p MV(t), MAZE procedure and LAAL (4/16/24)  2. New onset Aib/ AFlutter     - s/p BRUNA/DCCV on 4/23 with successful conversion back into sinus  - Continue amiodarone load: 400mg BID x 1 week, 200mg BID x1 week, then 200mg daily  - EF 35% on last echo - was 60-65% preop and >75% on intraop BRUNA  - BRUNA prior to DCCV revealed TIMMY is oversewn and no flow was observed  - Close telemetry monitoring  - Maintain K>4.0 and Mg>2.0    No further EP intervention indicated at this time. Will sign off  Plan discussed with Dr. Moreno

## 2024-04-24 NOTE — PROGRESS NOTE ADULT - SUBJECTIVE AND OBJECTIVE BOX
24H hour events: No acute events overnight     MEDICATIONS:  aMIOdarone    Tablet 400 milliGRAM(s) Oral two times a day  aspirin enteric coated 81 milliGRAM(s) Oral daily  furosemide    Tablet 40 milliGRAM(s) Oral daily  losartan 25 milliGRAM(s) Oral daily  spironolactone 25 milliGRAM(s) Oral daily  warfarin 5 milliGRAM(s) Oral once  acetaminophen     Tablet .. 650 milliGRAM(s) Oral every 6 hours PRN  gabapentin 300 milliGRAM(s) Oral every 8 hours  HYDROmorphone   Tablet 2 milliGRAM(s) Oral every 6 hours PRN  LORazepam     Tablet 1 milliGRAM(s) Oral every 8 hours PRN  oxyCODONE    IR 20 milliGRAM(s) Oral every 4 hours PRN  bisacodyl Suppository 10 milliGRAM(s) Rectal once  famotidine    Tablet 20 milliGRAM(s) Oral two times a day  polyethylene glycol 3350 17 Gram(s) Oral daily  senna 2 Tablet(s) Oral at bedtime  atorvastatin 20 milliGRAM(s) Oral at bedtime  dextrose 50% Injectable 50 milliLiter(s) IV Push every 15 minutes  insulin lispro (ADMELOG) corrective regimen sliding scale   SubCutaneous three times a day before meals  sodium chloride 0.9%. 1000 milliLiter(s) IV Continuous <Continuous>    REVIEW OF SYSTEMS:  See HPI, otherwise ROS negative.    PHYSICAL EXAM:  T(C): 37.1 (04-24-24 @ 04:41), Max: 37.1 (04-24-24 @ 04:41)  HR: 79 (04-24-24 @ 04:41) (72 - 81)  BP: 100/62 (04-24-24 @ 04:41) (91/52 - 120/67)  RR: 18 (04-24-24 @ 04:41) (16 - 19)  SpO2: 92% (04-24-24 @ 04:41) (92% - 97%)  Wt(kg): --  I&O's Summary    23 Apr 2024 07:01  -  24 Apr 2024 07:00  --------------------------------------------------------  IN: 480 mL / OUT: 1100 mL / NET: -620 mL    Appearance: Alert. NAD	  HEENT:   NC/AT	  Cardiovascular: +S1S2   Respiratory: CTA B/L	  Psychiatry: A & O x 3  Gastrointestinal:  Soft, NT.ND., + BS	  Neurologic: Non-focal  Extremities: No edema BLE  Vascular: Peripheral pulses palpable 2+ bilaterally    LABS:	 	  CBC Full  -  ( 24 Apr 2024 06:47 )  WBC Count : 9.64 K/uL  Hemoglobin : 8.9 g/dL  Hematocrit : 28.1 %  Platelet Count - Automated : 340 K/uL  Mean Cell Volume : 95.3 fl  Mean Cell Hemoglobin : 30.2 pg  Mean Cell Hemoglobin Concentration : 31.7 gm/dL  Auto Neutrophil # : x  Auto Lymphocyte # : x  Auto Monocyte # : x  Auto Eosinophil # : x  Auto Basophil # : x  Auto Neutrophil % : x  Auto Lymphocyte % : x  Auto Monocyte % : x  Auto Eosinophil % : x  Auto Basophil % : x    04-24    136  |  93<L>  |  13  ----------------------------<  108<H>  3.5   |  29  |  0.78  04-23    132<L>  |  93<L>  |  13  ----------------------------<  99  3.8   |  28  |  0.69    Ca    9.6      24 Apr 2024 06:46  Ca    9.3      23 Apr 2024 05:02  Phos  2.7     04-23  Mg     1.9     04-23    TELEMETRY: Sinus rhythm with 1st degree AV block 60 bpm  	    ECHO:    TRANSESOPHAGEAL ECHOCARDIOGRAM REPORT  ________________________________________________________________________________                                      _______       Pt. Name:       HERBER IRAHETA    Study Date:    4/23/2024  MRN:[id number]          YOB: 1960  Accession #:    [id number]           Age:           64 years  Account#:       [id number]        Gender:        F  Heart Rate:     70 bpm              Height:        70.87 in (180.00 cm)  Rhythm:         atrial fibrillation Weight:        209.44 lb (95.00 kg)  Blood Pressure: 132/70 mmHg         BSA/BMI:       2.15 m² / 29.32 kg/m²  ________________________________________________________________________________________  Referring Physician:    [id number] Prabhu Maki  Interpreting Physician: Clarence Watson M.D.  Primary Sonographer:    DAVID  Fellow (Performing):    Zohra Russell MD    CPT:               ECHO TRANSESOPH W/O CON - 92778.m;DOPPLER ECHO COMP W SPECT -                     99567.m;DOPPL ECHO COLOR FLOW - 33194.m  Indication(s):     Unspecified atrial fibrillation - I48.91  Procedure:         Transesophageal echocardiogram performed with 2D, M-mode and                     complete spectral and color flow Doppler.  Ordering Location: [facility]  Admission Status:  Inpatient  Study Information: Image quality for this study is good.    _______________________________________________________________________________________     CONCLUSIONS:      1. Borderline reduced systolic function.  2. No pericardial effusion seen.   3. Bioprosthetic valve present. in the mitral position. Well seated prosthetic mitral valve with normal function. Transvalvular mitral gradients are normal. No prosthetic mitral stenosis. There is No intravalvular mitral regurgitation. No paravalvular mitral regurgitation.   4. Left atrial appendage appears oversewn. No residual flow observed.   5. Left ventricular endocardium is not well visualized; however, the left ventricular systolic function appears grossly normal.   6. No evidence of left atrial or left atrial appendage thrombus.    ____________________________________________________________________  BRUNA Procedure:  After discussion of the risks and benefits of the BRUNA, an informed consent was obtained. Study was performed with anesthesia (please see anesthesia record).  The adult Kenyatta BRUNA probe was introduced and passed into the esophagus. The BRUNA probe was passed without difficulty. Images were obtained with the patient in a left lateral decubitus position. Image quality was good. The patient's vital signs; including heart rate, blood pressure, and oxygen saturation; remained stable throughout the procedure. The patient tolerated the procedure well and without complications.     ________________________________________________________________________________________  FINDINGS:     Left Ventricle:  Left ventricular endocardium is not well visualized; however, the left ventricular systolic function appears grossly normal.     Right Ventricle:  The right ventricular cavity is normal in size, with normal wall thickness and borderline reduced systolic function.     Left Atrium:  The left atrium is dilated. There is no evidence of left atrial or left atrial appendage thrombus. The leftatrial appendage appears oversewn. There is no residual flow observed.     Right Atrium:  The right atrium is normal in size.     Interatrial Septum:  The interatrial septum appears intact.     Aortic Valve:  The aortic valve is tricuspid with normalleaflet excursion. There is no evidence of aortic regurgitation.     Mitral Valve:  Bioprosthetic valve is present in the mitral position. The mitral valve prosthesis is well seated with normal function. Transvalvular mitral gradients are normal. There is no prosthetic mitral stenosis. No intravalvular mitral regurgitation. There is no paravalvular regurgitation.     Tricuspid Valve:  Structurally normal tricuspid valve with normal leaflet excursion. There is trace tricuspid regurgitation.     Pulmonic Valve:  Structurally normal pulmonic valve with normal leaflet excursion.     Aorta:  The aortic root at the sinuses of Valsalva is normal in size.     Pericardium:  No pericardial effusion seen.     Pleura:  Left pleural effusion noted.  ____________________________________________________________________  QUANTITATIVE DATA:     Mitral Valve Measurements:     MV Vmax:        1.60 m/s  MV VTI, jarred     0.235 m  MV Mean Grad:   4.0 mmHg  MV Peak Grad:   10.2 mmHg  MV Gradient HR: 78 bpm       Tricuspid Valve Measurements:     TR Vmax:          2.6 m/s  TR Peak Gradient: 26.0 mmHg    ________________________________________________________________________________________  Electronically signed on 4/23/2024 at 4:07:06 PM by Clarence Watson M.D.

## 2024-04-24 NOTE — PROGRESS NOTE ADULT - PROBLEM SELECTOR PROBLEM 5
ISABELA (acute kidney injury)

## 2024-04-24 NOTE — PROGRESS NOTE ADULT - ASSESSMENT
This is a   63 y/o female with PMHx of HLD, HTN, AFib (diagnosed recently, not on AC), benzodiazepine/opioid & ETOH abuse, and mitral valve prolapse found to have severe mitral and tricuspid regurgitation w/ signs of cardiogenic shock, now s/p RHC with IABP placement.   follwed by HF team  CTS consulted MR/TR  4/16 MVR(t) - Epic 33mm valve Maze procedure LAAL - 40mm AtriClip recovered in CTU  extubated OR day  4/17 VSS  dobutamine for  inotropic support  /  lasix gtt - VASO /IABP d/c l seen by PT disposition to home with PT librium thiamine and folic acid-  4/18TTE 4/18 EF 35%, no intervalvular MR  junctional rhythm  coumadin for MVR  4/19 Precedex for delirium   4/20 VSS seen by behavioural health recommend  Seroquel 12.5-25  PO q6 for agitation chest tubes d/c  dobutamine d/c  4/21 VSS right brachial felicia d/c transfer to [facility] +  upon arrival .  d/c IV benzo + PW VVI 40  TELE- NSR   disposition to home this week  4/22     afib  72   amio 200 QD   EP  cslt   + PW   to epm  4/23   coumadin 5 mg       lvx 90 q12,    npo  for DCCV   +pw   amio 200 qd    This is a   63 y/o female with PMHx of HLD, HTN, AFib (diagnosed recently, not on AC), benzodiazepine/opioid & ETOH abuse, and mitral valve prolapse found to have severe mitral and tricuspid regurgitation w/ signs of cardiogenic shock, now s/p RHC with IABP placement.   follwed by HF team  CTS consulted MR/TR  4/16 MVR(t) - Epic 33mm valve Maze procedure LAAL - 40mm AtriClip recovered in CTU  extubated OR day  4/17 VSS  dobutamine for  inotropic support  /  lasix gtt - VASO /IABP d/c l seen by PT disposition to home with PT librium thiamine and folic acid-  4/18TTE 4/18 EF 35%, no intervalvular MR  junctional rhythm  coumadin for MVR  4/19 Precedex for delirium   4/20 VSS seen by behavioural health recommend  Seroquel 12.5-25  PO q6 for agitation chest tubes d/c  dobutamine d/c  4/21 VSS right brachial felicia d/c transfer to [facility] +  upon arrival .  d/c IV benzo + PW VVI 40  TELE- NSR   disposition to home this week  4/22     afib  72   amio 200 QD   EP  cslt   + PW   to epm  4/23   coumadin 5 mg       lvx 90 q12,    npo  for DCCV   +pw   amio 200 qd  4/24  LVX d/c'ed INR 2.17 this AM, coumadin 5mg tonight.   As per d/w Heart Failure team:  patient started on Toprol 25mg qD, farxiga 10mg qD, increase aldactone 50mg qD, and decreased lasix to 20mg qD). Monitor electrolytes. K 3.5 this AM, s/p supplement.   This is a   65 y/o female with PMHx of HLD, HTN, AFib (diagnosed recently, not on AC), benzodiazepine/opioid & ETOH abuse, and mitral valve prolapse found to have severe mitral and tricuspid regurgitation w/ signs of cardiogenic shock, now s/p RHC with IABP placement.   follwed by HF team  CTS consulted MR/TR  4/16 MVR(t) - Epic 33mm valve Maze procedure LAAL - 40mm AtriClip recovered in CTU  extubated OR day  4/17 VSS  dobutamine for  inotropic support  /  lasix gtt - VASO /IABP d/c l seen by PT disposition to home with PT librium thiamine and folic acid-  4/18TTE 4/18 EF 35%, no intervalvular MR  junctional rhythm  coumadin for MVR  4/19 Precedex for delirium   4/20 VSS seen by behavioural health recommend  Seroquel 12.5-25  PO q6 for agitation chest tubes d/c  dobutamine d/c  4/21 VSS right brachial felicia d/c transfer to [facility] +  upon arrival .  d/c IV benzo + PW VVI 40  TELE- NSR   disposition to home this week  4/22     afib  72   amio 200 QD   EP  cslt   + PW   to epm  4/23   coumadin 5 mg       lvx 90 q12,    npo  for DCCV   +pw   amio 200 qd  4/24  LVX d/c'ed INR 2.17 this AM, coumadin 5mg tonight. Monitor electrolytes. K 3.5 this AM, s/p supplement.   As per d/w Heart Failure team:  patient started on Toprol 25mg qD and farxiga 10mg qD; increased aldactone 50mg qD, and decreased lasix to 20mg qD).

## 2024-04-24 NOTE — PROGRESS NOTE ADULT - ASSESSMENT
64F with PMHx of HLD, HTN, AFib (diagnosed recently, not on AC), and mitral valve prolapse found to have severe mitral regurgitation complicated by cardiogenic shock, now s/p RHC with IABP placement. She ultimately underwent MVR(t) with Maze procedure with Dr Maki on 4/16, extubated the following day. IABP removed 4/17. Postop course complicated by Afib, s/p DCCV with return to SR on 4/23 and has remained in SR since. Her most recent ECHO shows reduced EF of 35%. Overall she is progressing well and appears euvolemic on exam. Will continue to optimize her GDMT as tolerated.       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

## 2024-04-24 NOTE — PROGRESS NOTE ADULT - PROBLEM SELECTOR PLAN 1
- most recent ECHO from 4/18 shows reduce EF of 35%  - IABP removed 4/17  - plan to start Toprol XL 25 mg PO QD today  - continue Losartan 25 mg PO QD, will consider transitioning to Entresto prior to discharge   - reduce Lasix 20 mg PO QD and increase Aldactone 50 mg PO QD   - plan to start Farxiga 10 mg PO QD today   - obtain daily standing weight and strict I&Os   - continue to replete K 4-5, Mg >2.2

## 2024-04-24 NOTE — PROGRESS NOTE ADULT - PROBLEM SELECTOR PLAN 4
- s/p Maze procedure on 4/16  - was noted to go into Afib today, EP consulted  - started on Amio 200 mg PO QD  - per EP if AF persists would consider DCCV +/- BRUNA when INR therapeutic  - remains on Warfarin for AC
- s/p Maze procedure on 4/16  - remains off AC at this time (was not on any AC at home)
- s/p Maze procedure on 4/16  - remains off AC at this time (was not on any AC at home)
- s/p Maze procedure on 4/16  - remains off AC at this time (was not on any AC at home)  - currently on Amio 400 mg PO BID x 3 days
- s/p Maze procedure on 4/16  - postop course c/b Afib, s/p DCCV with return to SR on 4/23  - EP following  - remains on Amio 400 mg PO BID   - remains on Warfarin for AC

## 2024-04-25 LAB
ANION GAP SERPL CALC-SCNC: 11 MMOL/L — SIGNIFICANT CHANGE UP (ref 5–17)
BUN SERPL-MCNC: 13 MG/DL — SIGNIFICANT CHANGE UP (ref 7–23)
CALCIUM SERPL-MCNC: 9.7 MG/DL — SIGNIFICANT CHANGE UP (ref 8.4–10.5)
CHLORIDE SERPL-SCNC: 92 MMOL/L — LOW (ref 96–108)
CO2 SERPL-SCNC: 30 MMOL/L — SIGNIFICANT CHANGE UP (ref 22–31)
CREAT SERPL-MCNC: 0.77 MG/DL — SIGNIFICANT CHANGE UP (ref 0.5–1.3)
EGFR: 86 ML/MIN/1.73M2 — SIGNIFICANT CHANGE UP
GLUCOSE BLDC GLUCOMTR-MCNC: 109 MG/DL — HIGH (ref 70–99)
GLUCOSE BLDC GLUCOMTR-MCNC: 120 MG/DL — HIGH (ref 70–99)
GLUCOSE BLDC GLUCOMTR-MCNC: 152 MG/DL — HIGH (ref 70–99)
GLUCOSE SERPL-MCNC: 123 MG/DL — HIGH (ref 70–99)
HCT VFR BLD CALC: 29.6 % — LOW (ref 34.5–45)
HGB BLD-MCNC: 9.3 G/DL — LOW (ref 11.5–15.5)
INR BLD: 1.98 RATIO — HIGH (ref 0.85–1.18)
MCHC RBC-ENTMCNC: 30 PG — SIGNIFICANT CHANGE UP (ref 27–34)
MCHC RBC-ENTMCNC: 31.4 GM/DL — LOW (ref 32–36)
MCV RBC AUTO: 95.5 FL — SIGNIFICANT CHANGE UP (ref 80–100)
NRBC # BLD: 0 /100 WBCS — SIGNIFICANT CHANGE UP (ref 0–0)
PLATELET # BLD AUTO: 390 K/UL — SIGNIFICANT CHANGE UP (ref 150–400)
POTASSIUM SERPL-MCNC: 3.6 MMOL/L — SIGNIFICANT CHANGE UP (ref 3.5–5.3)
POTASSIUM SERPL-SCNC: 3.6 MMOL/L — SIGNIFICANT CHANGE UP (ref 3.5–5.3)
PROTHROM AB SERPL-ACNC: 21.3 SEC — HIGH (ref 9.5–13)
RBC # BLD: 3.1 M/UL — LOW (ref 3.8–5.2)
RBC # FLD: 14.6 % — HIGH (ref 10.3–14.5)
SODIUM SERPL-SCNC: 133 MMOL/L — LOW (ref 135–145)
WBC # BLD: 10.22 K/UL — SIGNIFICANT CHANGE UP (ref 3.8–10.5)
WBC # FLD AUTO: 10.22 K/UL — SIGNIFICANT CHANGE UP (ref 3.8–10.5)

## 2024-04-25 RX ORDER — POTASSIUM BICARBONATE 978 MG/1
25 TABLET, EFFERVESCENT ORAL
Refills: 0 | Status: COMPLETED | OUTPATIENT
Start: 2024-04-25 | End: 2024-04-25

## 2024-04-25 RX ORDER — WARFARIN SODIUM 2.5 MG/1
5 TABLET ORAL ONCE
Refills: 0 | Status: COMPLETED | OUTPATIENT
Start: 2024-04-25 | End: 2024-04-25

## 2024-04-25 RX ADMIN — SENNA PLUS 2 TABLET(S): 8.6 TABLET ORAL at 21:54

## 2024-04-25 RX ADMIN — Medication 1 PATCH: at 16:22

## 2024-04-25 RX ADMIN — OXYCODONE HYDROCHLORIDE 20 MILLIGRAM(S): 5 TABLET ORAL at 20:44

## 2024-04-25 RX ADMIN — POLYETHYLENE GLYCOL 3350 17 GRAM(S): 17 POWDER, FOR SOLUTION ORAL at 11:26

## 2024-04-25 RX ADMIN — WARFARIN SODIUM 5 MILLIGRAM(S): 2.5 TABLET ORAL at 21:54

## 2024-04-25 RX ADMIN — OXYCODONE HYDROCHLORIDE 20 MILLIGRAM(S): 5 TABLET ORAL at 16:01

## 2024-04-25 RX ADMIN — Medication 1 PATCH: at 07:00

## 2024-04-25 RX ADMIN — Medication 1 PATCH: at 17:14

## 2024-04-25 RX ADMIN — Medication 20 MILLIGRAM(S): at 05:51

## 2024-04-25 RX ADMIN — AMIODARONE HYDROCHLORIDE 400 MILLIGRAM(S): 400 TABLET ORAL at 05:50

## 2024-04-25 RX ADMIN — POTASSIUM BICARBONATE 25 MILLIEQUIVALENT(S): 978 TABLET, EFFERVESCENT ORAL at 12:03

## 2024-04-25 RX ADMIN — GABAPENTIN 300 MILLIGRAM(S): 400 CAPSULE ORAL at 05:50

## 2024-04-25 RX ADMIN — OXYCODONE HYDROCHLORIDE 20 MILLIGRAM(S): 5 TABLET ORAL at 11:00

## 2024-04-25 RX ADMIN — SPIRONOLACTONE 50 MILLIGRAM(S): 25 TABLET, FILM COATED ORAL at 05:51

## 2024-04-25 RX ADMIN — OXYCODONE HYDROCHLORIDE 20 MILLIGRAM(S): 5 TABLET ORAL at 10:30

## 2024-04-25 RX ADMIN — FAMOTIDINE 20 MILLIGRAM(S): 10 INJECTION INTRAVENOUS at 05:50

## 2024-04-25 RX ADMIN — OXYCODONE HYDROCHLORIDE 20 MILLIGRAM(S): 5 TABLET ORAL at 15:31

## 2024-04-25 RX ADMIN — OXYCODONE HYDROCHLORIDE 20 MILLIGRAM(S): 5 TABLET ORAL at 06:20

## 2024-04-25 RX ADMIN — Medication 25 MILLIGRAM(S): at 06:51

## 2024-04-25 RX ADMIN — OXYCODONE HYDROCHLORIDE 20 MILLIGRAM(S): 5 TABLET ORAL at 20:14

## 2024-04-25 RX ADMIN — Medication 1 MILLIGRAM(S): at 05:50

## 2024-04-25 RX ADMIN — Medication 1 PATCH: at 19:00

## 2024-04-25 RX ADMIN — DAPAGLIFLOZIN 10 MILLIGRAM(S): 10 TABLET, FILM COATED ORAL at 11:26

## 2024-04-25 RX ADMIN — Medication 1: at 11:25

## 2024-04-25 RX ADMIN — POTASSIUM BICARBONATE 25 MILLIEQUIVALENT(S): 978 TABLET, EFFERVESCENT ORAL at 13:05

## 2024-04-25 RX ADMIN — Medication 81 MILLIGRAM(S): at 11:26

## 2024-04-25 RX ADMIN — AMIODARONE HYDROCHLORIDE 400 MILLIGRAM(S): 400 TABLET ORAL at 17:14

## 2024-04-25 RX ADMIN — FAMOTIDINE 20 MILLIGRAM(S): 10 INJECTION INTRAVENOUS at 17:13

## 2024-04-25 RX ADMIN — LOSARTAN POTASSIUM 25 MILLIGRAM(S): 100 TABLET, FILM COATED ORAL at 06:52

## 2024-04-25 RX ADMIN — ATORVASTATIN CALCIUM 20 MILLIGRAM(S): 80 TABLET, FILM COATED ORAL at 21:54

## 2024-04-25 RX ADMIN — OXYCODONE HYDROCHLORIDE 20 MILLIGRAM(S): 5 TABLET ORAL at 05:50

## 2024-04-25 RX ADMIN — Medication 1 MILLIGRAM(S): at 21:54

## 2024-04-25 NOTE — PROGRESS NOTE ADULT - PROBLEM SELECTOR PLAN 1
4/16 MVR(t) - Epic 33mm valve Maze procedure LAAL - 40mm AtriClip   + PW VVI 40   on ASA 81 Atorvastatin 20  on Toprol 25mg qD  on farxiga 10mg qD, aldactone 50mg qD, and lasix to 20mg qD  dose coumadin daily per INR; due for 5mg coumadin tonight  EP following -> s/p DCCV 4/23  Heart failure team following  monitor electrolytes, keep K >4 and Mg >2  monitor I/Os  daily weight  disposition to home, once medically stable  Plan of care d/w CTS Attending

## 2024-04-25 NOTE — PROGRESS NOTE ADULT - PROBLEM SELECTOR PLAN 2
behavioral health team consulted  on PO ativan 1mg q8hr PRN for anxiety  on Oxycodone IR 20mg q4hr PRN

## 2024-04-25 NOTE — PROGRESS NOTE ADULT - SUBJECTIVE AND OBJECTIVE BOX
SUBJECTIVE: "Hi." Denies acute chest pain, palpitations, or shortness of breath. No acute overnight events reported.     TELEMETRY:  SR 60-80    Vital Signs Last 24 Hrs  T(C): 36.9 (24 @ 05:45), Max: 37.5 (24 @ 20:14)  T(F): 98.4 (24 @ 05:45), Max: 99.5 (24 @ 20:14)  HR: 74 (24 @ 06:50) (67 - 99)  BP: 108/64 (24 @ 06:50) (91/63 - 108/64)  RR: 18 (24 @ 06:50) (18 - 18)  SpO2: 96% (24 @ 06:50) (94% - 96%)           INPUT/OUTPUT:   @ 07:01  -   @ 07:00  --------------------------------------------------------  IN: 660 mL / OUT: 1100 mL / NET: -440 mL   @ 07:01  -   @ 11:56  --------------------------------------------------------  IN: 0 mL / OUT: 625 mL / NET: -625 mL      LABS:    133<L>  |  92<L>  |  13  ----------------------------<  123<H>  3.6   |  30  |  0.77    Ca    9.7      2024 10:23             9.3    10.22 )-----------( 390      ( 2024 10:23 )             29.6   PT/INR - ( 2024 10:23 )   PT: 21.3 sec;   INR: 1.98 ratio        Daily Weight in k.8 (2024 05:45)      CAPILLARY BLOOD GLUCOSE  POCT Blood Glucose.: 152 mg/dL (2024 11:12)  POCT Blood Glucose.: 109 mg/dL (2024 07:44)  POCT Blood Glucose.: 131 mg/dL (2024 21:24)  POCT Blood Glucose.: 118 mg/dL (2024 16:49)       PHYSICAL EXAM:  General: no acute distress  CV:  s1, s2  Sternal Wound:  MSI --->CDI , Stable   + PW  Lungs: nonlabored respirations with no use of accessory muscles  Abdomen: soft, nontender, nondistended  Extremities:  peripheral pulses intact bilaterally;  +edema B/L LE      ACTIVE MEDICATIONS:  acetaminophen     Tablet .. 650 milliGRAM(s) Oral every 6 hours PRN  aMIOdarone    Tablet 400 milliGRAM(s) Oral two times a day  aspirin enteric coated 81 milliGRAM(s) Oral daily  atorvastatin 20 milliGRAM(s) Oral at bedtime  bacitracin   Ointment 1 Application(s) Topical daily PRN  bisacodyl Suppository 10 milliGRAM(s) Rectal once  dapagliflozin 10 milliGRAM(s) Oral daily  dextrose 50% Injectable 50 milliLiter(s) IV Push every 15 minutes  famotidine    Tablet 20 milliGRAM(s) Oral two times a day  furosemide    Tablet 20 milliGRAM(s) Oral daily  HYDROmorphone   Tablet 2 milliGRAM(s) Oral every 6 hours PRN  insulin lispro (ADMELOG) corrective regimen sliding scale   SubCutaneous three times a day before meals  LORazepam     Tablet 1 milliGRAM(s) Oral every 8 hours PRN  losartan 25 milliGRAM(s) Oral daily  metoprolol succinate ER 25 milliGRAM(s) Oral daily  nicotine - 21 mG/24Hr(s) Patch 1 Patch Transdermal <User Schedule>  oxyCODONE    IR 20 milliGRAM(s) Oral every 4 hours PRN  polyethylene glycol 3350 17 Gram(s) Oral daily  potassium bicarbonate/potassium citrate 25 milliEquivalent(s) Oral every 1 hour  senna 2 Tablet(s) Oral at bedtime  sodium chloride 0.9%. 1000 milliLiter(s) IV Continuous <Continuous>  spironolactone 50 milliGRAM(s) Oral daily  warfarin 5 milliGRAM(s) Oral once      Case discussed in detail with Cardiothoracic Team and Attending Dr. Maki. Plan below as per discussion.

## 2024-04-25 NOTE — PROGRESS NOTE ADULT - ASSESSMENT
This is a   63 y/o female with PMHx of HLD, HTN, AFib (diagnosed recently, not on AC), benzodiazepine/opioid & ETOH abuse, and mitral valve prolapse found to have severe mitral and tricuspid regurgitation w/ signs of cardiogenic shock, now s/p RHC with IABP placement.   follwed by HF team  CTS consulted MR/TR  4/16 MVR(t) - Epic 33mm valve Maze procedure LAAL - 40mm AtriClip recovered in CTU  extubated OR day  4/17 VSS  dobutamine for  inotropic support  /  lasix gtt - VASO /IABP d/c l seen by PT disposition to home with PT librium thiamine and folic acid-  4/18TTE 4/18 EF 35%, no intervalvular MR  junctional rhythm  coumadin for MVR  4/19 Precedex for delirium   4/20 VSS seen by behavioural health recommend  Seroquel 12.5-25  PO q6 for agitation chest tubes d/c  dobutamine d/c  4/21 VSS right brachial felicia d/c transfer to [facility] +  upon arrival .  d/c IV benzo + PW VVI 40  TELE- NSR   disposition to home this week  4/22     afib  72   amio 200 QD   EP  cslt   + PW   to epm  4/23   coumadin 5 mg       lvx 90 q12,    npo  for DCCV   +pw   amio 200 qd  4/24  LVX d/c'ed INR 2.17 this AM, coumadin 5mg tonight. Monitor electrolytes. K 3.5 this AM, s/p supplement.   As per d/w Heart Failure team:  patient started on Toprol 25mg qD and farxiga 10mg qD; increased aldactone 50mg qD, and decreased lasix to 20mg qD).   4/25 VSS. K 3.6, s/p supplemented. Dose coumadin per INR. Encourage OOB/increase ambulation as tolerated. Current medication regimen maintained

## 2024-04-25 NOTE — PROGRESS NOTE ADULT - PROBLEM SELECTOR PLAN 3
EP following. S/p DCCV 4/23  on amio 400mg BID x1 wk, followed by 200mg BID x1 wk, followed by 200mg qD  s/p LVX-coumadin bridge until INR therapeutic  dose coumadin daily per INR

## 2024-04-26 ENCOUNTER — TRANSCRIPTION ENCOUNTER (OUTPATIENT)
Age: 64
End: 2024-04-26

## 2024-04-26 LAB
ANION GAP SERPL CALC-SCNC: 11 MMOL/L — SIGNIFICANT CHANGE UP (ref 5–17)
BUN SERPL-MCNC: 12 MG/DL — SIGNIFICANT CHANGE UP (ref 7–23)
CALCIUM SERPL-MCNC: 9.4 MG/DL — SIGNIFICANT CHANGE UP (ref 8.4–10.5)
CHLORIDE SERPL-SCNC: 92 MMOL/L — LOW (ref 96–108)
CO2 SERPL-SCNC: 31 MMOL/L — SIGNIFICANT CHANGE UP (ref 22–31)
CREAT SERPL-MCNC: 0.83 MG/DL — SIGNIFICANT CHANGE UP (ref 0.5–1.3)
EGFR: 79 ML/MIN/1.73M2 — SIGNIFICANT CHANGE UP
GLUCOSE BLDC GLUCOMTR-MCNC: 129 MG/DL — HIGH (ref 70–99)
GLUCOSE BLDC GLUCOMTR-MCNC: 137 MG/DL — HIGH (ref 70–99)
GLUCOSE SERPL-MCNC: 104 MG/DL — HIGH (ref 70–99)
HCT VFR BLD CALC: 26.5 % — LOW (ref 34.5–45)
HGB BLD-MCNC: 8.5 G/DL — LOW (ref 11.5–15.5)
INR BLD: 1.8 RATIO — HIGH (ref 0.85–1.18)
MCHC RBC-ENTMCNC: 30.4 PG — SIGNIFICANT CHANGE UP (ref 27–34)
MCHC RBC-ENTMCNC: 32.1 GM/DL — SIGNIFICANT CHANGE UP (ref 32–36)
MCV RBC AUTO: 94.6 FL — SIGNIFICANT CHANGE UP (ref 80–100)
NRBC # BLD: 0 /100 WBCS — SIGNIFICANT CHANGE UP (ref 0–0)
PLATELET # BLD AUTO: 392 K/UL — SIGNIFICANT CHANGE UP (ref 150–400)
POTASSIUM SERPL-MCNC: 4.4 MMOL/L — SIGNIFICANT CHANGE UP (ref 3.5–5.3)
POTASSIUM SERPL-SCNC: 4.4 MMOL/L — SIGNIFICANT CHANGE UP (ref 3.5–5.3)
PROTHROM AB SERPL-ACNC: 19.5 SEC — HIGH (ref 9.5–13)
RBC # BLD: 2.8 M/UL — LOW (ref 3.8–5.2)
RBC # FLD: 14.6 % — HIGH (ref 10.3–14.5)
SODIUM SERPL-SCNC: 134 MMOL/L — LOW (ref 135–145)
WBC # BLD: 11.35 K/UL — HIGH (ref 3.8–10.5)
WBC # FLD AUTO: 11.35 K/UL — HIGH (ref 3.8–10.5)

## 2024-04-26 PROCEDURE — 99232 SBSQ HOSP IP/OBS MODERATE 35: CPT | Mod: 24

## 2024-04-26 RX ORDER — WARFARIN SODIUM 2.5 MG/1
5 TABLET ORAL ONCE
Refills: 0 | Status: COMPLETED | OUTPATIENT
Start: 2024-04-26 | End: 2024-04-26

## 2024-04-26 RX ADMIN — WARFARIN SODIUM 5 MILLIGRAM(S): 2.5 TABLET ORAL at 22:17

## 2024-04-26 RX ADMIN — Medication 1 MILLIGRAM(S): at 06:37

## 2024-04-26 RX ADMIN — Medication 20 MILLIGRAM(S): at 05:38

## 2024-04-26 RX ADMIN — OXYCODONE HYDROCHLORIDE 20 MILLIGRAM(S): 5 TABLET ORAL at 20:56

## 2024-04-26 RX ADMIN — OXYCODONE HYDROCHLORIDE 20 MILLIGRAM(S): 5 TABLET ORAL at 09:51

## 2024-04-26 RX ADMIN — Medication 1 PATCH: at 17:20

## 2024-04-26 RX ADMIN — OXYCODONE HYDROCHLORIDE 20 MILLIGRAM(S): 5 TABLET ORAL at 02:29

## 2024-04-26 RX ADMIN — Medication 25 MILLIGRAM(S): at 06:37

## 2024-04-26 RX ADMIN — DAPAGLIFLOZIN 10 MILLIGRAM(S): 10 TABLET, FILM COATED ORAL at 08:05

## 2024-04-26 RX ADMIN — Medication 1 PATCH: at 20:55

## 2024-04-26 RX ADMIN — Medication 81 MILLIGRAM(S): at 08:05

## 2024-04-26 RX ADMIN — OXYCODONE HYDROCHLORIDE 20 MILLIGRAM(S): 5 TABLET ORAL at 08:51

## 2024-04-26 RX ADMIN — OXYCODONE HYDROCHLORIDE 20 MILLIGRAM(S): 5 TABLET ORAL at 12:55

## 2024-04-26 RX ADMIN — POLYETHYLENE GLYCOL 3350 17 GRAM(S): 17 POWDER, FOR SOLUTION ORAL at 08:05

## 2024-04-26 RX ADMIN — OXYCODONE HYDROCHLORIDE 20 MILLIGRAM(S): 5 TABLET ORAL at 19:56

## 2024-04-26 RX ADMIN — AMIODARONE HYDROCHLORIDE 400 MILLIGRAM(S): 400 TABLET ORAL at 05:39

## 2024-04-26 RX ADMIN — SPIRONOLACTONE 50 MILLIGRAM(S): 25 TABLET, FILM COATED ORAL at 05:38

## 2024-04-26 RX ADMIN — OXYCODONE HYDROCHLORIDE 20 MILLIGRAM(S): 5 TABLET ORAL at 13:57

## 2024-04-26 RX ADMIN — Medication 1 PATCH: at 17:03

## 2024-04-26 RX ADMIN — Medication 1 MILLIGRAM(S): at 21:37

## 2024-04-26 RX ADMIN — FAMOTIDINE 20 MILLIGRAM(S): 10 INJECTION INTRAVENOUS at 17:03

## 2024-04-26 RX ADMIN — OXYCODONE HYDROCHLORIDE 20 MILLIGRAM(S): 5 TABLET ORAL at 02:59

## 2024-04-26 RX ADMIN — Medication 1 PATCH: at 07:03

## 2024-04-26 RX ADMIN — ATORVASTATIN CALCIUM 20 MILLIGRAM(S): 80 TABLET, FILM COATED ORAL at 22:17

## 2024-04-26 RX ADMIN — LOSARTAN POTASSIUM 25 MILLIGRAM(S): 100 TABLET, FILM COATED ORAL at 06:36

## 2024-04-26 RX ADMIN — FAMOTIDINE 20 MILLIGRAM(S): 10 INJECTION INTRAVENOUS at 05:38

## 2024-04-26 RX ADMIN — AMIODARONE HYDROCHLORIDE 400 MILLIGRAM(S): 400 TABLET ORAL at 17:03

## 2024-04-26 NOTE — DISCHARGE NOTE NURSING/CASE MANAGEMENT/SOCIAL WORK - PATIENT PORTAL LINK FT
You can access the FollowMyHealth Patient Portal offered by [facility] by registering at the following website: [email or web address]. By joining [employer]’s FollowMyHealth portal, you will also be able to view your health information using other applications (apps) compatible with our system.

## 2024-04-26 NOTE — DISCHARGE NOTE NURSING/CASE MANAGEMENT/SOCIAL WORK - NSDCPEFALRISK_GEN_ALL_CORE
For information on Fall & Injury Prevention, visit: [email or web address] OR  [email or web address] OR  https://www.cdc.gov/steadi/patient.html

## 2024-04-26 NOTE — PROGRESS NOTE ADULT - PROBLEM SELECTOR PLAN 1
4/16 MVR(t) - Epic 33mm valve Maze procedure LAAL - 40mm AtriClip   c/w ASA 81 Atorvastatin 20 Toprol 25mg qD  on farxiga 10mg qD, aldactone 50mg qD, and lasix to 20mg qD  dose coumadin daily per INR; due for 5mg coumadin tonight  EP following -> s/p DCCV 4/23  Heart failure team following  monitor electrolytes, keep K >4 and Mg >2  monitor I/Os, daily weight  disposition to home w/ PT,  once medically stable  Plan of care d/w CTS Attending

## 2024-04-26 NOTE — PROGRESS NOTE ADULT - PROBLEM SELECTOR PLAN 3
EP following. S/p DCCV 4/23  on amio 400mg BID x1 wk, followed by 200mg BID x1 wk, followed by 200mg qD  dose coumadin daily per INR

## 2024-04-26 NOTE — PROGRESS NOTE ADULT - ASSESSMENT
This is a   63 y/o female with PMHx of HLD, HTN, AFib (diagnosed recently, not on AC), benzodiazepine/opioid & ETOH abuse, and mitral valve prolapse found to have severe mitral and tricuspid regurgitation w/ signs of cardiogenic shock, now s/p RHC with IABP placement.   follwed by HF team  CTS consulted MR/TR  4/16 MVR(t) - Epic 33mm valve Maze procedure LAAL - 40mm AtriClip recovered in CTU  extubated OR day  4/17 VSS  dobutamine for  inotropic support  /  lasix gtt - VASO /IABP d/c l seen by PT disposition to home with PT librium thiamine and folic acid-  4/18TTE 4/18 EF 35%, no intervalvular MR  junctional rhythm  coumadin for MVR  4/19 Precedex for delirium   4/20 VSS seen by behavioural health recommend  Seroquel 12.5-25  PO q6 for agitation chest tubes d/c  dobutamine d/c  4/21 VSS right brachial felicia d/c transfer to [facility] +  upon arrival .  d/c IV benzo + PW VVI 40  TELE- NSR   disposition to home this week  4/22     afib  72   amio 200 QD   EP  cslt   + PW   to epm  4/23   coumadin 5 mg       lvx 90 q12,    npo  for DCCV   +pw   amio 200 qd  4/24  LVX d/c'ed INR 2.17 this AM, coumadin 5mg tonight. Monitor electrolytes. K 3.5 this AM, s/p supplement.   As per d/w Heart Failure team:  patient started on Toprol 25mg qD and farxiga 10mg qD; increased aldactone 50mg qD, and decreased lasix to 20mg qD).   4/25 VSS. K 3.6, s/p supplemented. Dose coumadin per INR. Encourage OOB/increase ambulation as tolerated. Current medication regimen maintained  4/26 VSS, PW cut, likely dc sat/sun as per Dr. Maki, inc ambulation, Coumadin 5mg tonight

## 2024-04-26 NOTE — PROGRESS NOTE ADULT - SUBJECTIVE AND OBJECTIVE BOX
VITAL SIGNS    Telemetry:  SB-Sr 1st degree 50-85  Overnight Events: no acute events    Vital Signs Last 24 Hrs  T(C): 37 (24 @ 11:46), Max: 37 (24 @ 11:46)  T(F): 98.6 (24 @ 11:46), Max: 98.6 (24 @ 11:46)  HR: 58 (24 @ 11:46) (58 - 72)  BP: 100/56 (24 @ 11:46) (97/54 - 111/69)  RR: 18 (24 @ 11:46) (18 - 18)  SpO2: 94% (24 @ 11:46) (91% - 96%)             @ 07:01  -   @ 07:00  --------------------------------------------------------  IN: 480 mL / OUT: 1525 mL / NET: -1045 mL     @ 07:01  -   @ 12:38  --------------------------------------------------------  IN: 240 mL / OUT: 200 mL / NET: 40 mL       Daily     Daily Weight in k.2 (2024 05:10)  Admit Wt: Drug Dosing Weight  Height (cm): 180.3 (2024 14:18)  Weight (kg): 88.7 (2024 14:18)  BMI (kg/m2): 27.3 (2024 14:18)  BSA (m2): 2.09 (2024 14:18)      CAPILLARY BLOOD GLUCOSE      POCT Blood Glucose.: 137 mg/dL (2024 11:44)  POCT Blood Glucose.: 129 mg/dL (2024 07:31)  POCT Blood Glucose.: 120 mg/dL (2024 16:25)          acetaminophen     Tablet .. 650 milliGRAM(s) Oral every 6 hours PRN  aMIOdarone    Tablet 400 milliGRAM(s) Oral two times a day  aspirin enteric coated 81 milliGRAM(s) Oral daily  atorvastatin 20 milliGRAM(s) Oral at bedtime  bacitracin   Ointment 1 Application(s) Topical daily PRN  bisacodyl Suppository 10 milliGRAM(s) Rectal once PRN  bisacodyl Suppository 10 milliGRAM(s) Rectal once  dapagliflozin 10 milliGRAM(s) Oral daily  famotidine    Tablet 20 milliGRAM(s) Oral two times a day  furosemide    Tablet 20 milliGRAM(s) Oral daily  HYDROmorphone   Tablet 2 milliGRAM(s) Oral every 6 hours PRN  LORazepam     Tablet 1 milliGRAM(s) Oral every 8 hours PRN  losartan 25 milliGRAM(s) Oral daily  metoprolol succinate ER 25 milliGRAM(s) Oral daily  nicotine - 21 mG/24Hr(s) Patch 1 Patch Transdermal <User Schedule>  oxyCODONE    IR 20 milliGRAM(s) Oral every 4 hours PRN  polyethylene glycol 3350 17 Gram(s) Oral daily  senna 2 Tablet(s) Oral at bedtime  spironolactone 50 milliGRAM(s) Oral daily  warfarin 5 milliGRAM(s) Oral once                            8.5    11.35 )-----------( 392      ( 2024 06:02 )             26.5     04-26    134<L>  |  92<L>  |  12  ----------------------------<  104<H>  4.4   |  31  |  0.83    Ca    9.4      2024 06:02      PT/INR - ( 2024 06:02 )   PT: 19.5 sec;   INR: 1.80 ratio             PHYSICAL EXAM    Subjective: "I'm tired"  General: well appearing female, in no acute distress, laying in bed. POD #10  Neurology: alert and oriented x 3, nonfocal, no gross deficits  CV : S1/S2, no murmurs/rubs/gallops  Sternal Wound : CDI SHAINA, Stable  Lungs: clear. RR easy, unlabored   Abdomen: soft, nontender, nondistended, positive bowel sounds, +bowel movement yest, Neg N/V/D   :  pt voiding without difficulty   Extremities: VELIZ; no edema, neg calf tenderness. PPP bilaterally, groins stable        Pacing Wires        Cut today     Coumadin    [x ] YES          []      NO         Eliquis    [ ] YES          [x]      NO       Heparin gtt   [  ] YES              [x]   NO  Dose: 5mg    Disposition:  Home w/ PT [ x ]     Rehab [   ]       OR Date:    Plan of care discussed with Cardiothoracic Team at AM rounds.

## 2024-04-26 NOTE — PROGRESS NOTE ADULT - NSPROGADDITIONALINFOA_GEN_ALL_CORE
Jacqueline Lira, AGACNP-BC  Cardiovascular & Thoracic Surgery  [address] [place] [address]  Office: [phone]    Available on Microsoft Teams  Spectra: [id number]  New Consults: [id number]

## 2024-04-27 ENCOUNTER — TRANSCRIPTION ENCOUNTER (OUTPATIENT)
Age: 64
End: 2024-04-27

## 2024-04-27 VITALS
SYSTOLIC BLOOD PRESSURE: 94 MMHG | RESPIRATION RATE: 18 BRPM | HEART RATE: 61 BPM | DIASTOLIC BLOOD PRESSURE: 53 MMHG | TEMPERATURE: 97 F | OXYGEN SATURATION: 94 %

## 2024-04-27 LAB
ANION GAP SERPL CALC-SCNC: 13 MMOL/L — SIGNIFICANT CHANGE UP (ref 5–17)
APTT BLD: 33 SEC — SIGNIFICANT CHANGE UP (ref 24.5–35.6)
BASOPHILS # BLD AUTO: 0.05 K/UL — SIGNIFICANT CHANGE UP (ref 0–0.2)
BASOPHILS NFR BLD AUTO: 0.5 % — SIGNIFICANT CHANGE UP (ref 0–2)
BUN SERPL-MCNC: 10 MG/DL — SIGNIFICANT CHANGE UP (ref 7–23)
CALCIUM SERPL-MCNC: 9.3 MG/DL — SIGNIFICANT CHANGE UP (ref 8.4–10.5)
CHLORIDE SERPL-SCNC: 90 MMOL/L — LOW (ref 96–108)
CO2 SERPL-SCNC: 27 MMOL/L — SIGNIFICANT CHANGE UP (ref 22–31)
CREAT SERPL-MCNC: 0.72 MG/DL — SIGNIFICANT CHANGE UP (ref 0.5–1.3)
EGFR: 93 ML/MIN/1.73M2 — SIGNIFICANT CHANGE UP
EOSINOPHIL # BLD AUTO: 0.13 K/UL — SIGNIFICANT CHANGE UP (ref 0–0.5)
EOSINOPHIL NFR BLD AUTO: 1.3 % — SIGNIFICANT CHANGE UP (ref 0–6)
GLUCOSE SERPL-MCNC: 88 MG/DL — SIGNIFICANT CHANGE UP (ref 70–99)
HCT VFR BLD CALC: 26.6 % — LOW (ref 34.5–45)
HGB BLD-MCNC: 8.2 G/DL — LOW (ref 11.5–15.5)
IMM GRANULOCYTES NFR BLD AUTO: 1.3 % — HIGH (ref 0–0.9)
INR BLD: 2 RATIO — HIGH (ref 0.85–1.18)
LYMPHOCYTES # BLD AUTO: 0.91 K/UL — LOW (ref 1–3.3)
LYMPHOCYTES # BLD AUTO: 8.9 % — LOW (ref 13–44)
MAGNESIUM SERPL-MCNC: 2 MG/DL — SIGNIFICANT CHANGE UP (ref 1.6–2.6)
MCHC RBC-ENTMCNC: 29.5 PG — SIGNIFICANT CHANGE UP (ref 27–34)
MCHC RBC-ENTMCNC: 30.8 GM/DL — LOW (ref 32–36)
MCV RBC AUTO: 95.7 FL — SIGNIFICANT CHANGE UP (ref 80–100)
MONOCYTES # BLD AUTO: 1.28 K/UL — HIGH (ref 0–0.9)
MONOCYTES NFR BLD AUTO: 12.5 % — SIGNIFICANT CHANGE UP (ref 2–14)
NEUTROPHILS # BLD AUTO: 7.75 K/UL — HIGH (ref 1.8–7.4)
NEUTROPHILS NFR BLD AUTO: 75.5 % — SIGNIFICANT CHANGE UP (ref 43–77)
NRBC # BLD: 0 /100 WBCS — SIGNIFICANT CHANGE UP (ref 0–0)
PHOSPHATE SERPL-MCNC: 3.3 MG/DL — SIGNIFICANT CHANGE UP (ref 2.5–4.5)
PLATELET # BLD AUTO: 375 K/UL — SIGNIFICANT CHANGE UP (ref 150–400)
POTASSIUM SERPL-MCNC: 4.4 MMOL/L — SIGNIFICANT CHANGE UP (ref 3.5–5.3)
POTASSIUM SERPL-SCNC: 4.4 MMOL/L — SIGNIFICANT CHANGE UP (ref 3.5–5.3)
PROTHROM AB SERPL-ACNC: 20.6 SEC — HIGH (ref 9.5–13)
RBC # BLD: 2.78 M/UL — LOW (ref 3.8–5.2)
RBC # FLD: 14.7 % — HIGH (ref 10.3–14.5)
SODIUM SERPL-SCNC: 130 MMOL/L — LOW (ref 135–145)
WBC # BLD: 10.25 K/UL — SIGNIFICANT CHANGE UP (ref 3.8–10.5)
WBC # FLD AUTO: 10.25 K/UL — SIGNIFICANT CHANGE UP (ref 3.8–10.5)

## 2024-04-27 PROCEDURE — C1769: CPT

## 2024-04-27 PROCEDURE — 85018 HEMOGLOBIN: CPT

## 2024-04-27 PROCEDURE — P9045: CPT

## 2024-04-27 PROCEDURE — 93460 R&L HRT ART/VENTRICLE ANGIO: CPT

## 2024-04-27 PROCEDURE — 82435 ASSAY OF BLOOD CHLORIDE: CPT

## 2024-04-27 PROCEDURE — 82947 ASSAY GLUCOSE BLOOD QUANT: CPT

## 2024-04-27 PROCEDURE — 99285 EMERGENCY DEPT VISIT HI MDM: CPT

## 2024-04-27 PROCEDURE — 92960 CARDIOVERSION ELECTRIC EXT: CPT

## 2024-04-27 PROCEDURE — 93306 TTE W/DOPPLER COMPLETE: CPT

## 2024-04-27 PROCEDURE — C1889: CPT

## 2024-04-27 PROCEDURE — 86850 RBC ANTIBODY SCREEN: CPT

## 2024-04-27 PROCEDURE — 88311 DECALCIFY TISSUE: CPT

## 2024-04-27 PROCEDURE — 85730 THROMBOPLASTIN TIME PARTIAL: CPT

## 2024-04-27 PROCEDURE — 84443 ASSAY THYROID STIM HORMONE: CPT

## 2024-04-27 PROCEDURE — 86901 BLOOD TYPING SEROLOGIC RH(D): CPT

## 2024-04-27 PROCEDURE — 83880 ASSAY OF NATRIURETIC PEPTIDE: CPT

## 2024-04-27 PROCEDURE — 86891 AUTOLOGOUS BLOOD OP SALVAGE: CPT

## 2024-04-27 PROCEDURE — 97165 OT EVAL LOW COMPLEX 30 MIN: CPT

## 2024-04-27 PROCEDURE — 76937 US GUIDE VASCULAR ACCESS: CPT

## 2024-04-27 PROCEDURE — 80053 COMPREHEN METABOLIC PANEL: CPT

## 2024-04-27 PROCEDURE — 84132 ASSAY OF SERUM POTASSIUM: CPT

## 2024-04-27 PROCEDURE — 82565 ASSAY OF CREATININE: CPT

## 2024-04-27 PROCEDURE — 82550 ASSAY OF CK (CPK): CPT

## 2024-04-27 PROCEDURE — C1887: CPT

## 2024-04-27 PROCEDURE — 86923 COMPATIBILITY TEST ELECTRIC: CPT

## 2024-04-27 PROCEDURE — 83036 HEMOGLOBIN GLYCOSYLATED A1C: CPT

## 2024-04-27 PROCEDURE — 33967 INSERT I-AORT PERCUT DEVICE: CPT

## 2024-04-27 PROCEDURE — 93320 DOPPLER ECHO COMPLETE: CPT

## 2024-04-27 PROCEDURE — 83930 ASSAY OF BLOOD OSMOLALITY: CPT

## 2024-04-27 PROCEDURE — 84484 ASSAY OF TROPONIN QUANT: CPT

## 2024-04-27 PROCEDURE — 85396 CLOTTING ASSAY WHOLE BLOOD: CPT

## 2024-04-27 PROCEDURE — 85520 HEPARIN ASSAY: CPT

## 2024-04-27 PROCEDURE — C1751: CPT

## 2024-04-27 PROCEDURE — 36415 COLL VENOUS BLD VENIPUNCTURE: CPT

## 2024-04-27 PROCEDURE — 71045 X-RAY EXAM CHEST 1 VIEW: CPT

## 2024-04-27 PROCEDURE — 71045 X-RAY EXAM CHEST 1 VIEW: CPT | Mod: 26

## 2024-04-27 PROCEDURE — C2618: CPT

## 2024-04-27 PROCEDURE — 97116 GAIT TRAINING THERAPY: CPT

## 2024-04-27 PROCEDURE — 88305 TISSUE EXAM BY PATHOLOGIST: CPT

## 2024-04-27 PROCEDURE — 83605 ASSAY OF LACTIC ACID: CPT

## 2024-04-27 PROCEDURE — 85025 COMPLETE CBC W/AUTO DIFF WBC: CPT

## 2024-04-27 PROCEDURE — 86900 BLOOD TYPING SEROLOGIC ABO: CPT

## 2024-04-27 PROCEDURE — 85027 COMPLETE CBC AUTOMATED: CPT

## 2024-04-27 PROCEDURE — 82553 CREATINE MB FRACTION: CPT

## 2024-04-27 PROCEDURE — 85014 HEMATOCRIT: CPT

## 2024-04-27 PROCEDURE — P9047: CPT

## 2024-04-27 PROCEDURE — 82330 ASSAY OF CALCIUM: CPT

## 2024-04-27 PROCEDURE — C1894: CPT

## 2024-04-27 PROCEDURE — 87637 SARSCOV2&INF A&B&RSV AMP PRB: CPT

## 2024-04-27 PROCEDURE — 82533 TOTAL CORTISOL: CPT

## 2024-04-27 PROCEDURE — 85610 PROTHROMBIN TIME: CPT

## 2024-04-27 PROCEDURE — 83735 ASSAY OF MAGNESIUM: CPT

## 2024-04-27 PROCEDURE — 96374 THER/PROPH/DIAG INJ IV PUSH: CPT

## 2024-04-27 PROCEDURE — 94002 VENT MGMT INPAT INIT DAY: CPT

## 2024-04-27 PROCEDURE — 82803 BLOOD GASES ANY COMBINATION: CPT

## 2024-04-27 PROCEDURE — 80048 BASIC METABOLIC PNL TOTAL CA: CPT

## 2024-04-27 PROCEDURE — C9399: CPT

## 2024-04-27 PROCEDURE — 36430 TRANSFUSION BLD/BLD COMPNT: CPT

## 2024-04-27 PROCEDURE — 85384 FIBRINOGEN ACTIVITY: CPT

## 2024-04-27 PROCEDURE — 97110 THERAPEUTIC EXERCISES: CPT

## 2024-04-27 PROCEDURE — 93010 ELECTROCARDIOGRAM REPORT: CPT

## 2024-04-27 PROCEDURE — 80061 LIPID PANEL: CPT

## 2024-04-27 PROCEDURE — 82962 GLUCOSE BLOOD TEST: CPT

## 2024-04-27 PROCEDURE — 96375 TX/PRO/DX INJ NEW DRUG ADDON: CPT

## 2024-04-27 PROCEDURE — 94010 BREATHING CAPACITY TEST: CPT

## 2024-04-27 PROCEDURE — 97162 PT EVAL MOD COMPLEX 30 MIN: CPT

## 2024-04-27 PROCEDURE — 87040 BLOOD CULTURE FOR BACTERIA: CPT

## 2024-04-27 PROCEDURE — 93325 DOPPLER ECHO COLOR FLOW MAPG: CPT

## 2024-04-27 PROCEDURE — P9016: CPT

## 2024-04-27 PROCEDURE — 93005 ELECTROCARDIOGRAM TRACING: CPT

## 2024-04-27 PROCEDURE — 84100 ASSAY OF PHOSPHORUS: CPT

## 2024-04-27 PROCEDURE — 93308 TTE F-UP OR LMTD: CPT

## 2024-04-27 PROCEDURE — 93312 ECHO TRANSESOPHAGEAL: CPT

## 2024-04-27 PROCEDURE — 84295 ASSAY OF SERUM SODIUM: CPT

## 2024-04-27 PROCEDURE — 76700 US EXAM ABDOM COMPLETE: CPT

## 2024-04-27 PROCEDURE — L8699: CPT

## 2024-04-27 RX ORDER — ROSUVASTATIN CALCIUM 5 MG/1
1 TABLET ORAL
Qty: 30 | Refills: 1
Start: 2024-04-27 | End: 2024-06-25

## 2024-04-27 RX ORDER — LOSARTAN/HYDROCHLOROTHIAZIDE 100MG-25MG
1 TABLET ORAL
Refills: 0 | DISCHARGE

## 2024-04-27 RX ORDER — DAPAGLIFLOZIN 10 MG/1
1 TABLET, FILM COATED ORAL
Qty: 30 | Refills: 1
Start: 2024-04-27 | End: 2024-06-25

## 2024-04-27 RX ORDER — LOSARTAN POTASSIUM 100 MG/1
1 TABLET, FILM COATED ORAL
Qty: 30 | Refills: 1
Start: 2024-04-27 | End: 2024-06-25

## 2024-04-27 RX ORDER — OXYCODONE HYDROCHLORIDE 5 MG/1
1 TABLET ORAL
Qty: 18 | Refills: 0
Start: 2024-04-27 | End: 2024-05-02

## 2024-04-27 RX ORDER — METOPROLOL TARTRATE 50 MG
1 TABLET ORAL
Qty: 30 | Refills: 1
Start: 2024-04-27 | End: 2024-06-25

## 2024-04-27 RX ORDER — FUROSEMIDE 40 MG
1 TABLET ORAL
Qty: 30 | Refills: 0
Start: 2024-04-27 | End: 2024-05-26

## 2024-04-27 RX ORDER — CARVEDILOL PHOSPHATE 80 MG/1
1 CAPSULE, EXTENDED RELEASE ORAL
Refills: 0 | DISCHARGE

## 2024-04-27 RX ORDER — ASPIRIN/CALCIUM CARB/MAGNESIUM 324 MG
1 TABLET ORAL
Qty: 0 | Refills: 0 | DISCHARGE
Start: 2024-04-27

## 2024-04-27 RX ORDER — SENNA PLUS 8.6 MG/1
2 TABLET ORAL
Qty: 0 | Refills: 0 | DISCHARGE
Start: 2024-04-27

## 2024-04-27 RX ORDER — SPIRONOLACTONE 25 MG/1
2 TABLET, FILM COATED ORAL
Qty: 60 | Refills: 0
Start: 2024-04-27 | End: 2024-05-26

## 2024-04-27 RX ORDER — AMIODARONE HYDROCHLORIDE 400 MG/1
1 TABLET ORAL
Qty: 30 | Refills: 1
Start: 2024-04-27 | End: 2024-06-25

## 2024-04-27 RX ORDER — WARFARIN SODIUM 2.5 MG/1
1 TABLET ORAL
Qty: 30 | Refills: 1
Start: 2024-04-27 | End: 2024-06-25

## 2024-04-27 RX ADMIN — Medication 25 MILLIGRAM(S): at 05:12

## 2024-04-27 RX ADMIN — OXYCODONE HYDROCHLORIDE 20 MILLIGRAM(S): 5 TABLET ORAL at 06:33

## 2024-04-27 RX ADMIN — Medication 1 MILLIGRAM(S): at 06:33

## 2024-04-27 RX ADMIN — FAMOTIDINE 20 MILLIGRAM(S): 10 INJECTION INTRAVENOUS at 05:12

## 2024-04-27 RX ADMIN — Medication 81 MILLIGRAM(S): at 10:43

## 2024-04-27 RX ADMIN — OXYCODONE HYDROCHLORIDE 20 MILLIGRAM(S): 5 TABLET ORAL at 00:55

## 2024-04-27 RX ADMIN — SPIRONOLACTONE 50 MILLIGRAM(S): 25 TABLET, FILM COATED ORAL at 05:12

## 2024-04-27 RX ADMIN — AMIODARONE HYDROCHLORIDE 400 MILLIGRAM(S): 400 TABLET ORAL at 05:12

## 2024-04-27 RX ADMIN — OXYCODONE HYDROCHLORIDE 20 MILLIGRAM(S): 5 TABLET ORAL at 01:55

## 2024-04-27 RX ADMIN — POLYETHYLENE GLYCOL 3350 17 GRAM(S): 17 POWDER, FOR SOLUTION ORAL at 10:43

## 2024-04-27 RX ADMIN — OXYCODONE HYDROCHLORIDE 20 MILLIGRAM(S): 5 TABLET ORAL at 10:40

## 2024-04-27 RX ADMIN — Medication 20 MILLIGRAM(S): at 05:12

## 2024-04-27 RX ADMIN — DAPAGLIFLOZIN 10 MILLIGRAM(S): 10 TABLET, FILM COATED ORAL at 10:43

## 2024-04-27 RX ADMIN — LOSARTAN POTASSIUM 25 MILLIGRAM(S): 100 TABLET, FILM COATED ORAL at 05:12

## 2024-04-27 NOTE — DISCHARGE NOTE PROVIDER - NSDCCPCAREPLAN_GEN_ALL_CORE_FT
PRINCIPAL DISCHARGE DIAGNOSIS  Diagnosis: S/P MVR (mitral valve replacement)  Assessment and Plan of Treatment:

## 2024-04-27 NOTE — PROGRESS NOTE ADULT - PROVIDER SPECIALTY LIST ADULT
Critical Care
MARIA D
MARIA D
Critical Care
MARIA D
Critical Care
Electrophysiology
Electrophysiology
MARIA D
MARIA D
CT Surgery
Heart Failure
Heart Failure
Critical Care
CT Surgery
Heart Failure
CT Surgery
Heart Failure
CT Surgery
Heart Failure

## 2024-04-27 NOTE — DISCHARGE NOTE PROVIDER - NSDCFUADDAPPT_GEN_ALL_CORE_FT
Heart failure appt May 7 2024 at 830 am  [address]  Please follow up with Dr. Maki in 2 weeks>  Follow up appointment > Call assistant Priscilla  [phone] Monday  when office opens to arrange post op visit  Cardiac surgery office at [facility] entrance 3  first floor on left after entering [facility]  Office telephone     Your Care Navigator Nurse Practitioner will be in touch to see you in your home within a few days from discharge. The Follow Your Heart program can help ensure you understand your medications, discharge instructions and answer any questions you may have at that time. They are also a great source to address concerns during the day and may be reached at [phone].

## 2024-04-27 NOTE — DISCHARGE NOTE PROVIDER - NSDCFUADDINST_GEN_ALL_CORE_FT
Please have your PT/INR levels checked on Monday  and then weekly, please have all blood work fax to Dr. Maki office  and follow up with Dr. Maki  for your coumadin dosage.  Please fax all result to [phone]     Wash incisions with soap and water using washcloth in shower daily,do not apply any lotion,powder,oil,sunscreen to any surgical incision  Please come to ED or Call Cardio thoracic office at [phone] if Chest pain, Shortness of Breath, persistant Nausea & vomiting, oozing from wounds,palpitations or pain not relieved with medication  Weigh yourself daily>notify surgeons office if  2 lb increase in weight in 24 hours.  1. Take ALL of your medications as ordered. Fill your prescriptions the day you are discharged and take according to the schedule you were given. Continue to take a stool softener if you are taking narcotic pain medications. AVOID medications such as ibuprofen or naproxen if you have had bypass surgery. If you have any questions or are unable to fill the prescriptions, please call the office right away at [phone].  2. Shower daily. Clean all incisions daily while showering with warm water and mild soap, pat dry with a clean towel and do not cover with any dressings unless instructed to. No bathing, swimming in a pool or the ocean until instructed by MD.  DO NOT use creams or lotions on the wound.  3. We advise that you do not drive until instructed by MD. Use your red pillow between chest and seatbelt to avoid injury in the event of a motor vehicle accident until you see the surgeon again in the office.  4. You may not return to work until instructed by MD.   5. Please eat a low fat, low cholesterol, low salt diet. (No added/extra salt). A nutritional supplement like Ensure or Glucerna (for diabetics) may help you reach your nutritional goals after surgery and aid in your recovery.  6. Weigh yourself every day in the morning and record it in the weight log in your red folder. Notify the office of any weight gain more than 2-3 pounds in 24 hours.  **Please LIMIT YOUR FLUID INTAKE TO ABOUT 4 8 OUNCE GLASSES OF BEVERAGE DAILY.**  7. Continue breathing exercises several times a day. Continue to use your heart pillow when coughing.  8. No heavy lifting nothing greater than 5 pounds until cleared by MD. We recommend that you sleep on your back and not your side or stomach for the next 4-6 weeks.  9. Call / Notify MD any fever greater than 101.0, any drainage from incisions or if they become red, hot or very tender to the touch.  10. Increase activity as tolerated. Walk indoors and/or outdoors at least 3 times a day.

## 2024-04-27 NOTE — PROGRESS NOTE ADULT - PROBLEM SELECTOR PROBLEM 2
Opioid type dependence
Mitral valve regurgitation
Opioid type dependence
Opioid type dependence
Mitral valve regurgitation
Opioid type dependence
Mitral valve regurgitation

## 2024-04-27 NOTE — PROGRESS NOTE ADULT - REASON FOR ADMISSION
cardiogenic shock
sp    MVR t  TIMMY    Maze
cardiogenic shock
MVR
cardiogenic shock
post op   MVR   T
cardiogenic shock

## 2024-04-27 NOTE — PROGRESS NOTE ADULT - ASSESSMENT
This is a   63 y/o female with PMHx of HLD, HTN, AFib (diagnosed recently, not on AC), benzodiazepine/opioid & ETOH abuse, and mitral valve prolapse found to have severe mitral and tricuspid regurgitation w/ signs of cardiogenic shock, now s/p RHC with IABP placement.   follwed by HF team  CTS consulted MR/TR  4/16 MVR(t) - Epic 33mm valve Maze procedure LAAL - 40mm AtriClip recovered in CTU  extubated OR day  4/17 VSS  dobutamine for  inotropic support  /  lasix gtt - VASO /IABP d/c l seen by PT disposition to home with PT librium thiamine and folic acid-  4/18TTE 4/18 EF 35%, no intervalvular MR  junctional rhythm  coumadin for MVR  4/19 Precedex for delirium   4/20 VSS seen by behavioural health recommend  Seroquel 12.5-25  PO q6 for agitation chest tubes d/c  dobutamine d/c  4/21 VSS right brachial felicia d/c transfer to [facility] +  upon arrival .  d/c IV benzo + PW VVI 40  TELE- NSR   disposition to home this week  4/22     afib  72   amio 200 QD   EP  cslt   + PW   to epm  4/23   coumadin 5 mg       lvx 90 q12,    npo  for DCCV   +pw   amio 200 qd  4/24  LVX d/c'ed INR 2.17 this AM, coumadin 5mg tonight. Monitor electrolytes. K 3.5 this AM, s/p supplement.   As per d/w Heart Failure team:  patient started on Toprol 25mg qD and farxiga 10mg qD; increased aldactone 50mg qD, and decreased lasix to 20mg qD).   4/25 VSS. K 3.6, s/p supplemented. Dose coumadin per INR. Encourage OOB/increase ambulation as tolerated. Current medication regimen maintained  4/26 VSS, PW cut, likely dc sat/sun as per Dr. Maki, inc ambulation, Coumadin 5mg tonight  4/27 DC home Coumadin 4 mg qd MVR/Afib

## 2024-04-27 NOTE — PROGRESS NOTE ADULT - PROBLEM SELECTOR PROBLEM 1
S/P MVR (mitral valve replacement)
Acute decompensated heart failure
Acute decompensated heart failure
Heart failure with preserved left ventricular function (HFpEF)
S/P MVR (mitral valve replacement)
Acute decompensated heart failure
Heart failure with preserved left ventricular function (HFpEF)

## 2024-04-27 NOTE — DISCHARGE NOTE PROVIDER - NSDCACTIVITY_GEN_ALL_CORE
Bathing allowed/Do not drive or operate machinery/Showering allowed/Do not make important decisions/Stairs allowed/Walking - Indoors allowed/No heavy lifting/straining/Walking - Outdoors allowed/Follow Instructions Provided by your Surgical Team/Activity as tolerated

## 2024-04-27 NOTE — DISCHARGE NOTE PROVIDER - HOSPITAL COURSE
63 y/o female with PMHx of HLD, HTN, AFib (diagnosed recently, not on AC), benzodiazepine/opioid & ETOH abuse, and mitral valve prolapse found to have severe mitral and tricuspid regurgitation w/ signs of cardiogenic shock, now s/p RHC with IABP placement.   follwed by HF team  CTS consulted MR/TR  4/16 MVR(t) - Epic 33mm valve Maze procedure LAAL - 40mm AtriClip recovered in CTU  extubated OR day  4/17 VSS  dobutamine for  inotropic support  /  lasix gtt - VASO /IABP d/c l seen by PT disposition to home with PT librium thiamine and folic acid-  4/18TTE 4/18 EF 35%, no intervalvular MR  junctional rhythm  coumadin for MVR  4/19 Precedex for delirium   4/20 VSS seen by behavioural health recommend  Seroquel 12.5-25  PO q6 for agitation chest tubes d/c  dobutamine d/c  4/21 VSS right brachial felicia d/c transfer to [facility] +  upon arrival .  d/c IV benzo + PW VVI 40  TELE- NSR   disposition to home this week  4/22     afib  72   amio 200 QD   EP  cslt   + PW   to epm  4/23   coumadin 5 mg       lvx 90 q12,    npo  for DCCV   +pw   amio 200 qd  4/24  LVX d/c'ed INR 2.17 this AM, coumadin 5mg tonight. Monitor electrolytes. K 3.5 this AM, s/p supplement.   As per d/w Heart Failure team:  patient started on Toprol 25mg qD and farxiga 10mg qD; increased aldactone 50mg qD, and decreased lasix to 20mg qD).   4/25 VSS. K 3.6, s/p supplemented. Dose coumadin per INR. Encourage OOB/increase ambulation as tolerated. Current medication regimen maintained  4/26 VSS, PW cut, likely dc sat/sun as per Dr. Maki, inc ambulation, Coumadin 5mg tonight  4/27 DC home Coumadin 4 mg qd MVR/Afib

## 2024-04-27 NOTE — DISCHARGE NOTE PROVIDER - NSDCMRMEDTOKEN_GEN_ALL_CORE_FT
amiodarone 200 mg oral tablet: 1 tab(s) orally once a day  aspirin 81 mg oral delayed release tablet: 1 tab(s) orally once a day  Crestor 5 mg oral tablet: 1 tab(s) orally once a day  dapagliflozin 10 mg oral tablet: 1 tab(s) orally once a day  furosemide 20 mg oral tablet: 1 tab(s) orally once a day  losartan 25 mg oral tablet: 1 tab(s) orally once a day  metoprolol succinate 25 mg oral tablet, extended release: 1 tab(s) orally once a day  oxyCODONE 5 mg oral tablet: 1 tab(s) orally every 8 hours as needed for  moderate pain MDD: 3  rosuvastatin 5 mg oral tablet: 1 tab(s) orally once a day (at bedtime)  senna leaf extract oral tablet: 2 tab(s) orally once a day (at bedtime)  spironolactone 25 mg oral tablet: 2 tab(s) orally once a day  warfarin 4 mg oral tablet: 1 tab(s) orally once a day (at bedtime)

## 2024-04-27 NOTE — PROGRESS NOTE ADULT - PROBLEM SELECTOR PLAN 1
4/16 MVR(t) - Epic 33mm valve Maze procedure LAAL - 40mm AtriClip   c/w ASA 81 Atorvastatin 20 Toprol 25mg qD  on farxiga 10mg qD, aldactone 50mg qD, and lasix to 20mg qD  dose coumadin daily per INR; due for 5mg coumadin tonight  EP following -> s/p DCCV 4/23  Heart failure team following> OP followup 5/7  830 am  monitor electrolytes, keep K >4 and Mg >2  monitor I/Os, daily weight  DC home  Plan of care d/w CTS Attending

## 2024-04-27 NOTE — PROGRESS NOTE ADULT - SUBJECTIVE AND OBJECTIVE BOX
Subjective:  "Hello"  OOB chair  Ambulated in [place]      Tele:  SR  60s           V/S:                    T(F): 97.2 (24 @ 11:47), Max: 98.2 (24 @ 20:10)  HR: 61 (24 @ 11:47) (56 - 63)  BP: 94/53 (24 @ 11:47) (85/60 - 110/69)  RR: 18 (24 @ 11:47) (18 - 18)  SpO2: 94% (24 @ 11:47) (94% - 95%)  Wt(kg): --      LV EF:      Labs:      130<L>  |  90<L>  |  10  ----------------------------<  88  4.4   |  27  |  0.72    Ca    9.3      2024 06:05  Phos  3.3       Mg     2.0                                      8.2    10.25 )-----------( 375      ( 2024 06:05 )             26.6        PT/INR - ( 2024 06:05 )   PT: 20.6 sec;   INR: 2.00 ratio         PTT - ( 2024 06:05 )  PTT:33.0 sec     CAPILLARY BLOOD GLUCOSE               CXR:    I&O's Detail    2024 07:01  -  2024 07:00  --------------------------------------------------------  IN:    Oral Fluid: 680 mL  Total IN: 680 mL    OUT:    Voided (mL): 400 mL  Total OUT: 400 mL    Total NET: 280 mL    BOWEL MOVEMENT:  [x ] YES [ ] NO      Daily     Daily Weight in k.6 (2024 06:32)  Medications:  acetaminophen     Tablet .. 650 milliGRAM(s) Oral every 6 hours PRN  aMIOdarone    Tablet 400 milliGRAM(s) Oral two times a day  aspirin enteric coated 81 milliGRAM(s) Oral daily  atorvastatin 20 milliGRAM(s) Oral at bedtime  bacitracin   Ointment 1 Application(s) Topical daily PRN  bisacodyl Suppository 10 milliGRAM(s) Rectal once PRN  bisacodyl Suppository 10 milliGRAM(s) Rectal once  dapagliflozin 10 milliGRAM(s) Oral daily  famotidine    Tablet 20 milliGRAM(s) Oral two times a day  furosemide    Tablet 20 milliGRAM(s) Oral daily  LORazepam     Tablet 1 milliGRAM(s) Oral every 8 hours PRN  losartan 25 milliGRAM(s) Oral daily  metoprolol succinate ER 25 milliGRAM(s) Oral daily  nicotine - 21 mG/24Hr(s) Patch 1 Patch Transdermal <User Schedule>  oxyCODONE    IR 20 milliGRAM(s) Oral every 4 hours PRN  polyethylene glycol 3350 17 Gram(s) Oral daily  senna 2 Tablet(s) Oral at bedtime  spironolactone 50 milliGRAM(s) Oral daily        Physical Exam:    General: well appearing female, in no acute distress,  Neurology: alert and oriented x 3,  CV : S1/S2, no murmurs/rubs/gallops  Sternal Wound : CDI SHAINA, Stable  Lungs: clear. RR easy, unlabored   Abdomen: soft, nontender, nondistended, positive bowel sounds, +bowel movement   :  pt voiding without difficulty   Extremities: VELIZ; no edema, neg calf tenderness. PPP bilaterally, groins stable                     PAST MEDICAL & SURGICAL HISTORY:  HTN (hypertension)      Mitral valve prolapse      HLD (hyperlipidemia)      Chronic back pain      S/P MVR (mitral valve repair)

## 2024-04-27 NOTE — DISCHARGE NOTE PROVIDER - CARE PROVIDER_API CALL
Prabhu Maki  Thoracic Surgery  [address] [place] [id number]  Phone: [phone]  Fax: [phone]  Follow Up Time: 2 weeks

## 2024-04-27 NOTE — PROGRESS NOTE ADULT - PROBLEM SELECTOR PROBLEM 3
Unspecified atrial fibrillation
Tricuspid regurgitation
Unspecified atrial fibrillation
Tricuspid regurgitation
Unspecified atrial fibrillation
Unspecified atrial fibrillation
Tricuspid regurgitation

## 2024-04-27 NOTE — DISCHARGE NOTE PROVIDER - NSDCPNSUBOBJ_GEN_ALL_CORE
Progress Note:   · Provider Specialty  CT Surgery    Reason for Admission:   Reason for Admission:  · Reason for Admission  MVR      · Subjective and Objective:   Subjective:  "Hello"  OOB chair  Ambulated in [place]      Tele:  SR  60s           V/S:                    T(F): 97.2 (24 @ 11:47), Max: 98.2 (24 @ 20:10)  HR: 61 (24 @ 11:47) (56 - 63)  BP: 94/53 (24 @ 11:47) (85/60 - 110/69)  RR: 18 (24 @ 11:47) (18 - 18)  SpO2: 94% (24 @ 11:47) (94% - 95%)  Wt(kg): --      LV EF:      Labs:      130<L>  |  90<L>  |  10  ----------------------------<  88  4.4   |  27  |  0.72    Ca    9.3      2024 06:05  Phos  3.3       Mg     2.0                                      8.2    10.25 )-----------( 375      ( 2024 06:05 )             26.6        PT/INR - ( 2024 06:05 )   PT: 20.6 sec;   INR: 2.00 ratio         PTT - ( 2024 06:05 )  PTT:33.0 sec     CAPILLARY BLOOD GLUCOSE               CXR:    I&O's Detail    2024 07:01  -  2024 07:00  --------------------------------------------------------  IN:    Oral Fluid: 680 mL  Total IN: 680 mL    OUT:    Voided (mL): 400 mL  Total OUT: 400 mL    Total NET: 280 mL    BOWEL MOVEMENT:  [x ] YES [ ] NO      Daily     Daily Weight in k.6 (2024 06:32)  Medications:  acetaminophen     Tablet .. 650 milliGRAM(s) Oral every 6 hours PRN  aMIOdarone    Tablet 400 milliGRAM(s) Oral two times a day  aspirin enteric coated 81 milliGRAM(s) Oral daily  atorvastatin 20 milliGRAM(s) Oral at bedtime  bacitracin   Ointment 1 Application(s) Topical daily PRN  bisacodyl Suppository 10 milliGRAM(s) Rectal once PRN  bisacodyl Suppository 10 milliGRAM(s) Rectal once  dapagliflozin 10 milliGRAM(s) Oral daily  famotidine    Tablet 20 milliGRAM(s) Oral two times a day  furosemide    Tablet 20 milliGRAM(s) Oral daily  LORazepam     Tablet 1 milliGRAM(s) Oral every 8 hours PRN  losartan 25 milliGRAM(s) Oral daily  metoprolol succinate ER 25 milliGRAM(s) Oral daily  nicotine - 21 mG/24Hr(s) Patch 1 Patch Transdermal <User Schedule>  oxyCODONE    IR 20 milliGRAM(s) Oral every 4 hours PRN  polyethylene glycol 3350 17 Gram(s) Oral daily  senna 2 Tablet(s) Oral at bedtime  spironolactone 50 milliGRAM(s) Oral daily        Physical Exam:    General: well appearing female, in no acute distress,  Neurology: alert and oriented x 3,  CV : S1/S2, no murmurs/rubs/gallops  Sternal Wound : CDI SHAINA, Stable  Lungs: clear. RR easy, unlabored   Abdomen: soft, nontender, nondistended, positive bowel sounds, +bowel movement   :  pt voiding without difficulty   Extremities: VELIZ; no edema, neg calf tenderness. PPP bilaterally, groins stable                     PAST MEDICAL & SURGICAL HISTORY:  HTN (hypertension)      Mitral valve prolapse      HLD (hyperlipidemia)      Chronic back pain      S/P MVR (mitral valve repair)                      Assessment and Plan:   · Assessment     This is a   65 y/o female with PMHx of HLD, HTN, AFib (diagnosed recently, not on AC), benzodiazepine/opioid & ETOH abuse, and mitral valve prolapse found to have severe mitral and tricuspid regurgitation w/ signs of cardiogenic shock, now s/p RHC with IABP placement.   follwed by HF team  CTS consulted MR/TR   MVR(t) - Epic 33mm valve Maze procedure LAAL - 40mm AtriClip recovered in CTU  extubated OR day   VSS  dobutamine for  inotropic support  /  lasix gtt - VASO /IABP d/c l seen by PT disposition to home with PT librium thiamine and folic acid-  TTE  EF 35%, no intervalvular MR  junctional rhythm  coumadin for MVR   Precedex for delirium    VSS seen by behavioural health recommend  Seroquel 12.5-25  PO q6 for agitation chest tubes d/c  dobutamine d/c   VSS right brachial felicia d/c transfer to [facility] +  upon arrival .  d/c IV benzo + PW VVI 40  TELE- NSR   disposition to home this week       afib  72   amio 200 QD   EP  cslt   + PW   to epm     coumadin 5 mg       lvx 90 q12,    npo  for DCCV   +pw   amio 200 qd    LVX d/c'ed INR 2.17 this AM, coumadin 5mg tonight. Monitor electrolytes. K 3.5 this AM, s/p supplement.   As per d/w Heart Failure team:  patient started on Toprol 25mg qD and farxiga 10mg qD; increased aldactone 50mg qD, and decreased lasix to 20mg qD).    VSS. K 3.6, s/p supplemented. Dose coumadin per INR. Encourage OOB/increase ambulation as tolerated. Current medication regimen maintained   VSS, PW cut, likely dc sat/sun as per Dr. Maki, inc ambulation, Coumadin 5mg tonight   DC home Coumadin 4 mg qd MVR/Afib      Problem/Plan - 1:  ·  Problem: S/P MVR (mitral valve replacement).   ·  Plan:  MVR(t) - Epic 33mm valve Maze procedure LAAL - 40mm AtriClip   c/w ASA 81 Atorvastatin 20 Toprol 25mg qD  on farxiga 10mg qD, aldactone 50mg qD, and lasix to 20mg qD  dose coumadin daily per INR; due for 5mg coumadin tonight  EP following -> s/p DCCV   Heart failure team following> OP followup   830 am  monitor electrolytes, keep K >4 and Mg >2  monitor I/Os, daily weight  DC home  Plan of care d/w CTS Attending.    Problem/Plan - 2:  ·  Problem: Opioid type dependence.   ·  Plan: behavioral health team consulted  on PO ativan 1mg q8hr PRN for anxiety  on Oxycodone IR 20mg q4hr PRN.    Problem/Plan - 3:  ·  Problem: Unspecified atrial fibrillation.   ·  Plan: EP following. S/p DCCV   on amio 400mg BID x1 wk, followed by 200mg BID x1 wk, followed by 200mg qD  dose coumadin daily per INR.      Electronic Signatures:  Galina Fenton (NP)  (Signed 2024 12:20)  	Authored: Progress Note, Reason for Admission, Subjective and Objective, Assessment and Plan      Last Updated: 2024 12:20 by Galina Fenton (ANY)

## 2024-04-29 ENCOUNTER — NON-APPOINTMENT (OUTPATIENT)
Age: 64
End: 2024-04-29

## 2024-04-29 RX ORDER — LOSARTAN POTASSIUM 25 MG/1
25 TABLET, FILM COATED ORAL DAILY
Refills: 0 | Status: ACTIVE | COMMUNITY

## 2024-04-29 RX ORDER — ROSUVASTATIN CALCIUM 5 MG/1
5 TABLET, FILM COATED ORAL DAILY
Refills: 0 | Status: ACTIVE | COMMUNITY

## 2024-04-29 RX ORDER — OXYCODONE HYDROCHLORIDE 5 MG/1
5 CAPSULE ORAL EVERY 8 HOURS
Refills: 0 | Status: ACTIVE | COMMUNITY

## 2024-04-30 ENCOUNTER — NON-APPOINTMENT (OUTPATIENT)
Age: 64
End: 2024-04-30

## 2024-04-30 ENCOUNTER — APPOINTMENT (OUTPATIENT)
Dept: CARE COORDINATION | Facility: HOME HEALTH | Age: 64
End: 2024-04-30

## 2024-05-01 ENCOUNTER — NON-APPOINTMENT (OUTPATIENT)
Age: 64
End: 2024-05-01

## 2024-05-01 NOTE — CHART NOTE - NSCHARTNOTESELECT_GEN_ALL_CORE
Heart Failure/Off Service Note
Cardiology Chart Note
Heart Failure/Off Service Note
[facility] Reference #: [id number]/Event Note
Post-Discharge Note

## 2024-05-01 NOTE — CHART NOTE - NSCHARTNOTEFT_GEN_A_CORE
64F PMHx of HLD, HTN, AFib (diagnosed recently, not on AC), and mitral valve prolapse found to have severe mitral regurgitation complicated by cardiogenic shock.  Now POD#4 from MVR(t) with Maze procedure with Dr Maki on 4/16.    Contacted with concern for junctional bradycardia post-op, requiring pacing from epicardial wires.  Tele with V-pacing at 80bpm, episode of likely junctional rhythm on 4/19- likely during testing of pacing.  ECG at time of evaluation with normal sinus rhythm with rates in the 60s.    - transient junctional bradycardia, likely from post-operative myocardial edema- since resolved  - currently with underlying sinus rhythm in the 60s  - no indication for PPM at this time  - can change pacing settings to backup pacing to allow for physiologic conduction  - continue to hold AV ruslan blocking agents  - can reach out to EP as needed    Jemima Paige MD  PGY-4, Cardiology Fellow
Outpatient follow up has been arranged for 5/7 @ 8:30 with the heart failure NPs. Will be followed by Dr. Marx
Practitioner Count: 3  Pharmacy Count: 2  Current Opioid Prescriptions: 1  Current Benzodiazepine Prescriptions: 1  Current Stimulant Prescriptions: 0      Patient Demographic Information (PDI)       PDI	First Name	Last Name	Birth Date	Gender	Street Address	[facility]	Zip Code  A	Denise Bautista	1960	Female	[address] [facility]	[address]  B	Denise Bautista	1960	Female	[address] [facility]	[address]  C	Denise Bautista	1960	Female	[address] [facility]	[address]    Prescription Information      PDI Filter:    PDI	Current Rx	Drug Type	Rx Written	Rx Dispensed	Drug	Quantity	Days Supply  A	N	B	07/06/2023	07/14/2023	clonazepam 2 mg tablet	90	30  Prescriber Name Miguel Angel Serna MD  Prescriber HAYDEE # [id number]  [facility] Method Medicare  [facility]  [facility]	N	O	07/06/2023	07/14/2023	oxycodone hcl (ir) 20 mg tab	180	30  Prescriber Name Miguel Angel Serna MD  Prescriber HAYDEE # [id number]  Payment Method Medicare  Dispenser [employer] [facility]  B	N	B	03/11/2024	03/15/2024	lorazepam 0.5 mg tablet	28	28  Prescriber Name Chidi King MD  Prescriber HAYDEE # [id number]  Payment Method Insurance  Dispenser [employer] #06  B	N	B	02/12/2024	02/16/2024	lorazepam 0.5 mg tablet	28	28  Prescriber Name Chidi King MD  Prescriber HAYDEE # [id number]  Payment Method Insurance  Dispenser [employer]06  B	N	B	01/12/2024	01/15/2024	lorazepam 0.5 mg tablet	28	28  Prescriber Name Mandeep Barrera M, (PA)  Prescriber HAYDEE # [id number]  Payment Method Insurance  Dispenser [employer]06  B	N	O	12/23/2023	12/23/2023	guaifen-codeine 100-10 mg/5 ml	118ml	8  Prescriber Name Shravan Dela Cruz)  Prescriber HAYDEE # [id number]  Payment Method Insurance  Dispenser [employer]06  B	N	B	12/18/2023	12/18/2023	lorazepam 0.5 mg tablet	28	28  Prescriber Name Mandeep Barrera M, (PA)  Prescriber HAYDEE # [id number]  Payment Method Insurance  Dispenser [employer] #06  B	N	B	11/14/2023	11/18/2023	lorazepam 0.5 mg tablet	28	28  Prescriber Name Mandeep Barrera M, (PA)  Prescriber HAYDEE # [id number]  Payment Method Insurance  Dispenser [employer] #06  B	N	B	10/19/2023	10/20/2023	lorazepam 0.5 mg tablet	28	28  Prescriber Name Mandeep Barrera M, (PA)  Prescriber HAYDEE # [id number]  Payment Method Insurance  Dispenser [employer] #06  C	Y	O	03/26/2024	03/29/2024	oxycodone hcl (ir) 20 mg tab	120	30  Prescriber Name Miguel Angel Serna MD  Prescriber HAYDEE # [id number]  Payment Method Medicare  Dispenser [employer]  C	Y	B	03/26/2024	03/29/2024	clonazepam 1 mg tablet	90	30  Prescriber Name Miguel Angel Serna MD  Prescriber HAYDEE # [id number]  Payment Method Medicare  Dispenser [employer]  C	N	O	02/29/2024	03/01/2024	oxycodone hcl (ir) 20 mg tab	120	30  Prescriber Name Miguel Angel Serna MD  Prescriber HAYDEE # [id number]  Payment Method Medicare  Dispenser [employer]  C	N	B	02/29/2024	03/01/2024	clonazepam 1 mg tablet	90	30  Prescriber Name Miguel Angel Serna MD  Prescriber HYADEE # [id number]  Payment Method Medicare  Dispenser [employer]  C	N	O	02/01/2024	02/01/2024	oxycodone hcl (ir) 20 mg tab	120	30  Prescriber Name Miguel Angel Serna MD  Prescriber HAYDEE # [id number]  Payment Method Medicare  Dispenser [employer]  C	N	B	02/01/2024	02/01/2024	clonazepam 1 mg tablet	90	30  Prescriber Name Miguel Angel Serna MD  Prescriber HAYDEE # [id number]  Payment Method Medicare  Dispenser [employer]  C	N	O	12/28/2023	01/03/2024	oxycodone hcl (ir) 20 mg tab	120	30  Prescriber Name Miguel Angel Serna MD  Prescriber HAYDEE # [id number]  Payment Method Medicare  Dispenser [employer]  C	N	B	01/02/2024	01/03/2024	clonazepam 1 mg tablet	90	30  Prescriber Name Miguel Angel Serna MD  Prescriber HAYEDE # [id number]  Payment Method Medicare  Dispenser [employer]  C	N	B	12/05/2023	12/13/2023	clonazepam 1 mg tablet	60	30  Prescriber Name Miguel Angel Serna MD  Prescriber HAYDEE # [id number]  Payment Method Medicare  Dispenser [employer]  C	N	O	12/04/2023	12/05/2023	oxycodone hcl (ir) 20 mg tab	120	30  Prescriber Name Miguel Angel Serna MD  Prescriber HAYDEE # [id number]  Payment Method Medicare  Dispenser [employer]  C	N	B	12/04/2023	12/05/2023	clonazepam 1 mg tablet	30	10  Prescriber Name Miguel Angel Serna MD  Prescriber HAYDEE # [id number]  Payment Method Medicare  [employer]  C	N	O	11/02/2023	11/07/2023	oxycodone hcl (ir) 20 mg tab	180	30  Prescriber Name Miguel Angel Serna MD  Prescriber HAYDEE # [id number]  Payment Method Medicare  [employer]  C	N	B	11/02/2023	11/07/2023	clonazepam 2 mg tablet	90	30  Prescriber Name Miguel Angel Serna MD  Prescriber HAYDEE # [id number]  Payment Method Medicare  Dispenser [employer]  C	N	O	10/06/2023	10/08/2023	oxycodone hcl (ir) 20 mg tab	180	30  Prescriber Name Miguel Angel Serna MD  Prescriber HAYDEE # [id number]  Payment Method Medicare  Dispenser [employer]  C	N	B	10/06/2023	10/08/2023	clonazepam 2 mg tablet	90	30  Prescriber Name Miguel Angel Serna MD  Prescriber HAYDEE # [id number]  Payment Method Medicare  Dispenser [employer]  C	N	O	09/07/2023	09/09/2023	oxycodone hcl (ir) 20 mg tab	180	30  Prescriber Name Miguel Angel Serna MD  Prescriber HAYDEE # [id number]  Payment Method Medicare Dispenser [employer]  C	N	B	09/07/2023	09/09/2023	clonazepam 2 mg tablet	90	30  Prescriber Name Miguel Angel Serna MD  Prescriber HAYDEE # [id number]  Payment Method Medicare  Dispenser [employer]  C	N	B	08/10/2023	08/11/2023	clonazepam 2 mg tablet	90	30  Prescriber Name Miguel Angel Serna MD  Prescriber HAYDEE # [id number]  Payment Method Medicare Dispenser [employer]  C	N	O	08/10/2023	08/11/2023	oxycodone hcl (ir) 20 mg tab	180	30  Prescriber Name Miguel Angel Serna MD  Prescriber HAYDEE # [id number]  Payment Method Medicare  [employer]  C	N	O	06/13/2023	06/16/2023	oxycodone hcl (ir) 20 mg tab	180	30  Prescriber Name Miguel Angel Serna MD  Prescriber HAYDEE # [id number]  Payment Method Medicare  [employer]  C	N	B	06/13/2023	06/16/2023	clonazepam 2 mg tablet	90	30  Prescriber Name Miguel Angel Serna MD  Prescriber HAYDEE # [id number]  Payment Method Medicare  [employer]  C	N	O	05/18/2023	05/19/2023	oxycodone hcl (ir) 20 mg tab	180	30  Prescriber Name Miguel Angel Serna MD  Prescriber HAYDEE # [id number]  Payment Method Medicare  [employer]  C	N	B	05/18/2023	05/19/2023	clonazepam 2 mg tablet	90	30  Prescriber Name Miguel Angel Serna MD  Prescriber HAYDEE # [id number]  Payment Method Medicare  [employer]  C	N	O	04/19/2023	04/20/2023	oxycodone hcl (ir) 20 mg tab	120	30  Prescriber Name Miguel Angel Serna MD  Prescriber HAYDEE # [id number]  Payment Method Medicare  [employer]  C	N	B	04/19/2023	04/20/2023	clonazepam 2 mg tablet	90	30  Prescriber Name Miguel Angel Serna MD  Prescriber HAYDEE # [id number]  Payment Method Medicare  [employer]
Chart was reviewed and discussed with attending Dr. Bass.   Would recommend starting Toprol XL 25 mg PO QD today.   At this time will defer transitioning to Losartan to Entresto and starting Farxiga until patient has undergone DCCV
Post-Discharge Medication Review	  	  Patient's preferred pharmacy was updated in OMR: [facility]	  	  Patient contacted to offer medication counseling post-discharge. Medication reconciliation completed.     Per patient, medications include:	  1.	amiodarone 200 mg oral tablet 1 tab(s) orally once a day  2.	aspirin 81 mg oral delayed release tablet 1 tab(s) orally once a day  3.	Crestor 5 mg oral tablet 1 tab(s) orally once a day  4.	dapagliflozin 10 mg oral tablet 1 tab(s) orally once a day  5.	furosemide 20 mg oral tablet 1 tab(s) orally once a day  6.	losartan 25 mg oral tablet 1 tab(s) orally once a day  7.	metoprolol succinate 25 mg oral tablet, extended release 1 tab(s) orally once a day  8.	oxyCODONE 5 mg oral tablet 1 tab(s) orally every 8 hours as needed for moderate pain                   MDD: 3  9.	spironolactone 25 mg oral tablet 2 tab(s) orally once a day  10.	warfarin 4 mg oral tablet 1 tab(s) orally once a day (at bedtime)   	  Medications added to OMR (updated per discussion with patient):	  11.	polyethylene glycol 3350 oral powder for reconstitution             17 gram(s) orally once a day  12.	Nicotine 21 mg/24H Film Apply 1 patch as directed once daily   13.	Centrum Women's oral tablet 1 tab(s) orally once a day     Medications removed from OMR (updated per discussion with patient):	  1.	senna leaf extract oral tablet 2 tab(s) orally once a day (at bedtime)   	  Medication name, indication, administration, side effect, and monitoring reviewed for new medications during post discharge counseling visit with patient. Patient demonstrated understanding. Counseling offered for all medications.

## 2024-05-03 ENCOUNTER — APPOINTMENT (OUTPATIENT)
Dept: CARE COORDINATION | Facility: HOME HEALTH | Age: 64
End: 2024-05-03
Payer: MEDICARE

## 2024-05-03 ENCOUNTER — NON-APPOINTMENT (OUTPATIENT)
Age: 64
End: 2024-05-03

## 2024-05-03 VITALS
OXYGEN SATURATION: 94 % | RESPIRATION RATE: 16 BRPM | HEART RATE: 86 BPM | DIASTOLIC BLOOD PRESSURE: 68 MMHG | SYSTOLIC BLOOD PRESSURE: 110 MMHG

## 2024-05-03 LAB
INR PPP: 10.08 RATIO
PT BLD: 107.4 SEC

## 2024-05-03 PROCEDURE — 99024 POSTOP FOLLOW-UP VISIT: CPT

## 2024-05-03 RX ORDER — SPIRONOLACTONE 25 MG/1
25 TABLET ORAL DAILY
Refills: 0 | Status: ACTIVE | COMMUNITY

## 2024-05-03 NOTE — PHYSICAL EXAM
[] : no respiratory distress [Exaggerated Use Of Accessory Muscles For Inspiration] : no accessory muscle use [Auscultation Breath Sounds / Voice Sounds] : lungs were clear to auscultation bilaterally [Heart Sounds] : normal S1 and S2 [Chest Visual Inspection Thoracic Asymmetry] : no chest asymmetry [Bowel Sounds] : normal bowel sounds [Oriented To Time, Place, And Person] : oriented to person, place, and time [FreeTextEntry2] : trace B/L LE edema  [FreeTextEntry1] : LB 5/1

## 2024-05-03 NOTE — ASSESSMENT
[FreeTextEntry1] : Pt recovering well at home s/p OHS. Reviewed all medications and dosages with pt understanding. Pt has all medications in home and is taking as prescribed. Pain controlled with current medication regimen. No new symptoms, issues or concerns to report at this time. Pt. INR is being monitored by CTS office. Pt. to have INR drawn today at [facility] lab.

## 2024-05-03 NOTE — PLAN
[TextEntry] : Post Operative Care:  Pt advised of importance of daily weights. Pt instructed on how to use incentive spirometer every hour, demonstrated proper use. Pt encouraged to ambulate as much as tolerated, avoiding extreme temperatures outdoors. Also advised to cleanse incisions daily with mild soap and water and to avoid lotions, powders, ointments or creams near or on the incision. Low salt, low fat diet encouraged and discussed. Pt advised to avoid heavy lifting or straining.     Follow Your Heart team will continue to follow up with pt status.  NP/CCC roles explained with pt understanding, contact information provided. Pt agrees to call with any questions, issues or concerns.  Worsening symptoms reviewed with patient understanding.      FOLLOW UP APPOINTMENTS:  CTS:  5/8  CARDIOLOGIST:  pt. will call to schedule appointment to be seen within 2 weeks   HF clinic: 5/7  PCP: Dr.William Serna pt. was seen on 4/30

## 2024-05-03 NOTE — HISTORY OF PRESENT ILLNESS
[FreeTextEntry1] :  65 y/o female with PMHx of HLD, HTN, AFib (diagnosed recently, not on AC),  benzodiazepine/opioid & ETOH abuse, and mitral valve prolapse found to have  severe mitral and tricuspid regurgitation w/ signs of cardiogenic shock, now  s/p RHC with IABP placement. follwed by HF team  CTS consulted MR/TR  4/16 MVR(t) - Epic 33mm valve Maze procedure LAAL - 40mm AtriClip recovered in  CTU  extubated OR day  4/17 VSS  dobutamine for  inotropic support  /  lasix gtt - VASO /IABP d/c l  seen by PT disposition to home with PT librium thiamine and folic acid-  4/18TTE 4/18 EF 35%, no intervalvular MR  junctional rhythm  coumadin for MVR  4/19 Precedex for delirium  4/20 VSS seen by behavioural health recommend  Seroquel 12.5-25  PO q6 for  agitation chest tubes d/c  dobutamine d/c  4/21 VSS right brachial felicia d/c transfer to [facility] +  upon arrival .  d/c  IV benzo + PW VVI 40  TELE- NSR disposition to home this week  4/22   afib  72 amio 200 QD EP  cslt + PW to epm  4/23 coumadin 5 mg   lvx 90 q12,  npo  for DCCV +pw amio 200 qd  4/24  LVX d/c'ed INR 2.17 this AM, coumadin 5mg tonight. Monitor electrolytes.  K 3.5 this AM, s/p supplement.  As per d/w Heart Failure team:  patient started on Toprol 25mg qD and farxiga  10mg qD; increased aldactone 50mg qD, and decreased lasix to 20mg qD).  4/25 VSS. K 3.6, s/p supplemented. Dose coumadin per INR. Encourage  OOB/increase ambulation as tolerated. Current medication regimen maintained  4/26 VSS, PW cut, likely dc sat/sun as per Dr. Maki, inc ambulation,  Coumadin 5mg tonight  4/27 DC home Coumadin 4 mg qd MVR/Afib 5/3 Initial visit completed at home  CC " I am doing well"

## 2024-05-04 ENCOUNTER — RESULT REVIEW (OUTPATIENT)
Age: 64
End: 2024-05-04

## 2024-05-04 ENCOUNTER — INPATIENT (INPATIENT)
Facility: HOSPITAL | Age: 64
LOS: 0 days | Discharge: HOME | DRG: 948 | End: 2024-05-05
Attending: THORACIC SURGERY (CARDIOTHORACIC VASCULAR SURGERY) | Admitting: THORACIC SURGERY (CARDIOTHORACIC VASCULAR SURGERY)
Payer: MEDICARE

## 2024-05-04 VITALS
TEMPERATURE: 98 F | RESPIRATION RATE: 16 BRPM | HEIGHT: 71 IN | DIASTOLIC BLOOD PRESSURE: 74 MMHG | WEIGHT: 195.11 LBS | OXYGEN SATURATION: 95 % | SYSTOLIC BLOOD PRESSURE: 131 MMHG | HEART RATE: 78 BPM

## 2024-05-04 DIAGNOSIS — R79.1 ABNORMAL COAGULATION PROFILE: ICD-10-CM

## 2024-05-04 DIAGNOSIS — Z98.890 OTHER SPECIFIED POSTPROCEDURAL STATES: Chronic | ICD-10-CM

## 2024-05-04 LAB
ALBUMIN SERPL ELPH-MCNC: 3.9 G/DL — SIGNIFICANT CHANGE UP (ref 3.3–5)
ALBUMIN SERPL ELPH-MCNC: 4 G/DL — SIGNIFICANT CHANGE UP (ref 3.3–5)
ALP SERPL-CCNC: 209 U/L — HIGH (ref 40–120)
ALP SERPL-CCNC: 223 U/L — HIGH (ref 40–120)
ALT FLD-CCNC: 14 U/L — SIGNIFICANT CHANGE UP (ref 10–45)
ALT FLD-CCNC: 15 U/L — SIGNIFICANT CHANGE UP (ref 10–45)
ANION GAP SERPL CALC-SCNC: 14 MMOL/L — SIGNIFICANT CHANGE UP (ref 5–17)
ANION GAP SERPL CALC-SCNC: 15 MMOL/L — SIGNIFICANT CHANGE UP (ref 5–17)
APTT BLD: 58.9 SEC — HIGH (ref 24.5–35.6)
APTT BLD: 60.5 SEC — HIGH (ref 24.5–35.6)
AST SERPL-CCNC: 17 U/L — SIGNIFICANT CHANGE UP (ref 10–40)
AST SERPL-CCNC: 21 U/L — SIGNIFICANT CHANGE UP (ref 10–40)
BASOPHILS # BLD AUTO: 0.04 K/UL — SIGNIFICANT CHANGE UP (ref 0–0.2)
BASOPHILS # BLD AUTO: 0.05 K/UL — SIGNIFICANT CHANGE UP (ref 0–0.2)
BASOPHILS NFR BLD AUTO: 0.6 % — SIGNIFICANT CHANGE UP (ref 0–2)
BASOPHILS NFR BLD AUTO: 0.7 % — SIGNIFICANT CHANGE UP (ref 0–2)
BILIRUB SERPL-MCNC: 0.3 MG/DL — SIGNIFICANT CHANGE UP (ref 0.2–1.2)
BILIRUB SERPL-MCNC: 0.4 MG/DL — SIGNIFICANT CHANGE UP (ref 0.2–1.2)
BLD GP AB SCN SERPL QL: NEGATIVE — SIGNIFICANT CHANGE UP
BUN SERPL-MCNC: 11 MG/DL — SIGNIFICANT CHANGE UP (ref 7–23)
BUN SERPL-MCNC: 11 MG/DL — SIGNIFICANT CHANGE UP (ref 7–23)
CALCIUM SERPL-MCNC: 9.2 MG/DL — SIGNIFICANT CHANGE UP (ref 8.4–10.5)
CALCIUM SERPL-MCNC: 9.3 MG/DL — SIGNIFICANT CHANGE UP (ref 8.4–10.5)
CHLORIDE SERPL-SCNC: 95 MMOL/L — LOW (ref 96–108)
CHLORIDE SERPL-SCNC: 96 MMOL/L — SIGNIFICANT CHANGE UP (ref 96–108)
CO2 SERPL-SCNC: 26 MMOL/L — SIGNIFICANT CHANGE UP (ref 22–31)
CO2 SERPL-SCNC: 28 MMOL/L — SIGNIFICANT CHANGE UP (ref 22–31)
CREAT SERPL-MCNC: 1.09 MG/DL — SIGNIFICANT CHANGE UP (ref 0.5–1.3)
CREAT SERPL-MCNC: 1.21 MG/DL — SIGNIFICANT CHANGE UP (ref 0.5–1.3)
EGFR: 50 ML/MIN/1.73M2 — LOW
EGFR: 57 ML/MIN/1.73M2 — LOW
EOSINOPHIL # BLD AUTO: 0.3 K/UL — SIGNIFICANT CHANGE UP (ref 0–0.5)
EOSINOPHIL # BLD AUTO: 0.33 K/UL — SIGNIFICANT CHANGE UP (ref 0–0.5)
EOSINOPHIL NFR BLD AUTO: 4.7 % — SIGNIFICANT CHANGE UP (ref 0–6)
EOSINOPHIL NFR BLD AUTO: 4.9 % — SIGNIFICANT CHANGE UP (ref 0–6)
GLUCOSE SERPL-MCNC: 75 MG/DL — SIGNIFICANT CHANGE UP (ref 70–99)
GLUCOSE SERPL-MCNC: 95 MG/DL — SIGNIFICANT CHANGE UP (ref 70–99)
HCT VFR BLD CALC: 29.9 % — LOW (ref 34.5–45)
HCT VFR BLD CALC: 32.6 % — LOW (ref 34.5–45)
HGB BLD-MCNC: 10 G/DL — LOW (ref 11.5–15.5)
HGB BLD-MCNC: 9.5 G/DL — LOW (ref 11.5–15.5)
IMM GRANULOCYTES NFR BLD AUTO: 0.3 % — SIGNIFICANT CHANGE UP (ref 0–0.9)
IMM GRANULOCYTES NFR BLD AUTO: 0.4 % — SIGNIFICANT CHANGE UP (ref 0–0.9)
INR BLD: 9.67 RATIO — CRITICAL HIGH (ref 0.85–1.18)
INR BLD: 9.67 RATIO — CRITICAL HIGH (ref 0.85–1.18)
LYMPHOCYTES # BLD AUTO: 1.64 K/UL — SIGNIFICANT CHANGE UP (ref 1–3.3)
LYMPHOCYTES # BLD AUTO: 1.66 K/UL — SIGNIFICANT CHANGE UP (ref 1–3.3)
LYMPHOCYTES # BLD AUTO: 24.4 % — SIGNIFICANT CHANGE UP (ref 13–44)
LYMPHOCYTES # BLD AUTO: 25.7 % — SIGNIFICANT CHANGE UP (ref 13–44)
MCHC RBC-ENTMCNC: 29.3 PG — SIGNIFICANT CHANGE UP (ref 27–34)
MCHC RBC-ENTMCNC: 30 PG — SIGNIFICANT CHANGE UP (ref 27–34)
MCHC RBC-ENTMCNC: 30.7 GM/DL — LOW (ref 32–36)
MCHC RBC-ENTMCNC: 31.8 GM/DL — LOW (ref 32–36)
MCV RBC AUTO: 94.3 FL — SIGNIFICANT CHANGE UP (ref 80–100)
MCV RBC AUTO: 95.6 FL — SIGNIFICANT CHANGE UP (ref 80–100)
MONOCYTES # BLD AUTO: 0.65 K/UL — SIGNIFICANT CHANGE UP (ref 0–0.9)
MONOCYTES # BLD AUTO: 0.75 K/UL — SIGNIFICANT CHANGE UP (ref 0–0.9)
MONOCYTES NFR BLD AUTO: 10.1 % — SIGNIFICANT CHANGE UP (ref 2–14)
MONOCYTES NFR BLD AUTO: 11.2 % — SIGNIFICANT CHANGE UP (ref 2–14)
MRSA PCR RESULT.: SIGNIFICANT CHANGE UP
NEUTROPHILS # BLD AUTO: 3.78 K/UL — SIGNIFICANT CHANGE UP (ref 1.8–7.4)
NEUTROPHILS # BLD AUTO: 3.92 K/UL — SIGNIFICANT CHANGE UP (ref 1.8–7.4)
NEUTROPHILS NFR BLD AUTO: 58.4 % — SIGNIFICANT CHANGE UP (ref 43–77)
NEUTROPHILS NFR BLD AUTO: 58.6 % — SIGNIFICANT CHANGE UP (ref 43–77)
NRBC # BLD: 0 /100 WBCS — SIGNIFICANT CHANGE UP (ref 0–0)
NRBC # BLD: 0 /100 WBCS — SIGNIFICANT CHANGE UP (ref 0–0)
NT-PROBNP SERPL-SCNC: 1927 PG/ML — HIGH (ref 0–300)
PLATELET # BLD AUTO: 439 K/UL — HIGH (ref 150–400)
PLATELET # BLD AUTO: 503 K/UL — HIGH (ref 150–400)
POTASSIUM SERPL-MCNC: 3.7 MMOL/L — SIGNIFICANT CHANGE UP (ref 3.5–5.3)
POTASSIUM SERPL-MCNC: 4 MMOL/L — SIGNIFICANT CHANGE UP (ref 3.5–5.3)
POTASSIUM SERPL-SCNC: 3.7 MMOL/L — SIGNIFICANT CHANGE UP (ref 3.5–5.3)
POTASSIUM SERPL-SCNC: 4 MMOL/L — SIGNIFICANT CHANGE UP (ref 3.5–5.3)
PROT SERPL-MCNC: 7.3 G/DL — SIGNIFICANT CHANGE UP (ref 6–8.3)
PROT SERPL-MCNC: 7.8 G/DL — SIGNIFICANT CHANGE UP (ref 6–8.3)
PROTHROM AB SERPL-ACNC: 99.6 SEC — HIGH (ref 9.5–13)
PROTHROM AB SERPL-ACNC: 99.6 SEC — HIGH (ref 9.5–13)
RBC # BLD: 3.17 M/UL — LOW (ref 3.8–5.2)
RBC # BLD: 3.41 M/UL — LOW (ref 3.8–5.2)
RBC # FLD: 14.7 % — HIGH (ref 10.3–14.5)
RBC # FLD: 14.8 % — HIGH (ref 10.3–14.5)
RH IG SCN BLD-IMP: NEGATIVE — SIGNIFICANT CHANGE UP
S AUREUS DNA NOSE QL NAA+PROBE: SIGNIFICANT CHANGE UP
SODIUM SERPL-SCNC: 137 MMOL/L — SIGNIFICANT CHANGE UP (ref 135–145)
SODIUM SERPL-SCNC: 137 MMOL/L — SIGNIFICANT CHANGE UP (ref 135–145)
TSH SERPL-MCNC: 9.3 UIU/ML — HIGH (ref 0.27–4.2)
WBC # BLD: 6.45 K/UL — SIGNIFICANT CHANGE UP (ref 3.8–10.5)
WBC # BLD: 6.72 K/UL — SIGNIFICANT CHANGE UP (ref 3.8–10.5)
WBC # FLD AUTO: 6.45 K/UL — SIGNIFICANT CHANGE UP (ref 3.8–10.5)
WBC # FLD AUTO: 6.72 K/UL — SIGNIFICANT CHANGE UP (ref 3.8–10.5)

## 2024-05-04 PROCEDURE — 93010 ELECTROCARDIOGRAM REPORT: CPT

## 2024-05-04 PROCEDURE — 99285 EMERGENCY DEPT VISIT HI MDM: CPT

## 2024-05-04 PROCEDURE — 93308 TTE F-UP OR LMTD: CPT | Mod: 26

## 2024-05-04 PROCEDURE — 71045 X-RAY EXAM CHEST 1 VIEW: CPT | Mod: 26

## 2024-05-04 RX ORDER — METOPROLOL TARTRATE 50 MG
25 TABLET ORAL DAILY
Refills: 0 | Status: DISCONTINUED | OUTPATIENT
Start: 2024-05-04 | End: 2024-05-05

## 2024-05-04 RX ORDER — OXYCODONE HYDROCHLORIDE 5 MG/1
10 TABLET ORAL EVERY 6 HOURS
Refills: 0 | Status: DISCONTINUED | OUTPATIENT
Start: 2024-05-04 | End: 2024-05-05

## 2024-05-04 RX ORDER — AMIODARONE HYDROCHLORIDE 400 MG/1
200 TABLET ORAL DAILY
Refills: 0 | Status: DISCONTINUED | OUTPATIENT
Start: 2024-05-04 | End: 2024-05-05

## 2024-05-04 RX ORDER — LOSARTAN POTASSIUM 100 MG/1
25 TABLET, FILM COATED ORAL DAILY
Refills: 0 | Status: DISCONTINUED | OUTPATIENT
Start: 2024-05-04 | End: 2024-05-05

## 2024-05-04 RX ORDER — FUROSEMIDE 40 MG
20 TABLET ORAL DAILY
Refills: 0 | Status: DISCONTINUED | OUTPATIENT
Start: 2024-05-04 | End: 2024-05-05

## 2024-05-04 RX ORDER — ATORVASTATIN CALCIUM 80 MG/1
20 TABLET, FILM COATED ORAL AT BEDTIME
Refills: 0 | Status: DISCONTINUED | OUTPATIENT
Start: 2024-05-04 | End: 2024-05-05

## 2024-05-04 RX ORDER — POLYETHYLENE GLYCOL 3350 17 G/17G
17 POWDER, FOR SOLUTION ORAL DAILY
Refills: 0 | Status: DISCONTINUED | OUTPATIENT
Start: 2024-05-04 | End: 2024-05-05

## 2024-05-04 RX ORDER — SODIUM CHLORIDE 9 MG/ML
3 INJECTION INTRAMUSCULAR; INTRAVENOUS; SUBCUTANEOUS EVERY 8 HOURS
Refills: 0 | Status: DISCONTINUED | OUTPATIENT
Start: 2024-05-04 | End: 2024-05-05

## 2024-05-04 RX ORDER — SPIRONOLACTONE 25 MG/1
50 TABLET, FILM COATED ORAL DAILY
Refills: 0 | Status: DISCONTINUED | OUTPATIENT
Start: 2024-05-04 | End: 2024-05-05

## 2024-05-04 RX ORDER — DAPAGLIFLOZIN 10 MG/1
10 TABLET, FILM COATED ORAL DAILY
Refills: 0 | Status: DISCONTINUED | OUTPATIENT
Start: 2024-05-04 | End: 2024-05-05

## 2024-05-04 RX ORDER — PHYTONADIONE (VIT K1) 5 MG
5 TABLET ORAL ONCE
Refills: 0 | Status: COMPLETED | OUTPATIENT
Start: 2024-05-04 | End: 2024-05-04

## 2024-05-04 RX ADMIN — SODIUM CHLORIDE 3 MILLILITER(S): 9 INJECTION INTRAMUSCULAR; INTRAVENOUS; SUBCUTANEOUS at 06:41

## 2024-05-04 RX ADMIN — DAPAGLIFLOZIN 10 MILLIGRAM(S): 10 TABLET, FILM COATED ORAL at 11:55

## 2024-05-04 RX ADMIN — Medication 20 MILLIGRAM(S): at 06:34

## 2024-05-04 RX ADMIN — Medication 1 MILLIGRAM(S): at 18:55

## 2024-05-04 RX ADMIN — Medication 25 MILLIGRAM(S): at 06:35

## 2024-05-04 RX ADMIN — OXYCODONE HYDROCHLORIDE 10 MILLIGRAM(S): 5 TABLET ORAL at 21:50

## 2024-05-04 RX ADMIN — POLYETHYLENE GLYCOL 3350 17 GRAM(S): 17 POWDER, FOR SOLUTION ORAL at 11:55

## 2024-05-04 RX ADMIN — SODIUM CHLORIDE 3 MILLILITER(S): 9 INJECTION INTRAMUSCULAR; INTRAVENOUS; SUBCUTANEOUS at 14:58

## 2024-05-04 RX ADMIN — AMIODARONE HYDROCHLORIDE 200 MILLIGRAM(S): 400 TABLET ORAL at 06:35

## 2024-05-04 RX ADMIN — SPIRONOLACTONE 50 MILLIGRAM(S): 25 TABLET, FILM COATED ORAL at 06:35

## 2024-05-04 RX ADMIN — OXYCODONE HYDROCHLORIDE 10 MILLIGRAM(S): 5 TABLET ORAL at 15:00

## 2024-05-04 RX ADMIN — ATORVASTATIN CALCIUM 20 MILLIGRAM(S): 80 TABLET, FILM COATED ORAL at 21:13

## 2024-05-04 RX ADMIN — OXYCODONE HYDROCHLORIDE 10 MILLIGRAM(S): 5 TABLET ORAL at 15:30

## 2024-05-04 RX ADMIN — LOSARTAN POTASSIUM 25 MILLIGRAM(S): 100 TABLET, FILM COATED ORAL at 06:35

## 2024-05-04 RX ADMIN — Medication 5 MILLIGRAM(S): at 05:22

## 2024-05-04 RX ADMIN — SODIUM CHLORIDE 3 MILLILITER(S): 9 INJECTION INTRAMUSCULAR; INTRAVENOUS; SUBCUTANEOUS at 20:41

## 2024-05-04 RX ADMIN — OXYCODONE HYDROCHLORIDE 10 MILLIGRAM(S): 5 TABLET ORAL at 21:12

## 2024-05-04 NOTE — H&P ADULT - ASSESSMENT
63 y/o female with PMHx of HLD, HTN, AFib, benzodiazepine/opioid & ETOH abuse, s/p MVR(t) - Epic 33mm valve Maze procedure LAAL - 40mm AtriClip, on 4/16/24 with Dr. Prabhu Maki. Patient was initiated on coumadin, and discharged home with Coumadin for MVR/Afib. Patient being followed by CTS office outpatient for coumadin/INR monitoring. Patient noted to have critical lab value of INR ~10 on outpatient labs, called to come to the ED for evaluation.   In the ED, patient adamently denied any acute bleeding, acute chest pain, shortness of breath, or palpitations. Repeat levels in the ER noted with INR of 9.67, ordered for PO reversal with Phytonadione 5mg x1 by ER provider.   Last Dose Coumadin: Coumadin 4mg  Now being admitted to CTS service for further management.

## 2024-05-04 NOTE — H&P ADULT - HISTORY OF PRESENT ILLNESS
65 y/o female with PMHx of HLD, HTN, AFib, benzodiazepine/opioid & ETOH abuse, s/p MVR(t) - Epic 33mm valve Maze procedure LAAL - 40mm AtriClip, on 4/16/24 with Dr. Prabhu Maki. Patient was initiated on coumadin, and discharged home with Coumadin for MVR/Afib. Patient being followed by CTS office outpatient for coumadin/INR monitoring. Patient noted to have critical lab value of INR ~10 on outpatient labs, called to come to the ED for evaluation.   In the ED, patient adamently denied any acute bleeding, acute chest pain, shortness of breath, or palpitations. Repeat levels in the ER noted with INR of 9.67, ordered for PO reversal with Phytonadione 5mg x1 by ER provider.   Last Dose Coumadin: Coumadin 4mg  Now being admitted to CTS service for further management.  63 y/o female with PMHx of HLD, HTN, AFib, benzodiazepine/opioid & ETOH abuse, Heart failure, s/p MVR(t) - Epic 33mm valve Maze procedure LAAL - 40mm AtriClip, on 4/16/24 with Dr. Prabhu Maki. Patient was initiated on coumadin, and discharged home with Coumadin for MVR/Afib. Patient being followed by CTS office outpatient for coumadin/INR monitoring. Patient noted to have critical lab value of INR ~10 on outpatient labs, called to come to the ED for evaluation.   In the ED, patient adamently denied any acute bleeding, acute chest pain, shortness of breath, or palpitations. Repeat levels in the ER noted with INR of 9.67, ordered for PO reversal with Phytonadione 5mg x1 by ER provider.   Last Dose Coumadin: Coumadin 4mg  Now being admitted to CTS service for further management.

## 2024-05-04 NOTE — ED ADULT NURSE NOTE - NSFALLHARMRISKINTERV_ED_ALL_ED

## 2024-05-04 NOTE — H&P ADULT - NSHPLABSRESULTS_GEN_ALL_CORE
Vital Signs Last 24 Hrs  T(C): 36.6 (04 May 2024 00:03), Max: 36.6 (04 May 2024 00:03)  T(F): 97.8 (04 May 2024 00:03), Max: 97.8 (04 May 2024 00:03)  HR: 78 (04 May 2024 00:03) (78 - 78)  BP: 131/74 (04 May 2024 00:03) (131/74 - 131/74)  RR: 16 (04 May 2024 00:03) (16 - 16)  SpO2: 95% (04 May 2024 00:03) (95% - 95%)  Parameters below as of 04 May 2024 00:03 | Patient On (Oxygen Delivery Method): room air    Labs:                        10.0   6.45  )-----------( 503      ( 04 May 2024 01:30 )             32.6   05-04    137  |  96  |  11  ----------------------------<  75  4.0   |  26  |  1.21    Ca    9.3      04 May 2024 01:30    TPro  7.8  /  Alb  4.0  /  TBili  0.4  /  DBili  x   /  AST  21  /  ALT  15  /  AlkPhos  223<H>  05-04  PT/INR - ( 04 May 2024 01:30 )   PT: 99.6 sec;   INR: 9.67 ratio    PTT - ( 04 May 2024 01:30 )  PTT:58.9 sec

## 2024-05-04 NOTE — H&P ADULT - NSHPPHYSICALEXAM_GEN_ALL_CORE
GEN: well appearing, in no acute distress, non-toxic appearing  HEENT: NCAT, neck supple and nontender  CVS: s1, s2  Sternal Incision: C/D/I, sternum stable  RESP: nonlabored respirations with no use of accessory muscles at time of evaluation  GI: abdomen soft; nontender, no rebound/guarding, +BS  MSK: peripheral pulses intact bilaterally, no edema  NEURO: AAOx3; no gross, focal neurological deficits appreciated  SKIN: no abnormal bruising appreciated GEN: well appearing, in no acute distress, non-toxic appearing  HEENT: NCAT, neck supple and nontender  CVS: s1, s2  Sternal Incision: C/D/I, sternum stable  RESP: nonlabored respirations with no use of accessory muscles at time of evaluation  GI: abdomen soft; nontender, no rebound/guarding, +BS  MSK: peripheral pulses intact bilaterally, +B/L LE edema; denies calf tenderness  NEURO: AAOx3; no gross, focal neurological deficits appreciated  SKIN: no abnormal bruising appreciated

## 2024-05-04 NOTE — ED ADULT NURSE NOTE - CHIEF COMPLAINT QUOTE
had open heart surgery recently   takes coumadin, had INR drawn and resulted as 10  sent in for reversal

## 2024-05-04 NOTE — H&P ADULT - PROBLEM SELECTOR PLAN 1
- Admit to on-call CTS Dr. Adair  - Telemetry  - follow up CXR  - follow up EKG  - On coumadin 4mg, outpatient. INR level on admission 9.67.  - s/p PO vitK 5mg x1   - monitor INR level closely  - S/p MVR with Dr. Maki 4/16/24  - continue home regimen: amiodarone, atorvastatin, lasix, losartan, Toprol-XL, spironolactone at this time  - Hold coumadin at this time  - Case and plan of care as per d/w on-call CTS Attending Dr. Adair - Admit to on-call CTS Dr. Adair  - Telemetry  - follow up CXR  - follow up EKG  - On coumadin 4mg, outpatient. INR level on admission 9.67.  - PO vitK 5mg x1   - monitor INR level closely  - S/p MVR with Dr. Maki 4/16/24  - continue home regimen: amiodarone, atorvastatin, lasix, losartan, Toprol-XL, spironolactone at this time  - Hold coumadin at this time  - Case and plan of care as per d/w on-call CTS Attending Dr. Adair - Admit to on-call CTS Dr. Adair  - Telemetry  - follow up CXR  - follow up EKG  - follow up TTE to evaluate for effusion  - On coumadin 4mg, outpatient. INR level on admission 9.67.  - PO vitK 5mg x1   - monitor INR level closely  - S/p MVR with Dr. Maki 4/16/24  - continue home regimen: amiodarone, atorvastatin, lasix, losartan, Toprol-XL, spironolactone at this time  - Hold coumadin at this time  - Case and plan of care as per d/w on-call CTS Attending Dr. Adair - Admit to on-call CTS Dr. Adair  - Telemetry  - follow up CXR  - follow up EKG  - follow up TTE to evaluate for effusion  - On coumadin 4mg, outpatient. INR level on admission 9.67.  - PO vitK 5mg x1   - monitor INR level closely  - S/p MVR (t)/ Maze/ LAAL with Dr. Maki 4/16/24  - continue home regimen: amiodarone, atorvastatin, lasix, losartan, Toprol-XL, spironolactone at this time  - Hold coumadin at this time  - Case and plan of care as per d/w on-call CTS Attending Dr. Adair

## 2024-05-04 NOTE — CHART NOTE - NSCHARTNOTEFT_GEN_A_CORE
Pt recd 2 [place]> stable hemodynamics   ECHO performed, R/O effusion  Vitamin K given in ER >Continue to monitor INR Pt recd 2 [place]> stable hemodynamics   ECHO performed, R/O effusion> negative  Vitamin K given in ER >Continue to monitor INR

## 2024-05-04 NOTE — ED ADULT TRIAGE NOTE - CHIEF COMPLAINT QUOTE
takes coumadin, had INR drawn and resulted as 10  sent in for reversal had open heart surgery recently   takes coumadin, had INR drawn and resulted as 10  sent in for reversal

## 2024-05-04 NOTE — PATIENT PROFILE ADULT - FALL HARM RISK - HARM RISK INTERVENTIONS

## 2024-05-04 NOTE — ED ADULT NURSE NOTE - OBJECTIVE STATEMENT
64 y/ F with PMH of HLD, HTN, AFib, benzodiazepine/opioid & ETOH abuse, Heart failure, s/p MVR(t) - Epic 33mm valve Maze procedure LAAL - 40mm AtriClip, on 4/16/24 presents to ED complaining of abnormal lab result. Pt reports she was doing outpt testing and was sent to E for evaluation due to an INR of 10. Upon arrival, pt is A&OX4, satting well on RA. Afebrile orally. Well appearing. Some ecchymosis noted on left upper extremity. Pt placed on CM. Denies headache, dizziness, vision changes, chest pain, shortness of breath, abdominal pain, nausea, vomiting, diarrhea, fevers, chills, dysuria, hematuria, recent travel or fall.

## 2024-05-04 NOTE — ED PROVIDER NOTE - CLINICAL SUMMARY MEDICAL DECISION MAKING FREE TEXT BOX
63 y/o female with PMHx of HLD, HTN, AFib (diagnosed recently, not on AC), benzodiazepine/opioid & ETOH abuse, and mitral valve prolapse found to have severe mitral and tricuspid regurgitation w/ signs of cardiogenic shock, now s/p RHC with IABP placement s/p MV replacement 4/16 with Dr. Maki. taking coumadin, went for outpatient labs and found to have INR 10. pt not bleeding. no chest pain, fevers, chills, sob.     check INR, pt not bleeding, will reverse w PO, hold dose pt no CP SOB

## 2024-05-04 NOTE — ED PROVIDER NOTE - PHYSICAL EXAMINATION
Yes
GENERAL: well appearing in no acute distress, non-toxic appearing  HEAD: normocephalic, atraumatic  HEENT:  no JVD  CARDIAC: regular rate and rhythm, normal S1S2, no appreciable murmurs, 2+ pulses in UE/LE b/l  PULM: normal breath sounds, clear to ascultation bilaterally, no rales, rhonchi, wheezing  GI: abdomen nondistended, soft, nontender, no guarding, rebound tenderness  MSK: no peripheral edema,   SKIN: incision scar intact no surrounding erythema or crepitus

## 2024-05-04 NOTE — ED CLERICAL - NS ED CARE COORDINATION INFORMATION
This patient is enrolled in the Follow Your Heart program and has undergone a cardiac surgery procedure within the last 30 days and has active care navigation.   This patient can be followed up by the care navigation team within 24 hours. To arrange close follow-up or to obtain additional clinical information about this patient, please call the contact number above.   Please call the cardiac surgery team once patient is registered at [phone] for consultation PRIOR to disposition decision.  The patient recently underwent a cardiac surgery procedure and the team can assist in acute medical management.

## 2024-05-04 NOTE — ED PROVIDER NOTE - ATTENDING CONTRIBUTION TO CARE
patient with recent valve surgery presents with supra therapeutic inr to 10  no active bleeding or bruising  no petechiae  no rash/vesicles/petechiae   non-tachycardic  non-tachypneic   sternotomy scar healing well with mild ecchymosis without bleeding  will get iv, cbc, cmp, pt/inr, give vitamin K prn for elevated inr  Will follow up on labs, analgesia, imaging, reassess and disposition to the inpatient team as clinically indicated.  *The above represents an initial assessment/impression. Please refer to my progress notes below for potential changes in patient clinical course*     Emergency Medicine Attending- Dr. Timothy Layton MD, FACEP: Patient endorsed to Dr. Adair's team at the time of admission. Based on patient's history and physical exam, as well as the results of today's workup, I feel that patient warrants admission to the hospital for further workup/evaluation and continued management. I discussed the findings of today's workup with the patient and addressed the patient's questions and concerns. The patient was agreeable with admission. Our team spoke with the inpatient receiving team who accepted the patient for admission and subsequently took over the patient's care at the time of admission. The receiving team will follow up on pending labs, analgesia, any clinical imaging results, ancillary findings, reassess, and disposition as clinically indicated. Details of patient and plan conveyed to receiving physician team and conveyed back for understanding. There were no questions at this time about the patient's status, disposition, and plan. Patient's care to be taken over by receiving physician team at this time, all decisions regarding the progression of care will be made at their discretion.

## 2024-05-05 ENCOUNTER — TRANSCRIPTION ENCOUNTER (OUTPATIENT)
Age: 64
End: 2024-05-05

## 2024-05-05 VITALS — WEIGHT: 191.58 LBS

## 2024-05-05 LAB
ALBUMIN SERPL ELPH-MCNC: 3.9 G/DL — SIGNIFICANT CHANGE UP (ref 3.3–5)
ALP SERPL-CCNC: 205 U/L — HIGH (ref 40–120)
ALT FLD-CCNC: 13 U/L — SIGNIFICANT CHANGE UP (ref 10–45)
ANION GAP SERPL CALC-SCNC: 14 MMOL/L — SIGNIFICANT CHANGE UP (ref 5–17)
AST SERPL-CCNC: 14 U/L — SIGNIFICANT CHANGE UP (ref 10–40)
BILIRUB SERPL-MCNC: 0.5 MG/DL — SIGNIFICANT CHANGE UP (ref 0.2–1.2)
BUN SERPL-MCNC: 10 MG/DL — SIGNIFICANT CHANGE UP (ref 7–23)
CALCIUM SERPL-MCNC: 9.7 MG/DL — SIGNIFICANT CHANGE UP (ref 8.4–10.5)
CHLORIDE SERPL-SCNC: 99 MMOL/L — SIGNIFICANT CHANGE UP (ref 96–108)
CO2 SERPL-SCNC: 23 MMOL/L — SIGNIFICANT CHANGE UP (ref 22–31)
CREAT SERPL-MCNC: 0.85 MG/DL — SIGNIFICANT CHANGE UP (ref 0.5–1.3)
EGFR: 76 ML/MIN/1.73M2 — SIGNIFICANT CHANGE UP
GLUCOSE SERPL-MCNC: 84 MG/DL — SIGNIFICANT CHANGE UP (ref 70–99)
HCT VFR BLD CALC: 33.5 % — LOW (ref 34.5–45)
HGB BLD-MCNC: 10.3 G/DL — LOW (ref 11.5–15.5)
INR BLD: 1.52 RATIO — HIGH (ref 0.85–1.18)
MCHC RBC-ENTMCNC: 28.9 PG — SIGNIFICANT CHANGE UP (ref 27–34)
MCHC RBC-ENTMCNC: 30.7 GM/DL — LOW (ref 32–36)
MCV RBC AUTO: 94.1 FL — SIGNIFICANT CHANGE UP (ref 80–100)
NRBC # BLD: 0 /100 WBCS — SIGNIFICANT CHANGE UP (ref 0–0)
PLATELET # BLD AUTO: 442 K/UL — HIGH (ref 150–400)
POTASSIUM SERPL-MCNC: 4.1 MMOL/L — SIGNIFICANT CHANGE UP (ref 3.5–5.3)
POTASSIUM SERPL-SCNC: 4.1 MMOL/L — SIGNIFICANT CHANGE UP (ref 3.5–5.3)
PROT SERPL-MCNC: 7.6 G/DL — SIGNIFICANT CHANGE UP (ref 6–8.3)
PROTHROM AB SERPL-ACNC: 16.5 SEC — HIGH (ref 9.5–13)
RBC # BLD: 3.56 M/UL — LOW (ref 3.8–5.2)
RBC # FLD: 14.7 % — HIGH (ref 10.3–14.5)
SODIUM SERPL-SCNC: 136 MMOL/L — SIGNIFICANT CHANGE UP (ref 135–145)
WBC # BLD: 6.87 K/UL — SIGNIFICANT CHANGE UP (ref 3.8–10.5)
WBC # FLD AUTO: 6.87 K/UL — SIGNIFICANT CHANGE UP (ref 3.8–10.5)

## 2024-05-05 PROCEDURE — 86901 BLOOD TYPING SEROLOGIC RH(D): CPT

## 2024-05-05 PROCEDURE — 85027 COMPLETE CBC AUTOMATED: CPT

## 2024-05-05 PROCEDURE — 80053 COMPREHEN METABOLIC PANEL: CPT

## 2024-05-05 PROCEDURE — 85610 PROTHROMBIN TIME: CPT

## 2024-05-05 PROCEDURE — 93005 ELECTROCARDIOGRAM TRACING: CPT

## 2024-05-05 PROCEDURE — 85730 THROMBOPLASTIN TIME PARTIAL: CPT

## 2024-05-05 PROCEDURE — 71045 X-RAY EXAM CHEST 1 VIEW: CPT

## 2024-05-05 PROCEDURE — 85025 COMPLETE CBC W/AUTO DIFF WBC: CPT

## 2024-05-05 PROCEDURE — 93308 TTE F-UP OR LMTD: CPT

## 2024-05-05 PROCEDURE — 99285 EMERGENCY DEPT VISIT HI MDM: CPT

## 2024-05-05 PROCEDURE — 86850 RBC ANTIBODY SCREEN: CPT

## 2024-05-05 PROCEDURE — 86900 BLOOD TYPING SEROLOGIC ABO: CPT

## 2024-05-05 RX ORDER — ACETAMINOPHEN 500 MG
1000 TABLET ORAL ONCE
Refills: 0 | Status: COMPLETED | OUTPATIENT
Start: 2024-05-05 | End: 2024-05-05

## 2024-05-05 RX ORDER — WARFARIN SODIUM 2.5 MG/1
1 TABLET ORAL
Qty: 30 | Refills: 0
Start: 2024-05-05 | End: 2024-06-03

## 2024-05-05 RX ADMIN — OXYCODONE HYDROCHLORIDE 10 MILLIGRAM(S): 5 TABLET ORAL at 09:11

## 2024-05-05 RX ADMIN — Medication 1000 MILLIGRAM(S): at 01:40

## 2024-05-05 RX ADMIN — DAPAGLIFLOZIN 10 MILLIGRAM(S): 10 TABLET, FILM COATED ORAL at 11:16

## 2024-05-05 RX ADMIN — SPIRONOLACTONE 50 MILLIGRAM(S): 25 TABLET, FILM COATED ORAL at 05:19

## 2024-05-05 RX ADMIN — OXYCODONE HYDROCHLORIDE 10 MILLIGRAM(S): 5 TABLET ORAL at 03:30

## 2024-05-05 RX ADMIN — AMIODARONE HYDROCHLORIDE 200 MILLIGRAM(S): 400 TABLET ORAL at 05:19

## 2024-05-05 RX ADMIN — Medication 20 MILLIGRAM(S): at 05:19

## 2024-05-05 RX ADMIN — SODIUM CHLORIDE 3 MILLILITER(S): 9 INJECTION INTRAMUSCULAR; INTRAVENOUS; SUBCUTANEOUS at 04:55

## 2024-05-05 RX ADMIN — OXYCODONE HYDROCHLORIDE 10 MILLIGRAM(S): 5 TABLET ORAL at 02:45

## 2024-05-05 RX ADMIN — Medication 25 MILLIGRAM(S): at 05:19

## 2024-05-05 RX ADMIN — OXYCODONE HYDROCHLORIDE 10 MILLIGRAM(S): 5 TABLET ORAL at 08:41

## 2024-05-05 RX ADMIN — Medication 1 MILLIGRAM(S): at 06:56

## 2024-05-05 RX ADMIN — LOSARTAN POTASSIUM 25 MILLIGRAM(S): 100 TABLET, FILM COATED ORAL at 05:19

## 2024-05-05 RX ADMIN — Medication 400 MILLIGRAM(S): at 01:03

## 2024-05-05 NOTE — DISCHARGE NOTE PROVIDER - NSDCPNSUBOBJ_GEN_ALL_CORE
axox3  NSR s1s2   MTI SHAINA  healing sternum stable   Lungs CTA on room air   ab soft + BS  Voids   equal strength throughout  b/ll e+ DP no edema                        10.3   6.87  )-----------( 442      ( 05 May 2024 06:51 )             33.5     05-05    136  |  99  |  10  ----------------------------<  84  4.1   |  23  |  0.85    Ca    9.7      05 May 2024 06:57    TPro  7.6  /  Alb  3.9  /  TBili  0.5  /  DBili  x   /  AST  14  /  ALT  13  /  AlkPhos  205<H>  05-05    ICU Vital Signs Last 24 Hrs  T(C): 37 (05 May 2024 05:10), Max: 37.1 (04 May 2024 21:02)  T(F): 98.6 (05 May 2024 05:10), Max: 98.8 (04 May 2024 21:02)  HR: 94 (05 May 2024 05:10) (64 - 94)  BP: 133/85 (05 May 2024 05:10) (94/59 - 133/85)  BP(mean): 101 (05 May 2024 05:10) (66 - 101)  RR: 18 (05 May 2024 05:10) (18 - 18)  SpO2: 94% (05 May 2024 05:10) (94% - 95%)    O2 Parameters below as of 05 May 2024 05:10  Patient On (Oxygen Delivery Method): room air  < from: TTE W or WO Ultrasound Enhancing Agent (05.04.24 @ 07:36) >        1. No pericardial effusion seen.    < end of copied text >       Greater then 45 minutes spent on discharge stable and eager for discharge

## 2024-05-05 NOTE — DISCHARGE NOTE PROVIDER - HOSPITAL COURSE
63 y/o female with PMHx of HLD, HTN, AFib, benzodiazepine/opioid & ETOH abuse, Heart failure, s/p MVR(t) - Epic 33mm valve Maze procedure LAAL - 40mm AtriClip, on 4/16/24 with Dr. Prabhu Maki. Patient was initiated on coumadin, and discharged home with Coumadin for MVR/Afib. Patient being followed by CTS office outpatient for coumadin/INR monitoring. Patient noted to have critical lab value of INR ~10 on outpatient labs, called to come to the ED for evaluation.   In the ED, patient adamently denied any acute bleeding, acute chest pain, shortness of breath, or palpitations. Repeat levels in the ER noted with INR of 9.67, ordered for PO reversal with Phytonadione 5mg x1 by ER provider.   Last Dose Coumadin: Coumadin 4mgNow being admitted to CTS service for further management.5/4 0330      5/5 VSS INR 1.52 seen and examined in company of Dr Adair stable and eager for discharge .

## 2024-05-05 NOTE — DISCHARGE NOTE PROVIDER - NSDCFUSCHEDAPPT_GEN_ALL_CORE_FT
[facility] Physician [facility]  HEARTFAIL [address]  Scheduled Appointment: 05/07/2024    Prabhu Maki  [facility] Physician [facility]  CTSURG [address]  Scheduled Appointment: 05/08/2024

## 2024-05-05 NOTE — DISCHARGE NOTE PROVIDER - NSDCCPCAREPLAN_GEN_ALL_CORE_FT
PRINCIPAL DISCHARGE DIAGNOSIS  Diagnosis: Elevated INR  Assessment and Plan of Treatment: 1. Daily Shower  2. Weigh yourself daily and notify any weight gain greater than 2-3 pounds in 24 hours.  3. Regular diet - low fat, low cholesterol, no added salt.  4. Cleanse Midsternal incision and leg incision daily while showering with warm water and mild soap, pat dry and maintain open to air.   5. Follow Cardiac Surgery Do's and Don'ts discharge instructions.   6. No driving until cleared by MD.   7. No heavy lifting nothing greater than 5 pounds until cleared by MD.   8. Call / Notify MD any fever greater than 101.0  9. Increase Activity as tolerated..  10 Take 2.5 mg coumadin daily   INR to be checked at follow up visit with Dr Maki at  [facility] [address] [facility] [address] in Tuesday 5/7   call our office for fever chills or any concerns  [phone]       PRINCIPAL DISCHARGE DIAGNOSIS  Diagnosis: Elevated INR  Assessment and Plan of Treatment: 1. Daily Shower  2. Weigh yourself daily and notify any weight gain greater than 2-3 pounds in 24 hours.  3. Regular diet - low fat, low cholesterol, no added salt.  4. Cleanse Midsternal incision and leg incision daily while showering with warm water and mild soap, pat dry and maintain open to air.   5. Follow Cardiac Surgery Do's and Don'ts discharge instructions.   6. No driving until cleared by MD.   7. No heavy lifting nothing greater than 5 pounds until cleared by MD.   8. Call / Notify MD any fever greater than 101.0  9. Increase Activity as tolerated..  10 Take 2.5 mg coumadin daily  STAT INR TU[place]aday 5/7 fax results to ANY [facility] at [phone]   INR to be checked at follow up visit with Dr Maki at  [facility] [address] [facility] [address] in  Wednesday 5/8  call our office for fever chills or any concerns  [phone]

## 2024-05-05 NOTE — DISCHARGE NOTE NURSING/CASE MANAGEMENT/SOCIAL WORK - PATIENT PORTAL LINK FT
You can access the FollowMyHealth Patient Portal offered by [facility] by registering at the following website: [email or web address]. By joining [employer]’s FollowMyHealth portal, you will also be able to view your health information using other applications (apps) compatible with our system.

## 2024-05-05 NOTE — DISCHARGE NOTE PROVIDER - CARE PROVIDER_API CALL
Prabhu Maki  Thoracic Surgery  [address] [place] [id number]  Phone: [phone]  Fax: [phone]  Scheduled Appointment: 05/08/2024

## 2024-05-05 NOTE — DISCHARGE NOTE PROVIDER - NSDCMRMEDTOKEN_GEN_ALL_CORE_FT
amiodarone 200 mg oral tablet: 1 tab(s) orally once a day  aspirin 81 mg oral delayed release tablet: 1 tab(s) orally once a day  Centrum Women&#x27;s oral tablet: 1 tab(s) orally once a day  Crestor 5 mg oral tablet: 1 tab(s) orally once a day  dapagliflozin 10 mg oral tablet: 1 tab(s) orally once a day  furosemide 20 mg oral tablet: 1 tab(s) orally once a day  losartan 25 mg oral tablet: 1 tab(s) orally once a day  metoprolol succinate 25 mg oral tablet, extended release: 1 tab(s) orally once a day  nicotine 21 mg/24 hr transdermal film, extended release: 1 patch transdermally once a day  oxyCODONE 5 mg oral tablet: 1 tab(s) orally every 8 hours as needed for  moderate pain MDD: 3  polyethylene glycol 3350 oral powder for reconstitution: 17 gram(s) orally once a day  spironolactone 25 mg oral tablet: 2 tab(s) orally once a day  warfarin 2.5 mg oral tablet: 1 tab(s) orally once a day

## 2024-05-06 ENCOUNTER — TRANSCRIPTION ENCOUNTER (OUTPATIENT)
Age: 64
End: 2024-05-06

## 2024-05-08 ENCOUNTER — APPOINTMENT (OUTPATIENT)
Dept: CARDIOTHORACIC SURGERY | Facility: CLINIC | Age: 64
End: 2024-05-08
Payer: MEDICARE

## 2024-05-08 ENCOUNTER — INPATIENT (INPATIENT)
Facility: HOSPITAL | Age: 64
LOS: 0 days | Discharge: HOME | DRG: 684 | End: 2024-05-09
Attending: THORACIC SURGERY (CARDIOTHORACIC VASCULAR SURGERY) | Admitting: THORACIC SURGERY (CARDIOTHORACIC VASCULAR SURGERY)
Payer: MEDICARE

## 2024-05-08 VITALS
TEMPERATURE: 97.9 F | SYSTOLIC BLOOD PRESSURE: 89 MMHG | HEART RATE: 111 BPM | OXYGEN SATURATION: 95 % | HEIGHT: 71 IN | RESPIRATION RATE: 14 BRPM | DIASTOLIC BLOOD PRESSURE: 59 MMHG

## 2024-05-08 VITALS — BODY MASS INDEX: 27.48 KG/M2 | WEIGHT: 197 LBS

## 2024-05-08 VITALS
DIASTOLIC BLOOD PRESSURE: 60 MMHG | RESPIRATION RATE: 18 BRPM | HEIGHT: 71 IN | SYSTOLIC BLOOD PRESSURE: 89 MMHG | HEART RATE: 93 BPM | WEIGHT: 197.09 LBS | OXYGEN SATURATION: 96 % | TEMPERATURE: 98 F

## 2024-05-08 DIAGNOSIS — Z95.2 PRESENCE OF PROSTHETIC HEART VALVE: ICD-10-CM

## 2024-05-08 DIAGNOSIS — R41.82 ALTERED MENTAL STATUS, UNSPECIFIED: ICD-10-CM

## 2024-05-08 DIAGNOSIS — N17.9 ACUTE KIDNEY FAILURE, UNSPECIFIED: ICD-10-CM

## 2024-05-08 DIAGNOSIS — Z98.890 OTHER SPECIFIED POSTPROCEDURAL STATES: Chronic | ICD-10-CM

## 2024-05-08 LAB
ALBUMIN SERPL ELPH-MCNC: 4.2 G/DL — SIGNIFICANT CHANGE UP (ref 3.3–5)
ALP SERPL-CCNC: 181 U/L — HIGH (ref 40–120)
ALT FLD-CCNC: 28 U/L — SIGNIFICANT CHANGE UP (ref 10–45)
AMPHET UR-MCNC: NEGATIVE — SIGNIFICANT CHANGE UP
ANION GAP SERPL CALC-SCNC: 14 MMOL/L — SIGNIFICANT CHANGE UP (ref 5–17)
ANION GAP SERPL CALC-SCNC: 14 MMOL/L — SIGNIFICANT CHANGE UP (ref 5–17)
APTT BLD: 42.9 SEC — HIGH (ref 24.5–35.6)
AST SERPL-CCNC: 105 U/L — HIGH (ref 10–40)
BARBITURATES UR SCN-MCNC: POSITIVE
BASE EXCESS BLDV CALC-SCNC: 0.1 MMOL/L — SIGNIFICANT CHANGE UP (ref -2–3)
BASOPHILS # BLD AUTO: 0.05 K/UL — SIGNIFICANT CHANGE UP (ref 0–0.2)
BASOPHILS NFR BLD AUTO: 0.6 % — SIGNIFICANT CHANGE UP (ref 0–2)
BENZODIAZ UR-MCNC: POSITIVE
BILIRUB SERPL-MCNC: 0.5 MG/DL — SIGNIFICANT CHANGE UP (ref 0.2–1.2)
BUN SERPL-MCNC: 22 MG/DL — SIGNIFICANT CHANGE UP (ref 7–23)
BUN SERPL-MCNC: 24 MG/DL — HIGH (ref 7–23)
CA-I SERPL-SCNC: 1.29 MMOL/L — SIGNIFICANT CHANGE UP (ref 1.15–1.33)
CALCIUM SERPL-MCNC: 10 MG/DL — SIGNIFICANT CHANGE UP (ref 8.4–10.5)
CALCIUM SERPL-MCNC: 9.7 MG/DL — SIGNIFICANT CHANGE UP (ref 8.4–10.5)
CHLORIDE BLDV-SCNC: 97 MMOL/L — SIGNIFICANT CHANGE UP (ref 96–108)
CHLORIDE SERPL-SCNC: 94 MMOL/L — LOW (ref 96–108)
CHLORIDE SERPL-SCNC: 95 MMOL/L — LOW (ref 96–108)
CO2 BLDV-SCNC: 28 MMOL/L — HIGH (ref 22–26)
CO2 SERPL-SCNC: 23 MMOL/L — SIGNIFICANT CHANGE UP (ref 22–31)
CO2 SERPL-SCNC: 26 MMOL/L — SIGNIFICANT CHANGE UP (ref 22–31)
COCAINE METAB.OTHER UR-MCNC: NEGATIVE — SIGNIFICANT CHANGE UP
CREAT SERPL-MCNC: 1.35 MG/DL — HIGH (ref 0.5–1.3)
CREAT SERPL-MCNC: 1.39 MG/DL — HIGH (ref 0.5–1.3)
EGFR: 42 ML/MIN/1.73M2 — LOW
EGFR: 44 ML/MIN/1.73M2 — LOW
EOSINOPHIL # BLD AUTO: 0.23 K/UL — SIGNIFICANT CHANGE UP (ref 0–0.5)
EOSINOPHIL NFR BLD AUTO: 2.6 % — SIGNIFICANT CHANGE UP (ref 0–6)
ETHANOL SERPL-MCNC: <10 MG/DL — SIGNIFICANT CHANGE UP (ref 0–10)
GAS PNL BLDV: 129 MMOL/L — LOW (ref 136–145)
GAS PNL BLDV: SIGNIFICANT CHANGE UP
GAS PNL BLDV: SIGNIFICANT CHANGE UP
GLUCOSE BLDV-MCNC: 86 MG/DL — SIGNIFICANT CHANGE UP (ref 70–99)
GLUCOSE SERPL-MCNC: 82 MG/DL — SIGNIFICANT CHANGE UP (ref 70–99)
GLUCOSE SERPL-MCNC: 84 MG/DL — SIGNIFICANT CHANGE UP (ref 70–99)
HCO3 BLDV-SCNC: 26 MMOL/L — SIGNIFICANT CHANGE UP (ref 22–29)
HCT VFR BLD CALC: 34.6 % — SIGNIFICANT CHANGE UP (ref 34.5–45)
HCT VFR BLDA CALC: 29 % — LOW (ref 34.5–46.5)
HGB BLD CALC-MCNC: 9.7 G/DL — LOW (ref 11.7–16.1)
HGB BLD-MCNC: 11 G/DL — LOW (ref 11.5–15.5)
IMM GRANULOCYTES NFR BLD AUTO: 0.6 % — SIGNIFICANT CHANGE UP (ref 0–0.9)
INR BLD: 2.76 RATIO — HIGH (ref 0.85–1.18)
LACTATE BLDV-MCNC: 1.6 MMOL/L — SIGNIFICANT CHANGE UP (ref 0.5–2)
LYMPHOCYTES # BLD AUTO: 1.56 K/UL — SIGNIFICANT CHANGE UP (ref 1–3.3)
LYMPHOCYTES # BLD AUTO: 17.5 % — SIGNIFICANT CHANGE UP (ref 13–44)
MAGNESIUM SERPL-MCNC: 2.5 MG/DL — SIGNIFICANT CHANGE UP (ref 1.6–2.6)
MCHC RBC-ENTMCNC: 29.6 PG — SIGNIFICANT CHANGE UP (ref 27–34)
MCHC RBC-ENTMCNC: 31.8 GM/DL — LOW (ref 32–36)
MCV RBC AUTO: 93 FL — SIGNIFICANT CHANGE UP (ref 80–100)
METHADONE UR-MCNC: NEGATIVE — SIGNIFICANT CHANGE UP
MONOCYTES # BLD AUTO: 1.02 K/UL — HIGH (ref 0–0.9)
MONOCYTES NFR BLD AUTO: 11.5 % — SIGNIFICANT CHANGE UP (ref 2–14)
NEUTROPHILS # BLD AUTO: 5.98 K/UL — SIGNIFICANT CHANGE UP (ref 1.8–7.4)
NEUTROPHILS NFR BLD AUTO: 67.2 % — SIGNIFICANT CHANGE UP (ref 43–77)
NRBC # BLD: 0 /100 WBCS — SIGNIFICANT CHANGE UP (ref 0–0)
OPIATES UR-MCNC: NEGATIVE — SIGNIFICANT CHANGE UP
OXYCODONE UR-MCNC: POSITIVE
PCO2 BLDV: 50 MMHG — HIGH (ref 39–42)
PCP SPEC-MCNC: SIGNIFICANT CHANGE UP
PCP UR-MCNC: NEGATIVE — SIGNIFICANT CHANGE UP
PH BLDV: 7.33 — SIGNIFICANT CHANGE UP (ref 7.32–7.43)
PLATELET # BLD AUTO: 334 K/UL — SIGNIFICANT CHANGE UP (ref 150–400)
PO2 BLDV: 39 MMHG — SIGNIFICANT CHANGE UP (ref 25–45)
POTASSIUM BLDV-SCNC: 4.7 MMOL/L — SIGNIFICANT CHANGE UP (ref 3.5–5.1)
POTASSIUM SERPL-MCNC: 3.9 MMOL/L — SIGNIFICANT CHANGE UP (ref 3.5–5.3)
POTASSIUM SERPL-MCNC: 5.5 MMOL/L — HIGH (ref 3.5–5.3)
POTASSIUM SERPL-SCNC: 3.9 MMOL/L — SIGNIFICANT CHANGE UP (ref 3.5–5.3)
POTASSIUM SERPL-SCNC: 5.5 MMOL/L — HIGH (ref 3.5–5.3)
PROT SERPL-MCNC: 8.7 G/DL — HIGH (ref 6–8.3)
PROTHROM AB SERPL-ACNC: 28.1 SEC — HIGH (ref 9.5–13)
RBC # BLD: 3.72 M/UL — LOW (ref 3.8–5.2)
RBC # FLD: 15.1 % — HIGH (ref 10.3–14.5)
SAO2 % BLDV: 68.1 % — SIGNIFICANT CHANGE UP (ref 67–88)
SODIUM SERPL-SCNC: 131 MMOL/L — LOW (ref 135–145)
SODIUM SERPL-SCNC: 135 MMOL/L — SIGNIFICANT CHANGE UP (ref 135–145)
THC UR QL: NEGATIVE — SIGNIFICANT CHANGE UP
WBC # BLD: 8.89 K/UL — SIGNIFICANT CHANGE UP (ref 3.8–10.5)
WBC # FLD AUTO: 8.89 K/UL — SIGNIFICANT CHANGE UP (ref 3.8–10.5)

## 2024-05-08 PROCEDURE — 71045 X-RAY EXAM CHEST 1 VIEW: CPT | Mod: 26

## 2024-05-08 PROCEDURE — 70450 CT HEAD/BRAIN W/O DYE: CPT | Mod: 26

## 2024-05-08 PROCEDURE — 99024 POSTOP FOLLOW-UP VISIT: CPT

## 2024-05-08 PROCEDURE — 71275 CT ANGIOGRAPHY CHEST: CPT | Mod: 26

## 2024-05-08 PROCEDURE — 99222 1ST HOSP IP/OBS MODERATE 55: CPT

## 2024-05-08 PROCEDURE — 99285 EMERGENCY DEPT VISIT HI MDM: CPT

## 2024-05-08 PROCEDURE — 99232 SBSQ HOSP IP/OBS MODERATE 35: CPT

## 2024-05-08 RX ORDER — MULTIVIT-MIN/FERROUS GLUCONATE 9 MG/15 ML
1 LIQUID (ML) ORAL
Refills: 0 | DISCHARGE

## 2024-05-08 RX ORDER — METOPROLOL TARTRATE 50 MG
25 TABLET ORAL DAILY
Refills: 0 | Status: DISCONTINUED | OUTPATIENT
Start: 2024-05-08 | End: 2024-05-09

## 2024-05-08 RX ORDER — SODIUM CHLORIDE 9 MG/ML
500 INJECTION INTRAMUSCULAR; INTRAVENOUS; SUBCUTANEOUS ONCE
Refills: 0 | Status: COMPLETED | OUTPATIENT
Start: 2024-05-08 | End: 2024-05-08

## 2024-05-08 RX ORDER — POLYETHYLENE GLYCOL 3350 17 G/17G
17 POWDER, FOR SOLUTION ORAL
Refills: 0 | DISCHARGE

## 2024-05-08 RX ORDER — ASPIRIN/CALCIUM CARB/MAGNESIUM 324 MG
81 TABLET ORAL DAILY
Refills: 0 | Status: DISCONTINUED | OUTPATIENT
Start: 2024-05-08 | End: 2024-05-09

## 2024-05-08 RX ORDER — NICOTINE POLACRILEX 2 MG
1 GUM BUCCAL
Refills: 0 | DISCHARGE

## 2024-05-08 RX ORDER — LIDOCAINE 4 G/100G
1 CREAM TOPICAL ONCE
Refills: 0 | Status: COMPLETED | OUTPATIENT
Start: 2024-05-08 | End: 2024-05-09

## 2024-05-08 RX ADMIN — SODIUM CHLORIDE 500 MILLILITER(S): 9 INJECTION INTRAMUSCULAR; INTRAVENOUS; SUBCUTANEOUS at 14:32

## 2024-05-08 NOTE — H&P ADULT - NSHPREVIEWOFSYSTEMS_GEN_ALL_CORE
General: No Weight change/ Fatigue/ HA/Dizzy	    Skin/Breast: No Rashes/ Lesions/ Masses  	  Ophthalmologic: No Blurry vision/ Glaucoma/ Blindness  	  ENMT: No Hearing loss/ Drainage/ Lesions	    Respiratory and Thorax: No Cough/ Wheezing/ SOB/ Hemoptysis/ Sputum production  	  Cardiovascular: No Chest pain/ Palpitations/ Diaphoresis	    Gastrointestinal: No Nausea/ Vomiting/ Constipation/ Appetite Change	    Genitourinary: No Hematuria/ Dysuria/ Frequency change/ Impotence	    Musculoskeletal: No Pain/ Weakness/ Claudication	    Neurological: No Seizures/ TIA/CVA/ Parastesias	    Psychiatric: No Dementia/ Depression/ SI/HI	    Hematology/Lymphatics: No hx of bleeding/ Edema	    Endocrine: No Hyperglycemia/ Hypoglycemia    Allergic/Immunologic: No Anaphylaxis/ Intolerance/ Recent illnesses

## 2024-05-08 NOTE — ED ADULT NURSE REASSESSMENT NOTE - NS ED NURSE REASSESS COMMENT FT1
Pt AXOX4 breathing unlabored on O2 2LNC  and speaking in complete sentences. Pt updated on plan of care. Safety and comfort measures maintained. Bed in lowest position. Call bell within reach.

## 2024-05-08 NOTE — REASON FOR VISIT
[Family Member] : family member [de-identified] : MVR(t) - Epic 33mm valve Maze procedure LAAL - 40mm AtriClip [de-identified] : 4/16/2024

## 2024-05-08 NOTE — ED PROVIDER NOTE - CARE PLAN
Principal Discharge DX:	ISABELA (acute kidney injury)  Secondary Diagnosis:	Acute hypoxic respiratory failure   1

## 2024-05-08 NOTE — END OF VISIT
[FreeTextEntry3] :  I, RAINA Segovia , personally performed the evaluation and management (E/M) services for this established  patient. That E/M includes conducting the initial examination, assessing all conditions, and establishing the plan of care. Today, DANO CORDOBA  was here to observe my evaluation and management services for this patient.

## 2024-05-08 NOTE — H&P ADULT - NSHPSOCIALHISTORY_GEN_ALL_CORE
lives with spouse  denies current alcohol consumption  Tobacco prior to surgery  Daily narcotics for chronic pain

## 2024-05-08 NOTE — ED ADULT NURSE REASSESSMENT NOTE - NS ED NURSE REASSESS COMMENT FT1
Pt maintained on tele NSR 72 HR and continuous pulse oximetry 100% on O2 2L NC with parameters of <92%. No acute s/s of distress. Safety and comfort measures maintained.

## 2024-05-08 NOTE — ED CLERICAL - NS ED CLERK NOTE PRE-ARRIVAL INFORMATION; ADDITIONAL PRE-ARRIVAL INFORMATION
This patient is enrolled in the Follow Your Heart program and has undergone a cardiac surgery procedure within the last 30 days and has active care navigation. This patient can be followed up by the care navigation team within 24 hours. To arrange close follow-up or to obtain additional clinical information about this patient, please call the contact number above. Please call the cardiac surgery team once patient is registered at [phone] for consultation PRIOR to disposition decision.  The patient recently underwent a cardiac surgery procedure and the team can assist in acute medical management.

## 2024-05-08 NOTE — H&P ADULT - HISTORY OF PRESENT ILLNESS
63 y/o female with PMHx of HLD, HTN, AFib (diagnosed recently, not on AC), benzodiazepine/opioid & ETOH abuse, and mitral valve prolapse who underwent 4/16 MVR(t) - Epic 33mm valve Maze procedure LAAL  discharged home with subsequent readmission for elevated IBNR to 9.0 last weekend, doscing Vit K, neg effusion by ECHO and DC back home 4 days ago  Pt cancelled post op visit office appt as she wasn't up to it " Was seen in office today and she was found to be "altered as per Dr Maki/staff  She was not answering questions, and Dr Maki wanted further evaluation including head CT/labwork  Pt admits to prior and current narcotic use but  states she has not had any alcohol since her heart surgery Pt is lethargic, but arousable, oriented to self, events and states she feels "ok"

## 2024-05-08 NOTE — H&P ADULT - ASSESSMENT
65 y/o female with PMHx of HLD, HTN, AFib (diagnosed recently, not on AC), benzodiazepine/opioid & ETOH abuse, and mitral valve prolapse who underwent 4/16 MVR(t) - Epic 33mm valve Maze procedure LAAL  discharged home with subsequent readmission for elevated IBNR to 9.0 last weekend, doscing Vit K, neg effusion by ECHO and DC back home 4 days ago  Pt cancelled post op visit office appt as she wasn't up to it " Was seen in office today and she was found to be "altered as per Dr Maki/staff  She was not answering questions, and Dr Maki wanted further evaluation including head CT/labwork  Pt admits to prior and current narcotic use but  states she has not had any alcohol since her heart surgery Pt is lethargic, but arousable, oriented to self, events and states she feels "ok"

## 2024-05-08 NOTE — ASSESSMENT
[FreeTextEntry1] : 65 y/o female with PMHx of HLD, HTN, AFib (diagnosed recently, not on AC), benzodiazepine/opioid & ETOH abuse, and mitral valve prolapse found to have severe mitral and tricuspid regurgitation w/ signs of cardiogenic shock, now s/p RHC with IABP placement.   4/16 MVR(t) - Epic 33mm valve Maze procedure LAAL - 40mm AtriClip.    Post op dobutamine for inotropic support / lasix gtt - VASO /IABP d/c, TTE 4/18 EF 35%, no intervalvular MR junctional rhythm coumadin for MVR/afib. Precedex for delirium, seen by behavioral health recommend Seroquel 12.5-25 PO q6 for agitation.  afib 72 amio 200 QD EP cslt + PW to epm, npo for DCCV +pw amio 200 qd As per d/w Heart Failure team: patient started on Toprol 25mg qD and farxiga, increased aldactone 50mg qD, and decreased lasix to 20mg qD). DC home 4/27.  INR 4/30 resulted 2.75 remained on Warfarin 4mg.  Readmitted Friday evening for INR 10.08 from Friday morning.  Vit K reversal X 2, admitted overnight.  DC home on Coumadin 2.5mg daily. Dr. Miguel Angel Serna PCP able to follow INR, Pt to follow up with cardiologist Dr. Cunha Friday.  Needs INR today.  She appears to have little drowsy and garbled speech on this visit. Denies any chest pain, shortness of breath, palpitations .   Today on exam patient's lungs clear bilaterally, normal sinus rhythm, sternum stable, incision clean, dry and intact. Trace  peripheral edema noted.    Plan: 1)  Advised to go to ED for CT head to r/o any intra cranial bleed  2) Hold Coumadin for now  3) May return on as needed basis

## 2024-05-08 NOTE — ED ADULT NURSE NOTE - OBJECTIVE STATEMENT
64 year old female pt presented to the ED for abnormal labs high INR, sent by PCP for r/o head bleed, pt on warfarin, pt is ambulatory A&Ox3, denies any pain., no shortness of breath lung field cta abd soft nontender non distended, pt with a recent surgery with healing would to chest, no redness or drainage noted, pt denies any nausea no vomiting no dizziness or lightheadedness, denies any headache, pt states "I just feeling like myself", pt states she has not smoked since surgery, nicotine patch noted to left arm

## 2024-05-08 NOTE — H&P ADULT - PROBLEM SELECTOR PLAN 2
Head CT  Hold narcotics  Await drug toxic  Alcohol level >10 Refill request for   pantoprazole (PROTONIX) 40 MG tablet 30 tablet 5 12/8/2020 4/9/2021    Sig - Route: Take 1 tablet by mouth daily. - Oral    Sent to pharmacy as: Pantoprazole Sodium 40 MG Oral Tablet Delayed Release (PROTONIX)    Class: Eprescribe        Last office visit 10/21/20  Next office visit 4/20/21    Refilled per Dr. Horne's standing orders.

## 2024-05-08 NOTE — ED PROVIDER NOTE - CLINICAL SUMMARY MEDICAL DECISION MAKING FREE TEXT BOX
Patient presents to emergency department brought in for concern of AMS.  Will obtain CT head to rule out new acute intracranial bleed in setting of patient's INR being elevated.  The patient's physical exam, she has normal mentation at this time, no focal weakness, and no localizing deficits that would be concerning for a new CVA.  Will also obtain baseline electrolytes to rule out any acute abnormalities including hyponatremia and hypocalcemia that may precipitate mentation changes.    Patient has heart rate of 87, DARNELL, PGY4 Patient presents to emergency department brought in for concern of AMS.  Will obtain CT head to rule out new acute intracranial bleed in setting of patient's INR being elevated.  The patient's physical exam, she has normal mentation at this time, no focal weakness, and no localizing deficits that would be concerning for a new CVA.  Will also obtain baseline electrolytes to rule out any acute abnormalities including hyponatremia and hypocalcemia that may precipitate mentation changes.    Per RN team, patient noted to be hypoxic to 88-90% on SpO2. Given patient is a chronic smoker, unclear if this is baseline for patient. however, given history of recent sx, will supplement with O2 2L on NC in ED.    On lab review, also noted to have new ISABELA. Counseled patient that she would require medical admission today. Discussed with Dr. Maki's team who is willing to take patient back onto their service. DARNELL, PGY4 Patient presents to emergency department brought in for concern of AMS.  Will obtain CT head to rule out new acute intracranial bleed in setting of patient's INR being elevated.  The patient's physical exam, she has normal mentation at this time, no focal weakness, and no localizing deficits that would be concerning for a new CVA.  Will also obtain baseline electrolytes to rule out any acute abnormalities including hyponatremia and hypocalcemia that may precipitate mentation changes.    Per RN team, patient noted to be hypoxic to 88-90% on SpO2. Given patient is a chronic smoker, unclear if this is baseline for patient. however, given history of recent sx, will supplement with O2 2L on NC in ED.    On lab review, also noted to have new ISABELA. Counseled patient that she would require medical admission today. Discussed with Dr. Maki's team who is willing to take patient back onto their service.    Derick: 64 year old female with pmhx of htn hld, afib diagnosed recently, not on ac, bzd/opiod and etoh abuse, mvp here with severe mitral and TR s/p mvc placement on 4/16referreed back to ER for epeat ct head for elevated INR. PE: att exam: patient awake alert NAD. gcs 15 LUNGS CTAB no wheeze no crackle. CARD RRR no m/r/g.  Abdomen soft NT ND no rebound no guarding no CVA tenderness. EXT WWP no edema no calf tenderness CV 2+DP/PT bilaterally. neuro A&Ox3, no focal deficits skin warm and dry no rash. plan will get labs, imaging, ct head r/o ich, repeat INR, consult Dr. Maki's team. patient is at baseline, does not appear lethargic. oxygen dropping while in room. will get imaging to r/o pe as well, possible opioid abuse versus ich reassess

## 2024-05-08 NOTE — ED PROVIDER NOTE - OBJECTIVE STATEMENT
65 y/o female with PMHx of HLD, HTN, AFib (diagnosed recently, not on AC), benzodiazepine/opioid & ETOH abuse, and mitral valve prolapse found to have severe mitral and tricuspid regurgitation w/ signs of cardiogenic shock, now s/p RHC with IABP placement s/p MV replacement 4/16 with Dr. Maki. Patient was seen in ER 65 y/o female with PMHx of HLD, HTN, AFib (diagnosed recently, not on AC), benzodiazepine/opioid & ETOH abuse, and mitral valve prolapse found to have severe mitral and tricuspid regurgitation w/ signs of cardiogenic shock, now s/p RHC with IABP placement s/p MV replacement 4/16 with Dr. Maki. Patient presents today to the ER brought in by her daughter for concern of altered mental status in patient.      She was at Dr. Hanson's office today for routine follow-up after she reversed received a reversal of her INR for her Coumadin and Dr. Hanson thought that she was not as sharp as she used to be and is not as weighty as her usual consumer.  Patient herself states that she feels at baseline, and states that she has no new complaints at this time.  Deny recent fever chills nausea vomiting diaphoresis chest pain or shortness of breath.  Denied focal weakness.  Denied all toxic habits at this time. States she is on daily oxycodone 80 mg PO for pain control.

## 2024-05-08 NOTE — ED ADULT NURSE NOTE - NSFALLHARMRISKINTERV_ED_ALL_ED

## 2024-05-08 NOTE — PHYSICAL EXAM
[] : no respiratory distress [Respiration, Rhythm And Depth] : normal respiratory rhythm and effort [Auscultation Breath Sounds / Voice Sounds] : lungs were clear to auscultation bilaterally [Apical Impulse] : the apical impulse was normal [Heart Rate And Rhythm] : heart rate was normal and rhythm regular [Murmurs] : no murmurs [Heart Sounds] : normal S1 and S2 [Clean] : clean [Dry] : dry [Healing Well] : healing well [FreeTextEntry3] : Trace pedal edema

## 2024-05-08 NOTE — ED PROVIDER NOTE - PHYSICAL EXAMINATION
GEN: Pt in NAD, A&O x3. GCS 15  EYES: Sclera white w/o injection, PERRLA, EOMI.  ENT: Head NCAT. Nose without deformity, turbinates without edema or erythema, no DC. No auricular TTP. Mouth and pharynx without erythema or exudates, uvula midline, no tonsillar enlargement. Neck supple FROM.   RESP: No chest wall tenderness, CTA b/l, no wheezes, rales, or rhonchi.   CARDIAC: RRR, clear distinct S1, S2, no murmurs, gallops, or rubs.   ABD: Abdomen non-distended, soft, non-tender, no rebound or guarding. No CVAT b/l.  VASC: 2+ carotid, radial, and dorsalis pedis pulses b/l. No edema or tenderness of the lower extremities.  NEURO: CN 2-12 grossly intact. Normal and equal sensation UE, LE and face b/l. 5/5 strength UE and LE b/l.  Pronator drift negative. Normal gait and gross cerebellar functioning.  MSK: No joint erythema or obvious deformities. Spine without obvious deformity. No midline or paraspinal tenderness. FROM w/o pain of upper and lower extremities b/l.  SKIN: No rashes noted.

## 2024-05-09 ENCOUNTER — TRANSCRIPTION ENCOUNTER (OUTPATIENT)
Age: 64
End: 2024-05-09

## 2024-05-09 ENCOUNTER — APPOINTMENT (OUTPATIENT)
Dept: HEART FAILURE | Facility: CLINIC | Age: 64
End: 2024-05-09

## 2024-05-09 ENCOUNTER — RESULT REVIEW (OUTPATIENT)
Age: 64
End: 2024-05-09

## 2024-05-09 VITALS — WEIGHT: 193.57 LBS

## 2024-05-09 LAB
ALBUMIN SERPL ELPH-MCNC: 3.5 G/DL — SIGNIFICANT CHANGE UP (ref 3.3–5)
ALP SERPL-CCNC: 156 U/L — HIGH (ref 40–120)
ALT FLD-CCNC: 19 U/L — SIGNIFICANT CHANGE UP (ref 10–45)
ANION GAP SERPL CALC-SCNC: 13 MMOL/L — SIGNIFICANT CHANGE UP (ref 5–17)
AST SERPL-CCNC: 64 U/L — HIGH (ref 10–40)
BILIRUB DIRECT SERPL-MCNC: 0.1 MG/DL — SIGNIFICANT CHANGE UP (ref 0–0.3)
BILIRUB INDIRECT FLD-MCNC: 0.2 MG/DL — SIGNIFICANT CHANGE UP (ref 0.2–1)
BILIRUB SERPL-MCNC: 0.3 MG/DL — SIGNIFICANT CHANGE UP (ref 0.2–1.2)
BUN SERPL-MCNC: 23 MG/DL — SIGNIFICANT CHANGE UP (ref 7–23)
CALCIUM SERPL-MCNC: 9.1 MG/DL — SIGNIFICANT CHANGE UP (ref 8.4–10.5)
CHLORIDE SERPL-SCNC: 98 MMOL/L — SIGNIFICANT CHANGE UP (ref 96–108)
CO2 SERPL-SCNC: 25 MMOL/L — SIGNIFICANT CHANGE UP (ref 22–31)
CREAT SERPL-MCNC: 1.34 MG/DL — HIGH (ref 0.5–1.3)
EGFR: 44 ML/MIN/1.73M2 — LOW
GLUCOSE SERPL-MCNC: 83 MG/DL — SIGNIFICANT CHANGE UP (ref 70–99)
HCT VFR BLD CALC: 30.8 % — LOW (ref 34.5–45)
HGB BLD-MCNC: 9.9 G/DL — LOW (ref 11.5–15.5)
INR BLD: 3.16 RATIO — HIGH (ref 0.85–1.18)
MCHC RBC-ENTMCNC: 29.7 PG — SIGNIFICANT CHANGE UP (ref 27–34)
MCHC RBC-ENTMCNC: 32.1 GM/DL — SIGNIFICANT CHANGE UP (ref 32–36)
MCV RBC AUTO: 92.5 FL — SIGNIFICANT CHANGE UP (ref 80–100)
NRBC # BLD: 0 /100 WBCS — SIGNIFICANT CHANGE UP (ref 0–0)
PLATELET # BLD AUTO: 306 K/UL — SIGNIFICANT CHANGE UP (ref 150–400)
POTASSIUM SERPL-MCNC: 3.9 MMOL/L — SIGNIFICANT CHANGE UP (ref 3.5–5.3)
POTASSIUM SERPL-SCNC: 3.9 MMOL/L — SIGNIFICANT CHANGE UP (ref 3.5–5.3)
PROT SERPL-MCNC: 7.2 G/DL — SIGNIFICANT CHANGE UP (ref 6–8.3)
PROTHROM AB SERPL-ACNC: 32.1 SEC — HIGH (ref 9.5–13)
RBC # BLD: 3.33 M/UL — LOW (ref 3.8–5.2)
RBC # FLD: 14.9 % — HIGH (ref 10.3–14.5)
SODIUM SERPL-SCNC: 136 MMOL/L — SIGNIFICANT CHANGE UP (ref 135–145)
WBC # BLD: 7.18 K/UL — SIGNIFICANT CHANGE UP (ref 3.8–10.5)
WBC # FLD AUTO: 7.18 K/UL — SIGNIFICANT CHANGE UP (ref 3.8–10.5)

## 2024-05-09 PROCEDURE — 82330 ASSAY OF CALCIUM: CPT

## 2024-05-09 PROCEDURE — 80048 BASIC METABOLIC PNL TOTAL CA: CPT

## 2024-05-09 PROCEDURE — 85014 HEMATOCRIT: CPT

## 2024-05-09 PROCEDURE — 99285 EMERGENCY DEPT VISIT HI MDM: CPT | Mod: 25

## 2024-05-09 PROCEDURE — 93306 TTE W/DOPPLER COMPLETE: CPT

## 2024-05-09 PROCEDURE — 71045 X-RAY EXAM CHEST 1 VIEW: CPT

## 2024-05-09 PROCEDURE — 71275 CT ANGIOGRAPHY CHEST: CPT | Mod: MC

## 2024-05-09 PROCEDURE — 84295 ASSAY OF SERUM SODIUM: CPT

## 2024-05-09 PROCEDURE — 70450 CT HEAD/BRAIN W/O DYE: CPT | Mod: MC

## 2024-05-09 PROCEDURE — 82947 ASSAY GLUCOSE BLOOD QUANT: CPT

## 2024-05-09 PROCEDURE — 83735 ASSAY OF MAGNESIUM: CPT

## 2024-05-09 PROCEDURE — 80053 COMPREHEN METABOLIC PANEL: CPT

## 2024-05-09 PROCEDURE — 82803 BLOOD GASES ANY COMBINATION: CPT

## 2024-05-09 PROCEDURE — 85610 PROTHROMBIN TIME: CPT

## 2024-05-09 PROCEDURE — 84132 ASSAY OF SERUM POTASSIUM: CPT

## 2024-05-09 PROCEDURE — 85025 COMPLETE CBC W/AUTO DIFF WBC: CPT

## 2024-05-09 PROCEDURE — 36415 COLL VENOUS BLD VENIPUNCTURE: CPT

## 2024-05-09 PROCEDURE — 83605 ASSAY OF LACTIC ACID: CPT

## 2024-05-09 PROCEDURE — 80307 DRUG TEST PRSMV CHEM ANLYZR: CPT

## 2024-05-09 PROCEDURE — 82248 BILIRUBIN DIRECT: CPT

## 2024-05-09 PROCEDURE — 85027 COMPLETE CBC AUTOMATED: CPT

## 2024-05-09 PROCEDURE — 85018 HEMOGLOBIN: CPT

## 2024-05-09 PROCEDURE — 82435 ASSAY OF BLOOD CHLORIDE: CPT

## 2024-05-09 PROCEDURE — 93306 TTE W/DOPPLER COMPLETE: CPT | Mod: 26

## 2024-05-09 PROCEDURE — 85730 THROMBOPLASTIN TIME PARTIAL: CPT

## 2024-05-09 RX ORDER — SPIRONOLACTONE 25 MG/1
1 TABLET, FILM COATED ORAL
Qty: 30 | Refills: 0
Start: 2024-05-09 | End: 2024-06-07

## 2024-05-09 RX ORDER — ROSUVASTATIN CALCIUM 5 MG/1
1 TABLET ORAL
Refills: 0 | DISCHARGE

## 2024-05-09 RX ORDER — FOLIC ACID 0.8 MG
1 TABLET ORAL DAILY
Refills: 0 | Status: DISCONTINUED | OUTPATIENT
Start: 2024-05-09 | End: 2024-05-09

## 2024-05-09 RX ORDER — HEPARIN SODIUM 5000 [USP'U]/ML
5000 INJECTION INTRAVENOUS; SUBCUTANEOUS EVERY 8 HOURS
Refills: 0 | Status: DISCONTINUED | OUTPATIENT
Start: 2024-05-09 | End: 2024-05-09

## 2024-05-09 RX ADMIN — LIDOCAINE 1 PATCH: 4 CREAM TOPICAL at 02:00

## 2024-05-09 RX ADMIN — Medication 81 MILLIGRAM(S): at 10:17

## 2024-05-09 RX ADMIN — Medication 25 MILLIGRAM(S): at 05:34

## 2024-05-09 RX ADMIN — LIDOCAINE 1 PATCH: 4 CREAM TOPICAL at 06:09

## 2024-05-09 RX ADMIN — Medication 1 MILLIGRAM(S): at 10:16

## 2024-05-09 RX ADMIN — Medication 1 TABLET(S): at 10:16

## 2024-05-09 NOTE — DISCHARGE NOTE NURSING/CASE MANAGEMENT/SOCIAL WORK - PATIENT PORTAL LINK FT
You can access the FollowMyHealth Patient Portal offered by [facility] by registering at the following website: [email or web address]. By joining [employer]’s FollowMyHealth portal, you will also be able to view your health information using other applications (apps) compatible with our system.

## 2024-05-09 NOTE — DISCHARGE NOTE NURSING/CASE MANAGEMENT/SOCIAL WORK - NSDCPEFALRISK_GEN_ALL_CORE
For information on Fall & Injury Prevention, visit: [email or web address] OR  [email or web address] OR  https://www.cdc.gov/steadi/patient.html

## 2024-05-09 NOTE — DISCHARGE NOTE PROVIDER - NSDCCPCAREPLAN_GEN_ALL_CORE_FT
PRINCIPAL DISCHARGE DIAGNOSIS  Diagnosis: ISABELA (acute kidney injury)  Assessment and Plan of Treatment:       SECONDARY DISCHARGE DIAGNOSES  Diagnosis: Acute hypoxic respiratory failure  Assessment and Plan of Treatment:

## 2024-05-09 NOTE — DISCHARGE NOTE PROVIDER - NSDCFUSCHEDAPPT_GEN_ALL_CORE_FT
Jaime Cunha  [facility] Physician [facility]  CARDIOLOGY [address]  Scheduled Appointment: 05/10/2024

## 2024-05-09 NOTE — DISCHARGE NOTE PROVIDER - CARE PROVIDER_API CALL
Jaime Cunha  Interventional Cardiology  [address] [place] [address], [address] [place] [id number]  Phone: [phone]  Fax: [phone]  Follow Up Time:

## 2024-05-09 NOTE — DISCHARGE NOTE PROVIDER - NSDCMRMEDTOKEN_GEN_ALL_CORE_FT
amiodarone 200 mg oral tablet: 1 tab(s) orally once a day  aspirin 81 mg oral delayed release tablet: 1 tab(s) orally once a day  Crestor 5 mg oral tablet: 1 tab(s) orally once a day  dapagliflozin 10 mg oral tablet: 1 tab(s) orally once a day  furosemide 20 mg oral tablet: 1 tab(s) orally once a day  losartan 25 mg oral tablet: 1 tab(s) orally once a day  metoprolol succinate 25 mg oral tablet, extended release: 1 tab(s) orally once a day  oxyCODONE 5 mg oral tablet: 1 tab(s) orally every 8 hours as needed for  moderate pain MDD: 3  spironolactone 25 mg oral tablet: 2 tab(s) orally once a day  warfarin 2.5 mg oral tablet: 1 tab(s) orally once a day   aspirin 81 mg oral delayed release tablet: 1 tab(s) orally once a day  Crestor 5 mg oral tablet: 1 tab(s) orally once a day  dapagliflozin 10 mg oral tablet: 1 tab(s) orally once a day  furosemide 20 mg oral tablet: 1 tab(s) orally once a day  losartan 25 mg oral tablet: 1 tab(s) orally once a day  metoprolol succinate 25 mg oral tablet, extended release: 1 tab(s) orally once a day  spironolactone 25 mg oral tablet: 2 tab(s) orally once a day   aspirin 81 mg oral delayed release tablet: 1 tab(s) orally once a day  dapagliflozin 10 mg oral tablet: 1 tab(s) orally once a day  furosemide 20 mg oral tablet: 1 tab(s) orally once a day  losartan 25 mg oral tablet: 1 tab(s) orally once a day  metoprolol succinate 25 mg oral tablet, extended release: 1 tab(s) orally once a day  spironolactone 25 mg oral tablet: 1 tab(s) orally once a day

## 2024-05-09 NOTE — DISCHARGE NOTE PROVIDER - HOSPITAL COURSE
History of Present Illness:   63 y/o female with PMHx of HLD, HTN, AFib (diagnosed recently, not on AC), benzodiazepine/opioid & ETOH abuse, and mitral valve prolapse who underwent 4/16 MVR(t) - Epic 33mm valve Maze procedure LAAL  discharged home with subsequent readmission for elevated IBNR to 9.0 last weekend, doscing Vit K, neg effusion by ECHO and DC back home 4 days ago  Pt cancelled post op visit office appt as she wasn't up to it " Was seen in office today and she was found to be "altered as per Dr Maki/staff  She was not answering questions, and Dr Maki wanted further evaluation including head CT/labwork  Pt admits to prior and current narcotic use but  states she has not had any alcohol since her heart surgery Pt is lethargic, but arousable, oriented to self, events and states she feels "ok"

## 2024-05-10 ENCOUNTER — TRANSCRIPTION ENCOUNTER (OUTPATIENT)
Age: 64
End: 2024-05-10

## 2024-05-13 ENCOUNTER — APPOINTMENT (OUTPATIENT)
Dept: CARDIOLOGY | Facility: CLINIC | Age: 64
End: 2024-05-13

## 2024-05-13 VITALS
OXYGEN SATURATION: 97 % | HEIGHT: 71 IN | HEART RATE: 65 BPM | DIASTOLIC BLOOD PRESSURE: 57 MMHG | WEIGHT: 197 LBS | BODY MASS INDEX: 27.58 KG/M2 | SYSTOLIC BLOOD PRESSURE: 88 MMHG

## 2024-05-13 PROCEDURE — 99213 OFFICE O/P EST LOW 20 MIN: CPT | Mod: 25

## 2024-05-13 PROCEDURE — 93000 ELECTROCARDIOGRAM COMPLETE: CPT

## 2024-05-13 RX ORDER — DAPAGLIFLOZIN 10 MG/1
10 TABLET, FILM COATED ORAL
Qty: 30 | Refills: 1 | Status: DISCONTINUED | COMMUNITY
End: 2024-05-13

## 2024-05-13 RX ORDER — APIXABAN 5 MG/1
5 TABLET, FILM COATED ORAL
Qty: 90 | Refills: 3 | Status: ACTIVE | COMMUNITY
Start: 2024-05-13 | End: 1900-01-01

## 2024-05-13 RX ORDER — AMIODARONE HYDROCHLORIDE 200 MG/1
200 TABLET ORAL DAILY
Refills: 0 | Status: DISCONTINUED | COMMUNITY
End: 2024-05-13

## 2024-05-13 RX ORDER — WARFARIN 2.5 MG/1
2.5 TABLET ORAL
Refills: 0 | Status: DISCONTINUED | COMMUNITY
End: 2024-05-13

## 2024-05-14 LAB
ALBUMIN SERPL ELPH-MCNC: 4.3 G/DL
ALP BLD-CCNC: 141 U/L
ALT SERPL-CCNC: 12 U/L
ANION GAP SERPL CALC-SCNC: 13 MMOL/L
AST SERPL-CCNC: 28 U/L
BILIRUB SERPL-MCNC: 0.4 MG/DL
BUN SERPL-MCNC: 16 MG/DL
CALCIUM SERPL-MCNC: 9.8 MG/DL
CHLORIDE SERPL-SCNC: 96 MMOL/L
CO2 SERPL-SCNC: 28 MMOL/L
CREAT SERPL-MCNC: 1.03 MG/DL
EGFR: 61 ML/MIN/1.73M2
GLUCOSE SERPL-MCNC: 85 MG/DL
HCT VFR BLD CALC: 34.2 %
HGB BLD-MCNC: 10.7 G/DL
INR PPP: 1.39 RATIO
MCHC RBC-ENTMCNC: 28.8 PG
MCHC RBC-ENTMCNC: 31.3 GM/DL
MCV RBC AUTO: 92.2 FL
PLATELET # BLD AUTO: 374 K/UL
POTASSIUM SERPL-SCNC: 4.3 MMOL/L
PROT SERPL-MCNC: 8 G/DL
PT BLD: 15.6 SEC
RBC # BLD: 3.71 M/UL
RBC # FLD: 15 %
SODIUM SERPL-SCNC: 137 MMOL/L
WBC # FLD AUTO: 9.21 K/UL

## 2024-05-20 NOTE — COUNSELING
[Hygeine (Including Daily Shower)] : hygeine (including daily shower) [Importance of Regular Medical Follow-Up] : the importance of regular medical follow-up [No Heavy Lifting] : no heavy lifting (>15-20 lb. for 1 month or 25 lb. for 3 months from date of surgery) [Weight Management] : weight management [Blood Pressure Control] : blood pressure control [S/S of infection] : signs and symptoms of infection (and to whom it should be reported) [Progressive Ambulation/Activity] : progressive ambulation/activity [Medication/Vitamin/Herb/Food Interaction] : medication/vitamin/herb/food interaction [SBE antibiotic prophylaxis] : SBE antibiotic prophylaxis was recommended [Low Fat/Low Cholesterol Diet] : low fat/low cholesterol diet [Blood Sugar Control] : blood sugar control [Stress Management] : stress management

## 2024-05-21 ENCOUNTER — TRANSCRIPTION ENCOUNTER (OUTPATIENT)
Age: 64
End: 2024-05-21

## 2024-05-22 ENCOUNTER — APPOINTMENT (OUTPATIENT)
Dept: CARDIOTHORACIC SURGERY | Facility: CLINIC | Age: 64
End: 2024-05-22
Payer: MEDICARE

## 2024-05-22 VITALS
DIASTOLIC BLOOD PRESSURE: 60 MMHG | HEART RATE: 110 BPM | WEIGHT: 194 LBS | TEMPERATURE: 97.8 F | SYSTOLIC BLOOD PRESSURE: 99 MMHG | HEIGHT: 71 IN | RESPIRATION RATE: 14 BRPM | BODY MASS INDEX: 27.16 KG/M2 | OXYGEN SATURATION: 97 %

## 2024-05-22 PROCEDURE — 99024 POSTOP FOLLOW-UP VISIT: CPT

## 2024-05-22 RX ORDER — SENNOSIDES 8.6 MG/1
8.6 CAPSULE, GELATIN COATED ORAL
Refills: 0 | Status: COMPLETED | COMMUNITY
End: 2024-05-22

## 2024-05-22 NOTE — ASSESSMENT
[FreeTextEntry1] : 63 y/o female with PMHx of HLD, HTN, AFib, benzodiazepine/opioid & ETOH abuse, and mitral valve prolapse found to have severe mitral and tricuspid regurgitation w/ signs of cardiogenic shock, now s/p RHC with IABP placement.  4/16 MVR(t) - Epic 33mm valve Maze procedure LAAL - 40mm AtriClip.  Post op dobutamine for inotropic support / lasix gtt - VASO /IABP d/c, TTE 4/18 EF 35%, no intervalvular MR junctional rhythm coumadin for MVR/afib. Precedex for delirium, seen by behavioral health recommend Seroquel 12.5-25 PO q6 for agitation. afib 72 amio 200 QD EP cslt + PW to epm, npo for DCCV +pw amio 200 qd As per d/w Heart Failure team: patient started on Toprol 25mg qD and farxiga, increased aldactone 50mg qD, and decreased lasix to 20mg qD). DC home 4/27. INR 4/30 resulted 2.75 remained on Warfarin 4mg.  Readmitted Friday evening for INR 10.08 from Friday morning. Vit K reversal X 2, admitted overnight. DC home on Coumadin 2.5mg daily. Dr. Miguel Angel Serna PCP able to follow INR, Pt seen 2 weeks ago noted to be a little drowsy and garbled speech sent to ED for Non con head CT and hold Coumadin.  INR in ED 2.76. CT head and CTA chest for PE both negative. Utox noted for barbiturates, oxy and benzos, DC home off AC given TIMMY ligation and risks on AC.   Saw Dr. Wick last week who started Eliquis 5mg BID for her Afib.   Presents today and feeling better since last visit.  Gradually progressing since DC and walking more everyday. Eating and drinking with +BM. Denies chest pain, SOB, swelling, weight gain, palpitations, cough, fever or chills. Reports she is in the process of setting up apt with a mental health clinic with her insurance company.  Seeing Dr. Cunha to establish cardiology care next week.  Reports feeling nervous regarding all she went through.  Reports good supports at home with her  and daughter.  Denies any Suicidal/homicidal ideation.   Of note HE to 109 this morning, not sure if she took metoprolol.   Today on exam patient's lungs clear bilaterally, sternum stable, incision clean, dry and intact. No peripheral edema noted. Instructed patient on importance of optimal glycemic control, daily showering, daily weights, incentive spirometer use, and increase ambulation as tolerated. Instructed to call office with any signs or symptoms of infection or weight gain of 2 or more pounds in 1 day or 3 or more pounds in 1 week.   Plan:  - Continue current medication regimen per cardiology - Follow up with cardiologist Dr. Cunha next week - Follow up with PCP Dr. Serna next week - Follow up with Mental Health referral via insurance.  Provided [facility] walk in clinic name, address and number should she need - Continue to walk and increase as tolerated - Low salt diet and fluids discussed in detail - Tight glucose control discussed in detail for wound healing - SBE antibiotic prophylaxis discussed at length  - Return to office PRN - Call with any questions or concerns

## 2024-05-22 NOTE — PHYSICAL EXAM
[] : no respiratory distress [Respiration, Rhythm And Depth] : normal respiratory rhythm and effort [Auscultation Breath Sounds / Voice Sounds] : lungs were clear to auscultation bilaterally [Exaggerated Use Of Accessory Muscles For Inspiration] : no accessory muscle use [Apical Impulse] : the apical impulse was normal [Heart Rate And Rhythm] : heart rate was normal and rhythm regular [Heart Sounds] : normal S1 and S2 [Clean] : clean [Dry] : dry [Healing Well] : healing well [No Edema] : no edema [Bleeding] : no active bleeding [Foul Odor] : no foul smell [Purulent Drainage] : no purulent drainage [Serosanguinous Drainage] : no serosanguinous drainage [Erythema] : not erythematous [Warm] : not warm [Tender] : not tender

## 2024-05-22 NOTE — REASON FOR VISIT
[de-identified] : MVR(t) - Epic 33mm valve Maze procedure LAAL - 40mm AtriClip [de-identified] : 4/16/2024

## 2024-05-22 NOTE — END OF VISIT
[FreeTextEntry3] : I, Dr. Maki, personally performed the evaluation and management for this post op patient who presents today. Evaluation includes conducting the examination, assessing all conditions and establishing a plan of care moving forward. Today, the ACP, Emmanuel Treviño, was here to observe my evaluation and management services for this patient.

## 2024-05-30 ENCOUNTER — NON-APPOINTMENT (OUTPATIENT)
Age: 64
End: 2024-05-30

## 2024-05-30 ENCOUNTER — APPOINTMENT (OUTPATIENT)
Dept: CARDIOLOGY | Facility: CLINIC | Age: 64
End: 2024-05-30
Payer: MEDICARE

## 2024-05-30 ENCOUNTER — APPOINTMENT (OUTPATIENT)
Dept: ELECTROPHYSIOLOGY | Facility: CLINIC | Age: 64
End: 2024-05-30
Payer: MEDICARE

## 2024-05-30 VITALS
OXYGEN SATURATION: 98 % | DIASTOLIC BLOOD PRESSURE: 71 MMHG | HEIGHT: 71 IN | HEART RATE: 114 BPM | WEIGHT: 188 LBS | SYSTOLIC BLOOD PRESSURE: 102 MMHG | BODY MASS INDEX: 26.32 KG/M2

## 2024-05-30 DIAGNOSIS — F17.210 NICOTINE DEPENDENCE, CIGARETTES, UNCOMPLICATED: ICD-10-CM

## 2024-05-30 PROCEDURE — 99215 OFFICE O/P EST HI 40 MIN: CPT | Mod: 25

## 2024-05-30 PROCEDURE — 93000 ELECTROCARDIOGRAM COMPLETE: CPT

## 2024-05-30 RX ORDER — METOPROLOL SUCCINATE 50 MG/1
50 TABLET, EXTENDED RELEASE ORAL
Qty: 90 | Refills: 1 | Status: ACTIVE | COMMUNITY
Start: 1900-01-01 | End: 1900-01-01

## 2024-05-30 RX ORDER — BUPROPION HYDROCHLORIDE 150 MG/1
150 TABLET, FILM COATED, EXTENDED RELEASE ORAL
Qty: 90 | Refills: 3 | Status: ACTIVE | COMMUNITY
Start: 2024-05-30 | End: 1900-01-01

## 2024-05-30 NOTE — HISTORY OF PRESENT ILLNESS
[FreeTextEntry1] : 65 yo woman with recent MVR with MAZE (surgery 04/16/24), HTN, HLD, Afib, initially she presented to [facility] with cardiogenic shock and decompensated heart failure requiring ICU stay and placement of IABP, she subsequently improved clinically, and underwent mitral valve surgery on 04/16/24.  She underwent DCCV 04/24/24 post operatively.  Subsequently was sent home on Coumadin and then was noted to have supratherapeutic INR requiring hospitalization.  Then another admission for AMS and ISABELA in the beginning of May 2024.  Since then, she was started on Eliquis.  Most recent TTE 05/09/24 showed normalized LV function and improved RV function.   Current meds; asa 81 mg daily, Eliquis 5 mg bid, farxiga 10 mg daily, losartan 25 mg daily, Crestor 5 mg daily, pedro 25 daily, Toprol XL 25 daily, clonazepam 1 mg three times per day, oxy 20 mg 4 times per day.

## 2024-05-30 NOTE — REASON FOR VISIT
[Symptom and Test Evaluation] : symptom and test evaluation [CV Risk Factors and Non-Cardiac Disease] : CV risk factors and non-cardiac disease [Arrhythmia/ECG Abnorrmalities] : arrhythmia/ECG abnormalities [Structural Heart and Valve Disease] : structural heart and valve disease

## 2024-05-30 NOTE — DISCUSSION/SUMMARY
[FreeTextEntry1] : 63 yo woman with recent MVR with MAZE (surgery 04/16/24), HTN, HLD, Afib, presents for a follow up.  s/p mitral valve replacement 04/2024 -recent TTE showed normal EF, bioprosthetic valve with no issues -for now will cont ARB, pedro, Farxiga   HTN -controlled on losartan   HLD -normal cors per cath 04/13/2024, LDL 30 04/2024. cont Crestor    afib s/p DCCV  -?atrial tach today, increased Toprol XL from 25 mg to 50 mg daily, event monitor placed   Current smoker -has been on nicotine patch since the hospitalization, started on Welbutrin.   Follow up in one month.  I have spent 40 min on this encounter by seeing the pt, reviewing previous records, discussing plan of care and documenting this encounter.   [EKG obtained to assist in diagnosis and management of assessed problem(s)] : EKG obtained to assist in diagnosis and management of assessed problem(s)

## 2024-05-30 NOTE — REVIEW OF SYSTEMS
[Weight Loss (___ Lbs)] : [unfilled] ~Ulb weight loss [Dyspnea on exertion] : dyspnea during exertion [Snoring] : snoring [Dysuria] : dysuria [Easy Bruising] : a tendency for easy bruising [Fever] : no fever [Headache] : no headache [Chills] : no chills [Blurry Vision] : no blurred vision [Seeing Double (Diplopia)] : no diplopia [Earache] : no earache [Hearing Loss] : no hearing loss [Sore Throat] : no sore throat [Tinnitus] : no tinnitus [Chest Discomfort] : no chest discomfort [Lower Ext Edema] : no extremity edema [Palpitations] : no palpitations [Orthopnea] : no orthopnea [PND] : no PND [Syncope] : no syncope [Cough] : no cough [Coughing Up Blood] : no hemoptysis [Abdominal Pain] : no abdominal pain [Nausea] : no nausea [Vomiting] : no vomiting [Diarrhea] : diarrhea [Blood in Stool] : no blood in stool [Abn Vaginal Bleeding] : no unexplained vaginal bleeding [Joint Pain] : no joint pain [Joint Swelling] : no joint swelling [Rash] : no rash [Change In Color Of Skin] : change in skin color [Dizziness] : no dizziness [Numbness (Hypoesthesia)] : no numbness [Weakness] : no weakness [Speech Disturbance] : no speech disturbance [Easy Bleeding] : no tendency for easy bleeding

## 2024-06-13 PROCEDURE — 93244 EXT ECG>48HR<7D REV&INTERPJ: CPT

## 2024-06-19 LAB
HGB FLD-MCNC: 10.9 G/DL — LOW (ref 11.7–16.5)
OXYHGB MFR BLDMV: 88 % — LOW (ref 90–95)
SAO2 % BLD: 92.4 % — HIGH (ref 60–90)

## 2024-06-24 NOTE — PATIENT PROFILE ADULT - TOBACCO USE
"[facility] Crocker [facility].    Discharge instructions, which include signs and symptoms to monitor patient for, as well as detailed information regarding vomiting, fussy baby, and elevated liver enzymes provided.  All questions and concerns addressed at this time.      Mother educated to follow-up with PCP regarding slightly elevated liver enzymes.     Children's Tylenol (160mg/5mL) / Children's Motrin (100mg/5mL) dosing sheet with the appropriate dose per the patient's current weight was highlighted and provided with discharge instructions.      Patient leaves ER in no apparent distress. This RN provided education regarding returning to the ER for any new concerns or changes in patient's condition.      BP (!) 123/86 Comment: pt kicking, cycled x2  Pulse 122   Temp 36.6 °C (97.9 °F) (Temporal)   Resp 36   Ht 0.686 m (2' 3\")   Wt 8.6 kg (18 lb 15.4 oz)   SpO2 95%   BMI 18.29 kg/m²    " Current some day smoker

## 2024-06-25 ENCOUNTER — APPOINTMENT (OUTPATIENT)
Dept: CARDIOLOGY | Facility: CLINIC | Age: 64
End: 2024-06-25

## 2024-07-02 ENCOUNTER — APPOINTMENT (OUTPATIENT)
Dept: CARDIOLOGY | Facility: CLINIC | Age: 64
End: 2024-07-02
Payer: MEDICARE

## 2024-07-02 ENCOUNTER — NON-APPOINTMENT (OUTPATIENT)
Age: 64
End: 2024-07-02

## 2024-07-02 VITALS
OXYGEN SATURATION: 100 % | WEIGHT: 185 LBS | HEIGHT: 71 IN | HEART RATE: 107 BPM | DIASTOLIC BLOOD PRESSURE: 82 MMHG | BODY MASS INDEX: 25.9 KG/M2 | SYSTOLIC BLOOD PRESSURE: 115 MMHG

## 2024-07-02 DIAGNOSIS — E78.5 HYPERLIPIDEMIA, UNSPECIFIED: ICD-10-CM

## 2024-07-02 DIAGNOSIS — I10 ESSENTIAL (PRIMARY) HYPERTENSION: ICD-10-CM

## 2024-07-02 PROBLEM — Z95.2 S/P MITRAL VALVE REPLACEMENT: Status: ACTIVE | Noted: 2024-05-03

## 2024-07-02 PROBLEM — I48.91 AFIB: Status: ACTIVE | Noted: 2024-05-03

## 2024-07-02 PROCEDURE — G2211 COMPLEX E/M VISIT ADD ON: CPT

## 2024-07-02 PROCEDURE — 99214 OFFICE O/P EST MOD 30 MIN: CPT

## 2024-07-02 PROCEDURE — 93000 ELECTROCARDIOGRAM COMPLETE: CPT

## 2024-07-02 RX ORDER — DAPAGLIFLOZIN 10 MG/1
10 TABLET, FILM COATED ORAL DAILY
Qty: 30 | Refills: 5 | Status: ACTIVE | COMMUNITY
Start: 2024-07-02 | End: 1900-01-01

## 2024-07-02 RX ORDER — METOPROLOL SUCCINATE 25 MG/1
25 TABLET, EXTENDED RELEASE ORAL DAILY
Qty: 90 | Refills: 1 | Status: ACTIVE | COMMUNITY
Start: 2024-07-02 | End: 1900-01-01

## 2024-07-02 NOTE — OCCUPATIONAL THERAPY INITIAL EVALUATION ADULT - MD/RN NOTIFIED
Ellsworth.   Follow blood glucoses with labs draws.   yes

## 2024-08-06 ENCOUNTER — APPOINTMENT (OUTPATIENT)
Dept: ELECTROPHYSIOLOGY | Facility: CLINIC | Age: 64
End: 2024-08-06

## 2024-08-06 PROCEDURE — 93000 ELECTROCARDIOGRAM COMPLETE: CPT

## 2024-08-06 PROCEDURE — 99214 OFFICE O/P EST MOD 30 MIN: CPT | Mod: 25

## 2024-08-10 ENCOUNTER — NON-APPOINTMENT (OUTPATIENT)
Age: 64
End: 2024-08-10

## 2024-08-10 NOTE — PHYSICAL EXAM
[Well Developed] : well developed [Well Nourished] : well nourished [No Acute Distress] : no acute distress [Normal Conjunctiva] : normal conjunctiva [Normal Venous Pressure] : normal venous pressure [Normal S1, S2] : normal S1, S2 [No Murmur] : no murmur [No Rub] : no rub [No Gallop] : no gallop [Clear Lung Fields] : clear lung fields [Good Air Entry] : good air entry [No Respiratory Distress] : no respiratory distress  [Soft] : abdomen soft [Non Tender] : non-tender [Normal Bowel Sounds] : normal bowel sounds [Normal Gait] : normal gait [No Cyanosis] : no cyanosis [Edema ___] : edema [unfilled] [No Rash] : no rash [Moves all extremities] : moves all extremities [No Focal Deficits] : no focal deficits [Normal Speech] : normal speech [Alert and Oriented] : alert and oriented [Normal memory] : normal memory

## 2024-08-11 NOTE — END OF VISIT
[FreeTextEntry3] : I, Dr. Heredia, personally performed the evaluation and management (E/M) services for this new patient.  That E/M includes conducting the initial examination, assessing all conditions, and establishing the plan of care.  Today, my ACP, Soledad Rogers, was here to observe my evaluation and management services for this patient to be followed going forward.

## 2024-08-11 NOTE — HISTORY OF PRESENT ILLNESS
[FreeTextEntry1] : I had the pleasure of seeing Denise Bautista today for consultation for atrial flutter in the arrhythmia clinic at [facility]. As you well know, she is a pleasant 64-year-old woman with a history of hypertension, hyperlipidemia, admission in April 2024 with newly diagnosed atrial fibrillation and MVP with severe MR s/p bio MVR/Atriclip/biatrial MAZE (4/16/2024), hospital course complicated by postoperative AF s/p BURNA/DCCV 4/24/2024 and now with persistent atypical atrial flutter.  She initially was placed on amiodarone post cardioversion.  The patient was readmitted early May 2024 with a supratherapeutic INR and subsequent altered mental status and ISABELA.  She was taken off amiodarone at that time.  Her ECG in May 2024 demonstrates an atypical atrial flutter and subsequent EKGs since then has shown persistent atypical atrial flutter.  She denies any symptoms and denies any chest pain, shortness of breath, palpitations, near-syncope, or syncope.  She reports compliance with her Eliquis.

## 2024-08-11 NOTE — CARDIOLOGY SUMMARY
[de-identified] : Today: 2:1 AFL @ 113bpm [de-identified] : BRUNA (4/23/2024): Grossly normal LV systolic function; TIMMY appears oversewn with no residual flow observed  Echo (5/9/2024): EF 61%; moderately enlarged RV size with normal function; severely dilated LA; dilated RA; bio MVR which is well-seated with normal function  [de-identified] : [facility] (4/13/2024): Normal coronary arteries

## 2024-08-11 NOTE — CARDIOLOGY SUMMARY
[de-identified] : Today: 2:1 AFL @ 113bpm [de-identified] : BRUNA (4/23/2024): Grossly normal LV systolic function; TIMMY appears oversewn with no residual flow observed  Echo (5/9/2024): EF 61%; moderately enlarged RV size with normal function; severely dilated LA; dilated RA; bio MVR which is well-seated with normal function  [de-identified] : [facility] (4/13/2024): Normal coronary arteries

## 2024-08-11 NOTE — DISCUSSION/SUMMARY
[EKG obtained to assist in diagnosis and management of assessed problem(s)] : EKG obtained to assist in diagnosis and management of assessed problem(s) [FreeTextEntry1] : In summary, Ms. Bautista is a 64-year-old woman with a history of hypertension, hyperlipidemia, admission in April 2024 with newly diagnosed atrial fibrillation and MVP with severe MR s/p bio MVR/Atriclip/biatrial MAZE (4/16/2024), hospital course complicated by postoperative AF s/p BRUNA/DCCV 4/24/2024 and now with persistent atypical atrial flutter. We discussed the pathophysiology of atrial flutter including management strategies. The options of a cardioversion and a catheter ablation were discussed. Risks and benefits were discussed with each option. The procedures, outcomes and risks of ablation were reviewed. Risks discussed included but were not limited to bleeding, vascular damage, cardiac perforation, tamponade, phrenic nerve injury, stroke, pulmonary vein stenosis and atrial esophageal fistula.  After all questions were answered, it was a shared decision to initially proceed with a cardioversion given her fast resting heart rates to restore sinus rhythm and then to electively book an ablation several weeks after the cardioversion.  We will make arrangements to schedule her procedures.  With both procedures she was instructed to not interrupt her anticoagulation and to continue it including the morning of the procedure.  She was also instructed to hold her Farxiga 3 days prior to each of the procedures.

## 2024-08-11 NOTE — HISTORY OF PRESENT ILLNESS
[FreeTextEntry1] : I had the pleasure of seeing Denise Bautista today for consultation for atrial flutter in the arrhythmia clinic at [facility]. As you well know, she is a pleasant 64-year-old woman with a history of hypertension, hyperlipidemia, admission in April 2024 with newly diagnosed atrial fibrillation and MVP with severe MR s/p bio MVR/Atriclip/biatrial MAZE (4/16/2024), hospital course complicated by postoperative AF s/p BRUNA/DCCV 4/24/2024 and now with persistent atypical atrial flutter.  She initially was placed on amiodarone post cardioversion.  The patient was readmitted early May 2024 with a supratherapeutic INR and subsequent altered mental status and ISABELA.  She was taken off amiodarone at that time.  Her ECG in May 2024 demonstrates an atypical atrial flutter and subsequent EKGs since then has shown persistent atypical atrial flutter.  She denies any symptoms and denies any chest pain, shortness of breath, palpitations, near-syncope, or syncope.  She reports compliance with her Eliquis.

## 2024-08-12 ENCOUNTER — NON-APPOINTMENT (OUTPATIENT)
Age: 64
End: 2024-08-12

## 2024-08-14 ENCOUNTER — TRANSCRIPTION ENCOUNTER (OUTPATIENT)
Age: 64
End: 2024-08-14

## 2024-09-12 ENCOUNTER — RX RENEWAL (OUTPATIENT)
Age: 64
End: 2024-09-12

## 2024-09-19 ENCOUNTER — OUTPATIENT (OUTPATIENT)
Dept: OUTPATIENT SERVICES | Facility: HOSPITAL | Age: 64
LOS: 1 days | End: 2024-09-19
Payer: MEDICARE

## 2024-09-19 VITALS
TEMPERATURE: 98 F | SYSTOLIC BLOOD PRESSURE: 96 MMHG | DIASTOLIC BLOOD PRESSURE: 68 MMHG | HEART RATE: 61 BPM | OXYGEN SATURATION: 99 % | HEIGHT: 70 IN | WEIGHT: 179.02 LBS | RESPIRATION RATE: 20 BRPM

## 2024-09-19 DIAGNOSIS — Z98.890 OTHER SPECIFIED POSTPROCEDURAL STATES: Chronic | ICD-10-CM

## 2024-09-19 DIAGNOSIS — Z01.818 ENCOUNTER FOR OTHER PREPROCEDURAL EXAMINATION: ICD-10-CM

## 2024-09-19 DIAGNOSIS — I48.4 ATYPICAL ATRIAL FLUTTER: ICD-10-CM

## 2024-09-19 LAB
ANION GAP SERPL CALC-SCNC: 12 MMOL/L — SIGNIFICANT CHANGE UP (ref 5–17)
BLD GP AB SCN SERPL QL: NEGATIVE — SIGNIFICANT CHANGE UP
BUN SERPL-MCNC: 25 MG/DL — HIGH (ref 7–23)
CALCIUM SERPL-MCNC: 10 MG/DL — SIGNIFICANT CHANGE UP (ref 8.4–10.5)
CHLORIDE SERPL-SCNC: 104 MMOL/L — SIGNIFICANT CHANGE UP (ref 96–108)
CO2 SERPL-SCNC: 23 MMOL/L — SIGNIFICANT CHANGE UP (ref 22–31)
CREAT SERPL-MCNC: 1.01 MG/DL — SIGNIFICANT CHANGE UP (ref 0.5–1.3)
EGFR: 62 ML/MIN/1.73M2 — SIGNIFICANT CHANGE UP
GLUCOSE SERPL-MCNC: 86 MG/DL — SIGNIFICANT CHANGE UP (ref 70–99)
HCT VFR BLD CALC: 41.2 % — SIGNIFICANT CHANGE UP (ref 34.5–45)
HGB BLD-MCNC: 13.3 G/DL — SIGNIFICANT CHANGE UP (ref 11.5–15.5)
MCHC RBC-ENTMCNC: 28.7 PG — SIGNIFICANT CHANGE UP (ref 27–34)
MCHC RBC-ENTMCNC: 32.3 GM/DL — SIGNIFICANT CHANGE UP (ref 32–36)
MCV RBC AUTO: 88.8 FL — SIGNIFICANT CHANGE UP (ref 80–100)
NRBC # BLD: 0 /100 WBCS — SIGNIFICANT CHANGE UP (ref 0–0)
PLATELET # BLD AUTO: 354 K/UL — SIGNIFICANT CHANGE UP (ref 150–400)
POTASSIUM SERPL-MCNC: 4 MMOL/L — SIGNIFICANT CHANGE UP (ref 3.5–5.3)
POTASSIUM SERPL-SCNC: 4 MMOL/L — SIGNIFICANT CHANGE UP (ref 3.5–5.3)
RBC # BLD: 4.64 M/UL — SIGNIFICANT CHANGE UP (ref 3.8–5.2)
RBC # FLD: 16.6 % — HIGH (ref 10.3–14.5)
RH IG SCN BLD-IMP: NEGATIVE — SIGNIFICANT CHANGE UP
SODIUM SERPL-SCNC: 139 MMOL/L — SIGNIFICANT CHANGE UP (ref 135–145)
WBC # BLD: 7.21 K/UL — SIGNIFICANT CHANGE UP (ref 3.8–10.5)
WBC # FLD AUTO: 7.21 K/UL — SIGNIFICANT CHANGE UP (ref 3.8–10.5)

## 2024-09-19 PROCEDURE — 85027 COMPLETE CBC AUTOMATED: CPT

## 2024-09-19 PROCEDURE — 86901 BLOOD TYPING SEROLOGIC RH(D): CPT

## 2024-09-19 PROCEDURE — G0463: CPT

## 2024-09-19 PROCEDURE — 80048 BASIC METABOLIC PNL TOTAL CA: CPT

## 2024-09-19 PROCEDURE — 86850 RBC ANTIBODY SCREEN: CPT

## 2024-09-19 PROCEDURE — 86900 BLOOD TYPING SEROLOGIC ABO: CPT

## 2024-09-19 RX ORDER — BUPROPION HYDROCHLORIDE 150 MG/1
0 TABLET ORAL
Qty: 0 | Refills: 1 | DISCHARGE

## 2024-09-19 NOTE — H&P PST ADULT - NSICDXPASTMEDICALHX_GEN_ALL_CORE_FT
PAST MEDICAL HISTORY:  ISABELA (acute kidney injury)     Atrial fibrillation and flutter     Chronic back pain     H/O heart failure     HLD (hyperlipidemia)     HTN (hypertension)     Mitral valve prolapse

## 2024-09-19 NOTE — H&P PST ADULT - NSANTHOSAYNRD_GEN_A_CORE
No. NEERU screening performed.  STOP BANG Legend: 0-2 = LOW Risk; 3-4 = INTERMEDIATE Risk; 5-8 = HIGH Risk

## 2024-09-19 NOTE — H&P PST ADULT - PROBLEM SELECTOR PLAN 1
aflutter ablation aflutter ablation  hold farxiga 3 days preop, last dose 9/30/24  continue Eliquis as per EP

## 2024-09-19 NOTE — H&P PST ADULT - HISTORY OF PRESENT ILLNESS
64 year old female with PMHx of HTN, HLD, MVR with MAZE on 4/16/24, Afib on Eliquis (last dose 8/14 AM), presented to [facility] in April with cardiogenic shock and decompensated heart failure requiring ICU and IABP placement, eventually undergoing mitral valve surgery and DCCV postoperatively on 4/24/2024. Presented for follow up to Dr. Heredia's office and was found to be back in afib. s/p cardioversion 8/14/24 with Dr. Heredia. pt states it lasted about 10 days now here for ablation. Currently denies chest pain, palpitations, SOB, dizziness.

## 2024-09-19 NOTE — H&P PST ADULT - CARDIOVASCULAR
normal/S1 S2 present/no gallops/no rub/no murmur/irregular rate and rhythm details… S1 S2 present/no gallops/no rub/no murmur/irregular rate and rhythm

## 2024-09-24 ENCOUNTER — NON-APPOINTMENT (OUTPATIENT)
Age: 64
End: 2024-09-24

## 2024-10-04 ENCOUNTER — TRANSCRIPTION ENCOUNTER (OUTPATIENT)
Age: 64
End: 2024-10-04

## 2024-10-04 ENCOUNTER — OUTPATIENT (OUTPATIENT)
Dept: INPATIENT UNIT | Facility: HOSPITAL | Age: 64
LOS: 1 days | End: 2024-10-04
Payer: MEDICARE

## 2024-10-04 VITALS
HEART RATE: 59 BPM | SYSTOLIC BLOOD PRESSURE: 115 MMHG | DIASTOLIC BLOOD PRESSURE: 72 MMHG | OXYGEN SATURATION: 97 % | TEMPERATURE: 98 F | RESPIRATION RATE: 18 BRPM

## 2024-10-04 VITALS
DIASTOLIC BLOOD PRESSURE: 55 MMHG | SYSTOLIC BLOOD PRESSURE: 117 MMHG | WEIGHT: 177.91 LBS | TEMPERATURE: 98 F | HEIGHT: 71 IN | RESPIRATION RATE: 17 BRPM | OXYGEN SATURATION: 98 % | HEART RATE: 56 BPM

## 2024-10-04 DIAGNOSIS — Z98.890 OTHER SPECIFIED POSTPROCEDURAL STATES: Chronic | ICD-10-CM

## 2024-10-04 LAB
BLD GP AB SCN SERPL QL: NEGATIVE — SIGNIFICANT CHANGE UP
RH IG SCN BLD-IMP: NEGATIVE — SIGNIFICANT CHANGE UP

## 2024-10-04 RX ORDER — ACETAMINOPHEN 325 MG
1000 TABLET ORAL ONCE
Refills: 0 | Status: COMPLETED | OUTPATIENT
Start: 2024-10-04 | End: 2024-10-04

## 2024-10-04 RX ORDER — APIXABAN 5 MG/1
5 TABLET, FILM COATED ORAL EVERY 12 HOURS
Refills: 0 | Status: DISCONTINUED | OUTPATIENT
Start: 2024-10-04 | End: 2024-10-04

## 2024-10-04 RX ADMIN — Medication 1000 MILLIGRAM(S): at 17:00

## 2024-10-04 RX ADMIN — APIXABAN 5 MILLIGRAM(S): 5 TABLET, FILM COATED ORAL at 16:57

## 2024-10-04 RX ADMIN — Medication 400 MILLIGRAM(S): at 16:06

## 2024-10-04 NOTE — PRE-ANESTHESIA EVALUATION ADULT - BP NONINVASIVE SYSTOLIC (MM HG)
117 Isotretinoin Pregnancy And Lactation Text: This medication is Pregnancy Category X and is considered extremely dangerous during pregnancy. It is unknown if it is excreted in breast milk.

## 2024-10-04 NOTE — PRE-ANESTHESIA EVALUATION ADULT - NSANTHDIETYNSD_GEN_ALL_CORE
Patient : Marielos Price Age: 71 year old Sex: female   MRN: [id number] Encounter Date: 12/1/2022    History     Chief Complaint   Patient presents with   • Shortness of Breath       HPI    Marielos Price is a 71 year old presenting to the emergency department c/o cough, SOB, and wheezing for the last two days.  She has history of CHF and is concerned that this is causing her SOB so she has take two extra doses of of her torsemide.  She has also tried her nebulizer at home.  She also reports fever, chills, nausea and vomiting.  She lives at home alone but her daughters assist in her care.     I have reviewed Marielos Price's previous discharge summary from 11/23/22.  Note Review Summary:     Pt with history of hypertension, dyslipidemia, obstructive sleep apnea, obesity, heart failure with reduced ejection fraction to 29%, pulmonary hypertension, PE on Xarelto, mitral regurgitation status post mitral clip, and pulmonary hypertension.  She presented with SOB and headache, found to be profoundly anemic.  Anticoagulation was held.  She had push enteroscopy which showed actively oozing AVM that was cauterized.       She saw her PCP today c/o SOB and UTI sx.  CBC at 16:10 showed increasing hgb to 9.9 so it was felt that anemia was unlikely contributing to her SOB.  She was treated with abx empirically and viral swab was sent.  This was + for influenza.      Labs from today:  WBC 4.2 - 11.0 K/mcL 7.5    RBC 4.00 - 5.20 mil/mcL 3.36 Low     HGB 12.0 - 15.5 g/dL 9.9 Low     HCT 36.0 - 46.5 % 31.2 Low     MCV 78.0 - 100.0 fl 92.9    MCH 26.0 - 34.0 pg 29.5    MCHC 32.0 - 36.5 g/dL 31.7 Low     RDW-CV 11.0 - 15.0 % 15.9 High     RDW-SD 39.0 - 50.0 fL 52.9 High      - 450 K/mcL 167    Neutrophil, Percent % 85    Lymphocytes, Percent % 6    Mono, Percent % 8    Eosinophils, Percent % 1    Basophils, Percent % 0    Immature Granulocytes % 0    Absolute Neutrophils 1.8 - 7.7 K/mcL 6.3    Absolute Lymphocytes 1.0 - 4.0 K/mcL  0.5 Low     Absolute Monocytes 0.3 - 0.9 K/mcL 0.6    Absolute Eosinophils  0.0 - 0.5 K/mcL 0.1    Absolute Basophils 0.0 - 0.3 K/mcL 0.0    Absolute Immmature Granulocytes 0.0 - 0.2 K/mcL 0.0    NRBC       Fasting Status     Sodium 135 - 145 mmol/L 146 High     Potassium 3.4 - 5.1 mmol/L 4.0    Chloride 97 - 110 mmol/L 109    Carbon Dioxide 21 - 32 mmol/L 29    Anion Gap 7 - 19 mmol/L 12    Glucose 70 - 99 mg/dL 101 High     BUN 6 - 20 mg/dL 19    Creatinine 0.51 - 0.95 mg/dL 1.39 High     Glomerular Filtration Rate >=60 41 Low       Prothrombin Time 9.7 - 11.8 sec 12.4 High     INR   1.3              Allergies   Allergen Reactions   • Fentanyl SHORTNESS OF BREATH     Chest pain.    • Gabapentin SWELLING and WEAKNESS     Swelling in hands and feet.   Drop in blood pressure   • Riociguat SHORTNESS OF BREATH     Aches, low energy and low blood pressure   • Latex RASH       No current facility-administered medications for this encounter.     Current Outpatient Medications   Medication Sig   • azithromycin (ZITHROMAX) 250 MG tablet Take 2 tablets the first day, then 1 tablet a day thereafter   • oseltamivir (Tamiflu) 30 MG capsule Take 1 capsule by mouth in the morning and 1 capsule in the evening. Do all this for 5 days.   • torsemide (DEMADEX) 10 MG tablet Take 1 tablet by mouth daily.   • allopurinol (ZYLOPRIM) 300 MG tablet Take 1 tablet by mouth daily.   • docusate sodium-sennosides (SENOKOT S) 50-8.6 MG per tablet Take 1 tablet by mouth in the morning and 1 tablet in the evening.   • rivaroxaban (XARELTO) 20 MG Tab Take 1 tablet by mouth daily (with dinner). Do not start before November 24, 2022.   • amoxicillin (AMOXIL) 500 MG capsule Take 2,000 mg by mouth as directed. Take 4 capsules (2 grams) 1 hour before dental work, colonoscopy or gyn procedures.   • pantoprazole (PROTONIX) 40 MG tablet TAKE 1 TABLET BY MOUTH ONCE A DAY   • spironolactone (ALDACTONE) 25 MG tablet TAKE 1 TABLET BY MOUTH ONCE A DAY   •  albuterol 108 (90 Base) MCG/ACT inhaler Inhale 2 puffs into the lungs every 4 hours as needed for Wheezing.   • diclofenac (VOLTAREN) 1 % gel APPLY 2 TO 4 GRAMS TOPICALLY TO THE LEFT WRIST 4 TIMES DAILY AS NEEDED FOR PAIN.   • carvedilol (COREG) 3.125 MG tablet TAKE 1 TABLET BY MOUTH TWICE A DAY WITH MEALS   • Spacer/Aero-Holding Chambers (AeroChamber MV) Misc Use with inhaler   • albuterol (VENTOLIN) (2.5 MG/3ML) 0.083% nebulizer solution Take 3 mLs by nebulization 4 times daily. (Patient taking differently: Take 2.5 mg by nebulization in the morning and 2.5 mg at noon and 2.5 mg in the evening.)   • atorvastatin (LIPITOR) 20 MG tablet TAKE 1 TABLET BY MOUTH EVERY EVENING FOR CHOLESTEROL   • polyethylene glycol (MIRALAX) 17 GM/SCOOP powder Take 17 g by mouth every other day. Stir and dissolve powder in any 4 to 8 ounces of beverage, then drink.   • naLOXone (Narcan) 4 MG/0.1ML nasal spray Spray the content of 1 device into 1 nostril. Call 911. May repeat with 2nd device in alternate nostril if no response in 2-3 minutes.   • acetaminophen (TYLENOL) 500 MG tablet Take 1,000 mg by mouth every 6 hours as needed for Pain.       Past Medical History:   Diagnosis Date   • Anemia     [id number] - GI bleeding   • Arterial ischemic stroke, vertebrobasilar, brainstem, remote, resolved     Mini Stroke in early 20's   • AVM (arteriovenous malformation) of small bowel, acquired with hemorrhage 11/19/2021    Ablation done   • Bronchitis    • Cardiomyopathy 05/2022    Per echo, LVEF 46%   • Chronic pain 2018, 2019    Lower back, bilateral knee pain   • Current use of long term anticoagulation - Xarelto     hx of PE   • Dizziness    • Esophageal reflux    • Family history of malignant neoplasm of gastrointestinal tract 04/15/2013    Colonoscopy/Romero   • Fracture     fracture lt foot 2017   • Generalized headaches    • High cholesterol    • Lumbago 2017    Chronic lower back pain   • Mitral regurgitation     severe symptomatic,  mitral clip   • Murmur    • Osteoarthrosis, unspecified whether generalized or localized, unspecified site    • Other and unspecified mitral valve diseases    • Pulmonary Embolism 05/2018    right lung   • Pulmonary HTN (CMS/HCC) 05/2022    Compensated - RVSP 41 mmHg.  Dr. Contreras follows   • RAD (reactive airway disease)    • Unspecified essential hypertension        Past Surgical History:   Procedure Laterality Date   • Breast reduction surgery  12/07/2016    bilateral reduction - Dr Jewell   • Breast surgery procedure unlisted Bilateral 08/24/2017    Minor/ Excision dogears   • Cardiac catheterization/possible ptca/possible stent  11/02/2022   • Colonoscopy diagnostic  03/12/2021   • Colorec canc scrn,colonoscopy not hi risk  04/15/2013    Dr. Romero/Colorectal Screening/recall 4/15/2018   • Enteroscopy  11/19/2021    Ablation of 3 small bowel AVMs   • Esophagogastroduodenoscopy transoral flex diag  03/13/2021    w small bowel capsulotome   • Lumbar epidural injection  04/19/2017   • Lumbar epidural injection  09/06/2017    Pain management   • Lumbar epidural injection  02/21/2018    Lumbar JOSE   • Lumbar epidural injection  04/25/2019    Lumbar JOSE   • Lumbar epidural injection  06/13/2019    Lumbar JOSE   • Lumbar epidural injection Left 08/03/2021   • Lumbar epidural injection  07/28/2022   • Mitral valve repair  11/11/2010    mitral clip   • Nerve block Right 05/29/2019    Right knee   • Radiofrequency ablation knee Right 08/18/2020   • Right heart cath  04/16/2019   • Right heart cath  03/01/2021    Severe Pulm HTN   • Steroid injection knee Bilateral 09/05/2018   • Tooth extraction  2017       Family History   Problem Relation Age of Onset   • Heart Mother    • Hypertension Mother    • Hypertension Father    • Heart Father    • Cancer, Breast Sister    • Cancer, Prostate Brother    • Cancer Brother    • Cancer, Breast Maternal Aunt    • Cancer Paternal Aunt        Social History     Tobacco Use   • Smoking  Yes status: Former Smoker     Packs/day: 0.25     Years: 20.00     Pack years: 5.00     Types: Cigarettes     Quit date: 2005     Years since quittin.9   • Smokeless tobacco: Never Used   • Tobacco comment: pt quit 15-20 yrs ago   Vaping Use   • Vaping Use: never used   Substance Use Topics   • Alcohol use: No   • Drug use: No       Review of Systems     Review of Systems   Constitutional: Positive for fever. Negative for activity change, appetite change and chills.   HENT: Negative for congestion, rhinorrhea and sore throat.    Eyes: Negative for visual disturbance.   Respiratory: Positive for cough, shortness of breath and wheezing.    Cardiovascular: Negative for chest pain and leg swelling.   Gastrointestinal: Positive for nausea and vomiting. Negative for abdominal pain, constipation and diarrhea.   Genitourinary: Negative for dysuria and hematuria.   Musculoskeletal: Negative for arthralgias and myalgias.   Skin: Negative for rash and wound.   Allergic/Immunologic: Negative for immunocompromised state.   Neurological: Negative for dizziness, weakness, light-headedness, numbness and headaches.   Hematological: Does not bruise/bleed easily.   Psychiatric/Behavioral: Negative for confusion.       Physical Exam     ED Triage Vitals   ED Triage Vitals Group      Temp 22 100.4 °F (38 °C)      Heart Rate 22 73      Resp 22 18      BP 22 105/57      SpO2 22 98 %      EtCO2 mmHg --       Height --       Weight --       Weight Scale Used --       BMI (Calculated) --       IBW/kg (Calculated) --        Physical Exam  Vitals and nursing note reviewed.   Constitutional:       General: She is not in acute distress.     Appearance: Normal appearance. She is well-developed.   HENT:      Head: Normocephalic and atraumatic.      Mouth/Throat:      Mouth: Mucous membranes are not dry.   Eyes:      Conjunctiva/sclera: Conjunctivae normal.      Pupils: Pupils are equal,  round, and reactive to light.   Cardiovascular:      Rate and Rhythm: Normal rate and regular rhythm.      Pulses: Normal pulses.      Heart sounds: Normal heart sounds. No murmur heard.  Pulmonary:      Effort: Pulmonary effort is normal. No respiratory distress.      Breath sounds: Wheezing present.      Comments: End expiratory wheezing  Abdominal:      General: Bowel sounds are normal. There is no distension.      Palpations: Abdomen is soft. Abdomen is not rigid.      Tenderness: There is no abdominal tenderness. There is no guarding or rebound.   Musculoskeletal:      Cervical back: Normal range of motion and neck supple.   Lymphadenopathy:      Cervical: No cervical adenopathy.   Skin:     General: Skin is warm and dry.      Findings: No rash.   Neurological:      Mental Status: She is alert and oriented to person, place, and time.      GCS: GCS eye subscore is 4. GCS verbal subscore is 5. GCS motor subscore is 6.      Cranial Nerves: No cranial nerve deficit.      Sensory: No sensory deficit.   Psychiatric:         Speech: Speech normal.         Behavior: Behavior normal.           Procedures     Procedures    Lab Results     Results for orders placed or performed during the hospital encounter of 12/01/22   Comprehensive Metabolic Panel   Result Value Ref Range    Fasting Status      Sodium 147 (H) 135 - 145 mmol/L    Potassium 3.5 3.4 - 5.1 mmol/L    Chloride 110 97 - 110 mmol/L    Carbon Dioxide 27 21 - 32 mmol/L    Anion Gap 14 7 - 19 mmol/L    Glucose 104 (H) 70 - 99 mg/dL    BUN 16 6 - 20 mg/dL    Creatinine 1.32 (H) 0.51 - 0.95 mg/dL    Glomerular Filtration Rate 43 (L) >=60    BUN/ Creatinine Ratio 12 7 - 25    Calcium 8.2 (L) 8.4 - 10.2 mg/dL    Bilirubin, Total 0.3 0.2 - 1.0 mg/dL    GOT/AST 41 (H) <=37 Units/L    GPT/ALT 29 <64 Units/L    Alkaline Phosphatase 71 45 - 117 Units/L    Albumin 3.6 3.6 - 5.1 g/dL    Protein, Total 6.7 6.4 - 8.2 g/dL    Globulin 3.1 2.0 - 4.0 g/dL    A/G Ratio 1.2 1.0 -  2.4   TROPONIN I, HIGH SENSITIVITY   Result Value Ref Range    Troponin I, High Sensitivity 8 <52 ng/L   Procalcitonin   Result Value Ref Range    Procalcitonin 0.28 (H) <=0.09 ng/mL   NT proBNP   Result Value Ref Range    NT-proBNP 1,502 (H) <=125 pg/mL   CBC with Automated Differential (performable only)   Result Value Ref Range    WBC 7.5 4.2 - 11.0 K/mcL    RBC 3.39 (L) 4.00 - 5.20 mil/mcL    HGB 9.8 (L) 12.0 - 15.5 g/dL    HCT 30.9 (L) 36.0 - 46.5 %    MCV 91.2 78.0 - 100.0 fl    MCH 28.9 26.0 - 34.0 pg    MCHC 31.7 (L) 32.0 - 36.5 g/dL    RDW-CV 16.1 (H) 11.0 - 15.0 %    RDW-SD 52.4 (H) 39.0 - 50.0 fL     140 - 450 K/mcL    NRBC 0 <=0 /100 WBC    Neutrophil, Percent 83 %    Lymphocytes, Percent 7 %    Mono, Percent 9 %    Eosinophils, Percent 1 %    Basophils, Percent 0 %    Immature Granulocytes 0 %    Absolute Neutrophils 6.2 1.8 - 7.7 K/mcL    Absolute Lymphocytes 0.5 (L) 1.0 - 4.0 K/mcL    Absolute Monocytes 0.6 0.3 - 0.9 K/mcL    Absolute Eosinophils  0.1 0.0 - 0.5 K/mcL    Absolute Basophils 0.0 0.0 - 0.3 K/mcL    Absolute Immmature Granulocytes 0.0 0.0 - 0.2 K/mcL   BLOOD GAS, VENOUS -POINT OF CARE   Result Value Ref Range    PH, VENOUS - POINT OF CARE 7.52 (H) 7.35 - 7.45 Units    PCO2, VENOUS - POINT OF CARE 35 (L) 38 - 51 mm Hg    PO2, VENOUS - POINT OF CARE 38 35 - 42 mm Hg    HCO3, VENOUS - POINT OF CARE 29 (H) 22 - 28 mmol/L    BASE EXCESS / DEFICIT, VENOUS - POINT OF CARE 6 (H) -2 - 2 mmol/L    O2 SATURATION, VENOUS - POINT OF CARE 78 60 - 80 %    TCO2 - POINT OF CARE 30 (H) 19 - 24 mmol/L   ISTAT8 VENOUS  POINT OF CARE   Result Value Ref Range    BUN - POINT OF CARE 22 (H) 6 - 20 mg/dL    SODIUM - POINT OF CARE 142 135 - 145 mmol/L    POTASSIUM - POINT OF CARE 4.7 3.4 - 5.1 mmol/L    CHLORIDE - POINT OF CARE 107 97 - 110 mmol/L    TCO2 - POINT OF CARE 27 (H) 19 - 24 mmol/L    ANION GAP - POINT OF CARE 14 7 - 19 mmol/L    HEMATOCRIT - POINT OF CARE 32.0 (L) 36.0 - 46.5 %    HEMOGLOBIN -  POINT OF CARE 10.9 (L) 12.0 - 15.5 g/dL    GLUCOSE - POINT OF CARE 104 (H) 70 - 99 mg/dL    CALCIUM, IONIZED - POINT OF CARE 1.03 (L) 1.15 - 1.29 mmol/L    Creatinine 1.40 (H) 0.51 - 0.95 mg/dL    Glomerular Filtration Rate 40 (L) >=60       EKG     [facility] EKG report   Results for orders placed or performed during the hospital encounter of 12/01/22   ECG   Result Value Ref Range    Ventricular Rate EKG/Min (BPM) 91     Atrial Rate (BPM) 91     WY-Interval (MSEC) 152     QRS-Interval (MSEC) 96     QT-Interval (MSEC) 354     QTc 435     P Axis (Degrees) 52     R Axis (Degrees) -21     T Axis (Degrees) 111     REPORT TEXT       Sinus rhythm  with occasional  premature ventricular complexes  with junctional escape complexes  Nonspecific T wave abnormality  Abnormal ECG  When compared with ECG of  22-NOV-2022 09:46,  Sinus rhythm  is now  with junctional escape complexes  Incomplete left bundle branch block  is no longer  present  Confirmed by MOSHE WHITFIELD, ROGERIO RESENDIZ ([id number]),  Rishi Sahu ([id number]) on 12/2/2022 2:52:19 AM         Radiology Results     Imaging Results          XR CHEST AP OR PA - PORTABLE (Final result)  Result time 12/01/22 22:02:36    Final result                 Impression:    FINDINGS/IMPRESSION:      The heart is enlarged, similar to prior exam. Pulmonary vessels are  normally distributed. A small device projecting over the left ventricle is  unchanged.    No focal pulmonary consolidation, pleural effusion or pneumothorax.                     Narrative:    XR CHEST AP OR PA    HISTORY:  Cough     COMPARISON:  November 22, 2022    TECHNIQUE:  1 frontal view                                ED Medications     Medications   ipratropium-albuterol (DUONEB) 0.5-2.5 (3) MG/3ML nebulizer solution 3 mL (3 mLs Nebulization Given 12/1/22 2230)     ED Course     Vitals:    12/01/22 1945 12/01/22 1950 12/01/22 2230   BP:  105/57 110/60   BP Location:   LUE - Left upper extremity   Patient Position:   Semi-Blake's    Pulse: 73  70   Resp: 18  18   Temp: 100.4 °F (38 °C)  100 °F (37.8 °C)   TempSrc:   Oral   SpO2: 98%  100%   LMP: 11/12/2007       ED Course as of 12/02/22 0455   Thu Dec 01, 2022   2217 Pt presents with SOB.  +flu today.  Pt is very concerned about her heart failure so has been taking extra of her torsemide at home without improvement of her SOB.  She does have asthma and has tried her nebulizer at home as well.  She is not hypoxic.  She has expiratory wheezing.  Will try breathing treatment.  Will check labs and CXR. [AB]   2242 I rechecked the patient after her breathing treatment. Her lungs are clear to auscultation and the patient states her chest tightness has improved. We discussed that her BNP level has improved from yesterdays. We discussed her CBC, CMP, and CXR which are normal/at baseline.  We discussed her wheezing is most likely due to her viral infection.  Given her wheezing has resolved and she is not hypoxic, she will not require admission.  Pt is agreeable with this. [EH]   2245 I staffed the patient with Dr. Gaspar. She will see the pt.  [EH]   2253 Dr. Nataliia Gaspar evaluated the patient and agrees with the plan for discharge home. Will prescribe Tamiflu.  [EH]      ED Course User Index  [AB] Ines Ovalle PA-C  [EH] Javier Anglin       Radiology Review: I have independently interpreted the Chest X-Ray and have found No acute or active disease.  I am awaiting on the final radiology read.      MDM                       MDM done in ED Course    Does the Patient have sepsis: NO     Critical Care     No Critical Care    Disposition       Clinical Impression and Diagnosis  10:48 PM       ED Diagnosis     Diagnosis Comment Associated Orders       Final diagnosis    Influenza A -- --        Follow Up:  Cherelle Mancilla DO  [address] [facility] [address]  [phone]      Call to be seen for ER follow up          Summary of your Discharge Medications      Take these  Medications      Details   oseltamivir 30 MG capsule  Commonly known as: Tamiflu   Take 1 capsule by mouth in the morning and 1 capsule in the evening. Do all this for 5 days.            Pt is discharged to home/self care in stable condition.              Discharge 12/1/2022 10:52 PM  Marielos Price discharge to home/self care.              I have reviewed the information recorded by the scribe for accuracy and agree with its contents.    ____________________________________________________________________    Javier Anglin acting as a scribe for Ines Ovalle PA-C.    Ines Ovalle PA-C  Dictation # [id number]  Scribe: Javier Anglin    Attending Physician: Dr. Nataliia Gaspar  Dictation # [id number]       Ines Ovalle PA-C  12/02/22 0509

## 2024-10-04 NOTE — ASU DISCHARGE PLAN (ADULT/PEDIATRIC) - CARE PROVIDER_API CALL
Julio César Heredia.  Cardiac Electrophysiology  [address] [place] [id number]  Phone: [phone]  Fax: [phone]  Scheduled Appointment: 11/26/2024 09:30 AM

## 2024-10-07 PROCEDURE — 93656 COMPRE EP EVAL ABLTJ ATR FIB: CPT

## 2024-10-07 PROCEDURE — C1893: CPT

## 2024-10-07 PROCEDURE — C1730: CPT

## 2024-10-07 PROCEDURE — 86901 BLOOD TYPING SEROLOGIC RH(D): CPT

## 2024-10-07 PROCEDURE — 93005 ELECTROCARDIOGRAM TRACING: CPT

## 2024-10-07 PROCEDURE — 86900 BLOOD TYPING SEROLOGIC ABO: CPT

## 2024-10-07 PROCEDURE — C1889: CPT

## 2024-10-07 PROCEDURE — 86850 RBC ANTIBODY SCREEN: CPT

## 2024-10-07 PROCEDURE — C1766: CPT

## 2024-10-07 PROCEDURE — C1894: CPT

## 2024-10-07 PROCEDURE — C1732: CPT

## 2024-10-07 PROCEDURE — 93623 PRGRMD STIMJ&PACG IV RX NFS: CPT

## 2024-10-07 PROCEDURE — C1731: CPT

## 2024-10-07 PROCEDURE — C1759: CPT

## 2024-10-08 ENCOUNTER — APPOINTMENT (OUTPATIENT)
Dept: CARDIOLOGY | Facility: CLINIC | Age: 64
End: 2024-10-08
Payer: MEDICARE

## 2024-10-08 ENCOUNTER — NON-APPOINTMENT (OUTPATIENT)
Age: 64
End: 2024-10-08

## 2024-10-08 VITALS
HEART RATE: 76 BPM | HEIGHT: 71 IN | SYSTOLIC BLOOD PRESSURE: 106 MMHG | OXYGEN SATURATION: 99 % | DIASTOLIC BLOOD PRESSURE: 77 MMHG

## 2024-10-08 DIAGNOSIS — E78.5 HYPERLIPIDEMIA, UNSPECIFIED: ICD-10-CM

## 2024-10-08 DIAGNOSIS — I10 ESSENTIAL (PRIMARY) HYPERTENSION: ICD-10-CM

## 2024-10-08 DIAGNOSIS — I48.91 UNSPECIFIED ATRIAL FIBRILLATION: ICD-10-CM

## 2024-10-08 DIAGNOSIS — Z95.2 PRESENCE OF PROSTHETIC HEART VALVE: ICD-10-CM

## 2024-10-08 PROCEDURE — 99214 OFFICE O/P EST MOD 30 MIN: CPT | Mod: 25

## 2024-10-08 PROCEDURE — 93000 ELECTROCARDIOGRAM COMPLETE: CPT

## 2025-01-20 NOTE — ED ADULT NURSE NOTE - MODE OF DISCHARGE
Anemia is likely due to chronic disease due to Chronic Kidney Disease. Most recent hemoglobin and hematocrit are listed below.  Recent Labs     01/18/25  0424 01/19/25  0453 01/20/25  0645   HGB 8.6*  8.5* 9.2* 8.1*   HCT 27.4*  27.2* 30.4* 27.4*       Plan  - Monitor serial CBC: Daily  - Transfuse PRBC if patient becomes hemodynamically unstable, symptomatic or H/H drops below 7/21.  - Patient has not received any PRBC transfusions to date  - Patient's anemia is currently stable     Ambulatory

## 2025-01-22 PROBLEM — Z86.79 PERSONAL HISTORY OF OTHER DISEASES OF THE CIRCULATORY SYSTEM: Chronic | Status: ACTIVE | Noted: 2024-09-19

## 2025-01-22 PROBLEM — N17.9 ACUTE KIDNEY FAILURE, UNSPECIFIED: Chronic | Status: ACTIVE | Noted: 2024-09-19

## 2025-01-22 PROBLEM — I48.91 UNSPECIFIED ATRIAL FIBRILLATION: Chronic | Status: ACTIVE | Noted: 2024-09-19

## 2025-02-25 ENCOUNTER — APPOINTMENT (OUTPATIENT)
Dept: ELECTROPHYSIOLOGY | Facility: CLINIC | Age: 65
End: 2025-02-25

## 2025-03-04 ENCOUNTER — APPOINTMENT (OUTPATIENT)
Dept: CARDIOLOGY | Facility: CLINIC | Age: 65
End: 2025-03-04
Payer: MEDICARE

## 2025-03-04 ENCOUNTER — NON-APPOINTMENT (OUTPATIENT)
Age: 65
End: 2025-03-04

## 2025-03-04 VITALS
WEIGHT: 190 LBS | SYSTOLIC BLOOD PRESSURE: 108 MMHG | DIASTOLIC BLOOD PRESSURE: 71 MMHG | BODY MASS INDEX: 26.5 KG/M2 | HEART RATE: 83 BPM | OXYGEN SATURATION: 99 %

## 2025-03-04 DIAGNOSIS — I10 ESSENTIAL (PRIMARY) HYPERTENSION: ICD-10-CM

## 2025-03-04 DIAGNOSIS — Z95.2 PRESENCE OF PROSTHETIC HEART VALVE: ICD-10-CM

## 2025-03-04 DIAGNOSIS — I48.91 UNSPECIFIED ATRIAL FIBRILLATION: ICD-10-CM

## 2025-03-04 DIAGNOSIS — E78.5 HYPERLIPIDEMIA, UNSPECIFIED: ICD-10-CM

## 2025-03-04 PROCEDURE — 99214 OFFICE O/P EST MOD 30 MIN: CPT | Mod: 25

## 2025-03-04 PROCEDURE — 93000 ELECTROCARDIOGRAM COMPLETE: CPT

## 2025-03-17 ENCOUNTER — RX RENEWAL (OUTPATIENT)
Age: 65
End: 2025-03-17

## 2025-07-18 ENCOUNTER — RX RENEWAL (OUTPATIENT)
Age: 65
End: 2025-07-18

## 2025-07-21 ENCOUNTER — EMERGENCY (EMERGENCY)
Facility: HOSPITAL | Age: 65
LOS: 1 days | End: 2025-07-21
Attending: EMERGENCY MEDICINE
Payer: MEDICARE

## 2025-07-21 ENCOUNTER — NON-APPOINTMENT (OUTPATIENT)
Age: 65
End: 2025-07-21

## 2025-07-21 VITALS
DIASTOLIC BLOOD PRESSURE: 67 MMHG | WEIGHT: 175.93 LBS | HEART RATE: 80 BPM | HEIGHT: 72 IN | RESPIRATION RATE: 20 BRPM | SYSTOLIC BLOOD PRESSURE: 102 MMHG | OXYGEN SATURATION: 95 % | TEMPERATURE: 99 F

## 2025-07-21 DIAGNOSIS — Z98.890 OTHER SPECIFIED POSTPROCEDURAL STATES: Chronic | ICD-10-CM

## 2025-07-21 LAB
ALBUMIN SERPL ELPH-MCNC: 4.1 G/DL — SIGNIFICANT CHANGE UP (ref 3.3–5)
ALP SERPL-CCNC: 158 U/L — HIGH (ref 40–120)
ALT FLD-CCNC: 32 U/L — SIGNIFICANT CHANGE UP (ref 10–45)
ANION GAP SERPL CALC-SCNC: 16 MMOL/L — SIGNIFICANT CHANGE UP (ref 5–17)
APTT BLD: 38.7 SEC — HIGH (ref 26.1–36.8)
AST SERPL-CCNC: 27 U/L — SIGNIFICANT CHANGE UP (ref 10–40)
BASOPHILS # BLD AUTO: 0.05 K/UL — SIGNIFICANT CHANGE UP (ref 0–0.2)
BASOPHILS NFR BLD AUTO: 0.7 % — SIGNIFICANT CHANGE UP (ref 0–2)
BILIRUB SERPL-MCNC: 0.2 MG/DL — SIGNIFICANT CHANGE UP (ref 0.2–1.2)
BUN SERPL-MCNC: 31 MG/DL — HIGH (ref 7–23)
CALCIUM SERPL-MCNC: 9.8 MG/DL — SIGNIFICANT CHANGE UP (ref 8.4–10.5)
CHLORIDE SERPL-SCNC: 99 MMOL/L — SIGNIFICANT CHANGE UP (ref 96–108)
CO2 SERPL-SCNC: 20 MMOL/L — LOW (ref 22–31)
CREAT SERPL-MCNC: 1 MG/DL — SIGNIFICANT CHANGE UP (ref 0.5–1.3)
EGFR: 63 ML/MIN/1.73M2 — SIGNIFICANT CHANGE UP
EGFR: 63 ML/MIN/1.73M2 — SIGNIFICANT CHANGE UP
EOSINOPHIL # BLD AUTO: 0.27 K/UL — SIGNIFICANT CHANGE UP (ref 0–0.5)
EOSINOPHIL NFR BLD AUTO: 3.7 % — SIGNIFICANT CHANGE UP (ref 0–6)
GLUCOSE SERPL-MCNC: 89 MG/DL — SIGNIFICANT CHANGE UP (ref 70–99)
HCT VFR BLD CALC: 32.3 % — LOW (ref 34.5–45)
HGB BLD-MCNC: 10.1 G/DL — LOW (ref 11.5–15.5)
IMM GRANULOCYTES # BLD AUTO: 0.02 K/UL — SIGNIFICANT CHANGE UP (ref 0–0.07)
IMM GRANULOCYTES NFR BLD AUTO: 0.3 % — SIGNIFICANT CHANGE UP (ref 0–0.9)
INR BLD: 1.11 RATIO — SIGNIFICANT CHANGE UP (ref 0.85–1.16)
LIDOCAIN IGE QN: 10 U/L — SIGNIFICANT CHANGE UP (ref 7–60)
LYMPHOCYTES # BLD AUTO: 1.6 K/UL — SIGNIFICANT CHANGE UP (ref 1–3.3)
LYMPHOCYTES NFR BLD AUTO: 22.1 % — SIGNIFICANT CHANGE UP (ref 13–44)
MAGNESIUM SERPL-MCNC: 2.3 MG/DL — SIGNIFICANT CHANGE UP (ref 1.6–2.6)
MCHC RBC-ENTMCNC: 28.8 PG — SIGNIFICANT CHANGE UP (ref 27–34)
MCHC RBC-ENTMCNC: 31.3 G/DL — LOW (ref 32–36)
MCV RBC AUTO: 92 FL — SIGNIFICANT CHANGE UP (ref 80–100)
MONOCYTES # BLD AUTO: 0.77 K/UL — SIGNIFICANT CHANGE UP (ref 0–0.9)
MONOCYTES NFR BLD AUTO: 10.6 % — SIGNIFICANT CHANGE UP (ref 2–14)
NEUTROPHILS # BLD AUTO: 4.53 K/UL — SIGNIFICANT CHANGE UP (ref 1.8–7.4)
NEUTROPHILS NFR BLD AUTO: 62.6 % — SIGNIFICANT CHANGE UP (ref 43–77)
NRBC # BLD AUTO: 0 K/UL — SIGNIFICANT CHANGE UP (ref 0–0)
NRBC # FLD: 0 K/UL — SIGNIFICANT CHANGE UP (ref 0–0)
NRBC BLD AUTO-RTO: 0 /100 WBCS — SIGNIFICANT CHANGE UP (ref 0–0)
NT-PROBNP SERPL-SCNC: 1218 PG/ML — HIGH (ref 0–300)
PLATELET # BLD AUTO: 231 K/UL — SIGNIFICANT CHANGE UP (ref 150–400)
PMV BLD: 9.6 FL — SIGNIFICANT CHANGE UP (ref 7–13)
POTASSIUM SERPL-MCNC: 4.6 MMOL/L — SIGNIFICANT CHANGE UP (ref 3.5–5.3)
POTASSIUM SERPL-SCNC: 4.6 MMOL/L — SIGNIFICANT CHANGE UP (ref 3.5–5.3)
PROT SERPL-MCNC: 7.4 G/DL — SIGNIFICANT CHANGE UP (ref 6–8.3)
PROTHROM AB SERPL-ACNC: 12.8 SEC — SIGNIFICANT CHANGE UP (ref 9.9–13.4)
RBC # BLD: 3.51 M/UL — LOW (ref 3.8–5.2)
RBC # FLD: 17.8 % — HIGH (ref 10.3–14.5)
SODIUM SERPL-SCNC: 135 MMOL/L — SIGNIFICANT CHANGE UP (ref 135–145)
TROPONIN T, HIGH SENSITIVITY RESULT: 11 NG/L — SIGNIFICANT CHANGE UP (ref 0–51)
WBC # BLD: 7.24 K/UL — SIGNIFICANT CHANGE UP (ref 3.8–10.5)
WBC # FLD AUTO: 7.24 K/UL — SIGNIFICANT CHANGE UP (ref 3.8–10.5)

## 2025-07-21 PROCEDURE — 71045 X-RAY EXAM CHEST 1 VIEW: CPT

## 2025-07-21 PROCEDURE — 80053 COMPREHEN METABOLIC PANEL: CPT

## 2025-07-21 PROCEDURE — 82330 ASSAY OF CALCIUM: CPT

## 2025-07-21 PROCEDURE — 85610 PROTHROMBIN TIME: CPT

## 2025-07-21 PROCEDURE — 84295 ASSAY OF SERUM SODIUM: CPT

## 2025-07-21 PROCEDURE — 99285 EMERGENCY DEPT VISIT HI MDM: CPT | Mod: 25

## 2025-07-21 PROCEDURE — 71045 X-RAY EXAM CHEST 1 VIEW: CPT | Mod: 26

## 2025-07-21 PROCEDURE — 99285 EMERGENCY DEPT VISIT HI MDM: CPT

## 2025-07-21 PROCEDURE — 85014 HEMATOCRIT: CPT

## 2025-07-21 PROCEDURE — 85025 COMPLETE CBC W/AUTO DIFF WBC: CPT

## 2025-07-21 PROCEDURE — 83605 ASSAY OF LACTIC ACID: CPT

## 2025-07-21 PROCEDURE — 82947 ASSAY GLUCOSE BLOOD QUANT: CPT

## 2025-07-21 PROCEDURE — 83880 ASSAY OF NATRIURETIC PEPTIDE: CPT

## 2025-07-21 PROCEDURE — 36000 PLACE NEEDLE IN VEIN: CPT

## 2025-07-21 PROCEDURE — 93005 ELECTROCARDIOGRAM TRACING: CPT

## 2025-07-21 PROCEDURE — 84132 ASSAY OF SERUM POTASSIUM: CPT

## 2025-07-21 PROCEDURE — 84484 ASSAY OF TROPONIN QUANT: CPT

## 2025-07-21 PROCEDURE — 83735 ASSAY OF MAGNESIUM: CPT

## 2025-07-21 PROCEDURE — 83690 ASSAY OF LIPASE: CPT

## 2025-07-21 PROCEDURE — 82803 BLOOD GASES ANY COMBINATION: CPT

## 2025-07-21 PROCEDURE — 82435 ASSAY OF BLOOD CHLORIDE: CPT

## 2025-07-21 PROCEDURE — 85018 HEMOGLOBIN: CPT

## 2025-07-21 PROCEDURE — 85730 THROMBOPLASTIN TIME PARTIAL: CPT

## 2025-07-21 PROCEDURE — 93010 ELECTROCARDIOGRAM REPORT: CPT

## 2025-07-21 NOTE — ED PROVIDER NOTE - ATTENDING CONTRIBUTION TO CARE
66 yo female PMH as noted including a.fib on eliquis p/w increased leg swelling since yesterday.  family @ bedside for collateral.  denies SOB or CP.    nontoxic on my exam, conversant, no distress.  1+ b/l LE edema.  normal sat on room air.  will get CBC, CMP, BNP, trops and reassess.

## 2025-07-21 NOTE — ED ADULT NURSE NOTE - NSFALLHARMRISKINTERV_ED_ALL_ED

## 2025-07-21 NOTE — ED ADULT NURSE NOTE - OBJECTIVE STATEMENT
65y female with a PMH of [place] on eliquis, mitral valve prolapse s/p mitral valve repair, HLD, HF, HTN presents to the ED from home complaining of LLE swelling. Reports that she noticed that her shoe felt tight upon walking yesterday. States that LLE was swollen from ankle down. Today, noticed more swelling from calf down to foot. Endorses some pain with walking. 65y female with a PMH of [place] on eliquis, mitral valve prolapse s/p mitral valve repair, HLD, HF, HTN presents to the ED from home complaining of LLE swelling. Reports that she noticed that her shoe felt tight upon walking yesterday. States that LLE was swollen from ankle down. Today, noticed more swelling from calf down to foot. Endorses some pain with walking. Denies any new chest pain or SOB. + pedal pulses. LLE > RLE. No pitting edema noted. Denies headache, dizziness, vision changes, chest pain, shortness of breath, abdominal pain, nausea, vomiting, diarrhea, fevers, chills, dysuria, hematuria, recent illness travel or fall.

## 2025-07-21 NOTE — ED ADULT NURSE NOTE - NSFALLRISKFACTORS_ED_ALL_ED
chitra
Coagulation: Bleeding disorder either through use of anticoagulants or underlying clinical condition(s)

## 2025-07-21 NOTE — ED PROVIDER NOTE - CLINICAL SUMMARY MEDICAL DECISION MAKING FREE TEXT BOX
66 y/o female hx HTN, HLD, MVR with MAZE on 4/16/24, Afib on Eliquis presents with leg swelling. She is hemodynamically stable, afebrile, on exam there is bilateral pitting edema in lower extremities. Feet are warm, well perfused with palpable DP pulses. No induration/tenderness/focal area of redness. Lungs are clear without crackles. BP significantly lower in left arm than right, patient states this is chronic and has always been the case. No concern for DVT given bilateral swelling, compliance with eliquis. Will get 66 y/o female hx HTN, HLD, MVR with MAZE on 4/16/24, Afib on Eliquis presents with leg swelling. She is hemodynamically stable, afebrile, on exam there is bilateral pitting edema in lower extremities. Feet are warm, well perfused with palpable DP pulses. No induration/tenderness/focal area of redness. Lungs are clear without crackles. BP significantly lower in left arm than right, patient states this is chronic and has always been the case. No concern for DVT given bilateral swelling, compliance with eliquis. Will get    see attending attestation authored by me, Herbert Ramos MD, for further MDM details.

## 2025-07-21 NOTE — ED PROVIDER NOTE - INTERPRETATION
EKG independently reviewed for rate, rhythm, axis, intervals and segments, including QRS morphology, P wave appearance T wave appearance, NE interval, and QT interval.  I find the EKG to be notable as follows: a.fib

## 2025-07-21 NOTE — ED PROVIDER NOTE - NSFOLLOWUPINSTRUCTIONS_ED_ALL_ED_FT
You were seen in the ER for leg swelling. Your workup was reassuring and did not show signs of a dangerous condition.    We are starting you on a diuretic (water pill) called furosemide to help decrease the leg swelling. Please take this medication once daily until you are able to see your cardiologist.     Please schedule a follow up appointment with your cardiologist Dr. Olmos within the next few days. Please bring this discharge paperwork containing the results of your workup to any follow-up appointments you have.    Please return to the ER if you have severe chest or abdominal pain, difficulty breathing, intractable nausea/vomiting, fevers/chills, or other symptoms that concern you.

## 2025-07-21 NOTE — ED PROVIDER NOTE - PATIENT PORTAL LINK FT
You can access the FollowMyHealth Patient Portal offered by [facility] by registering at the following website: [email or web address]. By joining [employer]’s FollowMyHealth portal, you will also be able to view your health information using other applications (apps) compatible with our system.

## 2025-07-21 NOTE — ED PROVIDER NOTE - OBJECTIVE STATEMENT
64 y/o female hx HTN, HLD, MVR with MAZE on 4/16/24, Afib on Eliquis presents with leg swelling. She noticed symptoms yesterday, her left leg is slightly more swollen but both legs are more swollen than normal. She denies decreased/increased exercise, recent illness, chest pain, shortness of breath, headache, n/v/d, urinary complaints. She is compliant with eliquis daily, does not take a diuretic. No history of PAD/lower extremity grafts.

## 2025-07-21 NOTE — ED PROVIDER NOTE - PROGRESS NOTE DETAILS
S/o to me at 11PM by Dr. Ramos pending workup results. Labs w/ mild anemia, indeterminate but not significantly elevated troponin, pro-BNP mildly elevated but significantly improved compared to prior. CXR independently reviewed by me, no pleural effusions, questionable very mild vascular congestion. Reassessed, appears very well, NAD. Endorses progressive B/L leg swelling but otherwise denies CP/SOB. Follows regularly w/ cardiologist Dr. Olmos. Will restart low dose Lasix, advised urgent f/u with Dr. Olmos for re-evaluation/echo. Patient expressed agreement with and understanding of the results and plan, states she will call for an appointment. I have taken over the care of this patient and supervised the ongoing plan of care with the resident, fellow, ACP and/or student. -Elin Campbell MD (Attending)

## 2025-07-21 NOTE — ED ADULT NURSE NOTE - NS ED NURSE RECORD ANOTHER HT AND WT
Called and spoke with patient. Rash is improving with use of hydrocortisone for now, declines appointment. Advised to call back if rash recurs and wants to have virtual visit with provider.     Electronically Signed by:    Ewa Salgado CMA  04/24/20        
From: Jaime Tomas  To: Nahomy Smith MD  Sent: 4/5/2020 8:54 PM CDT  Subject: Medication Question    Doctor, for the last week, I have contacted a rash on my left foot (see image). I have been treating it twice a day with hydrocortisone cream 1 percent. No change. What do I need to do now?  
No response to my chart messages  Trying to reach you letter sent.   
Patient calling back. His # is [phone]  Please call   
Yes

## 2025-07-22 VITALS
OXYGEN SATURATION: 95 % | SYSTOLIC BLOOD PRESSURE: 110 MMHG | HEART RATE: 70 BPM | DIASTOLIC BLOOD PRESSURE: 62 MMHG | RESPIRATION RATE: 16 BRPM | TEMPERATURE: 98 F

## 2025-07-22 RX ORDER — FUROSEMIDE 10 MG/ML
1 INJECTION INTRAMUSCULAR; INTRAVENOUS
Qty: 30 | Refills: 0
Start: 2025-07-22 | End: 2025-08-20

## 2025-08-26 ENCOUNTER — APPOINTMENT (OUTPATIENT)
Dept: CARDIOLOGY | Facility: CLINIC | Age: 65
End: 2025-08-26
Payer: MEDICARE

## 2025-08-26 VITALS
OXYGEN SATURATION: 100 % | DIASTOLIC BLOOD PRESSURE: 78 MMHG | SYSTOLIC BLOOD PRESSURE: 109 MMHG | BODY MASS INDEX: 26.64 KG/M2 | WEIGHT: 191 LBS | HEART RATE: 75 BPM

## 2025-08-26 DIAGNOSIS — Z95.2 PRESENCE OF PROSTHETIC HEART VALVE: ICD-10-CM

## 2025-08-26 DIAGNOSIS — I48.91 UNSPECIFIED ATRIAL FIBRILLATION: ICD-10-CM

## 2025-08-26 DIAGNOSIS — E78.5 HYPERLIPIDEMIA, UNSPECIFIED: ICD-10-CM

## 2025-08-26 DIAGNOSIS — I10 ESSENTIAL (PRIMARY) HYPERTENSION: ICD-10-CM

## 2025-08-26 PROCEDURE — G2211 COMPLEX E/M VISIT ADD ON: CPT

## 2025-08-26 PROCEDURE — 93000 ELECTROCARDIOGRAM COMPLETE: CPT

## 2025-08-26 PROCEDURE — 99214 OFFICE O/P EST MOD 30 MIN: CPT

## 2025-08-26 RX ORDER — FUROSEMIDE 20 MG/1
20 TABLET ORAL
Qty: 90 | Refills: 1 | Status: ACTIVE | COMMUNITY
Start: 2025-08-26 | End: 1900-01-01

## (undated) DEVICE — SOL INJ LR 1000ML

## (undated) DEVICE — DRAPE TOWEL BLUE 17" X 24"

## (undated) DEVICE — SUT ETHIBOND 2-0 36" SH

## (undated) DEVICE — CONNECTOR STRAIGHT 1/2 X 1/2"

## (undated) DEVICE — TUBING TRUWAVE PRESSURE MALE/FEMALE 72"

## (undated) DEVICE — MEDICATION LABELS W MARKER

## (undated) DEVICE — VISITEC 4X4

## (undated) DEVICE — DRSG KLING 4"

## (undated) DEVICE — DRAIN PENROSE .25" X 18" LATEX

## (undated) DEVICE — GLV 6 PROTEXIS (WHITE)

## (undated) DEVICE — VENTING ADAPTER "Y" (RED/BLUE) 7.5"

## (undated) DEVICE — DRAIN RESERVOIR FOR JACKSON PRATT 100CC CARDINAL

## (undated) DEVICE — ADAPTER DLP MALE 1/4" X 2" (CLEAR) BARBED

## (undated) DEVICE — LAP PAD 18 X 18"

## (undated) DEVICE — SUT SILK 2-0 18" SH (POP-OFF)

## (undated) DEVICE — BLADE SCALPEL SAFETYLOCK #10

## (undated) DEVICE — DRAPE SLUSH MACHINE 48" X 48"

## (undated) DEVICE — DRAPE SLUSH / WARMER 44 X 66"

## (undated) DEVICE — GOWN TRIMAX LG

## (undated) DEVICE — SUT PROLENE 4-0 36" RB-1

## (undated) DEVICE — VENODYNE/SCD SLEEVE CALF MEDIUM

## (undated) DEVICE — GOWN XL

## (undated) DEVICE — TOURNIQUET SET TOURNIKWIK 12FR (4 TUBES, 1 SNARE) 7.5"

## (undated) DEVICE — CHEST DRAIN OASIS DRY SUCTION WATER SEAL

## (undated) DEVICE — DRAPE MAYO STAND 30"

## (undated) DEVICE — SUT PROLENE 3-0 36" SH

## (undated) DEVICE — PHRENIC NERVE PAD MEDIUM

## (undated) DEVICE — SPECIMEN CONTAINER 100ML

## (undated) DEVICE — DRSG CURITY GAUZE SPONGE 4 X 4" 12-PLY

## (undated) DEVICE — ELCTR BOVIE PENCIL HANDPIECE ROCKER SWITCH 15FT

## (undated) DEVICE — GLV 7.5 PROTEXIS (WHITE)

## (undated) DEVICE — DRSG OPSITE 13.75 X 4"

## (undated) DEVICE — ELCTR BOVIE TIP BLADE MEGADYNE E-Z CLEAN 6.5" (LONG)

## (undated) DEVICE — FOLEY TRAY 16FR 5CC LF LUBRISIL ADVANCE TEMP CLOSED

## (undated) DEVICE — DRSG DERMABOND PRINEO 60CM

## (undated) DEVICE — GLV 8.5 PROTEXIS (WHITE)

## (undated) DEVICE — DRAPE IOBAN 33" X 23"

## (undated) DEVICE — GLV 6.5 PROTEXIS (WHITE)

## (undated) DEVICE — PACING CABLE (BLUE) ATRIAL TEMP SCREW DOWN 12FT

## (undated) DEVICE — SAW BLADE MICROAIRE STERNUM 1X34X9.4MM

## (undated) DEVICE — CHEST DRAIN PLEUR-EVAC DRY/WET ADULT-PEDS SINGLE (QUICK)

## (undated) DEVICE — PACK CARDIAC YELLOW

## (undated) DEVICE — SPONGE PEANUT AUTO COUNT

## (undated) DEVICE — SUT VICRYL 1 36" CTX UNDYED

## (undated) DEVICE — DRSG DERMABOND PRINEO 22CM

## (undated) DEVICE — DRSG XEROFORM 1 X 8"

## (undated) DEVICE — Device

## (undated) DEVICE — SUT SILK 4-0 17-18"

## (undated) DEVICE — TUBING SUCTION CONN 1/4" X 10FT

## (undated) DEVICE — BAG DECANTER 2

## (undated) DEVICE — DEVICE CLAMP ENCOMPASS SYNERGY ISOLATOR

## (undated) DEVICE — CONNECTOR CARDIAC 1:1 FOR HUBLESS DRAINS

## (undated) DEVICE — DRAPE 3/4 SHEET W REINFORCEMENT 56X77"

## (undated) DEVICE — NDL HYPO SAFE 18G X 1.5" (PINK)

## (undated) DEVICE — SUT SOFSILK 2-0 30" SC-2

## (undated) DEVICE — SUT ETHIBOND EXCEL 3-0 30" V-5 PLDGT 5 GREEN 5 WHITE

## (undated) DEVICE — SUT PLEDGET SOFT LARGE 3/8" X 3/16" X 1/16" X6

## (undated) DEVICE — SUT PROLENE 5-0 36" RB-1

## (undated) DEVICE — WARMING BLANKET FULL UNDERBODY

## (undated) DEVICE — MULTIPLE PERFUSION SET FEMALE 1 INLET LEG W 4 LEGS 15" (BLUE/RED)

## (undated) DEVICE — VESSEL LOOP EXTRA MAXI-BLUE 0.200" X 22"

## (undated) DEVICE — SUT TICRON 2-0 30" CV-305 DA

## (undated) DEVICE — GLV 7 PROTEXIS (WHITE)

## (undated) DEVICE — STAPLER SKIN VISI-STAT 35 WIDE

## (undated) DEVICE — SUT PLEDGET PRE PUNCH 4.8 X 9.5 X 1.5 MM

## (undated) DEVICE — VESSEL LOOP ASPEN SUPERMAXI BLUE

## (undated) DEVICE — MARKING PEN W RULER

## (undated) DEVICE — SENSOR MYOCARDIAL TEMP 15MM

## (undated) DEVICE — PACK THYROID HEAD NECK

## (undated) DEVICE — DRAPE 1/2 SHEET 40X57"

## (undated) DEVICE — SUT MONOCRYL 4-0 18" PS-2

## (undated) DEVICE — SAW BLADE STRYKER STERNUM 31MM X 6.27 X .79

## (undated) DEVICE — VENODYNE/SCD SLEEVE CALF LARGE

## (undated) DEVICE — SUT SOFSILK 2-0 18" TIES

## (undated) DEVICE — PACK UNIVERSAL CARDIAC

## (undated) DEVICE — SUT SILK 5-0 60" TIES

## (undated) DEVICE — PREP CHLORAPREP HI-LITE ORANGE 26ML

## (undated) DEVICE — DRAIN JACKSON PRATT 10MM FLAT FULL NO TROCAR

## (undated) DEVICE — DRSG TEGADERM 4X4.75"

## (undated) DEVICE — FOLEY TRAY 16FR 5CC LTX UMETER CLOSED

## (undated) DEVICE — ELCTR BOVIE TIP BLADE MEGADYNE E-Z CLEAN 2.5" (SHORT)

## (undated) DEVICE — TOURNIQUET SET SURE-SNARE 22FR (2 TUBES, 2 UMBILICAL TAPES, 2 PLASTIC SNARES) 5"

## (undated) DEVICE — GLV 8 PROTEXIS (WHITE)

## (undated) DEVICE — SUT VICRYL 3-0 27" SH UNDYED

## (undated) DEVICE — SOL IRR POUR NS 0.9% 500ML

## (undated) DEVICE — PACING CABLE (BROWN) A/V TEMP SCREW DOWN 12FT

## (undated) DEVICE — SUT BOOT STANDARD (ASSORTED) 5 PAIR

## (undated) DEVICE — SUMP INTRACARDIAC 20FR 1/4" ADULT

## (undated) DEVICE — NDL COUNTER FOAM AND MAGNET 40-70

## (undated) DEVICE — SUMP PERICARDIAL 20FR 1/4" ADULT

## (undated) DEVICE — DRSG TEGADERM 6"X8"

## (undated) DEVICE — SUCTION YANKAUER TAPERED BULBOUS NO VENT

## (undated) DEVICE — PREP DURAPREP 26CC

## (undated) DEVICE — SUT PROLENE 4-0 36" SH

## (undated) DEVICE — POSITIONER FOAM EGG CRATE ULNAR 2PCS (PINK)

## (undated) DEVICE — SUT TICRON 2-0 36" CV-316 DA

## (undated) DEVICE — SUT DOUBLE 6 WIRE STERNAL

## (undated) DEVICE — STOPCOCK 4-WAY (BLUE) DISCOFIX SPIN-LOCK CONNECTOR

## (undated) DEVICE — WARMING BLANKET LOWER ADULT

## (undated) DEVICE — DRSG STERISTRIPS 0.5 X 4"

## (undated) DEVICE — BLADE SCALPEL SAFETYLOCK #15

## (undated) DEVICE — SOL IRR POUR H2O 250ML

## (undated) DEVICE — DRSG OPSITE 2.5 X 2"

## (undated) DEVICE — DRAPE INSTRUMENT POUCH 6.75" X 11"

## (undated) DEVICE — GOWN TRIMAX XXL

## (undated) DEVICE — ELCTR REM POLYHESIVE ADULT PT RETURN 15FT